# Patient Record
Sex: FEMALE | Race: WHITE
[De-identification: names, ages, dates, MRNs, and addresses within clinical notes are randomized per-mention and may not be internally consistent; named-entity substitution may affect disease eponyms.]

---

## 2017-06-14 ENCOUNTER — HOSPITAL ENCOUNTER (EMERGENCY)
Dept: HOSPITAL 49 - FER | Age: 52
LOS: 1 days | Discharge: HOME | End: 2017-06-15
Payer: COMMERCIAL

## 2017-06-14 DIAGNOSIS — Z88.5: ICD-10-CM

## 2017-06-14 DIAGNOSIS — Z79.84: ICD-10-CM

## 2017-06-14 DIAGNOSIS — Z79.4: ICD-10-CM

## 2017-06-14 DIAGNOSIS — Z79.899: ICD-10-CM

## 2017-06-14 DIAGNOSIS — E78.5: ICD-10-CM

## 2017-06-14 DIAGNOSIS — I82.432: ICD-10-CM

## 2017-06-14 DIAGNOSIS — I82.412: Primary | ICD-10-CM

## 2017-06-14 DIAGNOSIS — E11.9: ICD-10-CM

## 2017-06-14 DIAGNOSIS — Z88.6: ICD-10-CM

## 2017-06-14 LAB
BASOPHIL: 0.4 % (ref 0–2)
BUN SERPL-MCNC: 17 MG/DL (ref 6–25)
BUN/CREAT RATIO (CALC): 24 (ref 12.1–20.1)
CHLORIDE: 97 MMOL/L (ref 98–107)
CO2 (BICARBONATE): 24 MMOL/L (ref 23–33)
CREATININE: 0.7 MG/DL (ref 0.5–1)
EOSINOPHIL: 3 % (ref 0–5)
GLUCOSE SERPL-MCNC: 417 MG/DL (ref 70–105)
HCT: 38.4 % (ref 37–47)
HGB BLD-MCNC: 13.3 G/DL (ref 12.5–16)
LYMPHOCYTE: 28.8 % (ref 15–48)
MCH RBC QN AUTO: 30.8 PG (ref 25–31)
MCHC RBC AUTO-ENTMCNC: 34.6 G/DL (ref 32–36)
MCV: 88.9 FL (ref 78–100)
MONOCYTE: 10.5 % (ref 0–12)
MPV: 10.8 FL (ref 6–9.5)
NEUTROPHIL: 57.3 % (ref 41–80)
PLT: 166 K/UL (ref 150–400)
POTASSIUM: 4.1 MMOL/L (ref 3.5–5.1)
RBC MORPHOLOGY: NORMAL
RBC: 4.32 M/UL (ref 4.2–5.4)
RDW: 12.4 % (ref 11.5–14)
WBC: 7 K/UL (ref 4–10.5)

## 2017-09-25 ENCOUNTER — CONVERSION ENCOUNTER (OUTPATIENT)
Dept: OTHER | Facility: HOSPITAL | Age: 52
End: 2017-09-25

## 2018-01-23 ENCOUNTER — OFFICE VISIT CONVERTED (OUTPATIENT)
Dept: FAMILY MEDICINE CLINIC | Age: 53
End: 2018-01-23
Attending: NURSE PRACTITIONER

## 2018-02-05 ENCOUNTER — OFFICE VISIT CONVERTED (OUTPATIENT)
Dept: FAMILY MEDICINE CLINIC | Age: 53
End: 2018-02-05
Attending: NURSE PRACTITIONER

## 2018-02-07 ENCOUNTER — OFFICE VISIT CONVERTED (OUTPATIENT)
Dept: FAMILY MEDICINE CLINIC | Age: 53
End: 2018-02-07
Attending: FAMILY MEDICINE

## 2018-04-17 ENCOUNTER — OFFICE VISIT CONVERTED (OUTPATIENT)
Dept: FAMILY MEDICINE CLINIC | Age: 53
End: 2018-04-17
Attending: NURSE PRACTITIONER

## 2018-04-23 ENCOUNTER — OFFICE VISIT CONVERTED (OUTPATIENT)
Dept: FAMILY MEDICINE CLINIC | Age: 53
End: 2018-04-23
Attending: NURSE PRACTITIONER

## 2018-04-25 ENCOUNTER — OFFICE VISIT CONVERTED (OUTPATIENT)
Dept: FAMILY MEDICINE CLINIC | Age: 53
End: 2018-04-25
Attending: FAMILY MEDICINE

## 2018-05-01 ENCOUNTER — OFFICE VISIT CONVERTED (OUTPATIENT)
Dept: FAMILY MEDICINE CLINIC | Age: 53
End: 2018-05-01
Attending: FAMILY MEDICINE

## 2018-06-15 ENCOUNTER — OFFICE VISIT CONVERTED (OUTPATIENT)
Dept: FAMILY MEDICINE CLINIC | Age: 53
End: 2018-06-15
Attending: FAMILY MEDICINE

## 2018-09-04 ENCOUNTER — OFFICE VISIT CONVERTED (OUTPATIENT)
Dept: FAMILY MEDICINE CLINIC | Age: 53
End: 2018-09-04
Attending: NURSE PRACTITIONER

## 2018-09-17 ENCOUNTER — OFFICE VISIT CONVERTED (OUTPATIENT)
Dept: FAMILY MEDICINE CLINIC | Age: 53
End: 2018-09-17
Attending: FAMILY MEDICINE

## 2018-10-18 ENCOUNTER — OFFICE VISIT CONVERTED (OUTPATIENT)
Dept: FAMILY MEDICINE CLINIC | Age: 53
End: 2018-10-18
Attending: FAMILY MEDICINE

## 2018-11-27 ENCOUNTER — OFFICE VISIT CONVERTED (OUTPATIENT)
Dept: FAMILY MEDICINE CLINIC | Age: 53
End: 2018-11-27
Attending: NURSE PRACTITIONER

## 2018-12-03 ENCOUNTER — CONVERSION ENCOUNTER (OUTPATIENT)
Dept: OTHER | Facility: HOSPITAL | Age: 53
End: 2018-12-03

## 2018-12-03 ENCOUNTER — OFFICE VISIT CONVERTED (OUTPATIENT)
Dept: FAMILY MEDICINE CLINIC | Age: 53
End: 2018-12-03
Attending: FAMILY MEDICINE

## 2018-12-19 ENCOUNTER — OFFICE VISIT CONVERTED (OUTPATIENT)
Dept: FAMILY MEDICINE CLINIC | Age: 53
End: 2018-12-19
Attending: NURSE PRACTITIONER

## 2019-01-18 ENCOUNTER — HOSPITAL ENCOUNTER (OUTPATIENT)
Dept: OTHER | Facility: HOSPITAL | Age: 54
Discharge: HOME | End: 2019-01-18
Attending: NURSE PRACTITIONER

## 2019-01-18 ENCOUNTER — OFFICE VISIT CONVERTED (OUTPATIENT)
Dept: FAMILY MEDICINE CLINIC | Age: 54
End: 2019-01-18
Attending: NURSE PRACTITIONER

## 2019-01-20 LAB — BACTERIA SPEC AEROBE CULT: NORMAL

## 2019-02-25 ENCOUNTER — OFFICE VISIT CONVERTED (OUTPATIENT)
Dept: FAMILY MEDICINE CLINIC | Age: 54
End: 2019-02-25
Attending: NURSE PRACTITIONER

## 2019-03-06 ENCOUNTER — OFFICE VISIT CONVERTED (OUTPATIENT)
Dept: FAMILY MEDICINE CLINIC | Age: 54
End: 2019-03-06
Attending: FAMILY MEDICINE

## 2019-06-24 ENCOUNTER — HOSPITAL ENCOUNTER (OUTPATIENT)
Dept: OTHER | Facility: HOSPITAL | Age: 54
Discharge: HOME | End: 2019-06-24
Attending: FAMILY MEDICINE

## 2019-06-24 ENCOUNTER — OFFICE VISIT CONVERTED (OUTPATIENT)
Dept: FAMILY MEDICINE CLINIC | Age: 54
End: 2019-06-24
Attending: FAMILY MEDICINE

## 2019-06-24 LAB
ALBUMIN SERPL-MCNC: 3.7 G/DL (ref 3.5–5)
ALBUMIN/GLOB SERPL: 1.2 {RATIO} (ref 1.4–2.6)
ALP SERPL-CCNC: 132 U/L (ref 53–141)
ALT SERPL-CCNC: 42 U/L (ref 10–40)
ANION GAP SERPL CALC-SCNC: 15 MMOL/L (ref 8–19)
AST SERPL-CCNC: 34 U/L (ref 15–50)
BILIRUB SERPL-MCNC: 0.29 MG/DL (ref 0.2–1.3)
BUN SERPL-MCNC: 23 MG/DL (ref 5–25)
BUN/CREAT SERPL: 30 {RATIO} (ref 6–20)
CALCIUM SERPL-MCNC: 9.6 MG/DL (ref 8.7–10.4)
CHLORIDE SERPL-SCNC: 101 MMOL/L (ref 99–111)
CHOLEST SERPL-MCNC: 137 MG/DL (ref 107–200)
CHOLEST/HDLC SERPL: 4.7 {RATIO} (ref 3–6)
CONV CO2: 25 MMOL/L (ref 22–32)
CONV CREATININE URINE, RANDOM: 292.4 MG/DL (ref 10–300)
CONV MICROALBUM.,U,RANDOM: 37 MG/L (ref 0–20)
CONV TOTAL PROTEIN: 6.9 G/DL (ref 6.3–8.2)
CREAT UR-MCNC: 0.76 MG/DL (ref 0.5–0.9)
EST. AVERAGE GLUCOSE BLD GHB EST-MCNC: 266 MG/DL
GFR SERPLBLD BASED ON 1.73 SQ M-ARVRAT: >60 ML/MIN/{1.73_M2}
GLOBULIN UR ELPH-MCNC: 3.2 G/DL (ref 2–3.5)
GLUCOSE SERPL-MCNC: 238 MG/DL (ref 65–99)
HBA1C MFR BLD: 10.9 % (ref 3.5–5.7)
HDLC SERPL-MCNC: 29 MG/DL (ref 40–60)
LDLC SERPL CALC-MCNC: 70 MG/DL (ref 70–100)
MICROALBUMIN/CREAT UR: 12.7 MG/G{CRE} (ref 0–35)
OSMOLALITY SERPL CALC.SUM OF ELEC: 295 MOSM/KG (ref 273–304)
POTASSIUM SERPL-SCNC: 4.3 MMOL/L (ref 3.5–5.3)
SODIUM SERPL-SCNC: 137 MMOL/L (ref 135–147)
TRIGL SERPL-MCNC: 191 MG/DL (ref 40–150)
VLDLC SERPL-MCNC: 38 MG/DL (ref 5–37)

## 2019-07-08 ENCOUNTER — OFFICE VISIT CONVERTED (OUTPATIENT)
Dept: FAMILY MEDICINE CLINIC | Age: 54
End: 2019-07-08
Attending: NURSE PRACTITIONER

## 2019-07-08 ENCOUNTER — HOSPITAL ENCOUNTER (OUTPATIENT)
Dept: OTHER | Facility: HOSPITAL | Age: 54
Discharge: HOME | End: 2019-07-08
Attending: NURSE PRACTITIONER

## 2019-07-08 LAB
ANION GAP SERPL CALC-SCNC: 21 MMOL/L (ref 8–19)
BUN SERPL-MCNC: 28 MG/DL (ref 5–25)
BUN/CREAT SERPL: 25 {RATIO} (ref 6–20)
CALCIUM SERPL-MCNC: 9.3 MG/DL (ref 8.7–10.4)
CHLORIDE SERPL-SCNC: 101 MMOL/L (ref 99–111)
CONV CO2: 23 MMOL/L (ref 22–32)
CREAT UR-MCNC: 1.12 MG/DL (ref 0.5–0.9)
GFR SERPLBLD BASED ON 1.73 SQ M-ARVRAT: 55 ML/MIN/{1.73_M2}
GLUCOSE SERPL-MCNC: 204 MG/DL (ref 65–99)
OSMOLALITY SERPL CALC.SUM OF ELEC: 301 MOSM/KG (ref 273–304)
POTASSIUM SERPL-SCNC: 4.5 MMOL/L (ref 3.5–5.3)
SODIUM SERPL-SCNC: 140 MMOL/L (ref 135–147)

## 2019-07-29 ENCOUNTER — HOSPITAL ENCOUNTER (OUTPATIENT)
Dept: OTHER | Facility: HOSPITAL | Age: 54
Discharge: HOME | End: 2019-07-29
Attending: INTERNAL MEDICINE

## 2019-07-29 LAB
ANION GAP SERPL CALC-SCNC: 20 MMOL/L (ref 8–19)
BUN SERPL-MCNC: 25 MG/DL (ref 5–25)
BUN/CREAT SERPL: 25 {RATIO} (ref 6–20)
CALCIUM SERPL-MCNC: 9.4 MG/DL (ref 8.7–10.4)
CHLORIDE SERPL-SCNC: 102 MMOL/L (ref 99–111)
CONV CO2: 21 MMOL/L (ref 22–32)
CREAT UR-MCNC: 1 MG/DL (ref 0.5–0.9)
GFR SERPLBLD BASED ON 1.73 SQ M-ARVRAT: >60 ML/MIN/{1.73_M2}
GLUCOSE SERPL-MCNC: 244 MG/DL (ref 65–99)
OSMOLALITY SERPL CALC.SUM OF ELEC: 300 MOSM/KG (ref 273–304)
POTASSIUM SERPL-SCNC: 4.2 MMOL/L (ref 3.5–5.3)
SODIUM SERPL-SCNC: 139 MMOL/L (ref 135–147)

## 2019-07-31 ENCOUNTER — OFFICE VISIT CONVERTED (OUTPATIENT)
Dept: FAMILY MEDICINE CLINIC | Age: 54
End: 2019-07-31
Attending: NURSE PRACTITIONER

## 2019-08-22 ENCOUNTER — OFFICE VISIT CONVERTED (OUTPATIENT)
Dept: FAMILY MEDICINE CLINIC | Age: 54
End: 2019-08-22
Attending: NURSE PRACTITIONER

## 2019-08-22 ENCOUNTER — HOSPITAL ENCOUNTER (OUTPATIENT)
Dept: OTHER | Facility: HOSPITAL | Age: 54
Discharge: HOME | End: 2019-08-22
Attending: NURSE PRACTITIONER

## 2019-09-30 ENCOUNTER — OFFICE VISIT CONVERTED (OUTPATIENT)
Dept: FAMILY MEDICINE CLINIC | Age: 54
End: 2019-09-30
Attending: FAMILY MEDICINE

## 2019-10-02 ENCOUNTER — HOSPITAL ENCOUNTER (OUTPATIENT)
Dept: OTHER | Facility: HOSPITAL | Age: 54
Discharge: HOME | End: 2019-10-02
Attending: FAMILY MEDICINE

## 2019-10-02 LAB
25(OH)D3 SERPL-MCNC: 56.7 NG/ML (ref 30–100)
ABO GROUP BLD: NORMAL
ALBUMIN SERPL-MCNC: 3.9 G/DL (ref 3.5–5)
ALBUMIN/GLOB SERPL: 1 {RATIO} (ref 1.4–2.6)
ALP SERPL-CCNC: 131 U/L (ref 53–141)
ALT SERPL-CCNC: 34 U/L (ref 10–40)
ANION GAP SERPL CALC-SCNC: 18 MMOL/L (ref 8–19)
AST SERPL-CCNC: 29 U/L (ref 15–50)
BILIRUB SERPL-MCNC: 0.23 MG/DL (ref 0.2–1.3)
BUN SERPL-MCNC: 21 MG/DL (ref 5–25)
BUN/CREAT SERPL: 25 {RATIO} (ref 6–20)
CALCIUM SERPL-MCNC: 9.8 MG/DL (ref 8.7–10.4)
CHLORIDE SERPL-SCNC: 107 MMOL/L (ref 99–111)
CHOLEST SERPL-MCNC: 148 MG/DL (ref 107–200)
CHOLEST/HDLC SERPL: 4.1 {RATIO} (ref 3–6)
CONV ABD CONTROL: NORMAL
CONV CO2: 23 MMOL/L (ref 22–32)
CONV CREATININE URINE, RANDOM: 60.9 MG/DL (ref 10–300)
CONV MICROALBUM.,U,RANDOM: <12 MG/L (ref 0–20)
CONV TOTAL PROTEIN: 7.7 G/DL (ref 6.3–8.2)
CREAT UR-MCNC: 0.85 MG/DL (ref 0.5–0.9)
EST. AVERAGE GLUCOSE BLD GHB EST-MCNC: 171 MG/DL
GFR SERPLBLD BASED ON 1.73 SQ M-ARVRAT: >60 ML/MIN/{1.73_M2}
GLOBULIN UR ELPH-MCNC: 3.8 G/DL (ref 2–3.5)
GLUCOSE SERPL-MCNC: 116 MG/DL (ref 65–99)
HBA1C MFR BLD: 7.6 % (ref 3.5–5.7)
HDLC SERPL-MCNC: 36 MG/DL (ref 40–60)
LDLC SERPL CALC-MCNC: 80 MG/DL (ref 70–100)
MICROALBUMIN/CREAT UR: 19.7 MG/G{CRE} (ref 0–35)
OSMOLALITY SERPL CALC.SUM OF ELEC: 302 MOSM/KG (ref 273–304)
POTASSIUM SERPL-SCNC: 4.2 MMOL/L (ref 3.5–5.3)
RH BLD: NORMAL
SODIUM SERPL-SCNC: 144 MMOL/L (ref 135–147)
TRIGL SERPL-MCNC: 159 MG/DL (ref 40–150)
VLDLC SERPL-MCNC: 32 MG/DL (ref 5–37)

## 2019-10-18 ENCOUNTER — OFFICE VISIT CONVERTED (OUTPATIENT)
Dept: FAMILY MEDICINE CLINIC | Age: 54
End: 2019-10-18
Attending: NURSE PRACTITIONER

## 2019-12-18 ENCOUNTER — HOSPITAL ENCOUNTER (OUTPATIENT)
Dept: OTHER | Facility: HOSPITAL | Age: 54
Discharge: HOME | End: 2019-12-18
Attending: NURSE PRACTITIONER

## 2019-12-18 ENCOUNTER — OFFICE VISIT CONVERTED (OUTPATIENT)
Dept: FAMILY MEDICINE CLINIC | Age: 54
End: 2019-12-18
Attending: NURSE PRACTITIONER

## 2019-12-21 LAB — BACTERIA SPEC AEROBE CULT: NORMAL

## 2019-12-27 ENCOUNTER — HOSPITAL ENCOUNTER (OUTPATIENT)
Dept: OTHER | Facility: HOSPITAL | Age: 54
Discharge: HOME | End: 2019-12-27
Attending: FAMILY MEDICINE

## 2020-01-02 ENCOUNTER — HOSPITAL ENCOUNTER (OUTPATIENT)
Dept: OTHER | Facility: HOSPITAL | Age: 55
Discharge: HOME | End: 2020-01-02
Attending: FAMILY MEDICINE

## 2020-01-06 ENCOUNTER — HOSPITAL ENCOUNTER (OUTPATIENT)
Dept: OTHER | Facility: HOSPITAL | Age: 55
Discharge: HOME | End: 2020-01-06
Attending: NURSE PRACTITIONER

## 2020-01-06 ENCOUNTER — OFFICE VISIT CONVERTED (OUTPATIENT)
Dept: FAMILY MEDICINE CLINIC | Age: 55
End: 2020-01-06
Attending: NURSE PRACTITIONER

## 2020-01-23 ENCOUNTER — OFFICE VISIT CONVERTED (OUTPATIENT)
Dept: FAMILY MEDICINE CLINIC | Age: 55
End: 2020-01-23
Attending: FAMILY MEDICINE

## 2020-01-23 ENCOUNTER — HOSPITAL ENCOUNTER (OUTPATIENT)
Dept: OTHER | Facility: HOSPITAL | Age: 55
Discharge: HOME | End: 2020-01-23
Attending: FAMILY MEDICINE

## 2020-01-24 LAB
ALP SERPL-CCNC: 115 U/L (ref 53–141)
ASO AB SERPL-ACNC: 172 [IU]/ML (ref 0–200)
CALCIUM SERPL-MCNC: 9.3 MG/DL (ref 8.7–10.4)
CONV RHEUMATOID FACTOR IGM: 94.3 [IU]/ML (ref 0–14)
CRP SERPL-MCNC: 10.1 MG/L (ref 0–5)
DSDNA AB SER-ACNC: NEGATIVE [IU]/ML
ENA AB SER IA-ACNC: NEGATIVE {RATIO}
ERYTHROCYTE [SEDIMENTATION RATE] IN BLOOD: 54 MM/H (ref 0–30)
PHOSPHATE SERPL-MCNC: 4.5 MG/DL (ref 2.4–4.5)
URATE SERPL-MCNC: 4.6 MG/DL (ref 2.5–7.5)

## 2020-03-02 ENCOUNTER — HOSPITAL ENCOUNTER (OUTPATIENT)
Dept: OTHER | Facility: HOSPITAL | Age: 55
Discharge: HOME | End: 2020-03-02

## 2020-03-02 LAB
ALBUMIN SERPL-MCNC: 3.9 G/DL (ref 3.5–5)
ALBUMIN/GLOB SERPL: 1.1 {RATIO} (ref 1.4–2.6)
ALP SERPL-CCNC: 110 U/L (ref 53–141)
ALT SERPL-CCNC: 22 U/L (ref 10–40)
ANION GAP SERPL CALC-SCNC: 18 MMOL/L (ref 8–19)
AST SERPL-CCNC: 21 U/L (ref 15–50)
BILIRUB SERPL-MCNC: 0.19 MG/DL (ref 0.2–1.3)
BUN SERPL-MCNC: 25 MG/DL (ref 5–25)
BUN/CREAT SERPL: 27 {RATIO} (ref 6–20)
CALCIUM SERPL-MCNC: 9.7 MG/DL (ref 8.7–10.4)
CHLORIDE SERPL-SCNC: 105 MMOL/L (ref 99–111)
CONV CO2: 21 MMOL/L (ref 22–32)
CONV TOTAL PROTEIN: 7.5 G/DL (ref 6.3–8.2)
CREAT UR-MCNC: 0.91 MG/DL (ref 0.5–0.9)
EST. AVERAGE GLUCOSE BLD GHB EST-MCNC: 169 MG/DL
GFR SERPLBLD BASED ON 1.73 SQ M-ARVRAT: >60 ML/MIN/{1.73_M2}
GLOBULIN UR ELPH-MCNC: 3.6 G/DL (ref 2–3.5)
GLUCOSE SERPL-MCNC: 152 MG/DL (ref 65–99)
HBA1C MFR BLD: 7.5 % (ref 3.5–5.7)
OSMOLALITY SERPL CALC.SUM OF ELEC: 297 MOSM/KG (ref 273–304)
POTASSIUM SERPL-SCNC: 4.3 MMOL/L (ref 3.5–5.3)
SODIUM SERPL-SCNC: 140 MMOL/L (ref 135–147)

## 2020-03-23 ENCOUNTER — OFFICE VISIT CONVERTED (OUTPATIENT)
Dept: FAMILY MEDICINE CLINIC | Age: 55
End: 2020-03-23
Attending: FAMILY MEDICINE

## 2020-03-24 ENCOUNTER — HOSPITAL ENCOUNTER (OUTPATIENT)
Dept: OTHER | Facility: HOSPITAL | Age: 55
Discharge: HOME | End: 2020-03-24
Attending: FAMILY MEDICINE

## 2020-03-24 LAB
ALBUMIN SERPL-MCNC: 3.8 G/DL (ref 3.5–5)
ALBUMIN/GLOB SERPL: 1.1 {RATIO} (ref 1.4–2.6)
ALP SERPL-CCNC: 125 U/L (ref 53–141)
ALT SERPL-CCNC: 20 U/L (ref 10–40)
ANION GAP SERPL CALC-SCNC: 16 MMOL/L (ref 8–19)
AST SERPL-CCNC: 17 U/L (ref 15–50)
BILIRUB SERPL-MCNC: 0.23 MG/DL (ref 0.2–1.3)
BUN SERPL-MCNC: 22 MG/DL (ref 5–25)
BUN/CREAT SERPL: 26 {RATIO} (ref 6–20)
CALCIUM SERPL-MCNC: 9.6 MG/DL (ref 8.7–10.4)
CHLORIDE SERPL-SCNC: 103 MMOL/L (ref 99–111)
CHOLEST SERPL-MCNC: 153 MG/DL (ref 107–200)
CHOLEST/HDLC SERPL: 4.6 {RATIO} (ref 3–6)
CONV CO2: 24 MMOL/L (ref 22–32)
CONV TOTAL PROTEIN: 7.4 G/DL (ref 6.3–8.2)
CREAT UR-MCNC: 0.85 MG/DL (ref 0.5–0.9)
GFR SERPLBLD BASED ON 1.73 SQ M-ARVRAT: >60 ML/MIN/{1.73_M2}
GLOBULIN UR ELPH-MCNC: 3.6 G/DL (ref 2–3.5)
GLUCOSE SERPL-MCNC: 193 MG/DL (ref 65–99)
HDLC SERPL-MCNC: 33 MG/DL (ref 40–60)
LDLC SERPL CALC-MCNC: 62 MG/DL (ref 70–100)
OSMOLALITY SERPL CALC.SUM OF ELEC: 297 MOSM/KG (ref 273–304)
POTASSIUM SERPL-SCNC: 4.3 MMOL/L (ref 3.5–5.3)
SODIUM SERPL-SCNC: 139 MMOL/L (ref 135–147)
TRIGL SERPL-MCNC: 288 MG/DL (ref 40–150)
VLDLC SERPL-MCNC: 58 MG/DL (ref 5–37)

## 2020-05-02 ENCOUNTER — OFFICE VISIT CONVERTED (OUTPATIENT)
Dept: FAMILY MEDICINE CLINIC | Age: 55
End: 2020-05-02
Attending: NURSE PRACTITIONER

## 2020-07-06 ENCOUNTER — HOSPITAL ENCOUNTER (OUTPATIENT)
Dept: OTHER | Facility: HOSPITAL | Age: 55
Discharge: HOME | End: 2020-07-06
Attending: INTERNAL MEDICINE

## 2020-07-07 LAB
ALBUMIN SERPL-MCNC: 3.8 G/DL (ref 3.5–5)
ALBUMIN/GLOB SERPL: 1.3 {RATIO} (ref 1.4–2.6)
ALP SERPL-CCNC: 93 U/L (ref 53–141)
ALT SERPL-CCNC: 23 U/L (ref 10–40)
ANION GAP SERPL CALC-SCNC: 17 MMOL/L (ref 8–19)
AST SERPL-CCNC: 20 U/L (ref 15–50)
BILIRUB SERPL-MCNC: <0.15 MG/DL (ref 0.2–1.3)
BUN SERPL-MCNC: 16 MG/DL (ref 5–25)
BUN/CREAT SERPL: 18 {RATIO} (ref 6–20)
CALCIUM SERPL-MCNC: 9.3 MG/DL (ref 8.7–10.4)
CHLORIDE SERPL-SCNC: 108 MMOL/L (ref 99–111)
CHOLEST SERPL-MCNC: 139 MG/DL (ref 107–200)
CHOLEST/HDLC SERPL: 4.5 {RATIO} (ref 3–6)
CONV CO2: 17 MMOL/L (ref 22–32)
CONV CREATININE URINE, RANDOM: 74.6 MG/DL (ref 10–300)
CONV MICROALBUM.,U,RANDOM: <12 MG/L (ref 0–20)
CONV TOTAL PROTEIN: 6.7 G/DL (ref 6.3–8.2)
CREAT UR-MCNC: 0.91 MG/DL (ref 0.5–0.9)
EST. AVERAGE GLUCOSE BLD GHB EST-MCNC: 171 MG/DL
GFR SERPLBLD BASED ON 1.73 SQ M-ARVRAT: >60 ML/MIN/{1.73_M2}
GLOBULIN UR ELPH-MCNC: 2.9 G/DL (ref 2–3.5)
GLUCOSE SERPL-MCNC: 175 MG/DL (ref 65–99)
HBA1C MFR BLD: 7.6 % (ref 3.5–5.7)
HDLC SERPL-MCNC: 31 MG/DL (ref 40–60)
LDLC SERPL CALC-MCNC: 53 MG/DL (ref 70–100)
MICROALBUMIN/CREAT UR: 16.1 MG/G{CRE} (ref 0–35)
OSMOLALITY SERPL CALC.SUM OF ELEC: 291 MOSM/KG (ref 273–304)
POTASSIUM SERPL-SCNC: 4 MMOL/L (ref 3.5–5.3)
SODIUM SERPL-SCNC: 138 MMOL/L (ref 135–147)
TRIGL SERPL-MCNC: 275 MG/DL (ref 40–150)
VLDLC SERPL-MCNC: 55 MG/DL (ref 5–37)

## 2020-07-13 ENCOUNTER — OFFICE VISIT CONVERTED (OUTPATIENT)
Dept: FAMILY MEDICINE CLINIC | Age: 55
End: 2020-07-13
Attending: FAMILY MEDICINE

## 2020-11-02 ENCOUNTER — OFFICE VISIT CONVERTED (OUTPATIENT)
Dept: FAMILY MEDICINE CLINIC | Age: 55
End: 2020-11-02
Attending: FAMILY MEDICINE

## 2020-12-07 ENCOUNTER — OFFICE VISIT CONVERTED (OUTPATIENT)
Dept: FAMILY MEDICINE CLINIC | Age: 55
End: 2020-12-07

## 2020-12-07 ENCOUNTER — HOSPITAL ENCOUNTER (OUTPATIENT)
Dept: OTHER | Facility: HOSPITAL | Age: 55
Discharge: HOME | End: 2020-12-07
Attending: NURSE PRACTITIONER

## 2020-12-10 LAB — SARS-COV-2 RNA SPEC QL NAA+PROBE: DETECTED

## 2020-12-13 ENCOUNTER — APPOINTMENT (OUTPATIENT)
Dept: GENERAL RADIOLOGY | Facility: HOSPITAL | Age: 55
End: 2020-12-13

## 2020-12-13 ENCOUNTER — HOSPITAL ENCOUNTER (OUTPATIENT)
Facility: HOSPITAL | Age: 55
Setting detail: OBSERVATION
Discharge: HOME | End: 2020-12-15
Attending: EMERGENCY MEDICINE | Admitting: INTERNAL MEDICINE

## 2020-12-13 ENCOUNTER — APPOINTMENT (OUTPATIENT)
Dept: CT IMAGING | Facility: HOSPITAL | Age: 55
End: 2020-12-13

## 2020-12-13 DIAGNOSIS — R09.02 HYPOXIA: Primary | ICD-10-CM

## 2020-12-13 DIAGNOSIS — U07.1 PNEUMONIA DUE TO COVID-19 VIRUS: ICD-10-CM

## 2020-12-13 DIAGNOSIS — J12.82 PNEUMONIA DUE TO COVID-19 VIRUS: ICD-10-CM

## 2020-12-13 LAB
ALBUMIN SERPL-MCNC: 3.8 G/DL (ref 3.5–5.2)
ALBUMIN/GLOB SERPL: 1.2 G/DL
ALP SERPL-CCNC: 107 U/L (ref 39–117)
ALT SERPL W P-5'-P-CCNC: 22 U/L (ref 1–33)
ANION GAP SERPL CALCULATED.3IONS-SCNC: 14.3 MMOL/L (ref 5–15)
AST SERPL-CCNC: 23 U/L (ref 1–32)
B PARAPERT DNA SPEC QL NAA+PROBE: NOT DETECTED
B PERT DNA SPEC QL NAA+PROBE: NOT DETECTED
BASOPHILS # BLD AUTO: 0.01 10*3/MM3 (ref 0–0.2)
BASOPHILS NFR BLD AUTO: 0.2 % (ref 0–1.5)
BILIRUB SERPL-MCNC: 0.5 MG/DL (ref 0–1.2)
BUN SERPL-MCNC: 24 MG/DL (ref 6–20)
BUN/CREAT SERPL: 22.9 (ref 7–25)
C PNEUM DNA NPH QL NAA+NON-PROBE: NOT DETECTED
CALCIUM SPEC-SCNC: 8.8 MG/DL (ref 8.6–10.5)
CHLORIDE SERPL-SCNC: 103 MMOL/L (ref 98–107)
CO2 SERPL-SCNC: 19.7 MMOL/L (ref 22–29)
CREAT SERPL-MCNC: 1.05 MG/DL (ref 0.57–1)
D-LACTATE SERPL-SCNC: 1.1 MMOL/L (ref 0.5–2)
DEPRECATED RDW RBC AUTO: 42.9 FL (ref 37–54)
EOSINOPHIL # BLD AUTO: 0.02 10*3/MM3 (ref 0–0.4)
EOSINOPHIL NFR BLD AUTO: 0.5 % (ref 0.3–6.2)
ERYTHROCYTE [DISTWIDTH] IN BLOOD BY AUTOMATED COUNT: 12.6 % (ref 12.3–15.4)
FLUAV SUBTYP SPEC NAA+PROBE: NOT DETECTED
FLUBV RNA ISLT QL NAA+PROBE: NOT DETECTED
GFR SERPL CREATININE-BSD FRML MDRD: 54 ML/MIN/1.73
GLOBULIN UR ELPH-MCNC: 3.3 GM/DL
GLUCOSE BLDC GLUCOMTR-MCNC: 315 MG/DL (ref 70–130)
GLUCOSE SERPL-MCNC: 175 MG/DL (ref 65–99)
HADV DNA SPEC NAA+PROBE: NOT DETECTED
HBA1C MFR BLD: 7.7 % (ref 4.8–5.6)
HCOV 229E RNA SPEC QL NAA+PROBE: NOT DETECTED
HCOV HKU1 RNA SPEC QL NAA+PROBE: NOT DETECTED
HCOV NL63 RNA SPEC QL NAA+PROBE: NOT DETECTED
HCOV OC43 RNA SPEC QL NAA+PROBE: NOT DETECTED
HCT VFR BLD AUTO: 42.7 % (ref 34–46.6)
HGB BLD-MCNC: 13.9 G/DL (ref 12–15.9)
HMPV RNA NPH QL NAA+NON-PROBE: NOT DETECTED
HOLD SPECIMEN: NORMAL
HOLD SPECIMEN: NORMAL
HPIV1 RNA SPEC QL NAA+PROBE: NOT DETECTED
HPIV2 RNA SPEC QL NAA+PROBE: NOT DETECTED
HPIV3 RNA NPH QL NAA+PROBE: NOT DETECTED
HPIV4 P GENE NPH QL NAA+PROBE: NOT DETECTED
IMM GRANULOCYTES # BLD AUTO: 0.04 10*3/MM3 (ref 0–0.05)
IMM GRANULOCYTES NFR BLD AUTO: 1 % (ref 0–0.5)
INR PPP: 1.04 (ref 0.9–1.1)
LYMPHOCYTES # BLD AUTO: 1.32 10*3/MM3 (ref 0.7–3.1)
LYMPHOCYTES NFR BLD AUTO: 31.7 % (ref 19.6–45.3)
M PNEUMO IGG SER IA-ACNC: NOT DETECTED
MCH RBC QN AUTO: 30.1 PG (ref 26.6–33)
MCHC RBC AUTO-ENTMCNC: 32.6 G/DL (ref 31.5–35.7)
MCV RBC AUTO: 92.4 FL (ref 79–97)
MONOCYTES # BLD AUTO: 0.52 10*3/MM3 (ref 0.1–0.9)
MONOCYTES NFR BLD AUTO: 12.5 % (ref 5–12)
NEUTROPHILS NFR BLD AUTO: 2.25 10*3/MM3 (ref 1.7–7)
NEUTROPHILS NFR BLD AUTO: 54.1 % (ref 42.7–76)
NRBC BLD AUTO-RTO: 0 /100 WBC (ref 0–0.2)
NT-PROBNP SERPL-MCNC: 60.6 PG/ML (ref 0–900)
PLATELET # BLD AUTO: 166 10*3/MM3 (ref 140–450)
PMV BLD AUTO: 11.2 FL (ref 6–12)
POTASSIUM SERPL-SCNC: 3.8 MMOL/L (ref 3.5–5.2)
PROCALCITONIN SERPL-MCNC: 0.08 NG/ML (ref 0–0.25)
PROT SERPL-MCNC: 7.1 G/DL (ref 6–8.5)
PROTHROMBIN TIME: 13.4 SECONDS (ref 11.7–14.2)
RBC # BLD AUTO: 4.62 10*6/MM3 (ref 3.77–5.28)
RHINOVIRUS RNA SPEC NAA+PROBE: NOT DETECTED
RSV RNA NPH QL NAA+NON-PROBE: NOT DETECTED
SARS-COV-2 RNA NPH QL NAA+NON-PROBE: DETECTED
SODIUM SERPL-SCNC: 137 MMOL/L (ref 136–145)
TROPONIN T SERPL-MCNC: <0.01 NG/ML (ref 0–0.03)
WBC # BLD AUTO: 4.16 10*3/MM3 (ref 3.4–10.8)
WHOLE BLOOD HOLD SPECIMEN: NORMAL
WHOLE BLOOD HOLD SPECIMEN: NORMAL

## 2020-12-13 PROCEDURE — 85610 PROTHROMBIN TIME: CPT | Performed by: EMERGENCY MEDICINE

## 2020-12-13 PROCEDURE — 96372 THER/PROPH/DIAG INJ SC/IM: CPT

## 2020-12-13 PROCEDURE — 93010 ELECTROCARDIOGRAM REPORT: CPT | Performed by: INTERNAL MEDICINE

## 2020-12-13 PROCEDURE — G0378 HOSPITAL OBSERVATION PER HR: HCPCS

## 2020-12-13 PROCEDURE — 94799 UNLISTED PULMONARY SVC/PX: CPT

## 2020-12-13 PROCEDURE — 83880 ASSAY OF NATRIURETIC PEPTIDE: CPT | Performed by: EMERGENCY MEDICINE

## 2020-12-13 PROCEDURE — 99284 EMERGENCY DEPT VISIT MOD MDM: CPT

## 2020-12-13 PROCEDURE — 96374 THER/PROPH/DIAG INJ IV PUSH: CPT

## 2020-12-13 PROCEDURE — 82306 VITAMIN D 25 HYDROXY: CPT | Performed by: INTERNAL MEDICINE

## 2020-12-13 PROCEDURE — 25010000002 ONDANSETRON PER 1 MG: Performed by: EMERGENCY MEDICINE

## 2020-12-13 PROCEDURE — 96375 TX/PRO/DX INJ NEW DRUG ADDON: CPT

## 2020-12-13 PROCEDURE — 63710000001 INSULIN GLARGINE PER 5 UNITS: Performed by: INTERNAL MEDICINE

## 2020-12-13 PROCEDURE — 82962 GLUCOSE BLOOD TEST: CPT

## 2020-12-13 PROCEDURE — 93005 ELECTROCARDIOGRAM TRACING: CPT | Performed by: EMERGENCY MEDICINE

## 2020-12-13 PROCEDURE — 84145 PROCALCITONIN (PCT): CPT | Performed by: EMERGENCY MEDICINE

## 2020-12-13 PROCEDURE — 0202U NFCT DS 22 TRGT SARS-COV-2: CPT | Performed by: EMERGENCY MEDICINE

## 2020-12-13 PROCEDURE — 80053 COMPREHEN METABOLIC PANEL: CPT | Performed by: EMERGENCY MEDICINE

## 2020-12-13 PROCEDURE — 83036 HEMOGLOBIN GLYCOSYLATED A1C: CPT | Performed by: INTERNAL MEDICINE

## 2020-12-13 PROCEDURE — 85025 COMPLETE CBC W/AUTO DIFF WBC: CPT | Performed by: EMERGENCY MEDICINE

## 2020-12-13 PROCEDURE — 84484 ASSAY OF TROPONIN QUANT: CPT | Performed by: EMERGENCY MEDICINE

## 2020-12-13 PROCEDURE — 87040 BLOOD CULTURE FOR BACTERIA: CPT | Performed by: EMERGENCY MEDICINE

## 2020-12-13 PROCEDURE — 71275 CT ANGIOGRAPHY CHEST: CPT

## 2020-12-13 PROCEDURE — 83605 ASSAY OF LACTIC ACID: CPT | Performed by: EMERGENCY MEDICINE

## 2020-12-13 PROCEDURE — 25010000002 DEXAMETHASONE SODIUM PHOSPHATE 10 MG/ML SOLUTION: Performed by: EMERGENCY MEDICINE

## 2020-12-13 PROCEDURE — 25010000002 ENOXAPARIN PER 10 MG: Performed by: INTERNAL MEDICINE

## 2020-12-13 PROCEDURE — 71045 X-RAY EXAM CHEST 1 VIEW: CPT

## 2020-12-13 PROCEDURE — 0 IOPAMIDOL PER 1 ML: Performed by: EMERGENCY MEDICINE

## 2020-12-13 RX ORDER — DEXAMETHASONE SODIUM PHOSPHATE 10 MG/ML
6 INJECTION, SOLUTION INTRAMUSCULAR; INTRAVENOUS DAILY
Status: DISCONTINUED | OUTPATIENT
Start: 2020-12-14 | End: 2020-12-14

## 2020-12-13 RX ORDER — DULAGLUTIDE 1.5 MG/.5ML
4.5 INJECTION, SOLUTION SUBCUTANEOUS
COMMUNITY

## 2020-12-13 RX ORDER — ONDANSETRON 2 MG/ML
4 INJECTION INTRAMUSCULAR; INTRAVENOUS EVERY 6 HOURS PRN
Status: DISCONTINUED | OUTPATIENT
Start: 2020-12-13 | End: 2020-12-15 | Stop reason: HOSPADM

## 2020-12-13 RX ORDER — TOPIRAMATE 100 MG/1
100 TABLET, FILM COATED ORAL 2 TIMES DAILY
Status: DISCONTINUED | OUTPATIENT
Start: 2020-12-13 | End: 2020-12-15 | Stop reason: HOSPADM

## 2020-12-13 RX ORDER — PANTOPRAZOLE SODIUM 40 MG/1
40 TABLET, DELAYED RELEASE ORAL EVERY MORNING
Status: DISCONTINUED | OUTPATIENT
Start: 2020-12-14 | End: 2020-12-15 | Stop reason: HOSPADM

## 2020-12-13 RX ORDER — BUDESONIDE AND FORMOTEROL FUMARATE DIHYDRATE 160; 4.5 UG/1; UG/1
2 AEROSOL RESPIRATORY (INHALATION)
COMMUNITY

## 2020-12-13 RX ORDER — ALENDRONATE SODIUM 70 MG/1
70 TABLET ORAL
COMMUNITY
End: 2022-02-16 | Stop reason: SDUPTHER

## 2020-12-13 RX ORDER — CETIRIZINE HYDROCHLORIDE 10 MG/1
10 TABLET ORAL DAILY
COMMUNITY

## 2020-12-13 RX ORDER — SODIUM CHLORIDE 0.9 % (FLUSH) 0.9 %
10 SYRINGE (ML) INJECTION AS NEEDED
Status: DISCONTINUED | OUTPATIENT
Start: 2020-12-13 | End: 2020-12-15 | Stop reason: HOSPADM

## 2020-12-13 RX ORDER — ACETAMINOPHEN 325 MG/1
650 TABLET ORAL EVERY 4 HOURS PRN
Status: DISCONTINUED | OUTPATIENT
Start: 2020-12-13 | End: 2020-12-15 | Stop reason: HOSPADM

## 2020-12-13 RX ORDER — UREA 10 %
3 LOTION (ML) TOPICAL NIGHTLY PRN
Status: DISCONTINUED | OUTPATIENT
Start: 2020-12-13 | End: 2020-12-15 | Stop reason: HOSPADM

## 2020-12-13 RX ORDER — TRAZODONE HYDROCHLORIDE 100 MG/1
100 TABLET ORAL NIGHTLY
Status: DISCONTINUED | OUTPATIENT
Start: 2020-12-13 | End: 2020-12-15 | Stop reason: HOSPADM

## 2020-12-13 RX ORDER — DEXTROSE MONOHYDRATE 25 G/50ML
25 INJECTION, SOLUTION INTRAVENOUS
Status: DISCONTINUED | OUTPATIENT
Start: 2020-12-13 | End: 2020-12-15 | Stop reason: HOSPADM

## 2020-12-13 RX ORDER — MELATONIN
2000 DAILY
Status: DISCONTINUED | OUTPATIENT
Start: 2020-12-14 | End: 2020-12-15 | Stop reason: HOSPADM

## 2020-12-13 RX ORDER — ESTRADIOL 0.5 MG/1
0.5 TABLET ORAL DAILY
COMMUNITY
End: 2021-07-01 | Stop reason: SDUPTHER

## 2020-12-13 RX ORDER — PROMETHAZINE HYDROCHLORIDE 25 MG/1
25 TABLET ORAL EVERY 6 HOURS PRN
COMMUNITY
End: 2023-02-01 | Stop reason: SDUPTHER

## 2020-12-13 RX ORDER — EPINEPHRINE 0.3 MG/.3ML
INJECTION SUBCUTANEOUS
COMMUNITY
End: 2021-12-28 | Stop reason: SDUPTHER

## 2020-12-13 RX ORDER — INSULIN GLARGINE 100 [IU]/ML
120 INJECTION, SOLUTION SUBCUTANEOUS 2 TIMES DAILY
Status: DISCONTINUED | OUTPATIENT
Start: 2020-12-13 | End: 2020-12-14

## 2020-12-13 RX ORDER — OMEPRAZOLE 40 MG/1
40 CAPSULE, DELAYED RELEASE ORAL DAILY
COMMUNITY
End: 2021-10-26 | Stop reason: SDUPTHER

## 2020-12-13 RX ORDER — NICOTINE POLACRILEX 4 MG
15 LOZENGE BUCCAL
Status: DISCONTINUED | OUTPATIENT
Start: 2020-12-13 | End: 2020-12-15 | Stop reason: HOSPADM

## 2020-12-13 RX ORDER — ATORVASTATIN CALCIUM 10 MG/1
10 TABLET, FILM COATED ORAL NIGHTLY
COMMUNITY
End: 2021-07-01 | Stop reason: SDUPTHER

## 2020-12-13 RX ORDER — ONDANSETRON 4 MG/1
4 TABLET, FILM COATED ORAL EVERY 6 HOURS PRN
Status: DISCONTINUED | OUTPATIENT
Start: 2020-12-13 | End: 2020-12-15 | Stop reason: HOSPADM

## 2020-12-13 RX ORDER — BUDESONIDE AND FORMOTEROL FUMARATE DIHYDRATE 160; 4.5 UG/1; UG/1
2 AEROSOL RESPIRATORY (INHALATION)
Status: DISCONTINUED | OUTPATIENT
Start: 2020-12-13 | End: 2020-12-15 | Stop reason: HOSPADM

## 2020-12-13 RX ORDER — TRAZODONE HYDROCHLORIDE 100 MG/1
100 TABLET ORAL NIGHTLY
COMMUNITY
End: 2021-07-01 | Stop reason: SDUPTHER

## 2020-12-13 RX ORDER — DEXAMETHASONE SODIUM PHOSPHATE 10 MG/ML
6 INJECTION, SOLUTION INTRAMUSCULAR; INTRAVENOUS ONCE
Status: COMPLETED | OUTPATIENT
Start: 2020-12-13 | End: 2020-12-13

## 2020-12-13 RX ORDER — ATORVASTATIN CALCIUM 20 MG/1
10 TABLET, FILM COATED ORAL NIGHTLY
Status: DISCONTINUED | OUTPATIENT
Start: 2020-12-13 | End: 2020-12-15 | Stop reason: HOSPADM

## 2020-12-13 RX ORDER — ALBUTEROL SULFATE 90 UG/1
2 AEROSOL, METERED RESPIRATORY (INHALATION)
Status: DISCONTINUED | OUTPATIENT
Start: 2020-12-13 | End: 2020-12-15 | Stop reason: HOSPADM

## 2020-12-13 RX ORDER — TOPIRAMATE 100 MG/1
100 TABLET, FILM COATED ORAL 2 TIMES DAILY
COMMUNITY
End: 2021-07-01 | Stop reason: SDUPTHER

## 2020-12-13 RX ORDER — MONTELUKAST SODIUM 10 MG/1
10 TABLET ORAL NIGHTLY
COMMUNITY
End: 2021-12-14

## 2020-12-13 RX ORDER — MONTELUKAST SODIUM 10 MG/1
10 TABLET ORAL NIGHTLY
Status: DISCONTINUED | OUTPATIENT
Start: 2020-12-13 | End: 2020-12-14

## 2020-12-13 RX ORDER — NAPROXEN SODIUM 220 MG
220 TABLET ORAL 2 TIMES DAILY PRN
COMMUNITY
End: 2022-06-23

## 2020-12-13 RX ORDER — NITROGLYCERIN 0.4 MG/1
0.4 TABLET SUBLINGUAL
Status: DISCONTINUED | OUTPATIENT
Start: 2020-12-13 | End: 2020-12-15 | Stop reason: HOSPADM

## 2020-12-13 RX ORDER — ALBUTEROL SULFATE 90 UG/1
2 AEROSOL, METERED RESPIRATORY (INHALATION) EVERY 4 HOURS PRN
COMMUNITY

## 2020-12-13 RX ORDER — ALBUTEROL SULFATE 2.5 MG/3ML
2.5 SOLUTION RESPIRATORY (INHALATION) EVERY 4 HOURS PRN
COMMUNITY

## 2020-12-13 RX ORDER — DILTIAZEM HYDROCHLORIDE 120 MG/1
120 CAPSULE, COATED, EXTENDED RELEASE ORAL DAILY
Status: DISCONTINUED | OUTPATIENT
Start: 2020-12-14 | End: 2020-12-15 | Stop reason: HOSPADM

## 2020-12-13 RX ORDER — DILTIAZEM HYDROCHLORIDE 120 MG/1
120 CAPSULE, COATED, EXTENDED RELEASE ORAL DAILY
COMMUNITY
End: 2022-02-01 | Stop reason: SDUPTHER

## 2020-12-13 RX ORDER — CETIRIZINE HYDROCHLORIDE 10 MG/1
10 TABLET ORAL DAILY
Status: DISCONTINUED | OUTPATIENT
Start: 2020-12-14 | End: 2020-12-15 | Stop reason: HOSPADM

## 2020-12-13 RX ORDER — INSULIN GLARGINE 300 U/ML
120 INJECTION, SOLUTION SUBCUTANEOUS 2 TIMES DAILY
Status: ON HOLD | COMMUNITY
End: 2020-12-15 | Stop reason: SDUPTHER

## 2020-12-13 RX ORDER — ONDANSETRON 2 MG/ML
4 INJECTION INTRAMUSCULAR; INTRAVENOUS ONCE
Status: COMPLETED | OUTPATIENT
Start: 2020-12-13 | End: 2020-12-13

## 2020-12-13 RX ADMIN — SODIUM CHLORIDE 500 ML: 9 INJECTION, SOLUTION INTRAVENOUS at 16:04

## 2020-12-13 RX ADMIN — DEXAMETHASONE SODIUM PHOSPHATE 6 MG: 10 INJECTION, SOLUTION INTRAMUSCULAR; INTRAVENOUS at 13:40

## 2020-12-13 RX ADMIN — IOPAMIDOL 100 ML: 755 INJECTION, SOLUTION INTRAVENOUS at 14:22

## 2020-12-13 RX ADMIN — ENOXAPARIN SODIUM 40 MG: 40 INJECTION SUBCUTANEOUS at 21:33

## 2020-12-13 RX ADMIN — ACETAMINOPHEN 650 MG: 325 TABLET, FILM COATED ORAL at 18:24

## 2020-12-13 RX ADMIN — ONDANSETRON 4 MG: 2 INJECTION INTRAMUSCULAR; INTRAVENOUS at 12:10

## 2020-12-13 RX ADMIN — BUDESONIDE AND FORMOTEROL FUMARATE DIHYDRATE 2 PUFF: 160; 4.5 AEROSOL RESPIRATORY (INHALATION) at 23:29

## 2020-12-13 RX ADMIN — INSULIN GLARGINE 120 UNITS: 100 INJECTION, SOLUTION SUBCUTANEOUS at 21:37

## 2020-12-13 RX ADMIN — ATORVASTATIN CALCIUM 10 MG: 20 TABLET, FILM COATED ORAL at 21:33

## 2020-12-13 NOTE — ED TRIAGE NOTES
Pt reports that she tested positive for COVID on Monday. Reports worsening SOA in the last 3 days. States that she has a headache and back pain as well. Reports checking her O2 at home and that it has been 90 or lower. Patient masked at arrival and triage staff wore all appropriate PPE during entire encounter with patient.

## 2020-12-13 NOTE — PROGRESS NOTES
"Pharmacy Consult - Lovenox    Nory Rodriguez has been consulted for pharmacy to dose enoxaparin for VTE prophylaxis per Iain Howe's request.       Relevant clinical data and objective history reviewed:  55 y.o. 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    Lab Results   Component Value Date    WBC 4.16 12/13/2020    HGB 13.9 12/13/2020    HCT 42.7 12/13/2020    MCV 92.4 12/13/2020     12/13/2020     Lab Results   Component Value Date    GLUCOSE 175 (H) 12/13/2020    CALCIUM 8.8 12/13/2020     12/13/2020    K 3.8 12/13/2020    CO2 19.7 (L) 12/13/2020     12/13/2020    BUN 24 (H) 12/13/2020    CREATININE 1.05 (H) 12/13/2020    EGFRIFNONA 54 (L) 12/13/2020    BCR 22.9 12/13/2020    ANIONGAP 14.3 12/13/2020       Estimated Creatinine Clearance: 69.1 mL/min (A) (by C-G formula based on SCr of 1.05 mg/dL (H)).    Active Inpatient Anticoagulation Orders: lovenox    Assessment/Plan    Will start patient on 40 mg subcutaneous every 24 hours, adjusted for renal function. . Pharmacy will continue to follow.     Ibeth De Los Santos RPH       "

## 2020-12-13 NOTE — ED NOTES
Pt wearing mask. Myself had mask, and eye wear/PPE when present with pt.     Minda Hendrix, PCT  12/13/20 1120     labs, imaging wnl.  tolerating po, stable for dc home w supportive care.  f/up w PMD in 2 days. --MD Giacomo

## 2020-12-13 NOTE — ED NOTES
Patient was placed in face mask in first look.  Patient was wearing a face mask throughout our encounter.  I wore gown, mask, face shield throughout the encounter.  Hand hygiene was performed before and after patient encounter.  Isolation maintained.       Olegario Gutierrez RN  12/13/20 [id number]

## 2020-12-13 NOTE — ED PROVIDER NOTES
EMERGENCY DEPARTMENT ENCOUNTER    CHIEF COMPLAINT  Chief Complaint: Shortness of air  History given by: Patient  History limited by: Nothing  Room Number: 10/10  PMD: Eileen Barajas in [place]    HPI:  Pt is a 55 y.o. female presents complaining of 3-day history of shortness of air.  Patient reports body aches, cough, fevers onset approximately 8 days prior to arrival with oxygen saturations of 80 to 85% this morning at home after Covid 19 test + 12/7/2020.  Patient denies abdominal pain, nausea/vomiting, swelling of extremities.  Patient reports she is not a smoker, has a history of asthma and uses her inhalers sporadically, is not on home oxygen and has type 2 diabetes on oral medications and insulin which she reports she takes as directed.    Patient reports approximately 10 years ago she had a viral infection with fluid buildup around her heart and lungs and a pericardial window performed at [facility] during that illness.    Duration: 3 days  Associated Symptoms: Cough, congestion, body aches  Aggravating Factors: Exertion  Alleviating Factors: Nothing  Treatment before arrival: Regular medicines    Upon review the patient's old chart it is noted that she was admitted at [facility] in November 2011 with a cardiac cath done 11/8/2011 with an LV vent AF of 55 to 60% with no significant coronary artery disease and a pericardial window on the same day with removal of 300 cc of pericardial effusion.    PAST MEDICAL HISTORY  Active Ambulatory Problems     Diagnosis Date Noted   • No Active Ambulatory Problems     Resolved Ambulatory Problems     Diagnosis Date Noted   • No Resolved Ambulatory Problems     Past Medical History:   Diagnosis Date   • Acute vaginitis    • Asthma    • Chronic back pain    • Diabetes mellitus (CMS/[facility])    • GERD (gastroesophageal reflux disease)    • Hyperlipidemia    • Hypertension    • Kidney stone    • Neuropathy    • Obesity    • Osteoporosis    • Rheumatoid lung disease with  rheumatoid arthritis (CMS/HCC)    • Sleep apnea    • Viral hepatitis        PAST SURGICAL HISTORY  Past Surgical History:   Procedure Laterality Date   • CARDIAC CATHETERIZATION  11/2011   • CATARACT EXTRACTION, BILATERAL  08/2018   • CHOLECYSTECTOMY     • HYSTERECTOMY     • PERICARDIAL WINDOW  11/2011       FAMILY HISTORY  History reviewed. No pertinent family history.    SOCIAL HISTORY  Social History     Socioeconomic History   • Marital status:      Spouse name: Not on file   • Number of children: Not on file   • Years of education: Not on file   • Highest education level: Not on file   Tobacco Use   • Smoking status: Never Smoker   • Smokeless tobacco: Never Used   Substance and Sexual Activity   • Alcohol use: Not Currently   • Drug use: Never   • Sexual activity: Defer       ALLERGIES  Asa [aspirin], Ciprofloxacin, Levemir [insulin detemir], Nitroglycerin, Sumatriptan, Cat hair extract, Codeine, and Diclofenac    REVIEW OF SYSTEMS  Review of Systems   Constitutional: Positive for fatigue and fever. Negative for chills.   HENT: Negative for rhinorrhea and sore throat.    Eyes: Negative for visual disturbance.   Respiratory: Positive for cough and shortness of breath.    Cardiovascular: Negative for chest pain, palpitations and leg swelling.   Gastrointestinal: Negative for abdominal pain, diarrhea and vomiting.   Endocrine: Negative.    Genitourinary: Negative for decreased urine volume, dysuria and frequency.   Musculoskeletal: Positive for back pain and myalgias. Negative for neck pain.   Skin: Negative for rash.   Neurological: Negative for syncope and headaches.   Psychiatric/Behavioral: Negative.    All other systems reviewed and are negative.      PHYSICAL EXAM  ED Triage Vitals   Temp Heart Rate Resp BP SpO2   12/13/20 1045 12/13/20 1042 12/13/20 1042 -- 12/13/20 1042   98.8 °F (37.1 °C) 90 18  96 %      Temp src Heart Rate Source Patient Position BP Location FiO2 (%)   12/13/20 1045 12/13/20  1042 -- -- --   Tympanic Monitor          Physical Exam   Constitutional: She is oriented to person, place, and time. She has a sickly appearance ( Listless, mildly ill-appearing). She appears distressed (mild).   HENT:   Head: Normocephalic and atraumatic.   Eyes: EOM are normal.   Neck: Normal range of motion. Neck supple.   Cardiovascular: Normal rate, regular rhythm, normal heart sounds and intact distal pulses.   No murmur heard.  Pulses:       Posterior tibial pulses are 2+ on the right side and 2+ on the left side.   Pulmonary/Chest: Effort normal and breath sounds normal. No respiratory distress.   Abdominal: Soft. Bowel sounds are normal. There is no abdominal tenderness. There is no rebound and no guarding.   Musculoskeletal: Normal range of motion.         General: No edema.   Neurological: She is alert and oriented to person, place, and time.   Skin: Skin is warm and dry.   Psychiatric: Affect normal.   Nursing note and vitals reviewed.      LAB RESULTS  Lab Results (last 24 hours)     Procedure Component Value Units Date/Time    CBC & Differential [[id number]]  (Abnormal) Collected: 12/13/20 1109    Specimen: Blood Updated: 12/13/20 1146    Narrative:      The following orders were created for panel order CBC & Differential.  Procedure                               Abnormality         Status                     ---------                               -----------         ------                     CBC Auto Differential[[id number]]        Abnormal            Final result                 Please view results for these tests on the individual orders.    Comprehensive Metabolic Panel [[id number]]  (Abnormal) Collected: 12/13/20 1109    Specimen: Blood Updated: 12/13/20 1213     Glucose 175 mg/dL      BUN 24 mg/dL      Creatinine 1.05 mg/dL      Sodium 137 mmol/L      Potassium 3.8 mmol/L      Chloride 103 mmol/L      CO2 19.7 mmol/L      Calcium 8.8 mg/dL      Total Protein 7.1 g/dL      Albumin 3.80 g/dL       ALT (SGPT) 22 U/L      AST (SGOT) 23 U/L      Alkaline Phosphatase 107 U/L      Total Bilirubin 0.5 mg/dL      eGFR Non African Amer 54 mL/min/1.73      Globulin 3.3 gm/dL      A/G Ratio 1.2 g/dL      BUN/Creatinine Ratio 22.9     Anion Gap 14.3 mmol/L     Narrative:      GFR Normal >60  Chronic Kidney Disease <60  Kidney Failure <15      BNP [[id number]]  (Normal) Collected: 12/13/20 1109    Specimen: Blood Updated: 12/13/20 1218     proBNP 60.6 pg/mL     Narrative:      Among patients with dyspnea, NT-proBNP is highly sensitive for the detection of acute congestive heart failure. In addition NT-proBNP of <300 pg/ml effectively rules out acute congestive heart failure with 99% negative predictive value.    Results may be falsely decreased if patient taking Biotin.      Troponin [[id number]]  (Normal) Collected: 12/13/20 1109    Specimen: Blood Updated: 12/13/20 1218     Troponin T <0.010 ng/mL     Narrative:      Troponin T Reference Range:  <= 0.03 ng/mL-   Negative for AMI  >0.03 ng/mL-     Abnormal for myocardial necrosis.  Clinicians would have to utilize clinical acumen, EKG, Troponin and serial changes to determine if it is an Acute Myocardial Infarction or myocardial injury due to an underlying chronic condition.       Results may be falsely decreased if patient taking Biotin.      Protime-INR [[id number]]  (Normal) Collected: 12/13/20 1109    Specimen: Blood Updated: 12/13/20 1209     Protime 13.4 Seconds      INR 1.04    Procalcitonin [[id number]]  (Normal) Collected: 12/13/20 1109    Specimen: Blood Updated: 12/13/20 1218     Procalcitonin 0.08 ng/mL     Narrative:      As a Marker for Sepsis (Non-Neonates):   1. <0.5 ng/mL represents a low risk of severe sepsis and/or septic shock.  1. >2 ng/mL represents a high risk of severe sepsis and/or septic shock.    As a Marker for Lower Respiratory Tract Infections that require antibiotic therapy:  PCT on Admission     Antibiotic Therapy             6-12 Hrs  "later  > 0.5                Strongly Recommended            >0.25 - <0.5         Recommended  0.1 - 0.25           Discouraged                   Remeasure/reassess PCT  <0.1                 Strongly Discouraged          Remeasure/reassess PCT      As 28 day mortality risk marker: \"Change in Procalcitonin Result\" (> 80 % or <=80 %) if Day 0 (or Day 1) and Day 4 values are available. Refer to http://www.[facility]-pct-calculator.com/   Change in PCT <=80 %   A decrease of PCT levels below or equal to 80 % defines a positive change in PCT test result representing a higher risk for 28-day all-cause mortality of patients diagnosed with severe sepsis or septic shock.  Change in PCT > 80 %   A decrease of PCT levels of more than 80 % defines a negative change in PCT result representing a lower risk for 28-day all-cause mortality of patients diagnosed with severe sepsis or septic shock.                Results may be falsely decreased if patient taking Biotin.     Lactic Acid, Plasma [[id number]]  (Normal) Collected: 12/13/20 1109    Specimen: Blood Updated: 12/13/20 1205     Lactate 1.1 mmol/L     CBC Auto Differential [[id number]]  (Abnormal) Collected: 12/13/20 1109    Specimen: Blood Updated: 12/13/20 1146     WBC 4.16 10*3/mm3      RBC 4.62 10*6/mm3      Hemoglobin 13.9 g/dL      Hematocrit 42.7 %      MCV 92.4 fL      MCH 30.1 pg      MCHC 32.6 g/dL      RDW 12.6 %      RDW-SD 42.9 fl      MPV 11.2 fL      Platelets 166 10*3/mm3      Neutrophil % 54.1 %      Lymphocyte % 31.7 %      Monocyte % 12.5 %      Eosinophil % 0.5 %      Basophil % 0.2 %      Immature Grans % 1.0 %      Neutrophils, Absolute 2.25 10*3/mm3      Lymphocytes, Absolute 1.32 10*3/mm3      Monocytes, Absolute 0.52 10*3/mm3      Eosinophils, Absolute 0.02 10*3/mm3      Basophils, Absolute 0.01 10*3/mm3      Immature Grans, Absolute 0.04 10*3/mm3      nRBC 0.0 /100 WBC     Blood Culture - Blood, Arm, Right [[id number]] Collected: 12/13/20 1200    Specimen: " Blood from Arm, Right Updated: 12/13/20 1532    Blood Culture - Blood, Arm, Left [[id number]] Collected: 12/13/20 1220    Specimen: Blood from Arm, Left Updated: 12/13/20 1532    Respiratory Panel PCR w/COVID-19(SARS-CoV-2) TIFFANIE/MAX/ROSA/PAD/[facility] In-House, NP Swab in UTM/VTM, 3-4 HR TAT - Swab, Nasopharynx [[id number]]  (Abnormal) Collected: 12/13/20 1340    Specimen: Swab from Nasopharynx Updated: 12/13/20 1506     ADENOVIRUS, PCR Not Detected     Coronavirus 229E Not Detected     Coronavirus HKU1 Not Detected     Coronavirus NL63 Not Detected     Coronavirus OC43 Not Detected     COVID19 Detected     Human Metapneumovirus Not Detected     Human Rhinovirus/Enterovirus Not Detected     Influenza A PCR Not Detected     Influenza B PCR Not Detected     Parainfluenza Virus 1 Not Detected     Parainfluenza Virus 2 Not Detected     Parainfluenza Virus 3 Not Detected     Parainfluenza Virus 4 Not Detected     RSV, PCR Not Detected     Bordetella pertussis pcr Not Detected     Bordetella parapertussis PCR Not Detected     Chlamydophila pneumoniae PCR Not Detected     Mycoplasma pneumo by PCR Not Detected    Narrative:      Fact sheet for providers: https://docs.[employer]/wp-content/uploads/MRL2323-0731-DJ5.1-EUA-Provider-Fact-Sheet-3.pdf    Fact sheet for patients: https://docs.[employer]/wp-content/uploads/JMC9091-6749-DK7.1-EUA-Patient-Fact-Sheet-1.pdf    Test performed by PCR.          I ordered the above labs and reviewed the results    RADIOLOGY  CT Angiogram Chest   Final Result      XR Chest 1 View   Final Result      Discussed with Dr. Perrin, radiology regarding patient's CT chest is significant for no PE but bilateral patchy opacities consistent with Covid pneumonia    I ordered the above noted radiological studies. Interpreted by radiologist. Viewed by me in PACS.       PROCEDURES  Procedures      PROGRESS AND CONSULTS  ED Course as of Dec 13 1546   Sun Dec 13, 2020   1509 Discussed with Dr. Thomas  Stingl on-call for [facility] who is aware patient presentation, imaging imaging results, hypoxia and labs and agrees to admit for further testing, treatment as needed    [TO]      ED Course User Index  [TO] Kristina Nesbitt MD     EKG          EKG time: 1120  Rhythm/Rate: Sinus rhythm, rate in the 70s  P waves and MS: Left atrial enlargement, normal JOSIE  QRS, axis: Right bundle branch block, LVH  ST and T waves: Nonspecific ST/T wave findings    Interpreted Contemporaneously by me, independently viewed  No old for comparison        MEDICAL DECISION MAKING  Results were reviewed/discussed with the patient and they were also made aware of online access. Pt also made aware that some labs, such as cultures, will not be resulted during ER visit and follow up with PMD is necessary.       MDM       DIAGNOSIS  Final diagnoses:   Hypoxia   Pneumonia due to COVID-19 virus       DISPOSITION  ADMISSION    Discussed treatment plan and reason for admission with pt/family and admitting physician.  Pt/family voiced understanding of the plan for admission for further testing/treatment as needed.         Latest Documented Vital Signs:  As of 15:46 EST  BP- 118/69 HR- 76 Temp- 98.8 °F (37.1 °C) (Tympanic) O2 sat- 93%    --  Patient was wearing facemask when I entered the room and throughout our encounter. Full protective equipment was worn throughout this patient encounter including a face mask, eye protection and gloves. Hand hygiene was performed before donning protective equipment and after removal when leaving the room.      Kristina Nesbitt MD  12/13/20 [id number]

## 2020-12-13 NOTE — ED NOTES
Pt had mask on when placed in room. RN wore goggles and surgical mask upon entering room.        Vita Macias, RN  12/13/20 [id number]

## 2020-12-13 NOTE — PLAN OF CARE
Goal Outcome Evaluation:         Patient alert and oriented with c/o pain to head and back- aching. Admitted with PNA d/t COVID. Patient on 2 L/NC and sinus on monitor. No acute distress noted, will continue to monitor.

## 2020-12-14 PROBLEM — U07.1 PNEUMONIA DUE TO COVID-19 VIRUS: Status: ACTIVE | Noted: 2020-12-14

## 2020-12-14 PROBLEM — J12.82 PNEUMONIA DUE TO COVID-19 VIRUS: Status: ACTIVE | Noted: 2020-12-14

## 2020-12-14 LAB
25(OH)D3 SERPL-MCNC: 66.1 NG/ML (ref 30–100)
ANION GAP SERPL CALCULATED.3IONS-SCNC: 11.2 MMOL/L (ref 5–15)
BUN SERPL-MCNC: 29 MG/DL (ref 6–20)
BUN/CREAT SERPL: 30.9 (ref 7–25)
CALCIUM SPEC-SCNC: 9.1 MG/DL (ref 8.6–10.5)
CHLORIDE SERPL-SCNC: 104 MMOL/L (ref 98–107)
CO2 SERPL-SCNC: 24.8 MMOL/L (ref 22–29)
CREAT SERPL-MCNC: 0.94 MG/DL (ref 0.57–1)
DEPRECATED RDW RBC AUTO: 40.9 FL (ref 37–54)
ERYTHROCYTE [DISTWIDTH] IN BLOOD BY AUTOMATED COUNT: 12.5 % (ref 12.3–15.4)
GFR SERPL CREATININE-BSD FRML MDRD: 61 ML/MIN/1.73
GLUCOSE BLDC GLUCOMTR-MCNC: 115 MG/DL (ref 70–130)
GLUCOSE BLDC GLUCOMTR-MCNC: 135 MG/DL (ref 70–130)
GLUCOSE BLDC GLUCOMTR-MCNC: 185 MG/DL (ref 70–130)
GLUCOSE BLDC GLUCOMTR-MCNC: 217 MG/DL (ref 70–130)
GLUCOSE BLDC GLUCOMTR-MCNC: 247 MG/DL (ref 70–130)
GLUCOSE SERPL-MCNC: 122 MG/DL (ref 65–99)
HCT VFR BLD AUTO: 38 % (ref 34–46.6)
HGB BLD-MCNC: 12.9 G/DL (ref 12–15.9)
MCH RBC QN AUTO: 30.7 PG (ref 26.6–33)
MCHC RBC AUTO-ENTMCNC: 33.9 G/DL (ref 31.5–35.7)
MCV RBC AUTO: 90.5 FL (ref 79–97)
PLATELET # BLD AUTO: 157 10*3/MM3 (ref 140–450)
PMV BLD AUTO: 11.3 FL (ref 6–12)
POTASSIUM SERPL-SCNC: 4 MMOL/L (ref 3.5–5.2)
QT INTERVAL: 406 MS
RBC # BLD AUTO: 4.2 10*6/MM3 (ref 3.77–5.28)
SODIUM SERPL-SCNC: 140 MMOL/L (ref 136–145)
WBC # BLD AUTO: 4.24 10*3/MM3 (ref 3.4–10.8)

## 2020-12-14 PROCEDURE — 96376 TX/PRO/DX INJ SAME DRUG ADON: CPT

## 2020-12-14 PROCEDURE — 63710000001 INSULIN LISPRO (HUMAN) PER 5 UNITS: Performed by: INTERNAL MEDICINE

## 2020-12-14 PROCEDURE — 80048 BASIC METABOLIC PNL TOTAL CA: CPT | Performed by: INTERNAL MEDICINE

## 2020-12-14 PROCEDURE — 94799 UNLISTED PULMONARY SVC/PX: CPT

## 2020-12-14 PROCEDURE — 63710000001 INSULIN GLARGINE PER 5 UNITS: Performed by: INTERNAL MEDICINE

## 2020-12-14 PROCEDURE — 82962 GLUCOSE BLOOD TEST: CPT

## 2020-12-14 PROCEDURE — G0378 HOSPITAL OBSERVATION PER HR: HCPCS

## 2020-12-14 PROCEDURE — 25010000002 DEXAMETHASONE SODIUM PHOSPHATE 10 MG/ML SOLUTION: Performed by: INTERNAL MEDICINE

## 2020-12-14 PROCEDURE — 85027 COMPLETE CBC AUTOMATED: CPT | Performed by: INTERNAL MEDICINE

## 2020-12-14 PROCEDURE — 25010000002 ENOXAPARIN PER 10 MG: Performed by: INTERNAL MEDICINE

## 2020-12-14 PROCEDURE — 86140 C-REACTIVE PROTEIN: CPT | Performed by: INTERNAL MEDICINE

## 2020-12-14 PROCEDURE — 96372 THER/PROPH/DIAG INJ SC/IM: CPT

## 2020-12-14 RX ORDER — INSULIN GLARGINE 100 [IU]/ML
100 INJECTION, SOLUTION SUBCUTANEOUS 2 TIMES DAILY
Status: DISCONTINUED | OUTPATIENT
Start: 2020-12-14 | End: 2020-12-15

## 2020-12-14 RX ADMIN — TOPIRAMATE 100 MG: 100 TABLET, FILM COATED ORAL at 00:24

## 2020-12-14 RX ADMIN — INSULIN GLARGINE 100 UNITS: 100 INJECTION, SOLUTION SUBCUTANEOUS at 21:59

## 2020-12-14 RX ADMIN — ACETAMINOPHEN 650 MG: 325 TABLET, FILM COATED ORAL at 08:52

## 2020-12-14 RX ADMIN — ALBUTEROL SULFATE 2 PUFF: 90 AEROSOL, METERED RESPIRATORY (INHALATION) at 15:29

## 2020-12-14 RX ADMIN — SODIUM CHLORIDE, PRESERVATIVE FREE 10 ML: 5 INJECTION INTRAVENOUS at 21:56

## 2020-12-14 RX ADMIN — ACETAMINOPHEN 650 MG: 325 TABLET, FILM COATED ORAL at 14:12

## 2020-12-14 RX ADMIN — ALBUTEROL SULFATE 2 PUFF: 90 AEROSOL, METERED RESPIRATORY (INHALATION) at 11:37

## 2020-12-14 RX ADMIN — INSULIN LISPRO 8 UNITS: 100 INJECTION, SOLUTION INTRAVENOUS; SUBCUTANEOUS at 21:54

## 2020-12-14 RX ADMIN — BUDESONIDE AND FORMOTEROL FUMARATE DIHYDRATE 2 PUFF: 160; 4.5 AEROSOL RESPIRATORY (INHALATION) at 08:15

## 2020-12-14 RX ADMIN — TRAZODONE HYDROCHLORIDE 100 MG: 100 TABLET ORAL at 00:25

## 2020-12-14 RX ADMIN — INSULIN LISPRO 5 UNITS: 100 INJECTION, SOLUTION INTRAVENOUS; SUBCUTANEOUS at 17:39

## 2020-12-14 RX ADMIN — Medication 3 MG: at 00:24

## 2020-12-14 RX ADMIN — ENOXAPARIN SODIUM 40 MG: 40 INJECTION SUBCUTANEOUS at 21:55

## 2020-12-14 RX ADMIN — ALBUTEROL SULFATE 2 PUFF: 90 AEROSOL, METERED RESPIRATORY (INHALATION) at 23:38

## 2020-12-14 RX ADMIN — Medication 2000 UNITS: at 08:53

## 2020-12-14 RX ADMIN — DEXAMETHASONE SODIUM PHOSPHATE 6 MG: 10 INJECTION, SOLUTION INTRAMUSCULAR; INTRAVENOUS at 08:54

## 2020-12-14 RX ADMIN — INSULIN LISPRO 4 UNITS: 100 INJECTION, SOLUTION INTRAVENOUS; SUBCUTANEOUS at 12:47

## 2020-12-14 RX ADMIN — DILTIAZEM HYDROCHLORIDE 120 MG: 120 CAPSULE, COATED, EXTENDED RELEASE ORAL at 08:53

## 2020-12-14 RX ADMIN — INSULIN LISPRO 16 UNITS: 100 INJECTION, SOLUTION INTRAVENOUS; SUBCUTANEOUS at 00:25

## 2020-12-14 RX ADMIN — TOPIRAMATE 100 MG: 100 TABLET, FILM COATED ORAL at 21:55

## 2020-12-14 RX ADMIN — SODIUM CHLORIDE, PRESERVATIVE FREE 10 ML: 5 INJECTION INTRAVENOUS at 00:24

## 2020-12-14 RX ADMIN — TOPIRAMATE 100 MG: 100 TABLET, FILM COATED ORAL at 08:53

## 2020-12-14 RX ADMIN — INSULIN GLARGINE 110 UNITS: 100 INJECTION, SOLUTION SUBCUTANEOUS at 08:55

## 2020-12-14 RX ADMIN — ALBUTEROL SULFATE 2 PUFF: 90 AEROSOL, METERED RESPIRATORY (INHALATION) at 03:50

## 2020-12-14 RX ADMIN — INSULIN LISPRO 5 UNITS: 100 INJECTION, SOLUTION INTRAVENOUS; SUBCUTANEOUS at 12:46

## 2020-12-14 RX ADMIN — BUDESONIDE AND FORMOTEROL FUMARATE DIHYDRATE 2 PUFF: 160; 4.5 AEROSOL RESPIRATORY (INHALATION) at 19:23

## 2020-12-14 RX ADMIN — ATORVASTATIN CALCIUM 10 MG: 20 TABLET, FILM COATED ORAL at 21:55

## 2020-12-14 RX ADMIN — CETIRIZINE HYDROCHLORIDE 10 MG: 10 TABLET ORAL at 08:53

## 2020-12-14 RX ADMIN — TRAZODONE HYDROCHLORIDE 100 MG: 100 TABLET ORAL at 21:55

## 2020-12-14 RX ADMIN — PANTOPRAZOLE SODIUM 40 MG: 40 TABLET, DELAYED RELEASE ORAL at 06:36

## 2020-12-14 RX ADMIN — ACETAMINOPHEN 650 MG: 325 TABLET, FILM COATED ORAL at 21:58

## 2020-12-14 RX ADMIN — INSULIN LISPRO 8 UNITS: 100 INJECTION, SOLUTION INTRAVENOUS; SUBCUTANEOUS at 17:40

## 2020-12-14 NOTE — PROGRESS NOTES
Patient has already been seen by my partner earlier today,  COVID-19 pneumonia  The note was reviewed,Patient was not examined, will follow up daily and will assess at the bedside in a.m.

## 2020-12-14 NOTE — CONSULTS
Group: [facility]         CONSULT NOTE    Patient Identification:  Nory Rodriguez  55 y.o.  female  1965  [id number]            Requesting physician: Dr. Howe    Reason for Consultation: COVID-19 pneumonia    CC: Shortness of breath    History of Present Illness:  55-year-old obese female who presents for shortness of breath.  This has been going on for the past 8 to 9 days.  It became quite severe yesterday.  Alleviated by supplemental oxygen at rest.  Associated with a cough, no sputum, some chills but no documented fever, some myalgias and overall fatigue.  She tested positive for COVID-19 infection on December 7, 2020.  She does have diabetes mellitus.  She has a history of asthma.  Not usually on supplemental oxygen.  Denies ever smoking.  She says 10 years ago she was treated at [facility] for viral illness with pericarditis requiring pericardial window.  I reviewed medical records.  I reviewed H&P dictated with Dr. Nesbitt from the emergency room, there are no other medical records available for review in the system.       Review of Systems   Constitutional: Positive for diaphoresis and fatigue. Negative for fever.   HENT: Negative for ear discharge and sore throat.    Eyes: Negative for pain and visual disturbance.   Respiratory: Positive for cough and shortness of breath.    Cardiovascular: Negative for chest pain and leg swelling.   Gastrointestinal: Negative for abdominal pain and diarrhea.   Endocrine: Negative for cold intolerance and polyuria.   Genitourinary: Negative for dysuria and hematuria.   Musculoskeletal: Negative for joint swelling and myalgias.   Skin: Negative for rash and wound.   Neurological: Positive for weakness. Negative for speech difficulty and numbness.   Hematological: Negative for adenopathy. Does not bruise/bleed easily.   Psychiatric/Behavioral: Negative for agitation and confusion.       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   12/11/2020   • albuterol sulfate  (90 Base) MCG/ACT inhaler Inhale 2 puffs Every 4 (Four) Hours As Needed for Wheezing.   12/4/2020   • alendronate (FOSAMAX) 70 MG tablet Take 70 mg by mouth Every 7 (Seven) Days.   12/13/2020 at Unknown time   • atorvastatin (Lipitor) 10 MG tablet Take 10 mg by mouth Every Night.   12/12/2020 at Unknown time   • budesonide-formoterol (SYMBICORT) 160-4.5 MCG/ACT inhaler Inhale 2 puffs 2 (Two) Times a Day.   12/11/2020   • cetirizine (zyrTEC) 10 MG tablet Take 10 mg by mouth Daily.   12/12/2020 at Unknown time   • Cholecalciferol (vitamin D3) 125 MCG (5000 UT) capsule capsule Take 5,000 Units by mouth Daily.   12/12/2020 at Unknown time   • dilTIAZem CD (CARDIZEM CD) 120 MG 24 hr capsule Take 120 mg by mouth Daily.   12/12/2020 at Unknown time   • Dulaglutide (Trulicity) 1.5 MG/0.5ML solution pen-injector Inject  under the skin into the appropriate area as directed. Every sunday 12/12/2020 at Unknown time   • Empagliflozin (JARDIANCE PO) Take 10 mg by mouth Daily.   12/12/2020 at Unknown time   • EPINEPHrine (EpiPen 2-Lizandro) 0.3 MG/0.3ML solution auto-injector injection       •  estradiol (ESTRACE) 0.5 MG tablet Take 0.5 mg by mouth Daily.   12/12/2020 at Unknown time   • Insulin Glargine, 1 Unit Dial, (Toujeo SoloStar) 300 UNIT/ML solution pen-injector injection Inject 120 Units under the skin into the appropriate area as directed 2 (Two) Times a Day.   12/12/2020 at Unknown time   • insulin lispro (humaLOG) 100 UNIT/ML injection Inject  under the skin into the appropriate area as directed 3 (Three) Times a Day Before Meals. Sliding scale   12/3/2020   • metFORMIN (GLUCOPHAGE) 500 MG tablet Take 500 mg by mouth 2 (Two) Times a Day With Meals.   12/13/2020 at Unknown time   • montelukast (SINGULAIR) 10 MG tablet Take 10 mg by mouth Every Night.   12/12/2020 at Unknown time   • naproxen sodium (ALEVE) 220 MG tablet Take 220 mg by mouth 2 (Two) Times a Day As Needed.   12/12/2020 at Unknown time   • omeprazole (priLOSEC) 40 MG capsule Take 40 mg by mouth Daily.   12/12/2020 at Unknown time   • promethazine (PHENERGAN) 25 MG tablet Take 25 mg by mouth Every 6 (Six) Hours As Needed for Nausea or Vomiting.   Past Month at Unknown time   • topiramate (TOPAMAX) 100 MG tablet Take 100 mg by mouth 2 (Two) Times a Day.   12/12/2020 at Unknown time   • traZODone (DESYREL) 100 MG tablet Take 100 mg by mouth Every Night.   12/12/2020 at Unknown time       Allergies:  Allergies   Allergen Reactions   • Asa [Aspirin] Anaphylaxis   • Ciprofloxacin Anaphylaxis   • Levemir [Insulin Detemir] GI Intolerance   • Nitroglycerin Hives   • Sumatriptan Dizziness   • Cat Hair Extract Rash   • Codeine Palpitations   • Diclofenac Swelling       Social History:   Social History     Socioeconomic History   • Marital status:      Spouse name: Not on file   • Number of children: Not on file   • Years of education: Not on file   • Highest education level: Not on file   Tobacco Use   • Smoking status: Never Smoker   • Smokeless tobacco: Never Used   Substance and Sexual Activity   • Alcohol use: Not Currently   • Drug  "use: Never   • Sexual activity: Defer       Family History:  History reviewed. No pertinent family history.    Physical Exam:  /71 (BP Location: Right arm, Patient Position: Lying)   Pulse 80   Temp 96.4 °F (35.8 °C) (Oral)   Resp 20   Ht 162.6 cm (64\")   Wt 103 kg (227 lb 11.2 oz)   SpO2 91%   BMI 39.08 kg/m²  Body mass index is 39.08 kg/m². 91% 103 kg (227 lb 11.2 oz)  Physical Exam  Constitutional:       Appearance: She is obese.   HENT:      Right Ear: External ear normal.      Left Ear: External ear normal.      Nose: Nose normal.   Eyes:      Conjunctiva/sclera: Conjunctivae normal.      Pupils: Pupils are equal, round, and reactive to light.   Neck:      Musculoskeletal: Neck supple. No neck rigidity.      Thyroid: No thyromegaly.      Vascular: No JVD.      Trachea: No tracheal deviation.   Cardiovascular:      Rate and Rhythm: Normal rate and regular rhythm.      Heart sounds: Normal heart sounds. No murmur.   Pulmonary:      Effort: Pulmonary effort is normal.      Breath sounds: Normal breath sounds.   Abdominal:      Palpations: Abdomen is soft.      Tenderness: There is no abdominal tenderness. There is no rebound.      Comments: Obesity hampers exam   Musculoskeletal: Normal range of motion.         General: No deformity.   Skin:     General: Skin is warm.      Findings: No rash.      Comments: No palpable nodules   Neurological:      General: No focal deficit present.      Mental Status: She is alert and oriented to person, place, and time.      Cranial Nerves: No cranial nerve deficit.      Sensory: No sensory deficit.   Psychiatric:         Mood and Affect: Mood normal.         Thought Content: Thought content normal.         LABS:  COVID19   Date Value Ref Range Status   12/13/2020 Detected (C) Not Detected - Ref. Range Final       Lab Results   Component Value Date    CALCIUM 8.8 12/13/2020     Results from last 7 days   Lab Units 12/13/20  1109   SODIUM mmol/L 137   POTASSIUM mmol/L " 3.8   CHLORIDE mmol/L 103   CO2 mmol/L 19.7*   BUN mg/dL 24*   CREATININE mg/dL 1.05*   GLUCOSE mg/dL 175*   CALCIUM mg/dL 8.8   WBC 10*3/mm3 4.16   HEMOGLOBIN g/dL 13.9   PLATELETS 10*3/mm3 166   ALT (SGPT) U/L 22   AST (SGOT) U/L 23   PROBNP pg/mL 60.6   PROCALCITONIN ng/mL 0.08     Lab Results   Component Value Date    TROPONINT <0.010 12/13/2020     Results from last 7 days   Lab Units 12/13/20  1109   TROPONIN T ng/mL <0.010         Results from last 7 days   Lab Units 12/13/20  1109   PROCALCITONIN ng/mL 0.08   LACTATE mmol/L 1.1           Results from last 7 days   Lab Units 12/13/20  1109   INR  1.04         No results found for: TSH  Estimated Creatinine Clearance: 69.1 mL/min (A) (by C-G formula based on SCr of 1.05 mg/dL (H)).         Imaging: I personally visualized the images of CT scan of the chest showing no pulmonary embolism.  She does have hazy groundglass opacities bilaterally consistent with pneumonia.      Assessment:  COVID-19 pneumonia  Acute hypoxic respiratory failure  Obesity  Hyperglycemia, diabetes mellitus  History of asthma, stable    Recommendations:  Treat with supplemental oxygen and dexamethasone.  Has risk factors for progression and worsening of respiratory failure, namely diabetes mellitus and obesity.  She is outside of the window for remdesivir treatment.  Her asthma is currently stable.  We will follow along    Patient was placed in face mask upon entering room and kept mask on throughout our encounter. I wore full protective equipment throughout this patient encounter including a face mask, gown and gloves. Hand hygiene was performed before donning protective equipment and after removal when leaving the room.    Aman Crespo MD  12/14/2020  00:08 EST      Much of this encounter note is an electronic transcription/translation of spoken language to printed text using Dragon Software.

## 2020-12-14 NOTE — SIGNIFICANT NOTE
12/14/20 0912   OTHER   Discipline physical therapist   Rehab Time/Intention   Session Not Performed   (per nsg flowsheet, pt indep w/ mobility.  No current indication for skilled PT.  Will follow peripherally for any changes.)   Recommendation   PT - Next Appointment 12/16/20

## 2020-12-14 NOTE — PROGRESS NOTES
"Adult Nutrition  Assessment/PES    Patient Name:  Nory Rodriguez  YOB: 1965  MRN: [id number]  Admit Date:  12/13/2020    Assessment Date:  12/14/2020    Comments:  Nutrition assessment triggered by MST score of 2.  Admitted with COVID 19 PNA.  Eating 50-75% at meals.  Morbid obesity.  2L O2.    RD to continue to follow.    RD working remotely due to covid-19 pandemic. Assessment completed based on review of electronic medical record. RD may be reached via secure chat or email.     Reason for Assessment     Row Name 12/14/20 1544          Reason for Assessment    Reason For Assessment  identified at risk by screening criteria     Diagnosis  pulmonary disease;infection/sepsis;other (see comments);diabetes diagnosis/complications;gastrointestinal disease;cardiac disease;renal disease;liver disease asthma, chronic back pain, DM, GERD, HLD, HTN, kidney stone, OP, sleep apnea, viral hepatitis; adm with COVID 19 PNA     Identified At Risk by Screening Criteria  MST SCORE 2+         Nutrition/Diet History     Row Name 12/14/20 1547          Nutrition/Diet History    Typical Food/Fluid Intake  50-75% at meals         Anthropometrics     Row Name 12/14/20 1547          Anthropometrics    Height  162.6 cm (64.02\")        Admit Weight    Admit Weight  -- 226# 12/13        Ideal Body Weight (IBW)    Ideal Body Weight (IBW) (kg)  55.04        Body Mass Index (BMI)    BMI Assessment  BMI 35-39.9: obesity grade II 38.89         Labs/Tests/Procedures/Meds     Row Name 12/14/20 1547          Labs/Procedures/Meds    Lab Results Reviewed  reviewed, pertinent     Lab Results Comments  Gluc, BUN        Diagnostic Tests/Procedures    Diagnostic Test/Procedure Reviewed  reviewed, pertinent        Medications    Pertinent Medications Reviewed  reviewed, pertinent     Pertinent Medications Comments  vit D3, dexamethasone, lovenox, lantus, humalog, protonix         Physical Findings     Row Name 12/14/20 1548          Physical " "Findings    Overall Physical Appearance  obese;on oxygen therapy     Skin  -- B=21, intact         Estimated/Assessed Needs     Row Name 12/14/20 1549 12/14/20 1547       Calculation Measurements    Weight Used For Calculations  103 kg (227 lb 1.2 oz)  --    Height  --  162.6 cm (64.02\")       Estimated/Assessed Needs       KCAL/KG    KCAL/KG  15 Kcal/Kg (kcal);20 Kcal/Kg (kcal)  --    15 Kcal/Kg (kcal)  1545  --    20 Kcal/Kg (kcal)  2060  --       Protein Requirements    Weight Used For Protein Calculations  103 kg (227 lb 1.2 oz)  --    Est Protein Requirement Amount (gms/kg)  1.0 gm protein  --    Estimated Protein Requirements (gms/day)  103  --       Fluid Requirements    Fluid Requirements (mL/day)  2060  --        Nutrition Prescription Ordered     Row Name 12/14/20 1550          Nutrition Prescription PO    Current PO Diet  Regular     Common Modifiers  Consistent Carbohydrate         Evaluation of Received Nutrient/Fluid Intake     Row Name 12/14/20 1550          PO Evaluation    Number of Meals  2     % PO Intake  50-75         Problem/Interventions:  Problem 1     Row Name 12/14/20 1550          Nutrition Diagnoses Problem 1    Problem 1  Nutrition Appropriate for Condition at this Time         Intervention Goal     Row Name 12/14/20 1551          Intervention Goal    General  Maintain nutrition;Reduce/improve symptoms;Disease management/therapy     PO  Maintain intake     Weight  Appropriate weight loss         Nutrition Intervention     Row Name 12/14/20 1551          Nutrition Intervention    RD/Tech Action  Follow Tx progress;Care plan reviewd         Education/Evaluation     Row Name 12/14/20 9751          Education    Education  Will Instruct as appropriate        Monitor/Evaluation    Monitor  Per protocol;PO intake;Pertinent labs;Weight;Symptoms           Electronically signed by:  Maggie Luis RD  12/14/20 15:51 EST  "

## 2020-12-14 NOTE — PLAN OF CARE
Goal Outcome Evaluation:  Plan of Care Reviewed With: patient  Progress: no change   Patient alert and oriented with generalized c/o mild discomfort and aching back- medicated per MAR with tylenol. Patient on 2 L/NC. Patient up in chair. Will educate patient on how to use IS. No acute distress noted, will continue to monitor.

## 2020-12-14 NOTE — PROGRESS NOTES
[facility] Medicine Services  PROGRESS NOTE    Patient Name: Nory Rodriguez  : 1965  MRN: [id number]    Date of Admission: 2020  Primary Care Physician: Eileen Barajas MD    Subjective   Subjective     CC:  Follow-up Covid pneumonia    HPI:  Patient with shortness of breath.  She got up and took off her oxygen to go to the bathroom and had desaturation to 74%.  Next time she states she will ask for help and keep her oxygen on  when using the bathroom.  Denies much cough but just occasionally.  Denies any fevers today.  No other new complaints.    Review of Systems  No current fevers or chills  Positive for shortness of breath, occasional cough  No current nausea, vomiting, or diarrhea  No current chest pain or palpitations      Objective   Objective     Vital Signs:   Temp:  [96.4 °F (35.8 °C)-98.8 °F (37.1 °C)] 97 °F (36.1 °C)  Heart Rate:  [66-90] 66  Resp:  [18-20] 20  BP: (107-130)/(63-83) 110/64        Physical Exam:  Constitutional:Awake, alert  HENT: NCAT, mucous membranes moist, neck supple  Respiratory: Rales and rhonchi bilaterally, tachypnea noted, currently on supplemental oxygen  Cardiovascular: RRR, normal radial pulses  Gastrointestinal: Positive bowel sounds, soft, nontender, nondistended  Musculoskeletal: Near morbid obesity, BMI is 39, no lower extremity edema   Psychiatric: Appropriate affect, cooperative, conversational  Neurologic: No slurred speech or facial droop, follows commands, oriented   skin: No rashes or jaundice, warm      Results Reviewed:  Results from last 7 days   Lab Units 20  0720  1109   WBC 10*3/mm3 4.24 4.16   HEMOGLOBIN g/dL 12.9 13.9   HEMATOCRIT % 38.0 42.7   PLATELETS 10*3/mm3 157 166   INR   --  1.04   PROCALCITONIN ng/mL  --  0.08     Results from last 7 days   Lab Units 20  0727 20  1109   SODIUM mmol/L 140 137   POTASSIUM mmol/L 4.0 3.8   CHLORIDE mmol/L 104 103   CO2 mmol/L 24.8 19.7*   BUN mg/dL 29* 24*    CREATININE mg/dL 0.94 1.05*   GLUCOSE mg/dL 122* 175*   CALCIUM mg/dL 9.1 8.8   ALT (SGPT) U/L  --  22   AST (SGOT) U/L  --  23   TROPONIN T ng/mL  --  <0.010   PROBNP pg/mL  --  60.6     Estimated Creatinine Clearance: 77.1 mL/min (by C-G formula based on SCr of 0.94 mg/dL).    Microbiology Results Abnormal     Procedure Component Value - Date/Time    Respiratory Panel PCR w/COVID-19(SARS-CoV-2) TIFFANIE/AMX/ROSA/PAD/[facility]/M[facility] In-House, NP Swab in UTM/VTM, 3-4 HR TAT - Swab, Nasopharynx [[id number]]  (Abnormal) Collected: 12/13/20 1340    Lab Status: Final result Specimen: Swab from Nasopharynx Updated: 12/13/20 1506     ADENOVIRUS, PCR Not Detected     Coronavirus 229E Not Detected     Coronavirus HKU1 Not Detected     Coronavirus NL63 Not Detected     Coronavirus OC43 Not Detected     COVID19 Detected     Human Metapneumovirus Not Detected     Human Rhinovirus/Enterovirus Not Detected     Influenza A PCR Not Detected     Influenza B PCR Not Detected     Parainfluenza Virus 1 Not Detected     Parainfluenza Virus 2 Not Detected     Parainfluenza Virus 3 Not Detected     Parainfluenza Virus 4 Not Detected     RSV, PCR Not Detected     Bordetella pertussis pcr Not Detected     Bordetella parapertussis PCR Not Detected     Chlamydophila pneumoniae PCR Not Detected     Mycoplasma pneumo by PCR Not Detected    Narrative:      Fact sheet for providers: https://docs.[employer]/wp-content/uploads/DUD3855-3108-ER1.1-EUA-Provider-Fact-Sheet-3.pdf    Fact sheet for patients: https://docs.[employer]/wp-content/uploads/UGR8171-3655-SL9.1-EUA-Patient-Fact-Sheet-1.pdf    Test performed by PCR.          Imaging Results (Last 24 Hours)     Procedure Component Value Units Date/Time    CT Angiogram Chest [[id number]] Collected: 12/13/20 1442     Updated: 12/13/20 1456    Narrative:      CT ANGIOGRAPHY OF THE CHEST WITH INTRAVENOUS CONTRAST AND 3-D  RECONSTRUCTIONS     HISTORY: Covid positive infection. Cough and fevers and  hypoxia.     The CT scan was performed as an emergency procedure through the chest  with CT angiography protocol using intravenous contrast and 3-D  reconstructions and demonstrates the followin. The pulmonary arteries are well-opacified and there is no evidence of  pulmonary embolus. The thoracic aorta shows no evidence of aneurysm or  dissection.  2. There are scattered areas of groundglass opacity and consolidation  scattered in both lungs and highly suspicious for Covid 19 pneumonia.  These are not well seen on the recent portable chest x-ray.  3. There is no mediastinal adenopathy. There is an enlarged right hilar  lymph node measuring 2.2 cm in long axis dimension and this is likely  reactive. There is no axillary adenopathy.  4. There is no pericardial effusion. The CT images through the upper  liver, spleen, both adrenal glands, and upper poles of both kidneys are  unremarkable.                 Radiation dose reduction techniques were utilized, including automated  exposure control and exposure modulation based on body size.     This report was finalized on 2020 2:53 PM by Dr. Roderick Perrin M.D.       XR Chest 1 View [[id number]] Collected: 20 1216     Updated: 20 1309    Narrative:      ONE VIEW PORTABLE CHEST     HISTORY: Positive Covid 19 infection. Shortness of breath.     FINDINGS: The examination is somewhat limited by the patient's large  size. The lungs are moderately expanded and grossly clear and no focal  pneumonia is identified on the current exam. The heart size is normal.     This report was finalized on 2020 1:06 PM by Dr. Roderick Perrin M.D.                  I have reviewed the medications:  Scheduled Meds:albuterol sulfate HFA, 2 puff, Inhalation, Q4H - RT  atorvastatin, 10 mg, Oral, Nightly  budesonide-formoterol, 2 puff, Inhalation, BID - RT  cetirizine, 10 mg, Oral, Daily  cholecalciferol, 2,000 Units, Oral, Daily  dexamethasone, 6 mg, Intravenous,  Daily  dilTIAZem CD, 120 mg, Oral, Daily  enoxaparin, 40 mg, Subcutaneous, Q24H  insulin glargine, 100 Units, Subcutaneous, BID  insulin lispro, 0-24 Units, Subcutaneous, 4x Daily With Meals & Nightly  insulin lispro, 5 Units, Subcutaneous, TID With Meals  pantoprazole, 40 mg, Oral, QAM  topiramate, 100 mg, Oral, BID  traZODone, 100 mg, Oral, Nightly      Continuous Infusions:Pharmacy to Dose enoxaparin (LOVENOX),       PRN Meds:.•  acetaminophen  •  dextrose  •  dextrose  •  glucagon (human recombinant)  •  melatonin  •  nitroglycerin  •  ondansetron **OR** ondansetron  •  Pharmacy to Dose enoxaparin (LOVENOX)  •  sodium chloride    Assessment/Plan   Assessment & Plan     Active Hospital Problems    Diagnosis  POA   • **Pneumonia due to COVID-19 virus [U07.1, J12.89]  Yes   • Asthma [J45.909]  Yes   • Obesity [E66.9]  Yes   • Type 2 diabetes mellitus with hyperglycemia, with long-term current use of insulin (CMS/[facility]) [E11.65, Z79.4]  Not Applicable   • Hypertension [I10]  Yes   • Diabetic neuropathy (CMS/[facility]) [E11.40]  Yes   • GERD (gastroesophageal reflux disease) [K21.9]  Yes   • 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CMS/[facility]) [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        Brief Hospital Course to date:  Nory Rodriguez is a 55 y.o. female presents with Covid pneumonia and hypoxia.    Discussion/plan for today:  Received IV dexamethasone in emergency department.  Transition to oral today.  Monitor oxygen levels and adjust oxygen as needed.  74% on room air during my examination today.  I placed her back on her oxygen after she took it off to walk to the bathroom and encouraged her to leave it on and ask for nursing assistance.  Blood sugar will be complicated as patient is on very high insulin and steroids will likely worsen hyperglycemia however she is eating less.  Patient received 120 units last night  and this morning.  I have asked nurse to recheck midmorning and again at lunchtime to make sure she does not have any hypoglycemia.  She will likely need some prandial insulin which has been added today to her correction scale.  CT scan images personally reviewed and infiltrates noted. Repeat laboratory studies tomorrow.  Increase prophylactic Lovenox due to severe obesity.  Otherwise per below    COVID-19 pneumonia:  Steroid therapy with Decadron.  Wean oxygen as tolerated.  Supportive care and symptomatic treatment.    Asthma:  Continue inhalers and scheduled albuterol.  Steroid therapy.    Type 2 diabetes mellitus with diabetic neuropathy:  Basal, prandial, and correction insulin.  Adjust as needed.  Monitor glucose carefully.      GERD: PPI.  Stable.    Hyperlipidemia: Atorvastatin.  Stable.    Obesity: Supportive care.  Weight loss recommended.  Symptomatic treatment.    DVT Prophylaxis: Lovenox      Disposition: I expect the patient to be discharged likely home when improved    CODE STATUS:   Code Status and Medical Interventions:   Ordered at: 12/13/20 1748     Code Status:    CPR     Medical Interventions (Level of Support Prior to Arrest):    Full       Jorden Corley MD  12/14/20

## 2020-12-14 NOTE — H&P
HISTORY AND PHYSICAL   [facility]        Patient Identification:  Name: Nory Rodriguez  Age: 55 y.o.  Sex: female  :  1965  MRN: [id number]                     Primary Care Physician: Eileen Barajas MD    Chief Complaint:  55 year old female who has been short of air for the last three days; she tested positive for covid early this month; she has had cough, fever and aches and noted low oxygen sats at home; she denies nausea, vomiting or diarrhea; she is morbidly obese and has rheumatoid arthritis; she also has asthma    History of Present Illness:   As above    Past Medical History:  Past Medical History:   Diagnosis Date   • Acute vaginitis    • Asthma    • Chronic back pain    • Diabetes mellitus (CMS/HCC)    • GERD (gastroesophageal reflux disease)    • Hyperlipidemia    • Hypertension    • Kidney stone    • Neuropathy    • Obesity    • Osteoporosis    • 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   2020   • albuterol sulfate  (90 Base) MCG/ACT inhaler Inhale 2 puffs Every 4 (Four) Hours As Needed for Wheezing.   2020   • alendronate (FOSAMAX) 70 MG tablet Take 70 mg by mouth Every 7 (Seven) Days.   2020 at Unknown time   • atorvastatin (Lipitor) 10 MG tablet Take 10 mg by mouth Every Night.   2020 at Unknown time   • budesonide-formoterol (SYMBICORT) 160-4.5 MCG/ACT inhaler Inhale 2 puffs 2 (Two) Times a Day.   2020   • cetirizine (zyrTEC)  10 MG tablet Take 10 mg by mouth Daily.   12/12/2020 at Unknown time   • Cholecalciferol (vitamin D3) 125 MCG (5000 UT) capsule capsule Take 5,000 Units by mouth Daily.   12/12/2020 at Unknown time   • dilTIAZem CD (CARDIZEM CD) 120 MG 24 hr capsule Take 120 mg by mouth Daily.   12/12/2020 at Unknown time   • Dulaglutide (Trulicity) 1.5 MG/0.5ML solution pen-injector Inject  under the skin into the appropriate area as directed. Every sunday 12/12/2020 at Unknown time   • Empagliflozin (JARDIANCE PO) Take 10 mg by mouth Daily.   12/12/2020 at Unknown time   • EPINEPHrine (EpiPen 2-Lizandro) 0.3 MG/0.3ML solution auto-injector injection       • estradiol (ESTRACE) 0.5 MG tablet Take 0.5 mg by mouth Daily.   12/12/2020 at Unknown time   • Insulin Glargine, 1 Unit Dial, (Toujeo SoloStar) 300 UNIT/ML solution pen-injector injection Inject 120 Units under the skin into the appropriate area as directed 2 (Two) Times a Day.   12/12/2020 at Unknown time   • insulin lispro (humaLOG) 100 UNIT/ML injection Inject  under the skin into the appropriate area as directed 3 (Three) Times a Day Before Meals. Sliding scale   12/3/2020   • metFORMIN (GLUCOPHAGE) 500 MG tablet Take 500 mg by mouth 2 (Two) Times a Day With Meals.   12/13/2020 at Unknown time   • montelukast (SINGULAIR) 10 MG tablet Take 10 mg by mouth Every Night.   12/12/2020 at Unknown time   • naproxen sodium (ALEVE) 220 MG tablet Take 220 mg by mouth 2 (Two) Times a Day As Needed.   12/12/2020 at Unknown time   • omeprazole (priLOSEC) 40 MG capsule Take 40 mg by mouth Daily.   12/12/2020 at Unknown time   • promethazine (PHENERGAN) 25 MG tablet Take 25 mg by mouth Every 6 (Six) Hours As Needed for Nausea or Vomiting.   Past Month at Unknown time   • topiramate (TOPAMAX) 100 MG tablet Take 100 mg by mouth 2 (Two) Times a Day.   12/12/2020 at Unknown time   • traZODone (DESYREL) 100 MG tablet Take 100 mg by mouth Every Night.   12/12/2020 at Unknown time        Allergies:  Allergies   Allergen Reactions   • Asa [Aspirin] Anaphylaxis   • Ciprofloxacin Anaphylaxis   • Levemir [Insulin Detemir] GI Intolerance   • Nitroglycerin Hives   • Sumatriptan Dizziness   • Cat Hair Extract Rash   • Codeine Palpitations   • Diclofenac Swelling     Immunizations:    There is no immunization history on file for this patient.  Social History:   Social History     Social History Narrative   • Not on file     Social History     Socioeconomic History   • Marital status:      Spouse name: Not on file   • Number of children: Not on file   • Years of education: Not on file   • Highest education level: Not on file   Tobacco Use   • Smoking status: Never Smoker   • Smokeless tobacco: Never Used   Substance and Sexual Activity   • Alcohol use: Not Currently   • Drug use: Never   • Sexual activity: Defer       Family History:  History reviewed. No pertinent family history.     Review of Systems  See history of present illness and past medical history.  Patient denies headache, dizziness, syncope, falls, trauma, change in vision, change in hearing, change in taste, changes in weight, changes in appetite, focal weakness, numbness, or paresthesia.  Patient denies ,sinus pressure, rhinorrhea, epistaxis, hemoptysis, nausea, vomiting,hematemesis, diarrhea, constipation or hematchezia.  Denies cold or heat intolerance, polydipsia, polyuria, polyphagia. Denies hematuria, pyuria, dysuria, hesitancy, frequency or urgency. Denies consumption of raw and under cooked meats foods or change in water source.  Denies fever, chills, sweats, night sweats.  Denies missing any routine medications. Remainder of ROS is negative.    Objective:  T Max 24 hrs: Temp (24hrs), Av.4 °F (36.9 °C), Min:98 °F (36.7 °C), Max:98.8 °F (37.1 °C)    Vitals Ranges:   Temp:  [98 °F (36.7 °C)-98.8 °F (37.1 °C)] 98 °F (36.7 °C)  Heart Rate:  [76-90] 82  Resp:  [18-20] 20  BP: (113-130)/(63-83) 114/63      Exam:  /63  "(BP Location: Right arm, Patient Position: Lying)   Pulse 82   Temp 98 °F (36.7 °C) (Oral)   Resp 20   Ht 162.6 cm (64\")   Wt 103 kg (227 lb 11.2 oz)   SpO2 96%   BMI 39.08 kg/m²     General Appearance:    Alert, cooperative, no distress, appears stated age; tachypneic, ill-appearing   Head:    Normocephalic, without obvious abnormality, atraumatic   Eyes:    PERRL, conjunctivae/corneas clear, EOM's intact, both eyes   Ears:    Normal external ear canals, both ears   Nose:   Nares normal, septum midline, mucosa normal, no drainage    or sinus tenderness   Throat:   Lips, mucosa, and tongue normal   Neck:   Supple, symmetrical, trachea midline, no adenopathy;     thyroid:  no enlargement/tenderness/nodules; no carotid    bruit or JVD   Back:     Symmetric, no curvature, ROM normal, no CVA tenderness   Lungs:     Decreased breath sounds bilaterally, respirations unlabored   Chest Wall:    No tenderness or deformity    Heart:    Regular rate and rhythm, S1 and S2 normal, no murmur, rub   or gallop   Abdomen:     Soft, nontender, bowel sounds active all four quadrants,     no masses, no hepatomegaly, no splenomegaly   Extremities:   Extremities normal, atraumatic, no cyanosis or edema   Pulses:   2+ and symmetric all extremities   Skin:   Skin color, texture, turgor normal, no rashes or lesions   Lymph nodes:   Cervical, supraclavicular, and axillary nodes normal   Neurologic:   CNII-XII intact, normal strength, sensation intact throughout      .    Data Review:  Labs in chart were reviewed.  WBC   Date Value Ref Range Status   12/13/2020 4.16 3.40 - 10.80 10*3/mm3 Final     Hemoglobin   Date Value Ref Range Status   12/13/2020 13.9 12.0 - 15.9 g/dL Final     Hematocrit   Date Value Ref Range Status   12/13/2020 42.7 34.0 - 46.6 % Final     Platelets   Date Value Ref Range Status   12/13/2020 166 140 - 450 10*3/mm3 Final     Sodium   Date Value Ref Range Status   12/13/2020 137 136 - 145 mmol/L Final "     Potassium   Date Value Ref Range Status   2020 3.8 3.5 - 5.2 mmol/L Final     Chloride   Date Value Ref Range Status   2020 103 98 - 107 mmol/L Final     CO2   Date Value Ref Range Status   2020 19.7 (L) 22.0 - 29.0 mmol/L Final     BUN   Date Value Ref Range Status   2020 24 (H) 6 - 20 mg/dL Final     Creatinine   Date Value Ref Range Status   2020 1.05 (H) 0.57 - 1.00 mg/dL Final     Glucose   Date Value Ref Range Status   2020 175 (H) 65 - 99 mg/dL Final     Calcium   Date Value Ref Range Status   2020 8.8 8.6 - 10.5 mg/dL Final     AST (SGOT)   Date Value Ref Range Status   2020 23 1 - 32 U/L Final     ALT (SGPT)   Date Value Ref Range Status   2020 22 1 - 33 U/L Final     Alkaline Phosphatase   Date Value Ref Range Status   2020 107 39 - 117 U/L Final     INR   Date Value Ref Range Status   2020 1.04 0.90 - 1.10 Final                Imaging Results (All)     Procedure Component Value Units Date/Time    CT Angiogram Chest [[id number]] Collected: 20 1442     Updated: 20 1456    Narrative:      CT ANGIOGRAPHY OF THE CHEST WITH INTRAVENOUS CONTRAST AND 3-D  RECONSTRUCTIONS     HISTORY: Covid positive infection. Cough and fevers and hypoxia.     The CT scan was performed as an emergency procedure through the chest  with CT angiography protocol using intravenous contrast and 3-D  reconstructions and demonstrates the followin. The pulmonary arteries are well-opacified and there is no evidence of  pulmonary embolus. The thoracic aorta shows no evidence of aneurysm or  dissection.  2. There are scattered areas of groundglass opacity and consolidation  scattered in both lungs and highly suspicious for Covid 19 pneumonia.  These are not well seen on the recent portable chest x-ray.  3. There is no mediastinal adenopathy. There is an enlarged right hilar  lymph node measuring 2.2 cm in long axis dimension and this is likely  reactive.  There is no axillary adenopathy.  4. There is no pericardial effusion. The CT images through the upper  liver, spleen, both adrenal glands, and upper poles of both kidneys are  unremarkable.                 Radiation dose reduction techniques were utilized, including automated  exposure control and exposure modulation based on body size.     This report was finalized on 12/13/2020 2:53 PM by Dr. Roderick Perrin M.D.       XR Chest 1 View [[id number]] Collected: 12/13/20 1216     Updated: 12/13/20 1309    Narrative:      ONE VIEW PORTABLE CHEST     HISTORY: Positive Covid 19 infection. Shortness of breath.     FINDINGS: The examination is somewhat limited by the patient's large  size. The lungs are moderately expanded and grossly clear and no focal  pneumonia is identified on the current exam. The heart size is normal.     This report was finalized on 12/13/2020 1:06 PM by Dr. Roderick Perrin M.D.           Patient Active Problem List   Diagnosis Code   • Hypoxia R09.02       Assessment:    Hypoxia  covid 19 pneumonia  Obesity  Rheumatoid arthritis  Hyperglycemia  Hypertension  asthma  Diabetes    Plan:  Has required oxygen to keep her sats acceptable  Will ask pulmonary to see her  Insulin sliding scale and accu checks  Continue decadron  High risk due to obesity, oxygen requirement    Martha Howe MD  12/13/2020  20:48 EST

## 2020-12-14 NOTE — PROGRESS NOTES
Discharge Planning Assessment  [facility]     Patient Name: Nory Rodriguez  MRN: [id number]  Today's Date: 12/14/2020    Admit Date: 12/13/2020    Discharge Needs Assessment     Row Name 12/14/20 1200       Living Environment    Lives With  alone    Current Living Arrangements  home/apartment/condo    Primary Care Provided by  self    Family Caregiver if Needed  child(sathya), adult    Family Caregiver Names  [phone] Daughter/Malini    Quality of Family Relationships  helpful;involved    Able to Return to Prior Arrangements  yes       Resource/Environmental Concerns    Resource/Environmental Concerns  none       Transition Planning    Patient/Family Anticipates Transition to  home    Patient/Family Anticipated Services at Transition  none    Transportation Anticipated  family or friend will provide       Discharge Needs Assessment    Readmission Within the Last 30 Days  no previous admission in last 30 days    Equipment Currently Used at Home  glucometer    Concerns to be Addressed  adjustment to diagnosis/illness    Equipment Needed After Discharge  other (see comments) TBD, O2    Discharge Facility/Level of Care Needs  home with home health        Discharge Plan     Row Name 12/14/20 1201       Plan    Plan  Home w/ family support    Patient/Family in Agreement with Plan  yes    Plan Comments  Spoke w/ patient by phone, facesheet verified. Pt lives alone, she is normally IADL; she uses a glucometer no other DME. She plans to return home. She denies needs, but is agreeable w/ [facility] if home o2 is ordered. Pt lives in [facility], if HH is needed she is agreeable w/ [facility]. Her adult daughter is involved and will assist if needed. Preferred pharm is [facility]. [facility] notified for possible o2 needs at [place]. CCP will follow progress.        Continued Care and Services - Admitted Since 12/13/2020    Coordination has not been started for this encounter.         Demographic Summary    No documentation.        Functional Status     Row Name 12/14/20 1201       Functional Status    Usual Activity Tolerance  good       Functional Status, IADL    Medications  independent    Meal Preparation  independent    Housekeeping  independent    Laundry  independent    Shopping  independent       Mental Status Summary    Recent Changes in Mental Status/Cognitive Functioning  no changes        Psychosocial    No documentation.       Abuse/Neglect    No documentation.       Legal    No documentation.       Substance Abuse    No documentation.       Patient Forms    No documentation.           Radha Crow RN

## 2020-12-14 NOTE — PLAN OF CARE
Problem: Adult Inpatient Plan of Care  Goal: Plan of Care Review  Outcome: Ongoing, Progressing  Flowsheets (Taken 12/14/2020 0438)  Progress: no change  Plan of Care Reviewed With: patient  Outcome Summary: Pt rested comfortably throughout the night. No complaints of pain or SOA.  Goal: Patient-Specific Goal (Individualized)  Outcome: Ongoing, Progressing  Goal: Absence of Hospital-Acquired Illness or Injury  Outcome: Ongoing, Progressing  Intervention: Identify and Manage Fall Risk  Recent Flowsheet Documentation  Taken 12/14/2020 0200 by Minda Cerrato RN  Safety Promotion/Fall Prevention: safety round/check completed  Taken 12/14/2020 0000 by Minda Cerrato RN  Safety Promotion/Fall Prevention: safety round/check completed  Taken 12/13/2020 2133 by Minda Cerrato RN  Safety Promotion/Fall Prevention: safety round/check completed  Taken 12/13/2020 2000 by Minda Cerrato RN  Safety Promotion/Fall Prevention: safety round/check completed  Intervention: Prevent Skin Injury  Recent Flowsheet Documentation  Taken 12/14/2020 0200 by Minda Cerrato RN  Body Position: position changed independently  Taken 12/14/2020 0000 by Minda Cerrato RN  Body Position: position changed independently  Taken 12/13/2020 2133 by Minda Cerrato RN  Body Position: position changed independently  Taken 12/13/2020 2000 by Minda Cerrato RN  Body Position: position changed independently  Goal: Optimal Comfort and Wellbeing  Outcome: Ongoing, Progressing  Goal: Readiness for Transition of Care  Outcome: Ongoing, Progressing     Problem: Fluid Imbalance (Pneumonia)  Goal: Fluid Balance  Outcome: Ongoing, Progressing     Problem: Infection (Pneumonia)  Goal: Resolution of Infection Signs and Symptoms  Outcome: Ongoing, Progressing     Problem: Respiratory Compromise (Pneumonia)  Goal: Effective Oxygenation and Ventilation  Outcome: Ongoing, Progressing     Problem: Pain Acute  Goal: Optimal Pain Control  Outcome: Ongoing, Progressing   Goal Outcome  Evaluation:  Plan of Care Reviewed With: patient  Progress: no change  Outcome Summary: Pt rested comfortably throughout the night. No complaints of pain or SOA.

## 2020-12-15 ENCOUNTER — READMISSION MANAGEMENT (OUTPATIENT)
Dept: CALL CENTER | Facility: HOSPITAL | Age: 55
End: 2020-12-15

## 2020-12-15 VITALS
TEMPERATURE: 96.5 F | BODY MASS INDEX: 38.77 KG/M2 | WEIGHT: 227.1 LBS | HEART RATE: 83 BPM | RESPIRATION RATE: 18 BRPM | OXYGEN SATURATION: 96 % | DIASTOLIC BLOOD PRESSURE: 66 MMHG | HEIGHT: 64 IN | SYSTOLIC BLOOD PRESSURE: 122 MMHG

## 2020-12-15 PROBLEM — R09.02 HYPOXIA: Status: RESOLVED | Noted: 2020-12-13 | Resolved: 2020-12-15

## 2020-12-15 LAB
CRP SERPL-MCNC: 2.37 MG/DL (ref 0–0.5)
GLUCOSE BLDC GLUCOMTR-MCNC: 120 MG/DL (ref 70–130)
GLUCOSE BLDC GLUCOMTR-MCNC: 140 MG/DL (ref 70–130)

## 2020-12-15 PROCEDURE — 94618 PULMONARY STRESS TESTING: CPT

## 2020-12-15 PROCEDURE — 25010000002 ENOXAPARIN PER 10 MG: Performed by: INTERNAL MEDICINE

## 2020-12-15 PROCEDURE — 63710000001 DEXAMETHASONE PER 0.25 MG: Performed by: INTERNAL MEDICINE

## 2020-12-15 PROCEDURE — G0378 HOSPITAL OBSERVATION PER HR: HCPCS

## 2020-12-15 PROCEDURE — 96372 THER/PROPH/DIAG INJ SC/IM: CPT

## 2020-12-15 PROCEDURE — 82962 GLUCOSE BLOOD TEST: CPT

## 2020-12-15 PROCEDURE — 94799 UNLISTED PULMONARY SVC/PX: CPT

## 2020-12-15 PROCEDURE — 63710000001 INSULIN LISPRO (HUMAN) PER 5 UNITS: Performed by: INTERNAL MEDICINE

## 2020-12-15 PROCEDURE — 63710000001 INSULIN GLARGINE PER 5 UNITS: Performed by: INTERNAL MEDICINE

## 2020-12-15 RX ORDER — INSULIN GLARGINE 100 [IU]/ML
80 INJECTION, SOLUTION SUBCUTANEOUS 2 TIMES DAILY
Status: DISCONTINUED | OUTPATIENT
Start: 2020-12-15 | End: 2020-12-15 | Stop reason: HOSPADM

## 2020-12-15 RX ORDER — DEXAMETHASONE 6 MG/1
6 TABLET ORAL DAILY
Qty: 2 TABLET | Refills: 0 | Status: SHIPPED | OUTPATIENT
Start: 2020-12-16 | End: 2021-08-09 | Stop reason: SDUPTHER

## 2020-12-15 RX ORDER — ACETAMINOPHEN 325 MG/1
650 TABLET ORAL EVERY 4 HOURS PRN
Start: 2020-12-15 | End: 2022-07-25

## 2020-12-15 RX ORDER — INSULIN GLARGINE 300 U/ML
80 INJECTION, SOLUTION SUBCUTANEOUS 2 TIMES DAILY
Start: 2020-12-15 | End: 2021-09-21 | Stop reason: SDUPTHER

## 2020-12-15 RX ADMIN — INSULIN GLARGINE 100 UNITS: 100 INJECTION, SOLUTION SUBCUTANEOUS at 08:45

## 2020-12-15 RX ADMIN — ENOXAPARIN SODIUM 40 MG: 40 INJECTION SUBCUTANEOUS at 08:29

## 2020-12-15 RX ADMIN — ALBUTEROL SULFATE 2 PUFF: 90 AEROSOL, METERED RESPIRATORY (INHALATION) at 11:18

## 2020-12-15 RX ADMIN — ACETAMINOPHEN 650 MG: 325 TABLET, FILM COATED ORAL at 08:29

## 2020-12-15 RX ADMIN — INSULIN LISPRO 5 UNITS: 100 INJECTION, SOLUTION INTRAVENOUS; SUBCUTANEOUS at 12:57

## 2020-12-15 RX ADMIN — DILTIAZEM HYDROCHLORIDE 120 MG: 120 CAPSULE, COATED, EXTENDED RELEASE ORAL at 08:29

## 2020-12-15 RX ADMIN — ALBUTEROL SULFATE 2 PUFF: 90 AEROSOL, METERED RESPIRATORY (INHALATION) at 15:27

## 2020-12-15 RX ADMIN — SODIUM CHLORIDE, PRESERVATIVE FREE 10 ML: 5 INJECTION INTRAVENOUS at 08:29

## 2020-12-15 RX ADMIN — CETIRIZINE HYDROCHLORIDE 10 MG: 10 TABLET ORAL at 08:29

## 2020-12-15 RX ADMIN — Medication 2000 UNITS: at 08:29

## 2020-12-15 RX ADMIN — TOPIRAMATE 100 MG: 100 TABLET, FILM COATED ORAL at 08:29

## 2020-12-15 RX ADMIN — BUDESONIDE AND FORMOTEROL FUMARATE DIHYDRATE 2 PUFF: 160; 4.5 AEROSOL RESPIRATORY (INHALATION) at 07:34

## 2020-12-15 RX ADMIN — PANTOPRAZOLE SODIUM 40 MG: 40 TABLET, DELAYED RELEASE ORAL at 06:21

## 2020-12-15 RX ADMIN — DEXAMETHASONE 6 MG: 4 TABLET ORAL at 08:29

## 2020-12-15 NOTE — PLAN OF CARE
Goal Outcome Evaluation:  Plan of Care Reviewed With: patient  Progress: no change   Patient with discharge orders. Patient sitting up in chair on room air, seems to be tolerating well. Order for walking oximetry-pending and CRP order also pending. No acute distress noted, will continue to monitor.

## 2020-12-15 NOTE — DISCHARGE SUMMARY
[facility] Hospital Medicine Services  DISCHARGE SUMMARY    Patient Name: Nory Rodriguez  : 1965  MRN: [id number]    Date of Admission: 2020 10:45 AM  Date of Discharge:  12/15/2020  Primary Care Physician: Eileen Barajas MD    Consults     Date and Time Order Name Status Description    2020 Inpatient Pulmonology Consult Completed     2020 1457 [facility] (on-call MD unless specified) Details Completed           Hospital Course     Presenting Problem:   Hypoxia [R09.02]  Pneumonia due to COVID-19 virus [U07.1, J12.89]  Pneumonia due to COVID-19 virus [U07.1, J12.89]    Active Hospital Problems    Diagnosis  POA   • **Pneumonia due to COVID-19 virus [U07.1, J12.89]  Yes   • Asthma [J45.909]  Yes   • Obesity [E66.9]  Yes   • Type 2 diabetes mellitus with hyperglycemia, with long-term current use of insulin (CMS/[facility]) [E11.65, Z79.4]  Not Applicable   • Hypertension [I10]  Yes   • Diabetic neuropathy (CMS/[facility]) [E11.40]  Yes   • GERD (gastroesophageal reflux disease) [K21.9]  Yes   • 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CMS/[facility]) [M05.10]  Yes   • Hyperlipidemia [E78.5]  Yes      Resolved Hospital Problems    Diagnosis Date Resolved POA   • Hypoxia [R09.02] 12/15/2020 Yes          Hospital Course:  Nory Rodriguez is a 55 y.o. female with past medical history of asthma, diabetes, GERD, and obesity presents with Covid pneumonia and hypoxia.  Due to risk factors and severity of her illness and hypoxia she was treated with dexamethasone and hospitalized for further medical management.  Pulmonology evaluated her and agreed with steroid plan.  She is now back on room air oxygen after successful treatment of her COVID-19 pneumonia with Decadron.  Pulmonology has cleared her for discharge today but would like her to complete a total of 5 days dexamethasone treatment.  I have prescribed her 2 additional doses with the next dose to be given tomorrow morning.  She agrees with this plan and is  eager and adamant to discharge home today.  I had offered her an additional day in the hospital to be monitored until tomorrow but she declined this.  Patient agrees to follow-up very closely with her primary care physician and telemedicine and was told by the pulmonologist she can follow-up in their clinic on an as-needed basis if she has persistent issues with shortness of breath.  Patient agreed with this plan.  Her case was more difficult as she requires extremely large doses of insulin at home that were carefully adjusted with her decreased oral intake but also with steroid hyperglycemia.  Patient reports at home she usually takes 100 to 110 units twice daily of long-acting insulin.  With her eating very little currently current plan is to decrease to 80 units twice daily and continue her scale for meals.  Patient checks her blood sugar very carefully throughout the day and knows the signs and symptoms and treatment of hypoglycemia which we discussed at length.  She also agrees when she goes home to call her doctor if she has excessive hyper or hypoglycemia.      At the time of discharge patient was told to take all medications as prescribed, keep all follow-up appointments, and call their doctor or return to the hospital with any worsening or concerning symptoms.    Please note that dragon voice recognition software was used to create this note and that transcription errors are possible.    Day of Discharge     HPI:   Requesting discharge home today.  Denies any new complaints.    ROS:  No current fevers or chills  Positive for mostly resolved shortness of breath, occasional cough  No current nausea, vomiting, or diarrhea  No current chest pain or palpitations    Vital Signs:   Temp:  [96.5 °F (35.8 °C)-98 °F (36.7 °C)] 96.5 °F (35.8 °C)  Heart Rate:  [55-70] 65  Resp:  [18-20] 18  BP: (104-122)/(61-68) 122/66     Physical Exam:  Constitutional:Awake, alert  HENT: NCAT, mucous membranes moist, neck  supple  Respiratory:  Minimal Rales and rhonchi bilaterally, resolved tachypnea noted, currently on room air oxygen  Cardiovascular: RRR, normal radial pulses  Gastrointestinal: Positive bowel sounds, soft, nontender, nondistended  Musculoskeletal: Near morbid obesity, BMI is 39, no lower extremity edema   Psychiatric: Appropriate affect, cooperative, conversational  Neurologic: No slurred speech or facial droop, follows commands, oriented   skin: No rashes or jaundice, warm    Pertinent  and/or Most Recent Results     Results from last 7 days   Lab Units 12/14/20  0727 12/13/20  1109   WBC 10*3/mm3 4.24 4.16   HEMOGLOBIN g/dL 12.9 13.9   HEMATOCRIT % 38.0 42.7   PLATELETS 10*3/mm3 157 166   SODIUM mmol/L 140 137   POTASSIUM mmol/L 4.0 3.8   CHLORIDE mmol/L 104 103   CO2 mmol/L 24.8 19.7*   BUN mg/dL 29* 24*   CREATININE mg/dL 0.94 1.05*   GLUCOSE mg/dL 122* 175*   CALCIUM mg/dL 9.1 8.8     Results from last 7 days   Lab Units 12/13/20  1109   BILIRUBIN mg/dL 0.5   ALK PHOS U/L 107   ALT (SGPT) U/L 22   AST (SGOT) U/L 23   PROTIME Seconds 13.4   INR  1.04           Invalid input(s): TG, LDLCALC, LDLREALC  Results from last 7 days   Lab Units 12/13/20  2108 12/13/20  1109   HEMOGLOBIN A1C % 7.70*  --    PROBNP pg/mL  --  60.6   TROPONIN T ng/mL  --  <0.010   PROCALCITONIN ng/mL  --  0.08   LACTATE mmol/L  --  1.1       Brief Urine Lab Results     None          Microbiology Results Abnormal     Procedure Component Value - Date/Time    Blood Culture - Blood, Arm, Left [[id number]] Collected: 12/13/20 1220    Lab Status: Preliminary result Specimen: Blood from Arm, Left Updated: 12/14/20 1545     Blood Culture No growth at 24 hours    Blood Culture - Blood, Arm, Right [[id number]] Collected: 12/13/20 1200    Lab Status: Preliminary result Specimen: Blood from Arm, Right Updated: 12/14/20 1545     Blood Culture No growth at 24 hours    Respiratory Panel PCR w/COVID-19(SARS-CoV-2) TIFFANIE/MAX/ROSA/PAD/COR/MAD/MAYTE In-House, NP  Swab in UTM/VTM, 3-4 HR TAT - Swab, Nasopharynx [[id number]]  (Abnormal) Collected: 20 1340    Lab Status: Final result Specimen: Swab from Nasopharynx Updated: 20 1506     ADENOVIRUS, PCR Not Detected     Coronavirus 229E Not Detected     Coronavirus HKU1 Not Detected     Coronavirus NL63 Not Detected     Coronavirus OC43 Not Detected     COVID19 Detected     Human Metapneumovirus Not Detected     Human Rhinovirus/Enterovirus Not Detected     Influenza A PCR Not Detected     Influenza B PCR Not Detected     Parainfluenza Virus 1 Not Detected     Parainfluenza Virus 2 Not Detected     Parainfluenza Virus 3 Not Detected     Parainfluenza Virus 4 Not Detected     RSV, PCR Not Detected     Bordetella pertussis pcr Not Detected     Bordetella parapertussis PCR Not Detected     Chlamydophila pneumoniae PCR Not Detected     Mycoplasma pneumo by PCR Not Detected    Narrative:      Fact sheet for providers: https://docs.[employer]/wp-content/uploads/RMB3805-7432-ZV5.1-EUA-Provider-Fact-Sheet-3.pdf    Fact sheet for patients: https://docs.[employer]/wp-content/uploads/CDC8417-9171-YP3.1-EUA-Patient-Fact-Sheet-1.pdf    Test performed by PCR.          Imaging Results (All)     Procedure Component Value Units Date/Time    CT Angiogram Chest [[id number]] Collected: 20 1442     Updated: 20 1456    Narrative:      CT ANGIOGRAPHY OF THE CHEST WITH INTRAVENOUS CONTRAST AND 3-D  RECONSTRUCTIONS     HISTORY: Covid positive infection. Cough and fevers and hypoxia.     The CT scan was performed as an emergency procedure through the chest  with CT angiography protocol using intravenous contrast and 3-D  reconstructions and demonstrates the followin. The pulmonary arteries are well-opacified and there is no evidence of  pulmonary embolus. The thoracic aorta shows no evidence of aneurysm or  dissection.  2. There are scattered areas of groundglass opacity and consolidation  scattered in both lungs and  highly suspicious for Covid 19 pneumonia.  These are not well seen on the recent portable chest x-ray.  3. There is no mediastinal adenopathy. There is an enlarged right hilar  lymph node measuring 2.2 cm in long axis dimension and this is likely  reactive. There is no axillary adenopathy.  4. There is no pericardial effusion. The CT images through the upper  liver, spleen, both adrenal glands, and upper poles of both kidneys are  unremarkable.                 Radiation dose reduction techniques were utilized, including automated  exposure control and exposure modulation based on body size.     This report was finalized on 12/13/2020 2:53 PM by Dr. Roderick Perrin M.D.       XR Chest 1 View [[id number]] Collected: 12/13/20 1216     Updated: 12/13/20 1309    Narrative:      ONE VIEW PORTABLE CHEST     HISTORY: Positive Covid 19 infection. Shortness of breath.     FINDINGS: The examination is somewhat limited by the patient's large  size. The lungs are moderately expanded and grossly clear and no focal  pneumonia is identified on the current exam. The heart size is normal.     This report was finalized on 12/13/2020 1:06 PM by Dr. Roderick Perrin M.D.                            Discharge Details        Discharge Medications      New Medications      Instructions Start Date   acetaminophen 325 MG tablet  Commonly known as: TYLENOL   650 mg, Oral, Every 4 Hours PRN      dexamethasone 6 MG tablet  Commonly known as: DECADRON   6 mg, Oral, Daily, Next dose 12/16   Start Date: December 16, 2020        Changes to Medications      Instructions Start Date   Toujeo SoloStar 300 UNIT/ML solution pen-injector injection  Generic drug: Insulin Glargine (1 Unit Dial)  What changed: how much to take   80 Units, Subcutaneous, 2 Times Daily         Continue These Medications      Instructions Start Date   albuterol sulfate  (90 Base) MCG/ACT inhaler  Commonly known as: PROVENTIL HFA;VENTOLIN HFA;PROAIR HFA   2 puffs, Inhalation,  Every 4 Hours PRN      albuterol (2.5 MG/3ML) 0.083% nebulizer solution  Commonly known as: PROVENTIL   2.5 mg, Nebulization, Every 4 Hours PRN      alendronate 70 MG tablet  Commonly known as: FOSAMAX   70 mg, Oral, Every 7 Days      budesonide-formoterol 160-4.5 MCG/ACT inhaler  Commonly known as: SYMBICORT   2 puffs, Inhalation, 2 Times Daily - RT      cetirizine 10 MG tablet  Commonly known as: zyrTEC   10 mg, Oral, Daily      dilTIAZem  MG 24 hr capsule  Commonly known as: CARDIZEM CD   120 mg, Oral, Daily      EpiPen 2-Lizandro 0.3 MG/0.3ML solution auto-injector injection  Generic drug: EPINEPHrine   No dose, route, or frequency recorded.      estradiol 0.5 MG tablet  Commonly known as: ESTRACE   0.5 mg, Oral, Daily      insulin lispro 100 UNIT/ML injection  Commonly known as: humaLOG   Subcutaneous, 3 Times Daily Before Meals, Sliding scale       JARDIANCE PO   10 mg, Oral, Daily      Lipitor 10 MG tablet  Generic drug: atorvastatin   10 mg, Oral, Nightly      metFORMIN 500 MG tablet  Commonly known as: GLUCOPHAGE   500 mg, Oral, 2 Times Daily With Meals      montelukast 10 MG tablet  Commonly known as: SINGULAIR   10 mg, Oral, Nightly      naproxen sodium 220 MG tablet  Commonly known as: ALEVE   220 mg, Oral, 2 Times Daily PRN      omeprazole 40 MG capsule  Commonly known as: priLOSEC   40 mg, Oral, Daily      promethazine 25 MG tablet  Commonly known as: PHENERGAN   25 mg, Oral, Every 6 Hours PRN      topiramate 100 MG tablet  Commonly known as: TOPAMAX   100 mg, Oral, 2 Times Daily      traZODone 100 MG tablet  Commonly known as: DESYREL   100 mg, Oral, Nightly      Trulicity 1.5 MG/0.5ML solution pen-injector  Generic drug: Dulaglutide   Subcutaneous, Every sunday       vitamin D3 125 MCG (5000 UT) capsule capsule   5,000 Units, Oral, Daily             Allergies   Allergen Reactions   • Asa [Aspirin] Anaphylaxis   • Ciprofloxacin Anaphylaxis   • Levemir [Insulin Detemir] GI Intolerance   •  Nitroglycerin Hives   • Sumatriptan Dizziness   • Cat Hair Extract Rash   • Codeine Palpitations   • Diclofenac Swelling         Discharge Disposition:  Home or Self Care    Diet:  Hospital:  Diet Order   Procedures   • Diet Regular; Consistent Carbohydrate       Activity:  Activity Instructions     Activity as Tolerated                 CODE STATUS:    Code Status and Medical Interventions:   Ordered at: 12/13/20 1748     Code Status:    CPR     Medical Interventions (Level of Support Prior to Arrest):    Full       Additional Instructions for the Follow-ups that You Need to Schedule     Discharge Follow-up with PCP   As directed       Currently Documented PCP:    Eileen Barajas MD    PCP Phone Number:    [phone]     Follow Up Details: 1 week                     Jorden Corley MD  12/15/20      Time Spent on Discharge:  I spent 33 minutes on this discharge activity which included: face-to-face encounter with the patient, reviewing the data in the system, coordination of the care with the nursing staff as well as consultants, documentation, and entering orders.

## 2020-12-15 NOTE — PLAN OF CARE
Goal Outcome Evaluation:  Plan of Care Reviewed With: patient  Progress: no change  Outcome Summary: Pt resting comfortably, received tylenol for c/o pain to shoulders, currently on 1L of O2, satting at 92%. up to br ad aris, nad, sr,

## 2020-12-15 NOTE — DISCHARGE PLACEMENT REQUEST
"Nory Bagley (55 y.o. Female)     Date of Birth Social Security Number Address Home Phone MRN    1965  [address] [place] [address] [phone] [id number]    Yazdanism Marital Status          None        Admission Date Admission Type Admitting Provider Attending Provider Department, Room/Bed    12/13/20 Emergency Stingl, MD Barney Sanon Stephen Matthew, MD [address], [address]    Discharge Date Discharge Disposition Discharge Destination         Home or Self Care              Attending Provider: Jorden Corley MD    Allergies: Asa [Aspirin], Ciprofloxacin, Levemir [Insulin Detemir], Nitroglycerin, Sumatriptan, Cat Hair Extract, Codeine, Diclofenac    Isolation: Enh Drop/Con   Infection: COVID (confirmed) (12/13/20)   Code Status: CPR    Ht: 162.6 cm (64.02\")   Wt: 103 kg (227 lb 1.6 oz)    Admission Cmt: None   Principal Problem: Pneumonia due to COVID-19 virus [U07.1,J12.89]                 Active Insurance as of 12/13/2020     Primary Coverage     Payor Plan Insurance Group Employer/Plan Group    [employer]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

## 2020-12-15 NOTE — PROGRESS NOTES
Continued Stay Note  [facility]     Patient Name: Nory Rodriguez  MRN: [id number]  Today's Date: 12/15/2020    Admit Date: 12/13/2020    Discharge Plan     Row Name 12/15/20 1504       Plan    Final Discharge Disposition Code  01 - home or self-care    Final Note  Awaiting walking ox for o2 needs. Shabnam/Romel aware and will arrange o2/deliver tank pending o2 need/orders. Pt to dc home w/ family/private auto. Epic referral sent to [facility], PENDING o2/HH needs. CCP will follow up. Maricarmen PEPPER notified of COVID dc.        Discharge Codes    No documentation.       Expected Discharge Date and Time     Expected Discharge Date Expected Discharge Time    Dec 16, 2020             Radha Crow RN

## 2020-12-15 NOTE — NURSING NOTE
Patient states she is not feeling well today. VS stable and recorded.  BG 85 according to patients monitor. Patient not wanting to take scheduled insulin and wanting to wait to take lantus until after she eats breakfast.  Patient says she feels weak and dizzy. Patient states BG dropped to 83 last night and felt dizzy and nauseous. Oxygen increased to 2 L/NC patient instructed to stay in bed unless nurse or aid assists her on getting up. Patient verbalized understanding. Bed alarm turned on as  Reminder.  Patient eating breakfast now. Patient with dry cough. Patient using IS.  No acute distress noted, will continue to monitor.

## 2020-12-15 NOTE — NURSING NOTE
Krissy, from respiratory, said patient tolerated room air during oximetry exercise- No need for oxygen. Patient's sister transporting her home. Cherelle called sister and she is on her way to the hospital. No acute distress noted at this time.

## 2020-12-15 NOTE — PROGRESS NOTES
"  PROGRESS NOTE  Patient Name: Nory Rodriguez  Age/Sex: 55 y.o. female  : 1965  MRN: [id number]    Date of Admission: 2020  Date of Encounter Visit: 12/15/20   LOS: 1 day   Patient Care Team:  Eileen Barajas MD as PCP - General (Family Medicine)    Chief Complaint: COVID-19 pneumonia    Hospital course: Patient is feeling better, her oxygen requirements are better, she is well controlled on 2 L nasal cannula, she feels less tight and she is breathing easier, and she is reporting less dyspnea with activity      REVIEW OF SYSTEMS:   CONSTITUTIONAL: no fever or chills  CARDIOVASCULAR: No chest pain, chest pressure or chest discomfort. No palpitations or edema.   RESPIRATORY: Less shortness of breath, no productive secretions.   GASTROINTESTINAL: No anorexia, nausea, vomiting or diarrhea. No abdominal pain or blood.   HEMATOLOGIC: No bleeding or bruising.     Ventilator/Non-Invasive Ventilation Settings (From admission, onward)    2 L/min nasal cannula oxygen            Vital Signs  Temp:  [96.5 °F (35.8 °C)-98 °F (36.7 °C)] 96.5 °F (35.8 °C)  Heart Rate:  [55-70] 56  Resp:  [18-20] 18  BP: (104-122)/(60-68) 122/66  SpO2:  [91 %-98 %] 96 %  on  Flow (L/min):  [1-2] 2 Device (Oxygen Therapy): nasal cannula    Intake/Output Summary (Last 24 hours) at 12/15/2020 1110  Last data filed at 2020 1741  Gross per 24 hour   Intake 600 ml   Output --   Net 600 ml     Flowsheet Rows      First Filed Value   Admission Height  162.6 cm (64\") Documented at 2020 1107   Admission Weight  102 kg (225 lb) Documented at 2020 1107        Body mass index is 38.96 kg/m².      20  1640 20  0500 12/15/20  0500   Weight: 103 kg (227 lb 11.2 oz) 103 kg (226 lb 12.8 oz) 103 kg (227 lb 1.6 oz)       Physical Exam:  GEN:  No acute distress, alert, cooperative, well developed   EYES:   Sclerae clear. No icterus. PERRL. Normal EOM  ENT:   External ears/nose normal, no oral lesions, no thrush, mucous " membranes moist  NECK:  Supple, midline trachea, no JVD  LUNGS: Normal chest on inspection, minimal crackles no wheezes. Respirations regular, even and unlabored.   CV:  Regular rhythm and rate. Normal S1/S2. No murmurs, gallops, or rubs noted.  ABD:  Soft, nontender and nondistended. Normal bowel sounds. No guarding  EXT:  Moves all extremities well. No cyanosis. No redness. No edema.   Skin: Dry, intact, no bleeding    Results Review:    Results From Last 14 Days   Lab Units 12/13/20  1109   LACTATE mmol/L 1.1     Results from last 7 days   Lab Units 12/14/20  0727 12/13/20  1109   SODIUM mmol/L 140 137   POTASSIUM mmol/L 4.0 3.8   CHLORIDE mmol/L 104 103   CO2 mmol/L 24.8 19.7*   BUN mg/dL 29* 24*   CREATININE mg/dL 0.94 1.05*   CALCIUM mg/dL 9.1 8.8   AST (SGOT) U/L  --  23   ALT (SGPT) U/L  --  22   ANION GAP mmol/L 11.2 14.3   ALBUMIN g/dL  --  3.80     Results from last 7 days   Lab Units 12/13/20  1109   TROPONIN T ng/mL <0.010         Results from last 7 days   Lab Units 12/13/20  1109   PROBNP pg/mL 60.6     Results from last 7 days   Lab Units 12/14/20  0727 12/13/20  1109   WBC 10*3/mm3 4.24 4.16   HEMOGLOBIN g/dL 12.9 13.9   HEMATOCRIT % 38.0 42.7   PLATELETS 10*3/mm3 157 166   MCV fL 90.5 92.4   NEUTROPHIL % %  --  54.1   LYMPHOCYTE % %  --  31.7   MONOCYTES % %  --  12.5*   EOSINOPHIL % %  --  0.5   BASOPHIL % %  --  0.2   IMM GRAN % %  --  1.0*     Results from last 7 days   Lab Units 12/13/20  1109   INR  1.04               Invalid input(s): LDLCALC      Results from last 7 days   Lab Units 12/13/20  2108   HEMOGLOBIN A1C % 7.70*     Glucose   Date/Time Value Ref Range Status   12/15/2020 0618 120 70 - 130 mg/dL Final   12/14/2020 1943 217 (H) 70 - 130 mg/dL Final   12/14/2020 1712 247 (H) 70 - 130 mg/dL Final   12/14/2020 1154 185 (H) 70 - 130 mg/dL Final   12/14/2020 0929 135 (H) 70 - 130 mg/dL Final   12/14/2020 0753 115 70 - 130 mg/dL Final   12/13/2020 2241 315 (H) 70 - 130 mg/dL Final      Results from last 7 days   Lab Units 12/13/20  1109   PROCALCITONIN ng/mL 0.08   LACTATE mmol/L 1.1     Results from last 7 days   Lab Units 12/13/20  1220 12/13/20  1200   BLOODCX  No growth at 24 hours No growth at 24 hours         Results from last 7 days   Lab Units 12/13/20  1340   COVID19  Detected*   ADENOVIRUS DETECTION BY PCR  Not Detected   CORONAVIRUS 229E  Not Detected   CORONAVIRUS HKU1  Not Detected   CORONAVIRUS NL63  Not Detected   CORONAVIRUS OC43  Not Detected   HUMAN METAPNEUMOVIRUS  Not Detected   HUMAN RHINOVIRUS/ENTEROVIRUS  Not Detected   INFLUENZA B PCR  Not Detected   PARAINFLUENZA 1  Not Detected   PARAINFLUENZA VIRUS 2  Not Detected   PARAINFLUENZA VIRUS 3  Not Detected   PARAINFLUENZA VIRUS 4  Not Detected   BORDETELLA PERTUSSIS PCR  Not Detected   BORDETELLA PARAPERTUSSIS PCR  Not Detected   CHLAMYDOPHILA PNEUMONIAE PCR  Not Detected   MYCOPLAMA PNEUMO PCR  Not Detected   RSV, PCR  Not Detected               Imaging:   Imaging Results (All)     Procedure Component Value Units Date/Time       I reviewed the patient's new clinical results.  I personally viewed and interpreted the patient's imaging results: Bilateral groundglass infiltrate, consistent with the diagnosis of COVID-19 pneumonia        Medication Review:   albuterol sulfate HFA, 2 puff, Inhalation, Q4H - RT  atorvastatin, 10 mg, Oral, Nightly  budesonide-formoterol, 2 puff, Inhalation, BID - RT  cetirizine, 10 mg, Oral, Daily  cholecalciferol, 2,000 Units, Oral, Daily  dexamethasone, 6 mg, Oral, Daily  dilTIAZem CD, 120 mg, Oral, Daily  enoxaparin, 40 mg, Subcutaneous, BID  insulin glargine, 80 Units, Subcutaneous, BID  insulin lispro, 0-24 Units, Subcutaneous, 4x Daily With Meals & Nightly  insulin lispro, 5 Units, Subcutaneous, TID With Meals  pantoprazole, 40 mg, Oral, QAM  topiramate, 100 mg, Oral, BID  traZODone, 100 mg, Oral, Nightly             ASSESSMENT:   1. COVID-19 pneumonia  2. Acute hypoxic respiratory  failure  3. Obesity  4. Hyperglycemia, diabetes mellitus  5. History of asthma, stable    PLAN:  White count is normal, hemoglobin and platelet are stable, kidney function is normal, patient is currently on 2 L nasal cannula oxygen.  I do not have any inflammatory markers, but her procalcitonin is negative, her , she is feeling better, the infiltrate were not that extensive.  Patient is eager to go home  Since she is improving, she can go home on the oral Decadron  Patient is to have a portable pulse oximeter and to come back in case of any persistent hypoxemia or increased oxygen requirement after discharge.  Continue with the Decadron after discharge for a total of 5 days at the current 6 mg p.o. daily dose  Can follow-up as an outpatient only if having persistent symptoms despite the resolution of the viral phase    Discussed with patient    Disposition: Per primary team    Jenn Winston MD  12/15/20  11:10 EST          Dictated utilizing Dragon dictation

## 2020-12-15 NOTE — PROGRESS NOTES
Exercise Oximetry    Patient Name:Nory Rodriguez   MRN: [id number]   Date: 12/15/20             ROOM AIR BASELINE   SpO2% 94   Heart Rate 82   Blood Pressure      EXERCISE ON ROOM AIR SpO2% EXERCISE ON O2 @ LPM SpO2%   1 MINUTE 94 1 MINUTE    2 MINUTES 93 2 MINUTES    3 MINUTES 93 3 MINUTES    4 MINUTES 94 4 MINUTES    5 MINUTES 93 5 MINUTES    6 MINUTES 92 6 MINUTES               Distance Walked   Distance Walked   Dyspnea (Jessica Scale)   Dyspnea (Jessica Scale)   Fatigue (Jessica Scale)   Fatigue (Jessica Scale)   SpO2% Post Exercise   SpO2% Post Exercise   HR Post Exercise  83 HR Post Exercise   Time to Recovery   Time to Recovery     Comments: patient walked in the room without ant issues on room

## 2020-12-16 ENCOUNTER — READMISSION MANAGEMENT (OUTPATIENT)
Dept: CALL CENTER | Facility: HOSPITAL | Age: 55
End: 2020-12-16

## 2020-12-16 NOTE — OUTREACH NOTE
COVID-19 Week 1 Survey      Responses   [facility] patient discharged from?  [place]   Does the patient have one of the following disease processes/diagnoses(primary or secondary)?  COVID-19   COVID-19 underlying condition?  None   Call Number  Call 1   Week 1 Call successful?  No   Discharge diagnosis  Pneumonia due to COVID-19 virus          Meli Bunn RN

## 2020-12-16 NOTE — OUTREACH NOTE
Prep Survey      Responses   [facility] facility patient discharged from?  [place]   Is LACE score < 7 ?  No   Eligibility  Readm Mgmt   Discharge diagnosis  Pneumonia due to COVID-19 virus   Does the patient have one of the following disease processes/diagnoses(primary or secondary)?  COVID-19   Does the patient have Home health ordered?  Yes   What is the Home health agency?   [facility]   Is there a DME ordered?  Yes   What DME was ordered?  [facility] aware and will arrange o2/deliver   Prep survey completed?  Yes          Bere Clarke RN

## 2020-12-17 ENCOUNTER — READMISSION MANAGEMENT (OUTPATIENT)
Dept: CALL CENTER | Facility: HOSPITAL | Age: 55
End: 2020-12-17

## 2020-12-17 NOTE — OUTREACH NOTE
COVID-19 Week 1 Survey      Responses   [facility] patient discharged from?  [place]   Does the patient have one of the following disease processes/diagnoses(primary or secondary)?  COVID-19   COVID-19 underlying condition?  None   Call Number  Call 2   Week 1 Call successful?  Yes   Call start time  [phone]   Call end time  0948   Discharge diagnosis  Pneumonia due to COVID-19 virus   Person spoke with today (if not patient) and relationship  Malini-daughter    Meds reviewed with patient/caregiver?  Yes   Is the patient having any side effects they believe may be caused by any medication additions or changes?  No   Does the patient have all medications ordered at discharge?  Yes   Is the patient taking all medications as directed (includes completed medication regime)?  Yes   Does the patient have a primary care provider?   Yes   Does the patient have an appointment with their PCP or specialist within 7 days of discharge?  No   What is preventing the patient from scheduling follow up appointments within 7 days of discharge?  Haven't had time   Nursing Interventions  Advised patient to make appointment   Has the patient kept scheduled appointments due by today?  N/A   What is the Home health agency?   No home health    What DME was ordered?  [facility] aware and will arrange o2/deliver-no O2    Psychosocial issues?  No   Did the patient receive a copy of their discharge instructions?  Yes   Did the patient receive a copy of COVID-19 specific instructions?  Yes   Nursing interventions  Reviewed instructions with patient   What is the patient's perception of their health status since discharge?  Improving   Does the patient have any of the following symptoms?  None   Nursing Interventions  Nurse provided patient education   Pulse Ox monitoring  Intermittent   Pulse Ox device source  Patient   O2 Sat comments  Daughter is unsure reading    O2 Sat: education provided  Sat levels, When to seek care   Is the  patient/caregiver able to teach back steps to recovery at home?  Eat a well-balance diet, Rest and rebuild strength, gradually increase activity   If the patient is a current smoker, are they able to teach back resources for cessation?  Not a smoker   Is the patient/caregiver able to teach back the hierarchy of who to call/visit for symptoms/problems? PCP, Specialist, Home health nurse, Urgent Care, ED, 911  Yes   COVID-19 call completed?  Yes          Meli Bunn RN

## 2020-12-18 ENCOUNTER — READMISSION MANAGEMENT (OUTPATIENT)
Dept: CALL CENTER | Facility: HOSPITAL | Age: 55
End: 2020-12-18

## 2020-12-18 LAB
BACTERIA SPEC AEROBE CULT: NORMAL
BACTERIA SPEC AEROBE CULT: NORMAL

## 2020-12-18 NOTE — OUTREACH NOTE
COVID-19 Week 1 Survey      Responses   [facility] patient discharged from?  [place]   Does the patient have one of the following disease processes/diagnoses(primary or secondary)?  COVID-19   COVID-19 underlying condition?  None   Call Number  Call 3   Week 1 Call successful?  No   Discharge diagnosis  Pneumonia due to COVID-19 virus          Meli Bunn RN

## 2020-12-21 ENCOUNTER — OFFICE VISIT CONVERTED (OUTPATIENT)
Dept: FAMILY MEDICINE CLINIC | Age: 55
End: 2020-12-21
Attending: FAMILY MEDICINE

## 2020-12-22 ENCOUNTER — READMISSION MANAGEMENT (OUTPATIENT)
Dept: CALL CENTER | Facility: HOSPITAL | Age: 55
End: 2020-12-22

## 2020-12-22 NOTE — OUTREACH NOTE
COVID-19 Week 2 Survey      Responses   [facility] patient discharged from?  [place]   Does the patient have one of the following disease processes/diagnoses(primary or secondary)?  COVID-19   COVID-19 underlying condition?  None   Call Number  Call 1   COVID-19 Week 2: Call 1 attempt successful?  No   Discharge diagnosis  Pneumonia due to COVID-19 virus          Shy Burgos RN

## 2020-12-28 ENCOUNTER — HOSPITAL ENCOUNTER (OUTPATIENT)
Dept: OTHER | Facility: HOSPITAL | Age: 55
Discharge: HOME | End: 2020-12-28
Attending: INTERNAL MEDICINE

## 2020-12-28 LAB
ALBUMIN SERPL-MCNC: 3.6 G/DL (ref 3.5–5)
ALBUMIN/GLOB SERPL: 1 {RATIO} (ref 1.4–2.6)
ALP SERPL-CCNC: 131 U/L (ref 53–141)
ALT SERPL-CCNC: 26 U/L (ref 10–40)
ANION GAP SERPL CALC-SCNC: 14 MMOL/L (ref 8–19)
AST SERPL-CCNC: 20 U/L (ref 15–50)
BILIRUB SERPL-MCNC: 0.16 MG/DL (ref 0.2–1.3)
BUN SERPL-MCNC: 18 MG/DL (ref 5–25)
BUN/CREAT SERPL: 19 {RATIO} (ref 6–20)
CALCIUM SERPL-MCNC: 9.7 MG/DL (ref 8.7–10.4)
CHLORIDE SERPL-SCNC: 101 MMOL/L (ref 99–111)
CONV CO2: 27 MMOL/L (ref 22–32)
CONV TOTAL PROTEIN: 7.3 G/DL (ref 6.3–8.2)
CREAT UR-MCNC: 0.96 MG/DL (ref 0.5–0.9)
EST. AVERAGE GLUCOSE BLD GHB EST-MCNC: 186 MG/DL
GFR SERPLBLD BASED ON 1.73 SQ M-ARVRAT: >60 ML/MIN/{1.73_M2}
GLOBULIN UR ELPH-MCNC: 3.7 G/DL (ref 2–3.5)
GLUCOSE SERPL-MCNC: 330 MG/DL (ref 65–99)
HBA1C MFR BLD: 8.1 % (ref 3.5–5.7)
OSMOLALITY SERPL CALC.SUM OF ELEC: 301 MOSM/KG (ref 273–304)
POTASSIUM SERPL-SCNC: 3.8 MMOL/L (ref 3.5–5.3)
SODIUM SERPL-SCNC: 138 MMOL/L (ref 135–147)
TSH SERPL-ACNC: 1.47 M[IU]/L (ref 0.27–4.2)

## 2021-02-01 ENCOUNTER — OFFICE VISIT CONVERTED (OUTPATIENT)
Dept: FAMILY MEDICINE CLINIC | Age: 56
End: 2021-02-01
Attending: FAMILY MEDICINE

## 2021-02-15 ENCOUNTER — HOSPITAL ENCOUNTER (OUTPATIENT)
Dept: OTHER | Facility: HOSPITAL | Age: 56
Discharge: HOME | End: 2021-02-15
Attending: FAMILY MEDICINE

## 2021-03-08 ENCOUNTER — OFFICE VISIT CONVERTED (OUTPATIENT)
Dept: FAMILY MEDICINE CLINIC | Age: 56
End: 2021-03-08
Attending: FAMILY MEDICINE

## 2021-05-03 ENCOUNTER — HOSPITAL ENCOUNTER (OUTPATIENT)
Dept: OTHER | Facility: HOSPITAL | Age: 56
Discharge: HOME | End: 2021-05-03
Attending: FAMILY MEDICINE

## 2021-05-03 ENCOUNTER — OFFICE VISIT CONVERTED (OUTPATIENT)
Dept: FAMILY MEDICINE CLINIC | Age: 56
End: 2021-05-03
Attending: FAMILY MEDICINE

## 2021-05-04 LAB
ALBUMIN SERPL-MCNC: 4.2 G/DL (ref 3.5–5)
ALBUMIN/GLOB SERPL: 1.1 {RATIO} (ref 1.4–2.6)
ALP SERPL-CCNC: 112 U/L (ref 53–141)
ALT SERPL-CCNC: 20 U/L (ref 10–40)
ANION GAP SERPL CALC-SCNC: 18 MMOL/L (ref 8–19)
AST SERPL-CCNC: 18 U/L (ref 15–50)
BILIRUB SERPL-MCNC: 0.21 MG/DL (ref 0.2–1.3)
BUN SERPL-MCNC: 19 MG/DL (ref 5–25)
BUN/CREAT SERPL: 22 {RATIO} (ref 6–20)
CALCIUM SERPL-MCNC: 9.4 MG/DL (ref 8.7–10.4)
CHLORIDE SERPL-SCNC: 103 MMOL/L (ref 99–111)
CHOLEST SERPL-MCNC: 177 MG/DL (ref 107–200)
CHOLEST/HDLC SERPL: 3.7 {RATIO} (ref 3–6)
CONV CO2: 23 MMOL/L (ref 22–32)
CONV CREATININE URINE, RANDOM: 82.2 MG/DL (ref 10–300)
CONV MICROALBUM.,U,RANDOM: <12 MG/L (ref 0–20)
CONV TOTAL PROTEIN: 7.9 G/DL (ref 6.3–8.2)
CREAT UR-MCNC: 0.87 MG/DL (ref 0.5–0.9)
EST. AVERAGE GLUCOSE BLD GHB EST-MCNC: 180 MG/DL
GFR SERPLBLD BASED ON 1.73 SQ M-ARVRAT: >60 ML/MIN/{1.73_M2}
GLOBULIN UR ELPH-MCNC: 3.7 G/DL (ref 2–3.5)
GLUCOSE SERPL-MCNC: 180 MG/DL (ref 65–99)
HBA1C MFR BLD: 7.9 % (ref 3.5–5.7)
HDLC SERPL-MCNC: 48 MG/DL (ref 40–60)
LDLC SERPL CALC-MCNC: 97 MG/DL (ref 70–100)
MICROALBUMIN/CREAT UR: 14.6 MG/G{CRE} (ref 0–35)
OSMOLALITY SERPL CALC.SUM OF ELEC: 297 MOSM/KG (ref 273–304)
POTASSIUM SERPL-SCNC: 4.1 MMOL/L (ref 3.5–5.3)
SODIUM SERPL-SCNC: 140 MMOL/L (ref 135–147)
TRIGL SERPL-MCNC: 158 MG/DL (ref 40–150)
VLDLC SERPL-MCNC: 32 MG/DL (ref 5–37)

## 2021-05-18 NOTE — PROGRESS NOTES
Nory Rodriguez Maren  1965     Office/Outpatient Visit    Visit Date: Mon, Mar 8, 2021 09:45 am    Provider: Eileen Barajas MD (Assistant: Karen Daniel MA)    Location: [facility]        Electronically signed by Eileen Barajas MD on  03/09/2021 09:21:32 AM                             Subjective:        CC: Cherelle is a 55 year old White female.  ALSO NEEDS TO DISCUSS ATORVASTATIN;         HPI:           Patient to be evaluated for acute recurrent maxillary sinusitis.  This has been a problem for the past week.  This is an acute problem without chronic or recurrent episodes.  Her primary symptoms include chest congestion, dry cough, facial pressure, frontal headache, nasal congestion, rhinorrhea and sneezing.  She denies fever.  She has already tried allergen immunotherapy and TYLENOL SINUS.  Medical history is pertinent for allergies, asthma, and frequent sinusitis.            With regard to the essential (primary) hypertension, her current cardiac medication regimen includes a diuretic ( hctz ) and an ACE inhibitor ( lisinopril 2.5 mg for her diabetes ).  Compliance with treatment has been good; she takes her medication as directed, maintains her diet and exercise regimen, and follows up as directed.  She is tolerating the medication well without side effects.            With regard to the type 2 diabetes mellitus with diabetic polyneuropathy, specifically, this is type 2, insulin requiring diabetes, complicated by peripheral neuropathy.  Compliance with treatment has been poor; she does not follow a diet and exercise regimen.      Tobacco screen: Non-smoker.  Current meds include an oral hypoglycemic ( Glucophage ), insulin/injectable ( Humalog- about 15 units with meals, SSI and carb counting; Trulicity; toujeo-125 units bid ), an ACE inhibitor, aspirin, and a lipid lowering agent.  She reports home blood glucose readings have been high, with average fasting readings in the mid-200s  mg/dL range. She checks her glucose 3 to 4 times daily.  Has not fallen recently In regard to preventative care, she performs foot self-exams several days per week and her last ophthalmology exam was in 2020-Dr Hernández- NO DIABETIC RETINOPATHY, she has cataracts.      ROS:     CONSTITUTIONAL:  Negative for chills, fatigue, fever, and weight change.      EYES:  Negative for blurred vision, eye pain, and photophobia.      CARDIOVASCULAR:  Negative for chest pain, palpitations, tachycardia, orthopnea, and edema.      RESPIRATORY:  Positive for recent cough, dyspnea and frequent wheezing.      GASTROINTESTINAL:  Positive for diarrhea.   Negative for abdominal pain, constipation, nausea or vomiting.      GENITOURINARY:  Negative for genital lesions, hematuria, menstrual problems, polyuria, abnormal vaginal bleeding, and vaginal discharge.      MUSCULOSKELETAL:  Negative for myalgias.      INTEGUMENTARY/BREAST:  Negative for rash.      NEUROLOGICAL:  Positive for weakness.   Negative for dizziness or headaches.          Past Medical History / Family History / Social History:         Last Reviewed on 3/08/2021 10:06 AM by Eileen Barajas    Past Medical History:                 PAST MEDICAL HISTORY         Hepatitis: type A;     Allergies    GERD    IDDM    hypercholesterolemia     Back pain     Breast lump     Cellulitis of the chest wall     Classic migraine     Congenital accessory skin tags     H/O Depression     GERD     chronic hoarseness     Hypertension     H/O Migraine headaches     Obstructive sleep apnea    Peripheral neuropathy     Post-menopausal    h/o Uterine polyp             GYNECOLOGICAL HISTORY:     miscarriage 1    Contraception: S/P tubal ligation;         CURRENT MEDICAL PROVIDERS:    Allergist: Dr. Delgadillo    Ophthalmologist: Ebenezer    Orthopedist: Antony Ewing, Shabnam Miguel Endocrinologist         PREVENTIVE HEALTH MAINTENANCE             BONE DENSITY: was last done 2019 with the  following abnormality noted-- Osteopenic at L1 in Lumbar Spine Only     COLORECTAL CANCER SCREENING: Up to date (colonoscopy q10y; sigmoidoscopy q5y; Cologuard q3y) was last done 18, Results are in chart     EYE EXAM: Diabetic Eye Exam during this calendar year and results are in chart was last done 2020     MAMMOGRAM: Done within last 2 years and results in are chart was last done 2/15/21 with normal results     PAP SMEAR: was last done ZEESHAN BSO no longer needs PAPs         Surgical History:         Cholecystectomy    Hysterectomy: TAHBSO;     cataracts removed b 2018     pericardial window ; Procedures: heart cath          Family History:     Father: Coronary Artery Disease; ;  Cerebrovascular Accident     Mother: Hyperlipidemia;  Endometrial Cancer     Brother(s): Type 2 Diabetes     Paternal Grandfather: Type 2 Diabetes     Paternal Grandmother:      Maternal Grandfather: Type 2 Diabetes     Maternal Grandmother: Breast Cancer;  Alzheimer's Disease         Social History:     Occupation: [employer] dept     Marital Status:      Children: 1 child and 2 step-children         Tobacco/Alcohol/Supplements:     Last Reviewed on 3/08/2021 09:50 AM by Karen Daniel    Tobacco: She has never smoked.  Non-drinker         Substance Abuse History:     Last Reviewed on 2020 11:51 AM by Mitzi West        Mental Health History:     Last Reviewed on 2019 01:57 PM by Santa Tobar        Communicable Diseases (eg STDs):     Last Reviewed on 2019 01:57 PM by Santa Tobar        Allergies:     Last Reviewed on 2021 02:30 PM by Adriel Doss    Levemir:      Cipro:      Nitroglycerin:      Bydureon: Nausea  (Adverse Reaction)    Diclofenac Sodium: swelling of feet,swelling of ankle  (Adverse Reaction)    cat dander:      Codeine:      Aspirin:      Sumatriptan: dizziness  (Adverse Reaction)        Current Medications:     Last Reviewed on  "12/21/2020 09:58 AM by Shefali Jimenez    EpiPen 0.3 mg/0.3 mL injection Auto-Injector [inject 0.3 milliliter (0.3 mg) by intramuscular route once]    Claritin 10 mg oral tablet [take 1 tablet (10 mg) by oral route once daily]    Lancet   Lancet [Onet touch Fine Point Lancets use wiht one touch glucometer, check blood sugars 4 times daily]    lisinopriL 2.5 mg oral tablet [TAKE 1 TABLET DAILY]    BD Ultra-Fine Short Pen Needle 31 gauge x 5/16\"  [USE 4 TIMES A DAY WITH     HUMALOG]    promethazine 25 mg oral tablet [TAKE ONE-HALF TO ONE TABLET BY MOUTH EVERY 4 TO 6 HOURS AS NEEDED FOR NAUSEA AND VOMITING]    topiramate 100 mg oral tablet [TAKE 1 TABLET TWICE A DAY]    metFORMIN 500 mg oral Tablet, Extended Release 24 hr [1 tab bid]    Aleve 220 mg oral tablet    One Touch Ultra Blue Test Strips  Reagent Strips [Test Blood Sugar QID]    estradioL 0.5 mg oral tablet [TAKE 1 TABLET DAILY]    atorvastatin 10 mg oral tablet [1 tab qhs]    OneTouch UltraMini kit  [check blood sugar 6 times day]    Insulin Syringes 0.5ml Syringe [Use as directed]    HumaLOG KwikPen Insulin 200 unit/mL (3 mL) subcutaneous Insulin Pen [sliding scale]    alendronate 70 mg oral tablet [TAKE 1 TABLET EVERY WEEK]    Toujeo Max U-300 SoloStar 300 unit/mL (3 mL) subcutaneous Insulin Pen [INJECT 80 UNITS  SUBCUTANEOUSLY TWO TIMES A DAY]    montelukast 10 mg oral tablet [TAKE 1 TABLET EVERY EVENING]    allergy shots once a week     ProAir HFA 90 mcg/actuation Inhalation HFA Aerosol Inhaler [PRN]    Symbicort 160mcg/4.5mcg Oral Inhaler [2 puffs BID]    Trulicity 1.5 mg/0.5 mL subcutaneous Pen Injector [every Sunday]    Jardiance 10 mg oral tablet [1 TAB DAILY]    omeprazole 40 mg oral capsule,delayed release (enteric coated) [TAKE 1 CAPSULE TWICE DAILY]    cholecalciferol (vitamin D3) 125 mcg (5,000 unit) oral capsule [TAKE ONE CAPSULE BY MOUTH DAILY]    Diltiazem HCl 120 mg oral Capsule, Extended Release 24 hr [1 tab daily]    Glucagon Emergency     " Dexcom G6 Sensor device  [Use as directed]    ALBUTEROL    NEB 0.083%     traZODone 150 mg oral tablet [take 1 tablet (150 mg) by oral route qhs]    DEXAMETHASON TAB 6MG         Objective:        Vitals:         Current: 3/8/2021 9:50:58 AM    Ht:  5 ft, 4.25 in;  Wt: 245.2 lbs;  BMI: 41.8T: 96.9 F (temporal);  BP: 121/62 mm Hg (left arm, sitting);  P: 75 bpm (left arm (BP Cuff), sitting);  sCr: 0.91 mg/dL;  GFR: 83.64O2 Sat: 95 % (room air)        Exams:     PHYSICAL EXAM:     GENERAL: vital signs recorded - well developed, well nourished;  no apparent distress;     EYES: extraocular movements intact; conjunctiva and cornea are normal; PERRLA;     E/N/T: EARS: the left TM is distorted, dull, and has fluid behind it;  NOSE: nasal mucosa is partially obscured by clear drainage;  bilateral maxillary and bilateral frontal sinus tenderness present; OROPHARYNX:  normal mucosa, dentition, gingiva, and posterior pharynx;     NECK: range of motion is normal; thyroid is non-palpable;     RESPIRATORY: normal respiratory rate and pattern with no distress; expiratory wheezes in the apices;     CARDIOVASCULAR: normal rate; rhythm is regular;  no systolic murmur; no edema;     GASTROINTESTINAL: nontender; normal bowel sounds; no organomegaly;     MUSCULOSKELETAL:  Normal range of motion, strength and tone;     NEUROLOGICAL:  cranial nerves, motor and sensory function, reflexes, gait and coordination are all intact;     PSYCHIATRIC:  appropriate affect and demeanor; normal speech pattern; grossly normal memory;         Procedures:     Acute recurrent maxillary sinusitis    1. Kenalog 40 mg given IM in the right upper arm; administered by KG;  lot number [id number]; expires 04/01/2022 Patient experienced no reaction.              Assessment:         J01.01   Acute recurrent maxillary sinusitis       I10   Essential (primary) hypertension       E11.42   Type 2 diabetes mellitus with diabetic polyneuropathy           ORDERS:          Meds Prescribed:       [Refilled] Augmentin 875-125 mg oral tablet [1 tablet po bid x 10 days. ], #20 (twenty) tablets, Refills: 0 (zero)       [Refilled] guaiFENesin 400 mg oral tablet [take 1 tablet (400 mg) by oral route every BID], #20 (twenty) tablets, Refills: 0 (zero)       [Refilled] atorvastatin 10 mg oral tablet [1 tab qhs], #90 (ninety) tablets, Refills: 1 (one)         Other Orders:       [id number]  Therapeutic injection  (In-House)                      Plan:         Acute recurrent maxillary sinusitis        RECOMMENDATIONS given include: Push Fluids, Rest, Follow up if no improvement or worsening symptoms like high fevers, vomiting, weakness, or increasing shortness of air.    .            Prescriptions:       [Refilled] Augmentin 875-125 mg oral tablet [1 tablet po bid x 10 days. ], #20 (twenty) tablets, Refills: 0 (zero)       [Refilled] guaiFENesin 400 mg oral tablet [take 1 tablet (400 mg) by oral route every BID], #20 (twenty) tablets, Refills: 0 (zero)       [Refilled] atorvastatin 10 mg oral tablet [1 tab qhs], #90 (ninety) tablets, Refills: 1 (one)           Orders:       [id number]  Therapeutic injection  (In-House)              Essential (primary) hypertensionwell controlled        Type 2 diabetes mellitus with diabetic polyneuropathyshe is seeing diabetic specialist who is managing her high BS at this time            Charge Capture:         Primary Diagnosis:     J01.01  Acute recurrent maxillary sinusitis           Orders:      [id number]  Office/outpatient visit; established patient, level 4  (In-House)            [id number]  Therapeutic injection  (In-House)              I10  Essential (primary) hypertension     E11.42  Type 2 diabetes mellitus with diabetic polyneuropathy         ADDENDUMS:      ____________________________________    Addendum: 03/22/2021 09:11 AM - Marina Prado        Add -  (Kenalog 10Mg x1)/KG

## 2021-05-18 NOTE — PROGRESS NOTES
Nory Rodriguez 1965     Office/Outpatient Visit    Visit Date:  12:58 pm    Provider: Eileen Barajas MD (Assistant: Rimma Sanford MA)    Location: [facility]        Electronically signed by Eileen Barajas MD on  2018 09:18:50 AM                             SUBJECTIVE:        CC: foot pain         HPI:     acute ankle pain B with edema in medial ankles and tenderness to tough, brown spots on ankles are more prominant.     ROS:     CONSTITUTIONAL:  Negative for chills, fatigue, fever, and weight change.      CARDIOVASCULAR:  Negative for chest pain, palpitations, tachycardia, orthopnea, and edema.      RESPIRATORY:  Negative for cough, dyspnea, and hemoptysis.      GASTROINTESTINAL:  Negative for abdominal pain, heartburn, constipation, diarrhea, and stool changes.      INTEGUMENTARY/BREAST:  Negative for rash.      NEUROLOGICAL:  Negative for dizziness and weakness.          PMH/FMH/SH:     Last Reviewed on 2018 01:45 PM by Eileen Barajas    Past Medical History:         Hepatitis: type A;     Allergies    GERD    IDDM    hypercholesterolemia     Back pain     Breast lump     Cellulitis of the chest wall     Classic migraine     Classic migraine     Common migraine     Congenital accessory skin tags     H/O Depression     GERD     chronic hoarseness     Hypertension     H/O Migraine headaches     Obstructive sleep apnea    Peripheral neuropathy     Post-menopausal    h/o Uterine polyp             GYNECOLOGICAL HISTORY:     miscarriage 1    Contraception: S/P tubal ligation;         CURRENT MEDICAL PROVIDERS:    Therapist, Shabnam Miguel         PREVENTIVE HEALTH MAINTENANCE             COLORECTAL CANCER SCREENING: will schedule     MAMMOGRAM: Done within last 2 years and results in are chart was last done 17 with normal results     PAP SMEAR: was last done ZEESHAN BSO no longer needs PAPs         Surgical History:         Cholecystectomy    Hysterectomy:  TAHBSO;      pericardial window ; Procedures: heart cath          Family History:     Father: Coronary Artery Disease; ;  Cerebrovascular Accident     Mother: Hyperlipidemia;  Endometrial Cancer     Brother(s): Type 2 Diabetes     Paternal Grandfather: Type 2 Diabetes     Paternal Grandmother:      Maternal Grandfather: Type 2 Diabetes     Maternal Grandmother: Breast Cancer;  Alzheimer's Disease         Social History:     Occupation: [employer]     Marital Status:      Children: 1 child and 2 step-children         Tobacco/Alcohol/Supplements:     Last Reviewed on 2018 01:45 PM by Eileen Barajas    Tobacco: She has never smoked.  Non-drinker         Substance Abuse History:     Last Reviewed on 2018 03:57 PM by Santa Tobar        Mental Health History:     Last Reviewed on 2018 03:57 PM by Santa Tobar        Communicable Diseases (eg STDs):     Last Reviewed on 2018 03:57 PM by Santa Tobar            Allergies:     Last Reviewed on 2018 03:57 PM by Santa Tobar    Levemir:    Cipro:    Nitroglycerin:    Januvia: vomiting (Adverse Reaction)    Bydureon: nausea (Adverse Reaction)    Metformin HCl: vomiting (Adverse Reaction)    Codeine:    Aspirin:    Sumatriptan: dizziness (Adverse Reaction)        Current Medications:     Last Reviewed on 2018 03:57 PM by Santa Tobar    Atorvastatin Calcium 10mg Tablet 1 tab qhs     Fosamax 70mg Tablet Take 1 tablet(s) by mouth q week     Singulair  10mg Tablet Take 1 tablet(s) by mouth each evening     Betamethasone/Clotrimazole 0.05%/1% Cream Apply small amount to affected area bid  x 10 days     Prilosec 40mg Capsules, Extended Release 1 tab BID     Promethazine HCl 25mg Tablet 1/2 - 1 tab q 4-6 hrs prn   prn nausea and vomiting     Estradiol 0.5mg Tablet 1 tab daily PO     Mary Gill 300units/1ml Injection 100 units bid     Lancet   Lancet Onet touch Fine Point Lancets  "use wiht one touch glucometer, check blood sugars 4 times daily     Jardiance 25mg Tablet Take 1 tablet(s) by mouth qam     Lisinopril 2.5mg Tablet Take 1 tablet(s) by mouth daily     Ventolin HFA 90mcg/1actuation Oral Inhaler Inhale 2 puff(s) by mouth 4 times a day as needed     One Touch Ultra Blue Test Strips  Reagent Strips Test Blood Sugar QID     Topamax 100mg Tablet 1 tab BID     BD Ultra-Fine Original Pen Needle 29G x 1/2\"  Pen Needle Use as directed QID w/Lantus and Novalog Pen     Benzonatate 200mg Capsules 1 capsule tid     Insulin Syringes 0.5ml Syringe Use as directed     Fioricet 50mg/300mg/40mg Capsules One to 2 PO Q 4 hours. Max of 4 in 24 hr period.     Erythromycin Ophthalmic 0.5% Ophthalmic Ointment Apply 1/2 inch ribbon of ointment to affected eye(s) bid for 10 days     Symbicort 160/4.5 Oral Inhaler 2 puffs BID     Trulicity PEN 0.75mg/0.5ml Injection Inject once weekly     allergy shots once a week     One Touch UltraMini Monitor Kit check blood sugar 6 times day     Aleve 220mg Tablet     [employer] Pen Needles 31G 8mm #100         OBJECTIVE:        Vitals:         Current: 2/7/2018 1:00:50 PM    Ht:  5 ft, 4.25 in;  Wt: 257 lbs;  BMI: 43.8    T: 97.3 F (oral);  BP: 125/78 mm Hg (right arm, sitting);  P: 84 bpm (right arm (BP Cuff), sitting);  sCr: 0.65 mg/dL;  GFR: 123.65        Exams:     PHYSICAL EXAM:     GENERAL:  well developed and nourished; appropriately groomed; in no apparent distress;     EYES: PERRL, EOMI     NECK: FROM, supple;     RESPIRATORY: CTA B WITHOUT WHEEZING/RALES OR RHONCHI, NORMAL RESP. EFFORT     CARDIOVASCULAR: regular rate and rhythm; normal S1, S2; no murmur, rub, or gallop; normal PMI;     MUSCULOSKELETAL: gait: affected by a right leg limp;  edema/warmth/redness and pain to touch medial ankles B; decreased ROM R ankle due to pain neg homman sign         ASSESSMENT           729.5   M25.571   M25.572  Foot pain              DDx:     V76.51   Z12.11  Screening for " cancer of colon              DDx:         ORDERS:         Meds Prescribed:       Indomethacin 50mg Capsules 1 po qid day 1, 1 po tid day 2, 1 po bid day 3 and 4, 1 po qday day 5 and 6  #14 (Fourteen) capsule(s) Refills: 0         Radiology/Test Orders:       [id number]  Radiologic examination, right ankle complete minimum 3 views  (Send-Out)           Lab Orders:       [id number]  [facility] Arthritis Profile  (Send-Out)         [id number]  [facility] Basic Metabolic Panel  (Send-Out)                   PLAN:          Foot pain pain B, L is worse than R     LABORATORY:  Labs ordered to be performed today include Arthritis Profile and basic metabolic panel.      RADIOLOGY:  I have ordered a right ankle xray to be done today.  School/Work Excuse for Today           Prescriptions:       Indomethacin 50mg Capsules 1 po qid day 1, 1 po tid day 2, 1 po bid day 3 and 4, 1 po qday day 5 and 6  #14 (Fourteen) capsule(s) Refills: 0           Orders:       [id number]  [facility] Arthritis Profile  (Send-Out)         [id number]  [facility] Basic Metabolic Panel  (Send-Out)         [id number]  Radiologic examination, right ankle complete minimum 3 views  (Send-Out)            Screening for cancer of colon she couldnt tolerate the oral prep for Dr Rollins/Derick for her colonoscopy (even on nausea medicine she vomited), her insurance wont pay for the cologuard, and she called a GI doc in [facility] who does the pill prep and he refused to do pills in a diabetic-need to call Dr Rollins office and see what they recommend so we can get her colonoscopy done             Patient Recommendations:        For  Foot pain:     I also recommend ^.  right ankle x-ray             CHARGE CAPTURE           **Please note: ICD descriptions below are intended for billing purposes only and may not represent clinical diagnoses**        Primary Diagnosis:         729.5 Foot pain            M25.571    Pain in right ankle and joints of right foot           M25.572    Pain in left  ankle and joints of left foot              Orders:          [id number]   Office/outpatient visit; established patient, level 3  (In-House)           V76.51 Screening for cancer of colon            Z12.11    Encounter for screening for malignant neoplasm of colon        ADDENDUMS:      ____________________________________    Date: 02/12/2018 09:29 AM    Author: Diamond Pulido         Visit Note Faxed to:        Jus Zhang  (General Surgery); Number [phone]

## 2021-05-18 NOTE — PROGRESS NOTES
Nory RodriguezShawna 1965     Office/Outpatient Visit    Visit Date: Wed, Apr 25, 2018 02:28 pm    Provider: Eileen Barajas MD (Assistant: Micaela Baca MA)    Location: [facility]        Electronically signed by Eileen Barajas MD on  04/25/2018 05:38:25 PM                             SUBJECTIVE:        CC: MRSA of a carbuncle         HPI:     Cherelle presents with unspecified skin lesion.  There is a solitary skin lesion of concern.  This was first noticed 9-10 days.  It is located in the vaginal or vulvar region.  This is described as painful.  Other symptoms noted include purulent drainage from boil still.  Past medical history is significant for culture was positive for MRSA/ sensitive to doxy.  she thinks the area is slightly better, has taken ATB, done warm compresses, but is wanting to know about going ahead with her shoulder surgery on Wednesday     ongoing R shoulder pain, she was sched for surgery today and I made her reschedule due to MRSA infection     her diabetes is stable, she needs a toujeo pen sample today     ROS:     CONSTITUTIONAL:  Negative for fever.      CARDIOVASCULAR:  Negative for chest pain, palpitations, tachycardia, orthopnea, and edema.      RESPIRATORY:  Negative for dyspnea and frequent wheezing.      GASTROINTESTINAL:  Positive for nausea.   Negative for abdominal pain, constipation, diarrhea or vomiting.      GENITOURINARY:  Positive for vaginal itching.      MUSCULOSKELETAL:  Positive for right shoulder pain.      INTEGUMENTARY/BREAST:  Positive for rash.          PMH/FMH/SH:     Last Reviewed on 4/25/2018 02:47 PM by Eileen Barajas    Past Medical History:         Hepatitis: type A;     Allergies    GERD    IDDM    hypercholesterolemia     Back pain     Breast lump     Cellulitis of the chest wall     Classic migraine     Congenital accessory skin tags     H/O Depression     GERD     chronic hoarseness     Hypertension     H/O Migraine headaches     Obstructive  sleep apnea    Peripheral neuropathy     Post-menopausal    h/o Uterine polyp             GYNECOLOGICAL HISTORY:     miscarriage 1    Contraception: S/P tubal ligation;         CURRENT MEDICAL PROVIDERS:    Therapist, Shabnam Miguel         PREVENTIVE HEALTH MAINTENANCE             BONE DENSITY: was last done 17 with the following abnormality noted-- Osteopenic at L1 in Lumbar Spine Only     COLORECTAL CANCER SCREENING: will schedule     MAMMOGRAM: Done within last 2 years and results in are chart was last done 17 with normal results     PAP SMEAR: was last done ZEESHAN BSO no longer needs PAPs         Surgical History:         Cholecystectomy    Hysterectomy: TAHBSO;      pericardial window ; Procedures: heart cath          Family History:     Father: Coronary Artery Disease; ;  Cerebrovascular Accident     Mother: Hyperlipidemia;  Endometrial Cancer     Brother(s): Type 2 Diabetes     Paternal Grandfather: Type 2 Diabetes     Paternal Grandmother:      Maternal Grandfather: Type 2 Diabetes     Maternal Grandmother: Breast Cancer;  Alzheimer's Disease         Social History:     Occupation: [employer] de[employer]     Marital Status:      Children: 1 child and 2 step-children         Tobacco/Alcohol/Supplements:     Last Reviewed on 2018 02:47 PM by Eileen Barajas    Tobacco: She has never smoked.  Non-drinker         Substance Abuse History:     Last Reviewed on 2018 03:57 PM by Santa Tobar        Mental Health History:     Last Reviewed on 2018 03:57 PM by Santa Tobar        Communicable Diseases (eg STDs):     Last Reviewed on 2018 03:57 PM by Santa Tobar            Allergies:     Last Reviewed on 2018 10:58 AM by Rimma Sanford    Levemir:    Cipro:    Nitroglycerin:    Januvia: vomiting (Adverse Reaction)    Bydureon: nausea (Adverse Reaction)    Metformin HCl: vomiting (Adverse Reaction)    Codeine:    Aspirin:     "Sumatriptan: dizziness (Adverse Reaction)        Current Medications:     Last Reviewed on 4/23/2018 12:57 PM by Monika Baca    Betamethasone/Clotrimazole 0.05%/1% Cream Apply small amount to affected area bid  x 10 days     Topamax 100mg Tablet 1 tab BID     Omeprazole 40mg Capsules, Extended Release 1 tab BID     Singulair  10mg Tablet Take 1 tablet(s) by mouth each evening     Estradiol 0.5mg Tablet 1 tab daily PO     Lisinopril 2.5mg Tablet Take 1 tablet(s) by mouth daily     Atorvastatin Calcium 10mg Tablet 1 tab qhs     Fosamax 70mg Tablet Take 1 tablet(s) by mouth q week     Promethazine HCl 25mg Tablet 1/2 - 1 tab q 4-6 hrs prn   prn nausea and vomiting     Toujeo SoloStar 300units/1ml Injection 100 units bid     Lancet   Lancet Onet touch Fine Point Lancets use wiht one touch glucometer, check blood sugars 4 times daily     Jardiance 25mg Tablet Take 1 tablet(s) by mouth qam     One Touch Ultra Blue Test Strips  Reagent Strips Test Blood Sugar QID     BD Ultra-Fine Original Pen Needle 29G x 1/2\"  Pen Needle Use as directed QID w/Lantus and Novalog Pen     Benzonatate 200mg Capsules 1 capsule tid     Insulin Syringes 0.5ml Syringe Use as directed     Fioricet 50mg/300mg/40mg Capsules One to 2 PO Q 4 hours. Max of 4 in 24 hr period.     Erythromycin Ophthalmic 0.5% Ophthalmic Ointment Apply 1/2 inch ribbon of ointment to affected eye(s) bid for 10 days     Symbicort 160/4.5 Oral Inhaler 2 puffs BID     Bactrim DS 160mg/800mg Tablet Take 1 tablet(s) by mouth q12h for 10 days     Doxycycline Monohydrate 100mg Tablet Take 1 tablet(s) by mouth bid     Miconazole Nitrate 2% Vaginal Cream Apply externally bid x 7 days.     Meloxicam 15mg Tablet 1/2 to 1 prn pain to be take with food     ProAir HFA 90mcg/1actuation Oral Inhaler PRN     allergy shots once a week     One Touch UltraMini Monitor Kit check blood sugar 6 times day     Aleve 220mg Tablet     [employer] Pen Needles 31G 8mm #100         OBJECTIVE:        " Vitals:         Current: 4/25/2018 2:30:01 PM    Ht:  5 ft, 4.25 in;  Wt: 255.6 lbs;  BMI: 43.5    T: 99.3 F (oral);  BP: 125/79 mm Hg (right arm, sitting);  P: 91 bpm (right arm (BP Cuff), sitting);  sCr: 0.76 mg/dL;  GFR: 105.51        Exams:     PHYSICAL EXAM:     GENERAL: vital signs recorded - morbidly obese;  well groomed;  no apparent distress;     NECK: supple, no LAD/TM;     RESPIRATORY: normal respiratory rate and pattern with no distress; normal breath sounds with no rales, rhonchi, wheezes or rubs;     CARDIOVASCULAR: normal rate; rhythm is regular;     GENITOURINARY: external genitalia: 1cm open carbuncle, 2 cm closed red inflammed tender carbuncle;     MUSCULOSKELETAL: normal gait;     NEUROLOGICAL:  cranial nerves, motor and sensory function, reflexes, gait and coordination are all intact;     PSYCHIATRIC:  appropriate affect and demeanor; normal speech pattern; grossly normal memory;         Procedures:     Carbuncle of vulva             INCISION & DRAINAGE     Informed consent obtained in writing.  The patient expressed understanding of the potential risks and complications discussed including pain, bleeding, infection, swelling, nerve damage, damage to blood vessels, scarring, and need for additional surgery.  Sterile technique was observed.  Wound care instructions were given to the patient.          DIAGNOSIS: abscess vulva     LOCATION:  vulva     LESION SIZE:  2 cm     PREP: betadine     ANESTHESIA: 1% lidocaine with epinephrine ( 1 cc ) infiltrated locally     INCISION:  Incision was performed using a #11 blade.      CONTENTS:  The incision yielded purulent material.      DRAINAGE: A moderate amount of material drained with the aid of a skeele curette.      IRRIGATION: Sterile saline was injected into the cyst lining and lumen.      HEMOSTASIS: application of pressure     EST BLOOD LOSS: 2 cc     COMPLICATIONS: no complications             ASSESSMENT           041.19   B95.62  MRSA wound               DDx:     616.4   N76.89  Carbuncle of vulva              DDx:     719.41   M25.511  Shoulder pain              DDx:     250.00   E11.42  Type II diabetes requiring insulin              DDx:     787.02   R11.0  Nausea              DDx:     477.9   J30.9  Allergic rhinitis              DDx:         ORDERS:         Meds Prescribed:       Refill of: Promethazine HCl 25mg Tablet 1/2 - 1 tab q 4-6 hrs prn   prn nausea and vomiting  #20 (Twenty) tablet(s) Refills: 2       Refill of: Toujeo SoloStar (Insulin Glargine (rDNA)) 300units/1ml Injection 100 units bid  #18 (Eighteen) prefilled pen Refills: 0       Mupirocin 2% Topical Ointment apply to nares and rectal area daily x 10 days  #15 (Fifteen) gm Refills: 0         Radiology/Test Orders:       [id number]  Pro services for allergen immunotherapy not including provision of allergenic extracts; 2+ injection  (In-House)                   PLAN:          MRSA wound  cont doxy and bactrim to completion     School/Work Excuse for Today          Carbuncle of vulva s/p I and D, cont doxy and bactrim          Shoulder pain shoulder sx rescheduled for next week with Dr Ortiz told Cherelle she is not to have her surgery if this wound is not completely healed, will treat potential mrsa in nares with mucopirin ointment          Type II diabetes requiring insulin Toujeo pen given          Nausea chronic, phenergan refilled           Prescriptions:       Refill of: Promethazine HCl 25mg Tablet 1/2 - 1 tab q 4-6 hrs prn   prn nausea and vomiting  #20 (Twenty) tablet(s) Refills: 2       Refill of: Toujeo SoloStar (Insulin Glargine (rDNA)) 300units/1ml Injection 100 units bid  #18 (Eighteen) prefilled pen Refills: 0       Mupirocin 2% Topical Ointment apply to nares and rectal area daily x 10 days  #15 (Fifteen) gm Refills: 0          Allergic rhinitis           Orders:       [id number]  Pro services for allergen immunotherapy not including provision of allergenic extracts; 2+ injection   (In-House)               CHARGE CAPTURE           **Please note: ICD descriptions below are intended for billing purposes only and may not represent clinical diagnoses**        Primary Diagnosis:         041.19 MRSA wound            B95.62    Methicillin resistant Staphylococcus aureus infection as the cause of diseases classified elsewhere              Orders:          27458   Office/outpatient visit; established patient, level 4  (In-House)           616.4 Carbuncle of vulva            N76.89    Other specified inflammation of vagina and vulva    719.41 Shoulder pain            M25.511    Pain in right shoulder    250.00 Type II diabetes requiring insulin            E11.42    Type 2 diabetes mellitus with diabetic polyneuropathy    787.02 Nausea            R11.0    Nausea    477.9 Allergic rhinitis            J30.9    Allergic rhinitis, unspecified              Orders:          40121   Pro services for allergen immunotherapy not including provision of allergenic extracts; 2+ injection  (In-House)

## 2021-05-18 NOTE — PROGRESS NOTES
"Nory Rodriguez 1965     Office/Outpatient Visit    Visit Date: Tue, Nov 27, 2018 11:08 am    Provider: Nory Sauceda N.P. (Assistant: Nuris Vázquez MA)    Location: [facility]        Electronically signed by Nory Sauceda N.P. on  11/30/2018 10:04:22 AM                             SUBJECTIVE:        CC:     Cherelle is a 53 year old White female.  Patient presents today for blood sugar, states she passed out at work this morning, sugar level went to 652.;         HPI:         Patient to be evaluated for type II diabetes requiring insulin.  Pt states being at work today with her vision changing and leg went \"jelly\". She increased her insulin  at work and rechecked it. Her level was down to 350 from 650 earlier. She called her daughter who came and picked her up. States people at work and her daughter told her to got to the hospital. Howver, pt states this has happened several times before. She has gone to the hospital and they tell her that there's not anything they can do and send her home. Pt states she has been dealing with this for years and she knows her body. She refuses to go to the hospital. States she has a headache. She will go home and rest, let her headache improve. If there is anything that changes, she will know and will deal with that then.      ROS:     CONSTITUTIONAL:  Positive for fatigue.      EYES:  Negative for blurred vision.      CARDIOVASCULAR:  Negative for chest pain, orthopnea, paroxysmal nocturnal dyspnea and pedal edema.      RESPIRATORY:  Negative for dyspnea.      GASTROINTESTINAL:  Negative for abdominal pain, constipation, diarrhea, nausea and vomiting.      NEUROLOGICAL:  Positive for dizziness and headaches.      PSYCHIATRIC:  Negative for anxiety, depression, and sleep disturbances.          PMH/FMH/SH:     Last Reviewed on 11/27/2018 12:06 PM by Nory Sauceda    Past Medical History:         Hepatitis: type A;     Allergies    GERD    IDDM    " hypercholesterolemia     Back pain     Breast lump     Cellulitis of the chest wall     Classic migraine     Congenital accessory skin tags     H/O Depression     GERD     chronic hoarseness     Hypertension     H/O Migraine headaches     Obstructive sleep apnea    Peripheral neuropathy     Post-menopausal    h/o Uterine polyp             GYNECOLOGICAL HISTORY:     miscarriage 1    Contraception: S/P tubal ligation;         CURRENT MEDICAL PROVIDERS:    Therapist, Shabnam Miguel         PREVENTIVE HEALTH MAINTENANCE             BONE DENSITY: was last done 17 with the following abnormality noted-- Osteopenic at L1 in Lumbar Spine Only     COLORECTAL CANCER SCREENING: Up to date (colonoscopy q10y; sigmoidoscopy q5y; Cologuard q3y) was last done 18, Results are in chart     MAMMOGRAM: Done within last 2 years and results in are chart was last done 17 with normal results     PAP SMEAR: was last done ZEESHAN BSO no longer needs PAPs         Surgical History:         Cholecystectomy    Hysterectomy: TAHBSO;      pericardial window ; Procedures: heart cath          Family History:     Father: Coronary Artery Disease; ;  Cerebrovascular Accident     Mother: Hyperlipidemia;  Endometrial Cancer     Brother(s): Type 2 Diabetes     Paternal Grandfather: Type 2 Diabetes     Paternal Grandmother:      Maternal Grandfather: Type 2 Diabetes     Maternal Grandmother: Breast Cancer;  Alzheimer's Disease         Social History:     Occupation: [employer] dept     Marital Status:      Children: 1 child and 2 step-children         Tobacco/Alcohol/Supplements:     Last Reviewed on 2018 12:06 PM by Nory Sauceda    Tobacco: She has never smoked.  Non-drinker         Substance Abuse History:     Last Reviewed on 2018 12:06 PM by Nory Sauceda        Mental Health History:     Last Reviewed on 2018 12:06 PM by Nory Sauceda        Communicable Diseases (eg  STDs):     Last Reviewed on 11/27/2018 12:06 PM by Nory Sauceda            Current Problems:     Last Reviewed on 11/27/2018 12:06 PM by Nory Sauceda    Low back pain     Osteopenia     Malaise and fatigue, NEC     Other viral hepatitis carrier     Screening for cancer of colon     History of noncompliance with medical treatment     Overweight     GERD     Common migraine     Peripheral neuropathy     Pressure ulcer stage I     Morbid obesity     Chronic insomnia     Degenerative disc disease     Nephrolithiasis     Allergic rhinitis     Congenital accessory skin tags     Post-menopausal HRT     Type II diabetes requiring insulin     Obstructive sleep apnea     Uterine polyp     Hypercholesterolemia     Hypertension     Depression     GE reflux disease     Shoulder pain     Carbuncle of vulva     MRSA wound         Immunizations:     zzFluzone pf-quadrivalent 3 and up 9/29/2015     zzFluzone pf-quadrivalent 3 and up 10/26/2016     zzFluzone pf-quadrivalent 3 and up 9/25/2017     Fluzone (3 + years dose) 9/14/2009     Fluzone (3 + years dose) 10/27/2012     Fluzone pf (3+ years dose) 10/14/2013     Fluzone pf (3+ years dose) 10/7/2014     Fluzone Quadrivalent (3+ years) 9/17/2018     PNEUMOVAX 23 (Pneumococcal PPV23) 11/29/2016     Tdap (Tetanus, reduced diph, acellular pertussis) 9/29/2015         Allergies:     Last Reviewed on 11/27/2018 12:06 PM by Nory Sauceda    Levemir:    Cipro:    Nitroglycerin:    Januvia: vomiting (Adverse Reaction)    Bydureon: nausea (Adverse Reaction)    Metformin HCl: vomiting (Adverse Reaction)    Codeine:    Aspirin:    Sumatriptan: dizziness (Adverse Reaction)        Current Medications:     Last Reviewed on 11/27/2018 12:06 PM by Nory Sauceda    Singulair  10mg Tablet Take 1 tablet(s) by mouth each evening     Topamax 100mg Tablet 1 tab BID     Atorvastatin Calcium 10mg Tablet 1 tab qhs     Fosamax 70mg Tablet Take 1 tablet(s) by mouth q week     Lisinopril  "2.5mg Tablet Take 1 tablet(s) by mouth daily     Betamethasone/Clotrimazole 0.05%/1% Cream Apply small amount to affected area bid  x 10 days     Meloxicam 15mg Tablet 1/2 to 1 prn pain to be take with food     Trulicity PEN 0.75mg/0.5ml Injection Inject once weekly     Omeprazole 40mg Capsules, Extended Release 1 tab BID     Toujeo SoloStar 300units/1ml Injection 100 units bid     Promethazine HCl 25mg Tablet 1/2 - 1 tab q 4-6 hrs prn   prn nausea and vomiting     Estradiol 0.5mg Tablet 1 tab daily PO     Lancet   Lancet Onet touch Fine Point Lancets use wiht one touch glucometer, check blood sugars 4 times daily     One Touch Ultra Blue Test Strips  Reagent Strips Test Blood Sugar QID     BD Ultra-Fine Original Pen Needle 29G x 1/2\"  Pen Needle Use as directed QID w/Lantus and Novalog Pen     Benzonatate 200mg Capsules 1 capsule tid     Insulin Syringes 0.5ml Syringe Use as directed     Fioricet 50mg/300mg/40mg Capsules One to 2 PO Q 4 hours. Max of 4 in 24 hr period.     Symbicort 160/4.5 Oral Inhaler 2 puffs BID     Baclofen 10mg Tablet 1 tab po TID prn for pain/muscle pain     Zyrtec 10mg Tablet 1 bid     ProAir HFA 90mcg/1actuation Oral Inhaler PRN     allergy shots once a week     One Touch UltraMini Monitor Kit check blood sugar 6 times day     Aleve 220mg Tablet     [employer] Pen Needles 31G 8mm #100         OBJECTIVE:        Vitals:         Current: 11/27/2018 11:15:21 AM    Ht:  5 ft, 4.25 in;  Wt: 263.4 lbs;  BMI: 44.9    T: 98 F (oral);  BP: 129/61 mm Hg (right arm, sitting);  P: 77 bpm (right arm (BP Cuff), sitting);  sCr: 0.74 mg/dL;  GFR: 108.51        Exams:     PHYSICAL EXAM:     GENERAL: vital signs recorded - well developed, well nourished;  no apparent distress;     EYES: extraocular movements intact; conjunctiva and cornea are normal; PERRLA;     NECK: range of motion is normal; thyroid is non-palpable;     RESPIRATORY: normal respiratory rate and pattern with no distress; normal breath sounds " with no rales, rhonchi, wheezes or rubs;     CARDIOVASCULAR: normal rate; rhythm is regular;  no systolic murmur; no edema;     GASTROINTESTINAL: nontender; normal bowel sounds; no organomegaly;     BREAST/INTEGUMENT: Facial flushing;     NEUROLOGICAL:  cranial nerves, motor and sensory function, reflexes, gait and coordination are all intact;     PSYCHIATRIC:  appropriate affect and demeanor; normal speech pattern; grossly normal memory;         Lab/Test Results:             Glucose capillary:  241 (11/27/2018),     Performed by::  giselle (11/27/2018),             ASSESSMENT:           250.00   E11.42  Type II diabetes requiring insulin              DDx:     278.00   E66.9   Z71.3  Obesity, NOS              DDx:         ORDERS:         Lab Orders:       [id number]*  Glucose quantitative inhouse  (In-House)         86207  BDCB - [facility] CBC with 3 part diff  (Send-Out)         98387  COMP - [facility] Comp. Metabolic Panel  (Send-Out)         69861  BDUAM - [facility] Urinalysis, automated, with micro  (Send-Out)           Procedures Ordered:       [id number]  Collection of capillary blood specimen (eg, finger, heel, ear stick)  (In-House)           Other Orders:         Calculated BMI above the upper parameter and a follow-up plan was documented in the medical record  (In-House)                   PLAN:          Type II diabetes requiring insulin Instructed daughter to stay or have someone stay with patient and monitor her blood sugar hourly and if needed with symptom changes. Daughter states the pt's mom will be coming to stay with her.     LABORATORY:  Labs ordered to be performed today include CBC, Comprehensive metabolic panel, Glucose by glucometer, and urinalysis with micro.            Orders:       94583*  Glucose quantitative inhouse  (In-House)         75939  BDCBC - [facility] CBC with 3 part diff  (Send-Out)         59289  COMP - [facility] Comp. Metabolic Panel  (Send-Out)         57442  BDUAM - [facility] Urinalysis, automated, with micro   (Send-Out)         87407  Collection of capillary blood specimen (eg, finger, heel, ear stick)  (In-House)            Obesity, NOS     MIPS     BMI Elevated - Follow-Up Plan: She was provided education on weight loss strategies           Orders:         Calculated BMI above the upper parameter and a follow-up plan was documented in the medical record  (In-House)               CHARGE CAPTURE:           Primary Diagnosis:     250.00 Type II diabetes requiring insulin            E11.42    Type 2 diabetes mellitus with diabetic polyneuropathy              Orders:          60086   Office/outpatient visit; established patient, level 4 (55 minutes)  (In-House)             20897*   Glucose quantitative inhouse  (In-House)             09012   Collection of capillary blood specimen (eg, finger, heel, ear stick)  (In-House)           278.00 Obesity, NOS            E66.9    Obesity, unspecified           Z71.3    Dietary counseling and surveillance              Orders:             Calculated BMI above the upper parameter and a follow-up plan was documented in the medical record  (In-House)

## 2021-05-18 NOTE — PROGRESS NOTES
Nory Rodriguez Maren  1965     Office/Outpatient Visit    Visit Date: Thu, Jan 23, 2020 10:49 am    Provider: Eileen Barajas MD (Assistant: Kerri Underwood RN)    Location: [facility]        Electronically signed by Eileen Barajas MD on  02/03/2020 09:32:25 AM                             Subjective:        CC: hand pain    HPI:       Cherelle has acute numbness and pain to her plantar hand in the 3-5 metacarpal region, tender to touch and constantly numb for past week and a half, she had these symptoms 3 months ago for 3 days and then it went away, ice causes pain, heat does nothing, she cant take aleve, but she can tolerate advil and this does not help her pain (400 mg every 4-6 hours).  No wrist pain or pain moving wrist.  She has swelling in her fingers and pain in all her fingers (a little in thumb), cherry with flexion. No known injury or trauma          Essential (primary) hypertension details; this was first diagnosed one year ago.  Her current cardiac medication regimen includes a diuretic ( hctz ) and an ACE inhibitor ( lisinopril 2.5 mg for her diabetes ).  Compliance with treatment has been good; she takes her medication as directed, maintains her diet and exercise regimen, and follows up as directed.  She is tolerating the medication well without side effects.      ROS:     CONSTITUTIONAL:  Negative for chills, fatigue, fever, and weight change.      CARDIOVASCULAR:  Negative for chest pain, palpitations, tachycardia, orthopnea, and edema.      RESPIRATORY:  Negative for cough, dyspnea, and hemoptysis.      GASTROINTESTINAL:  Negative for abdominal pain, heartburn, constipation, diarrhea, and stool changes.      GENITOURINARY:  Positive for vaginal itching.   Negative for dysuria, vaginal discharge or frequent urination.      INTEGUMENTARY/BREAST:  Positive for knot x 3 on left lower leg.          Past Medical History / Family History / Social History:         Last Reviewed on  2019 01:51 PM by Eileen Barajas    Past Medical History:                 PAST MEDICAL HISTORY         Hepatitis: type A;     Allergies    GERD    IDDM    hypercholesterolemia     Back pain     Breast lump     Cellulitis of the chest wall     Classic migraine     Congenital accessory skin tags     H/O Depression     GERD     chronic hoarseness     Hypertension     H/O Migraine headaches     Obstructive sleep apnea    Peripheral neuropathy     Post-menopausal    h/o Uterine polyp             GYNECOLOGICAL HISTORY:     miscarriage 1    Contraception: S/P tubal ligation;         CURRENT MEDICAL PROVIDERS:    Allergist: Dr. Delgadillo    Ophthalmologist: Ebenezer    Orthopedist: Antony    Therapist, Shabnam Miguel Endocrinologist         PREVENTIVE HEALTH MAINTENANCE             BONE DENSITY: was last done 2019 with the following abnormality noted-- Osteopenic at L1 in Lumbar Spine Only     COLORECTAL CANCER SCREENING: Up to date (colonoscopy q10y; sigmoidoscopy q5y; Cologuard q3y) was last done 18, Results are in chart     EYE EXAM: Diabetic Eye Exam during this calendar year and results are in chart was last done 19     MAMMOGRAM: Done within last 2 years and results in are chart was last done 2019 with normal results     PAP SMEAR: was last done ZEESHAN BSO no longer needs PAPs         Surgical History:         Cholecystectomy    Hysterectomy: TAHBSO;     cataracts removed b 2018     pericardial window ; Procedures: heart cath          Family History:     Father: Coronary Artery Disease; ;  Cerebrovascular Accident     Mother: Hyperlipidemia;  Endometrial Cancer     Brother(s): Type 2 Diabetes     Paternal Grandfather: Type 2 Diabetes     Paternal Grandmother:      Maternal Grandfather: Type 2 Diabetes     Maternal Grandmother: Breast Cancer;  Alzheimer's Disease         Social History:     Occupation: [employer] dept     Marital Status:      Children: 1 child  "and 2 step-children         Tobacco/Alcohol/Supplements:     Last Reviewed on 1/06/2020 01:07 PM by Spurling, Sarah C    Tobacco: She has never smoked.  Non-drinker         Substance Abuse History:     Last Reviewed on 7/31/2019 01:57 PM by Santa Tobar        Mental Health History:     Last Reviewed on 7/31/2019 01:57 PM by Santa Tobar        Communicable Diseases (eg STDs):     Last Reviewed on 7/31/2019 01:57 PM by Santa Tobar        Allergies:     Last Reviewed on 1/23/2020 10:51 AM by Kerri Underwood    Levemir:      Cipro:      Nitroglycerin:      Bydureon: Nausea  (Adverse Reaction)    Diclofenac Sodium: swelling of feet,swelling of ankle  (Adverse Reaction)    cat dander:      Codeine:      Aspirin:      Sumatriptan: dizziness  (Adverse Reaction)        Current Medications:     Last Reviewed on 1/06/2020 01:21 PM by Spurling, Sarah C    EpiPen 0.3 mg/0.3 mL injection Auto-Injector [inject 0.3 milliliter (0.3 mg) by intramuscular route once]    Lancet   Lancet [Onet touch Fine Point Lancets use wiht one touch glucometer, check blood sugars 4 times daily]    lisinopril 2.5 mg oral tablet [TAKE ONE TABLET BY MOUTH DAILY]    [employer] Pen [place] 31G 8mm #100     Promethazine HCl 25mg Tablet [ 1/2 - 1 tab q 4-6 hrs prn   prn nausea and vomiting]    Topamax 100 mg oral tablet [1 tab BID]    metFORMIN 500 mg oral Tablet, Extended Release 24 hr [1 tab bid]    Aleve 220 mg oral tablet    BD Ultra-Fine Original Pen Needle 29G x 1/2\"  Pen Needle [use bid with toujeo]    One Touch Ultra Blue Test Strips  Reagent Strips [Test Blood Sugar QID]    Estradiol 0.5 mg oral tablet [1 tab daily PO]    Atorvastatin Calcium 10mg Tablet [1 tab qhs]    OneTouch UltraMini kit  [check blood sugar 6 times day]    Insulin Syringes 0.5ml Syringe [Use as directed]    HumaLOG KwikPen Insulin 200 unit/mL (3 mL) subcutaneous Insulin Pen [sliding scale]    Fosamax 70 mg oral tablet [Take 1 tablet(s) by mouth q week]    " Singulair  10mg Tablet [Take 1 tablet(s) by mouth each evening]    allergy shots once a week     ProAir HFA 90 mcg/actuation Inhalation HFA Aerosol Inhaler [PRN]    Symbicort 160mcg/4.5mcg Oral Inhaler [2 puffs BID]    Trulicity 1.5 mg/0.5 mL subcutaneous Pen Injector [every Sunday]    Jardiance 10 mg oral tablet [1 TAB DAILY]    Omeprazole 40 mg oral capsule,delayed release (enteric coated) [1 tab BID]    Zyrtec 10 mg oral tablet [1 bid]    Diltiazem HCl 120 mg oral Capsule, Extended Release 24 hr [1 tab daily]    hydroCHLOROthiazide 12.5 mg oral tablet [1 po daily]    Glucagon Emergency     Dexcom G6 Sensor device  [Use as directed]    [place] (general)  Needle [31 guage X 5/16 QID with humalog]    ALBUTEROL    NEB 0.083%         Objective:        Vitals:         Current: 1/23/2020 10:55:42 AM    Ht:  5 ft, 4.25 in;  Wt: 243.6 lbs;  BMI: 41.5T: 98.2 F (oral);  BP: 104/61 mm Hg (left arm, sitting);  P: 84 bpm (left arm (BP Cuff), sitting);  sCr: 0.85 mg/dL;  GFR: 90.34        Exams:     PHYSICAL EXAM:     GENERAL: vital signs recorded - well developed, well nourished;  no apparent distress;     RESPIRATORY: CTA B WITHOUT WHEEZING/RALES OR RHONCHI, NORMAL RESP. EFFORT     CARDIOVASCULAR: normal rate; rhythm is regular;  no systolic murmur; no edema;     MUSCULOSKELETAL: normal gait; R hand-edema to soft tissue, entire plantar surface, very tender to light pressure, poor hand  strength-diffuse, with a 1 cm new tender knot mid hand;     NEUROLOGICAL:  cranial nerves, motor and sensory function, reflexes, gait and coordination are all intact;     PSYCHIATRIC:  appropriate affect and demeanor; normal speech pattern; grossly normal memory;         Procedures:     Allergic rhinitis, unspecified    Allergy Injection (2 or more) exp 10/29/20/ pdr             Assessment:         R20.2   Paresthesia of skin       M25.541   Pain in joints of right hand       I10   Essential (primary) hypertension       E11.42   Type 2  diabetes mellitus with diabetic polyneuropathy       J30.9   Allergic rhinitis, unspecified           ORDERS:         Radiology/Test Orders:       [id number]  Right radiologic examination; hand; minimum of three views  (Send-Out)            99564  Professional services for allergen immunotherapy not including provision of allergenic extracts; two or more injections  (In-House)              Lab Orders:       [id number]  [facility] Arthritis Profile  (Send-Out)                      Plan:         Paresthesia of skinsee below        Pain in joints of right handif she gets worse she is to go [facility], cont hand brace, will check a hand xray and arthritis profilewill start her on a topical cream of gabapentin/lidocaine and diclofenac    LABORATORY:  Labs ordered to be performed today include Arthritis Profile.      RADIOLOGY:  I have ordered a Right Hand hand x-ray to be done today.            Orders:       94648  [facility] Arthritis Profile  (Send-Out)            [id number]  Right radiologic examination; hand; minimum of three views  (Send-Out)              Essential (primary) hypertensionwell controlled        Type 2 diabetes mellitus with diabetic polyneuropathyBS are in low 200s and sign improved for her since starting the dexcom        Allergic rhinitis, unspecified          Orders:       67762  Professional services for allergen immunotherapy not including provision of allergenic extracts; two or more injections  (In-House)                  Patient Recommendations:        For  Pain in joints of right hand:    I also recommend ^.              Charge Capture:         Primary Diagnosis:     R20.2  Paresthesia of skin           Orders:      22467  Office/outpatient visit; established patient, level 4  (In-House)              M25.541  Pain in joints of right hand     I10  Essential (primary) hypertension     E11.42  Type 2 diabetes mellitus with diabetic polyneuropathy     J30.9  Allergic rhinitis, unspecified            Orders:      84573  Professional services for allergen immunotherapy not including provision of allergenic extracts; two or more injections  (In-House)

## 2021-05-18 NOTE — PROGRESS NOTES
MichaelNory NALLELY 1965     Office/Outpatient Visit    Visit Date: Fri, Oct 18, 2019 11:23 am    Provider: Margie Steele N.P. (Assistant: Nuris Vázquez MA)    Location: [facility]        Electronically signed by Margie Steele N.P. on  10/19/2019 05:43:17 PM                             SUBJECTIVE:        CC:     Cherelle is a 54 year old White female.  Patient presents today with complaints of sinus pressure, bilateral ear pain, cough, congestion X 2 days;         HPI:         Patient to be evaluated for upper respiratory illness.  These have been present for the past one to two days.  The symptoms include dry cough, ear complaints, headache, sinus pain/pressure and sore throat.  She denies fever.  She has not tried any medications for symptomatic relief.  Medical history is significant for on immunotherapy allergies.  On Zyrtec and Singulair. She reports not able to tolerate nasal steroid sprays and was told by allergist not to use.     ROS:     CONSTITUTIONAL:  Negative for chills and fever.      EYES:  Negative for eye drainage and eye pain.      E/N/T:  Positive for ear pain and sinus pressure.      CARDIOVASCULAR:  Negative for chest pain and palpitations.      RESPIRATORY:  Negative for dyspnea and frequent wheezing.      NEUROLOGICAL:  Positive for headaches.   Negative for fainting.          PMH/FMH/SH:     Last Reviewed on 2019 01:51 PM by Eileen Barajas    Past Medical History:                 PAST MEDICAL HISTORY         Hepatitis: type A;     Allergies    GERD    IDDM    hypercholesterolemia     Back pain     Breast lump     Cellulitis of the chest wall     Classic migraine     Congenital accessory skin tags     H/O Depression     GERD     chronic hoarseness     Hypertension     H/O Migraine headaches     Obstructive sleep apnea    Peripheral neuropathy     Post-menopausal    h/o Uterine polyp             GYNECOLOGICAL HISTORY:     miscarriage 1    Contraception: S/P tubal  ligation;         CURRENT MEDICAL PROVIDERS:    Allergist: Dr. Delgadillo    Ophthalmologist: Ebenezer    Orthopedist: Antony    Therapist, Shabnam Miguel Endocrinologist         PREVENTIVE HEALTH MAINTENANCE             BONE DENSITY: was last done 17 with the following abnormality noted-- Osteopenic at L1 in Lumbar Spine Only     COLORECTAL CANCER SCREENING: Up to date (colonoscopy q10y; sigmoidoscopy q5y; Cologuard q3y) was last done 18, Results are in chart     EYE EXAM: Diabetic Eye Exam during this calendar year and results are in chart was last done 19     MAMMOGRAM: Done within last 2 years and results in are chart was last done 12-3-18 with normal results     PAP SMEAR: was last done ZEEHSAN BSO no longer needs PAPs         Surgical History:         Cholecystectomy    Hysterectomy: TAHBSO;     cataracts removed b 2018      pericardial window ; Procedures: heart cath          Family History:     Father: Coronary Artery Disease; ;  Cerebrovascular Accident     Mother: Hyperlipidemia;  Endometrial Cancer     Brother(s): Type 2 Diabetes     Paternal Grandfather: Type 2 Diabetes     Paternal Grandmother:      Maternal Grandfather: Type 2 Diabetes     Maternal Grandmother: Breast Cancer;  Alzheimer's Disease         Social History:     Occupation: [employer] dept     Marital Status:      Children: 1 child and 2 step-children         Tobacco/Alcohol/Supplements:     Last Reviewed on 2019 01:51 PM by Eileen Barajas    Tobacco: She has never smoked.  Non-drinker         Substance Abuse History:     Last Reviewed on 2019 01:57 PM by Santa oTbar        Mental Health History:     Last Reviewed on 2019 01:57 PM by Santa Tobar        Communicable Diseases (eg STDs):     Last Reviewed on 2019 01:57 PM by Santa Tobar            Current Problems:     Last Reviewed on 2019 01:57 PM by Santa Tobar    Encounter for blood typing      Hip pain     Sleep apnea with sleep disturbance     Vitamin D deficiency, unspecified     Moderate asthma (exacerbation)     Near-syncope     Obesity, NOS     Low back pain     Osteopenia     Other viral hepatitis carrier     Screening for cancer of colon     History of noncompliance with medical treatment     Overweight     GERD     Common migraine     Peripheral neuropathy     Pressure ulcer stage I     Morbid obesity     Chronic insomnia     Degenerative disc disease     Nephrolithiasis     Allergic rhinitis     Congenital accessory skin tags     Post-menopausal HRT     Type II diabetes requiring insulin     Obstructive sleep apnea     Uterine polyp     Hypercholesterolemia     Hypertension     Depression     GE reflux disease     Knee pain     Wrist pain     Screening for depression     Shoulder pain     MRSA wound         Immunizations:     zzFluzone pf-quadrivalent 3 and up 9/29/2015     zzFluzone pf-quadrivalent 3 and up 10/26/2016     zzFluzone pf-quadrivalent 3 and up 9/25/2017     Fluzone (3 + years dose) 9/14/2009     Fluzone (3 + years dose) 10/27/2012     Fluzone pf (3+ years dose) 10/14/2013     Fluzone pf (3+ years dose) 10/7/2014     Fluzone Quadrivalent (3+ years) 9/17/2018     Fluzone Quadrivalent (3+ years) 9/30/2019     PNEUMOVAX 23 (Pneumococcal PPV23) 11/29/2016     Tdap (Tetanus, reduced diph, acellular pertussis) 9/29/2015         Allergies:     Last Reviewed on 9/30/2019 01:10 PM by Monika Puga    Levemir:    Cipro:    Nitroglycerin:    Bydureon: nausea (Adverse Reaction)    Diclofenac Sodium: swelling of ankle, swelling of feet (Adverse Reaction)    Codeine:    Aspirin:    Sumatriptan: dizziness (Adverse Reaction)        Current Medications:     Last Reviewed on 9/30/2019 01:10 PM by Monika Puga    Estradiol 0.5mg Tablet 1 tab daily PO     Lisinopril 2.5mg Tablet Take 1 tablet(s) by mouth daily     Symbicort 160mcg/4.5mcg Oral Inhaler 2 puffs BID     Singulair  10mg Tablet Take 1  "tablet(s) by mouth each evening     Topamax 100mg Tablet 1 tab BID     Atorvastatin Calcium 10mg Tablet 1 tab qhs     Promethazine HCl 25mg Tablet 1/2 - 1 tab q 4-6 hrs prn   prn nausea and vomiting     Humalog KwikPen 200units/1ml Pen System, Disposable sliding scale     Trulicity PEN 1.5mg/0.5ml Injection every Sunday     Omeprazole 40mg Capsules, Extended Release 1 tab BID     Fosamax 70mg Tablet Take 1 tablet(s) by mouth q week     Lancet   Lancet Onet touch Fine Point Lancets use wiht one touch glucometer, check blood sugars 4 times daily     One Touch Ultra Blue Test Strips  Reagent Strips Test Blood Sugar QID     Insulin Syringes 0.5ml Syringe Use as directed     Jardiance 10mg Tablet 1 TAB DAILY     Glucagon Emergency     Hydrochlorothiazide (HCTZ) 12.5mg Tablet 1 po daily     Metformin HCl 500mg Tablets, Extended Release 1 tab bid     Diltiazem HCl 120mg Capsules, Extended Release 1 tab daily     Zyrtec 10mg Tablet 1 bid     ProAir HFA 90mcg/1actuation Oral Inhaler PRN     allergy shots once a week     One Touch UltraMini Monitor Kit check blood sugar 6 times day     Aleve 220mg Tablet     Kroger Pen Needles 31G 8mm #100     BD Ultra-Fine Original Pen Needle 29G x 1/2\"  Pen Needle use bid with toujeo     Needles (general)  Needle 31 guage X 5/16 QID with humalog     Toujeo SoloStar 300units/1ml Injection 120 units bid     Vitamin D3 5,000IU Capsules 1 capsule 4 days a week and 2 capsules 3 days a week     Dexcom G6 Sensor Kit Use as directed         OBJECTIVE:        Vitals:         Current: 10/18/2019 11:27:35 AM    Ht:  5 ft, 4.25 in;  Wt: 259.6 lbs;  BMI: 44.2    T: 97.8 F (oral);  BP: 116/67 mm Hg (right arm, sitting);  P: 77 bpm (right arm (BP Cuff), sitting);  sCr: 0.85 mg/dL;  GFR: 92.82        Exams:     PHYSICAL EXAM:     GENERAL: well developed, well nourished;  no apparent distress;     EYES: PERRL, EOMI     E/N/T: EARS: external auditory canal normal;  both TMs are dull;  NOSE: normal " turbinates; bilateral maxillary sinus tenderness present; OROPHARYNX: oral mucosa is normal; posterior pharynx shows no exudate;     NECK: range of motion is normal; trachea is midline;     RESPIRATORY: normal respiratory rate and pattern with no distress; normal breath sounds with no rales, rhonchi, wheezes or rubs;     CARDIOVASCULAR: normal rate; rhythm is regular;     NEUROLOGIC: mental status: alert and oriented x 3; GROSSLY INTACT     PSYCHIATRIC: appropriate affect and demeanor;         Procedures:     Acute sinusitis, other     1. Toradol 30 mg given IM in the left hip; administered by AS;  lot number [id number]; expires 08/2020             ASSESSMENT           461.8   J01.90  Acute sinusitis, other              DDx:         ORDERS:         Other Orders:       [id number]  Therapeutic injection  (In-House)           Toradol, per 15 mg (x2)                 PLAN:          Acute sinusitis, other Symptomatic treatment discussed. To call next Friday if no improvement and will order antibiotics if persistent symptoms.         RECOMMENDATIONS given include: Push Fluids, Rest, Follow up if no improvement or worsening symptoms like high fevers, vomiting, weakness, or increasing shortness of air.     and Sinus rinses. Vicks vapo rub..  Narcotic or pain medication Toradol 30 mg MIPS Vaccines Flu and Pneumonia updated in Shot record School/Work Excuse for Today           Orders:       [id number]  Therapeutic injection  (In-House)                     Toradol, per 15 mg (x2)             CHARGE CAPTURE           **Please note: ICD descriptions below are intended for billing purposes only and may not represent clinical diagnoses**        Primary Diagnosis:         461.8 Acute sinusitis, other            J01.90    Acute sinusitis, unspecified              Orders:          [id number]   Office/outpatient visit; established patient, level 3  (In-House)             [id number]   Therapeutic injection  (In-House)                                            Toradol, per 15 mg (x2)

## 2021-05-18 NOTE — PROGRESS NOTES
Michael Nory Maren  1965     Office/Outpatient Visit    Visit Date: Sat, May 2, 2020 08:58 am    Provider: Margie Steele N.P. (Assistant: Ivana Pulido MA)    Location: [facility]        Electronically signed by Margie Steele N.P. on  05/03/2020 04:55:52 PM                             Subjective:        CC: Cherelle is a 54 year old White female.  right hand pain for 4/29/2020;         HPI: Cherelle consented to this telemedication consultation. Consent obtained by Ivana Pulido MA and Margie RODRIGUEZ. Visit is being conducted over [employer] jose audio and video.     Cherelle c/o numbness and pain to her right hand in the 3-5 metacarpal region to the plantar surface. Notes swelling, tender to touch and numb for 3-4 days. Worse with flexion. She had these she had these symptoms 3 months ago. Right hand xray was normal. She saw ortho Dr Hardy and was referred to hand specialist. Has appointment with [facility] in June. She did PT back at that time and that helped her symptoms some. She can not tolerate Advil or Diclofenac. No known injury or trauma. Additionally, she had elevated rheumatology labs at that time and was referred to rheumatologist.    ROS:     CONSTITUTIONAL:  Negative for chills and fever.      CARDIOVASCULAR:  Negative for chest pain and palpitations.      RESPIRATORY:  Negative for dyspnea and frequent wheezing.      MUSCULOSKELETAL:  Positive for right hand pain.      NEUROLOGICAL:  Positive for paresthesias.   Negative for fainting.          Past Medical History / Family History / Social History:         Last Reviewed on 9/30/2019 01:51 PM by Eileen Barajas    Past Medical History:                 PAST MEDICAL HISTORY         Hepatitis: type A;     Allergies    GERD    IDDM    hypercholesterolemia     Back pain     Breast lump     Cellulitis of the chest wall     Classic migraine     Congenital accessory skin tags     H/O Depression     GERD     chronic hoarseness      Hypertension     H/O Migraine headaches     Obstructive sleep apnea    Peripheral neuropathy     Post-menopausal    h/o Uterine polyp             GYNECOLOGICAL HISTORY:     miscarriage 1    Contraception: S/P tubal ligation;         CURRENT MEDICAL PROVIDERS:    Allergist: Dr. Delgadillo    Ophthalmologist: Ebenezer    Orthopedist: Antony    Therapist, Shabnam Miguel Endocrinologist         PREVENTIVE HEALTH MAINTENANCE             BONE DENSITY: was last done 2019 with the following abnormality noted-- Osteopenic at L1 in Lumbar Spine Only     COLORECTAL CANCER SCREENING: Up to date (colonoscopy q10y; sigmoidoscopy q5y; Cologuard q3y) was last done 18, Results are in chart     EYE EXAM: Diabetic Eye Exam during this calendar year and results are in chart was last done 19     MAMMOGRAM: Done within last 2 years and results in are chart was last done 2019 with normal results     PAP SMEAR: was last done ZEESHAN BSO no longer needs PAPs         Surgical History:         Cholecystectomy    Hysterectomy: TAHBSO;     cataracts removed b 2018     pericardial window ; Procedures: heart cath          Family History:     Father: Coronary Artery Disease; ;  Cerebrovascular Accident     Mother: Hyperlipidemia;  Endometrial Cancer     Brother(s): Type 2 Diabetes     Paternal Grandfather: Type 2 Diabetes     Paternal Grandmother:      Maternal Grandfather: Type 2 Diabetes     Maternal Grandmother: Breast Cancer;  Alzheimer's Disease         Social History:     Occupation: [employer] dept     Marital Status:      Children: 1 child and 2 step-children         Tobacco/Alcohol/Supplements:     Last Reviewed on 3/23/2020 09:29 AM by Diana Mora    Tobacco: She has never smoked.  Non-drinker         Substance Abuse History:     Last Reviewed on 2019 01:57 PM by Santa Tobar        Mental Health History:     Last Reviewed on 2019 01:57 PM by Santa Tobar         Communicable Diseases (eg STDs):     Last Reviewed on 7/31/2019 01:57 PM by Santa Tobar        Current Problems:     Last Reviewed on 1/06/2020 01:08 PM by Spurling, Sarah C    Essential (primary) hypertension    Pure hypercholesterolemia, unspecified    Type 2 diabetes mellitus with diabetic polyneuropathy    Hormone replacement therapy (postmenopausal)    Hereditary and idiopathic neuropathy, unspecified    Allergic rhinitis, unspecified    Low back pain    Calculus of kidney    Primary insomnia    Morbid (severe) obesity due to excess calories    Migraine without aura, not intractable, without status migrainosus    Pressure ulcer of other site, stage 1    Gastro-esophageal reflux disease without esophagitis    Overweight    Encounter for screening for malignant neoplasm of colon    Methicillin resistant Staphylococcus aureus infection as the cause of diseases classified elsewhere    Pain in right shoulder    Carrier of other viral hepatitis    Encounter for screening for osteoporosis    Dietary counseling and surveillance    Obesity, unspecified    Dizziness and giddiness    Moderate persistent asthma with (acute) exacerbation    Vitamin D deficiency, unspecified    Sleep apnea, unspecified    Encounter for screening for other disorder    Pain in left hip    Encounter for blood typing    Acute upper respiratory infection, unspecified    Encounter for other screening for malignant neoplasm of breast    Localized swelling, mass and lump, right lower limb    Encounter for gynecological examination (general) (routine) with abnormal findings    Acute vaginitis    Pain in left leg    Personal history of other venous thrombosis and embolism    Allergic rhinitis    Paresthesia of skin    Pain in joints of right hand    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    Effusion, right knee    Effusion, left knee    Insomnia, unspecified    Mild intermittent asthma, uncomplicated         Immunizations:     zzFluzone pf-quadrivalent 3 and up 9/29/2015    zzFluzone pf-quadrivalent 3 and up 10/26/2016    zzFluzone pf-quadrivalent 3 and up 9/25/2017    Fluzone (3 + years dose) 9/14/2009    Fluzone (3 + years dose) 10/27/2012    Fluzone pf (3+ years dose) 10/14/2013    Fluzone pf (3+ years dose) 10/7/2014    Fluzone Quadrivalent (3+ years) 9/17/2018    Fluzone Quadrivalent (3+ years) 9/30/2019    Fluzone Quadrivalent (3+ years) 10/18/2019    PNEUMOVAX 23 (Pneumococcal PPV23) 11/29/2016    Tdap (Tetanus, reduced diph, acellular pertussis) 9/29/2015        Allergies:     Last Reviewed on 5/02/2020 08:59 AM by Ivana Pulido    Levemir:      Cipro:      Nitroglycerin:      Bydureon: Nausea  (Adverse Reaction)    Diclofenac Sodium: swelling of feet,swelling of ankle  (Adverse Reaction)    cat dander:      Codeine:      Aspirin:      Sumatriptan: dizziness  (Adverse Reaction)        Current Medications:     Last Reviewed on 3/23/2020 09:29 AM by Diana Mora    EpiPen 0.3 mg/0.3 mL injection Auto-Injector [inject 0.3 milliliter (0.3 mg) by intramuscular route once]    Lancet   Lancet [Onet touch Fine Point Lancets use wiht one touch glucometer, check blood sugars 4 times daily]    lisinopriL 2.5 mg oral tablet [TAKE ONE TABLET BY MOUTH DAILY]    Kroger Pen [place] 31G 8mm #100     promethazine 25 mg oral tablet [ 1/2 - 1 tab q 4-6 hrs prn   prn nausea and vomiting]    topiramate 100 mg oral tablet [TAKE 1 TABLET TWICE A DAY]    metFORMIN 500 mg oral Tablet, Extended Release 24 hr [1 tab bid]    Aleve 220 mg oral tablet    One Touch Ultra Blue Test Strips  Reagent Strips [Test Blood Sugar QID]    Estradiol 0.5 mg oral tablet [1 tab daily PO]    Atorvastatin Calcium 10mg Tablet [1 tab qhs]    OneTouch UltraMini kit  [check blood sugar 6 times day]    Insulin Syringes 0.5ml Syringe [Use as directed]    HumaLOG KwikPen Insulin 200 unit/mL (3 mL) subcutaneous Insulin Pen [sliding scale]    Fosamax 70 mg oral  tablet [Take 1 tablet(s) by mouth q week]    Toujeo Max U-300 SoloStar 300 unit/mL (3 mL) subcutaneous Insulin Pen [INJECT 120 UNITS TWO TIMES A DAY]    Singulair  10mg Tablet [Take 1 tablet(s) by mouth each evening]    allergy shots once a week     ProAir HFA 90 mcg/actuation Inhalation HFA Aerosol Inhaler [PRN]    Symbicort 160mcg/4.5mcg Oral Inhaler [2 puffs BID]    Trulicity 1.5 mg/0.5 mL subcutaneous Pen Injector [every Sunday]    Jardiance 10 mg oral tablet [1 TAB DAILY]    Omeprazole 40 mg oral capsule,delayed release (enteric coated) [1 tab BID]    cholecalciferol (vitamin D3) 125 mcg (5,000 unit) oral capsule [1 capsule every day]    Zyrtec 10 mg oral tablet [1 bid]    Diltiazem HCl 120 mg oral Capsule, Extended Release 24 hr [1 tab daily]    hydroCHLOROthiazide 12.5 mg oral tablet [1 po daily]    Glucagon Emergency     Dexcom G6 Sensor device  [Use as directed]    ALBUTEROL    NEB 0.083%     traZODone 50 mg oral tablet [take 1 tablet (50 mg) by oral route once daily at bedtime]        Objective:        Exams:     PHYSICAL EXAM:     GENERAL: well developed, well nourished;  no apparent distress;     EYES: extraocular movements intact;     NECK: range of motion is normal; trachea is midline;     RESPIRATORY: normal respiratory rate and pattern with no distress;     MUSCULOSKELETAL: Mild swelling noted to right hand. Reports discomfort with flexion. No discoloration noted.;     NEUROLOGIC: mental status: alert and oriented x 3;     PSYCHIATRIC: appropriate affect and demeanor; normal speech pattern;         Assessment:         M25.541   Pain in joints of right hand       E11.42   Type 2 diabetes mellitus with diabetic polyneuropathy           ORDERS:         Meds Prescribed:       [New Rx] Medrol 4 mg oral tablet [take 3 tablets by oral route once daily for 5 days], #15 (fifteen) tablets, Refills: 0 (zero)         Procedures Ordered:       RFPT  Physical/Occupational Therapy Referral  (Send-Out)                       Plan:         Pain in joints of right hand        REFERRALS:  Referral initiated to physical therapy ( [facility] ) for evaluation and treatment.      FOLLOW-UP: Chronic visit follow up           Prescriptions:       [New Rx] Medrol 4 mg oral tablet [take 3 tablets by oral route once daily for 5 days], #15 (fifteen) tablets, Refills: 0 (zero)           Orders:       RFPT  Physical/Occupational Therapy Referral  (Send-Out)              Type 2 diabetes mellitus with diabetic polyneuropathyDiscussed monitoring blood sugar closely while taking oral steroids. Call for any concerns.            Charge Capture:         Primary Diagnosis:     M25.541  Pain in joints of right hand           Orders:      [id number]  Office/outpatient visit; established patient, level 3  (In-House)              E11.42  Type 2 diabetes mellitus with diabetic polyneuropathy

## 2021-05-18 NOTE — PROGRESS NOTES
Michael Nory Maren  1965     Office/Outpatient Visit    Visit Date: Wed, Dec 18, 2019 03:04 pm    Provider: Margie Steele N.P. (Assistant: Nuris Vázquez MA)    Location: [facility]        Electronically signed by Margie Steele N.P. on  2019 04:03:17 PM                             Subjective:        CC: Cherelle is a 54 year old White female.  presents today due to complaints of sinus pressure, drainage, sore throat X 1 day         HPI:           Patient to be evaluated for acute upper respiratory infection, unspecified.  These have been present since yesterday.  The symptoms include sinus pain/pressure, post nasal drip and sore throat.  She denies fever.  She has not tried any medications for symptomatic relief.  She reports that she is unable to take many OTC medications such as Flonase. She can also not tolerate oral steroids. She has taken Mucinex DM in the past and was able to tolerate.     ROS:     CONSTITUTIONAL:  Negative for chills and fever.      EYES:  Negative for eye drainage and eye pain.      E/N/T:  Positive for sinus pressure, sore throat and post nasal drip.   Negative for ear pain.      CARDIOVASCULAR:  Negative for chest pain and palpitations.      RESPIRATORY:  Negative for dyspnea and frequent wheezing.          Past Medical History / Family History / Social History:         Last Reviewed on 2019 01:51 PM by Eileen Barajas    Past Medical History:                 PAST MEDICAL HISTORY         Hepatitis: type A;     Allergies    GERD    IDDM    hypercholesterolemia     Back pain     Breast lump     Cellulitis of the chest wall     Classic migraine     Congenital accessory skin tags     H/O Depression     GERD     chronic hoarseness     Hypertension     H/O Migraine headaches     Obstructive sleep apnea    Peripheral neuropathy     Post-menopausal    h/o Uterine polyp             GYNECOLOGICAL HISTORY:     miscarriage 1    Contraception: S/P tubal ligation;          CURRENT MEDICAL PROVIDERS:    Allergist: Dr. Delgadillo    Ophthalmologist: Ebenezer    Orthopedist: Antony    Therapist, Shabnam Miguel Endocrinologist         PREVENTIVE HEALTH MAINTENANCE             BONE DENSITY: was last done 17 with the following abnormality noted-- Osteopenic at L1 in Lumbar Spine Only     COLORECTAL CANCER SCREENING: Up to date (colonoscopy q10y; sigmoidoscopy q5y; Cologuard q3y) was last done 18, Results are in chart     EYE EXAM: Diabetic Eye Exam during this calendar year and results are in chart was last done 19     MAMMOGRAM: Done within last 2 years and results in are chart was last done 12-3-18 with normal results     PAP SMEAR: was last done ZEESHAN BSO no longer needs PAPs         Surgical History:         Cholecystectomy    Hysterectomy: TAHBSO;     cataracts removed b 2018     pericardial window ; Procedures: heart cath          Family History:     Father: Coronary Artery Disease; ;  Cerebrovascular Accident     Mother: Hyperlipidemia;  Endometrial Cancer     Brother(s): Type 2 Diabetes     Paternal Grandfather: Type 2 Diabetes     Paternal Grandmother:      Maternal Grandfather: Type 2 Diabetes     Maternal Grandmother: Breast Cancer;  Alzheimer's Disease         Social History:     Occupation: [employer]     Marital Status:      Children: 1 child and 2 step-children         Tobacco/Alcohol/Supplements:     Last Reviewed on 10/18/2019 11:26 AM by Nuris Vázquez    Tobacco: She has never smoked.  Non-drinker         Substance Abuse History:     Last Reviewed on 2019 01:57 PM by Santa Tobar        Mental Health History:     Last Reviewed on 2019 01:57 PM by Santa Tobar        Communicable Diseases (eg STDs):     Last Reviewed on 2019 01:57 PM by Santa Tobar        Current Problems:     Last Reviewed on 2019 01:57 PM by Santa Tobar    GE reflux disease    Depression     Hypertension    Essential (primary) hypertension    Pure hypercholesterolemia, unspecified    Hypercholesterolemia    Uterine polyp    Obstructive sleep apnea    Type II diabetes requiring insulin    Type 2 diabetes mellitus with diabetic polyneuropathy    Post-menopausal HRT    Hormone replacement therapy (postmenopausal)    Peripheral neuropathy    Hereditary and idiopathic neuropathy, unspecified    Congenital accessory skin tags    Allergic rhinitis, unspecified    Allergic rhinitis    Low back pain    Calculus of kidney    Nephrolithiasis    Degenerative disc disease    Primary insomnia    Chronic insomnia    Morbid obesity    Morbid (severe) obesity due to excess calories    Migraine without aura, not intractable, without status migrainosus    Common migraine    Pressure ulcer of other site, stage 1    Pressure ulcer stage I    Overweight    Overweight    Gastro-esophageal reflux disease without esophagitis    GERD    History of noncompliance with medical treatment    Screening for cancer of colon    Encounter for screening for malignant neoplasm of colon    Pain in right shoulder    Methicillin resistant Staphylococcus aureus infection as the cause of diseases classified elsewhere    MRSA wound    Shoulder pain    Carrier of other viral hepatitis    Other viral hepatitis carrier    Encounter for screening for osteoporosis    Osteopenia    Low back pain    Obesity, unspecified    Obesity, NOS    Dietary counseling and surveillance    Near-syncope    Dizziness and giddiness    Moderate persistent asthma with (acute) exacerbation    Moderate asthma (exacerbation)    Vitamin D deficiency, unspecified    Vitamin D deficiency, unspecified    Sleep apnea, unspecified    Sleep apnea with sleep disturbance    Encounter for screening for other disorder    Screening for depression    Pain in left hip    Hip pain    Encounter for blood typing    Encounter for blood typing        Immunizations:     zzFluzone  "pf-quadrivalent 3 and up 9/29/2015    zzFluzone pf-quadrivalent 3 and up 10/26/2016    zzFluzone pf-quadrivalent 3 and up 9/25/2017    Fluzone (3 + years dose) 9/14/2009    Fluzone (3 + years dose) 10/27/2012    Fluzone pf (3+ years dose) 10/14/2013    Fluzone pf (3+ years dose) 10/7/2014    Fluzone Quadrivalent (3+ years) 9/17/2018    Fluzone Quadrivalent (3+ years) 9/30/2019    Fluzone Quadrivalent (3+ years) 10/18/2019    PNEUMOVAX 23 (Pneumococcal PPV23) 11/29/2016    Tdap (Tetanus, reduced diph, acellular pertussis) 9/29/2015        Allergies:     Last Reviewed on 10/18/2019 11:26 AM by Nuris Vázquez    Levemir:      Cipro:      Nitroglycerin:      Bydureon: Nausea  (Adverse Reaction)    Diclofenac Sodium: swelling of feet,swelling of ankle  (Adverse Reaction)    Codeine:      Aspirin:      Sumatriptan: dizziness  (Adverse Reaction)        Current Medications:     Last Reviewed on 10/18/2019 11:26 AM by Nuris Vázquez    Lancet   Lancet [Onet touch Fine Point Lancets use wiht one touch glucometer, check blood sugars 4 times daily]    lisinopril 2.5 mg oral tablet [TAKE ONE TABLET BY MOUTH DAILY]    [employer] Pen [place] 31G 8mm #100     Promethazine HCl 25mg Tablet [ 1/2 - 1 tab q 4-6 hrs prn   prn nausea and vomiting]    Topamax 100 mg oral tablet [1 tab BID]    metFORMIN 500 mg oral Tablet, Extended Release 24 hr [1 tab bid]    Aleve 220 mg oral tablet    BD Ultra-Fine Original Pen Needle 29G x 1/2\"  Pen Needle [use bid with toujeo]    One Touch Ultra Blue Test Strips  Reagent Strips [Test Blood Sugar QID]    Estradiol 0.5 mg oral tablet [1 tab daily PO]    Atorvastatin Calcium 10mg Tablet [1 tab qhs]    OneTouch UltraMini kit  [check blood sugar 6 times day]    Insulin Syringes 0.5ml Syringe [Use as directed]    HumaLOG KwikPen Insulin 200 unit/mL (3 mL) subcutaneous Insulin Pen [sliding scale]    Fosamax 70 mg oral tablet [Take 1 tablet(s) by mouth q week]    Toujeo SoloStar 300units/1ml Injection [120 " units bid]    Singulair  10mg Tablet [Take 1 tablet(s) by mouth each evening]    allergy shots once a week     ProAir HFA 90 mcg/actuation Inhalation HFA Aerosol Inhaler [PRN]    Symbicort 160mcg/4.5mcg Oral Inhaler [2 puffs BID]    Trulicity 1.5 mg/0.5 mL subcutaneous Pen Injector [every Sunday]    Jardiance 10 mg oral tablet [1 TAB DAILY]    Omeprazole 40 mg oral capsule,delayed release (enteric coated) [1 tab BID]    cholecalciferol (vitamin D3) 5,000 unit oral capsule [1 capsule 4 days a week and 2 capsules 3 days a week]    Zyrtec 10 mg oral tablet [1 bid]    Diltiazem HCl 120 mg oral Capsule, Extended Release 24 hr [1 tab daily]    hydroCHLOROthiazide 12.5 mg oral tablet [1 po daily]    Glucagon Emergency     Dexcom G6 Sensor device  [Use as directed]    [place] (general)  Needle [31 guage X 5/16 QID with humalog]        Objective:        Vitals:         Current: 12/18/2019 3:08:02 PM    Ht:  5 ft, 4.25 in;  Wt: 250.6 lbs;  BMI: 42.7T: 98 F (oral);  BP: 115/58 mm Hg (right arm, sitting);  P: 86 bpm (right arm (BP Cuff), sitting);  sCr: 0.85 mg/dL;  GFR: 91.43        Exams:     PHYSICAL EXAM:     GENERAL: well developed, well nourished;  no apparent distress;     EYES: PERRL, EOMI     E/N/T: EARS: external auditory canal normal;  both TMs are dull;  NOSE: normal turbinates; no sinus tenderness; OROPHARYNX: oral mucosa is normal; posterior pharynx shows no exudate and post nasal drip;     NECK: range of motion is normal; trachea is midline;     RESPIRATORY: normal respiratory rate and pattern with no distress; normal breath sounds with no rales, rhonchi, wheezes or rubs;     CARDIOVASCULAR: normal rate; rhythm is regular;     MUSCULOSKELETAL: normal gait;     NEUROLOGIC: mental status: alert and oriented x 3; GROSSLY INTACT     PSYCHIATRIC: appropriate affect and demeanor;         Lab/Test Results:         Rapid Strep Screen: Negative (12/18/2019),     Performed by:: tls (12/18/2019),     Influenza A and B:  Negative (12/18/2019),             Assessment:         J06.9   Acute upper respiratory infection, unspecified           ORDERS:         Meds Prescribed:       [New Rx] Mucinex DM  mg oral Tablet, Extended Release 12 hr [take 1 - 2 tablets by oral route every 12 hours as needed], #60 (sixty) tablets, Refills: 0 (zero)         Lab Orders:       [id number]  Infectious agent antigen detection by immunoassay; Influenza  (In-House)            87376-09  Infectious agent antigen detection by immunoassay; Influenza  (In-House)            06595  Group A Streptococcus detection by immunoassay with direct optical observation  (In-House)            60476  [facility] Throat culture, strep  (Send-Out)                      Plan:         Acute upper respiratory infection, unspecifiedRSS and influenza testing normal.     LABORATORY:  Labs ordered to be performed today include Flu A&B Flu A Flu B.      RECOMMENDATIONS given include: Push Fluids, Rest, Follow up if no improvement or worsening symptoms like high fevers, vomiting, weakness, or increasing shortness of air.    .  MIPS Vaccines Flu and Pneumonia updated in Shot record     FOLLOW-UP: Chronic visit follow up           Prescriptions:       [New Rx] Mucinex DM  mg oral Tablet, Extended Release 12 hr [take 1 - 2 tablets by oral route every 12 hours as needed], #60 (sixty) tablets, Refills: 0 (zero)           Orders:       [id number]  Infectious agent antigen detection by immunoassay; Influenza  (In-House)            56168-53  Infectious agent antigen detection by immunoassay; Influenza  (In-House)            93685  Group A Streptococcus detection by immunoassay with direct optical observation  (In-House)            88478  [facility] Throat culture, strep  (Send-Out)                  Charge Capture:         Primary Diagnosis:     J06.9  Acute upper respiratory infection, unspecified           Orders:      [id number]  Office/outpatient visit; established patient, level 3   (In-House)            03260  Infectious agent antigen detection by immunoassay; Influenza  (In-House)            42293-79  Infectious agent antigen detection by immunoassay; Influenza  (In-House)            14828  Group A Streptococcus detection by immunoassay with direct optical observation  (In-House)

## 2021-05-18 NOTE — PROGRESS NOTES
Nory RodriguezShawna 1965     Office/Outpatient Visit    Visit Date: Tue, Sep 4, 2018 05:50 pm    Provider: Melita Garcia N.P. (Assistant: Priti Whyte MA)    Location: [facility]        Electronically signed by Melita Garcia N.P. on  09/06/2018 10:46:16 PM                             SUBJECTIVE:        CC:     Cherelle is a 53 year old White female.  dry cough,sore throat echo in ears, headache ? pt questioning sinuis infection;         HPI: seen with [employer] student         Cherelle presents with uRI.  These have been present for the past 3 days.  The symptoms include productive cough, ear congestion and stuffiness,  frontal headache, nasal congestion and watery nasal discharge.  She denies fever or wheezing.  She denies exposure to ill contacts.  She has already tried to relieve the symptoms with zyrtec, singulair, symbicort,.      ROS:     CONSTITUTIONAL:  Negative for chills and fever.      EYES:  Negative for eye drainage.      E/N/T:  Positive for nasal congestion, hoarseness, sinus pressure and sore throat.   Negative for diminished hearing.      CARDIOVASCULAR:  Negative for chest pain and palpitations.      RESPIRATORY:  Positive for chronic cough.   Negative for dyspnea.      GASTROINTESTINAL:  Negative for abdominal pain, nausea and vomiting.      NEUROLOGICAL:  Negative for paresthesias and weakness.      PSYCHIATRIC:  Negative for anxiety and depression.          PMH/FMH/SH:     Last Reviewed on 6/15/2018 10:03 AM by Eileen Barajas    Past Medical History:         Hepatitis: type A;     Allergies    GERD    IDDM    hypercholesterolemia     Back pain     Breast lump     Cellulitis of the chest wall     Classic migraine     Congenital accessory skin tags     H/O Depression     GERD     chronic hoarseness     Hypertension     H/O Migraine headaches     Obstructive sleep apnea    Peripheral neuropathy     Post-menopausal    h/o Uterine polyp             GYNECOLOGICAL HISTORY:      miscarriage 1    Contraception: S/P tubal ligation;         CURRENT MEDICAL PROVIDERS:    Therapist, Shabnam Miguel         PREVENTIVE HEALTH MAINTENANCE             BONE DENSITY: was last done 17 with the following abnormality noted-- Osteopenic at L1 in Lumbar Spine Only     COLORECTAL CANCER SCREENING: Up to date (colonoscopy q10y; sigmoidoscopy q5y; Cologuard q3y) was last done 18, Results are in chart     MAMMOGRAM: Done within last 2 years and results in are chart was last done 17 with normal results     PAP SMEAR: was last done ZEESHAN BSO no longer needs PAPs         Surgical History:         Cholecystectomy    Hysterectomy: TAHBSO;      pericardial window ; Procedures: heart cath          Family History:     Father: Coronary Artery Disease; ;  Cerebrovascular Accident     Mother: Hyperlipidemia;  Endometrial Cancer     Brother(s): Type 2 Diabetes     Paternal Grandfather: Type 2 Diabetes     Paternal Grandmother:      Maternal Grandfather: Type 2 Diabetes     Maternal Grandmother: Breast Cancer;  Alzheimer's Disease         Social History:     Occupation: [employer]     Marital Status:      Children: 1 child and 2 step-children         Tobacco/Alcohol/Supplements:     Last Reviewed on 6/15/2018 10:03 AM by Eileen Barajas    Tobacco: She has never smoked.  Non-drinker         Substance Abuse History:     Last Reviewed on 2018 03:57 PM by Santa Tobar        Mental Health History:     Last Reviewed on 2018 03:57 PM by Santa Tobar        Communicable Diseases (eg STDs):     Last Reviewed on 2018 03:57 PM by Santa Tobar            Current Problems:     Last Reviewed on 2018 03:57 PM by Santa Tobar    Malaise and fatigue, NEC     Other viral hepatitis carrier     Screening for cancer of colon     History of noncompliance with medical treatment     Overweight     GERD     Common migraine     Peripheral  neuropathy     Pressure ulcer stage I     Morbid obesity     Chronic insomnia     Degenerative disc disease     Nephrolithiasis     Allergic rhinitis     Congenital accessory skin tags     Post-menopausal HRT     Type II diabetes requiring insulin     Obstructive sleep apnea     Uterine polyp     Hypercholesterolemia     Hypertension     Depression     GE reflux disease     Shoulder pain     Carbuncle of vulva     MRSA wound         Immunizations:     zzFluzone pf-quadrivalent 3 and up 9/29/2015     zzFluzone pf-quadrivalent 3 and up 10/26/2016     zzFluzone pf-quadrivalent 3 and up 9/25/2017     Fluzone (3 + years dose) 9/14/2009     Fluzone (3 + years dose) 10/27/2012     Fluzone pf (3+ years dose) 10/14/2013     Fluzone pf (3+ years dose) 10/7/2014     PNEUMOVAX 23 (Pneumococcal PPV23) 11/29/2016     Tdap (Tetanus, reduced diph, acellular pertussis) 9/29/2015         Allergies:     Last Reviewed on 9/04/2018 05:56 PM by Priti Whyte    Levemir:    Cipro:    Nitroglycerin:    Januvia: vomiting (Adverse Reaction)    Bydureon: nausea (Adverse Reaction)    Metformin HCl: vomiting (Adverse Reaction)    Codeine:    Aspirin:    Sumatriptan: dizziness (Adverse Reaction)        Current Medications:     Last Reviewed on 9/04/2018 05:57 PM by Priti Whyte    Lisinopril 2.5mg Tablet Take 1 tablet(s) by mouth daily     Betamethasone/Clotrimazole 0.05%/1% Cream Apply small amount to affected area bid  x 10 days     Meloxicam 15mg Tablet 1/2 to 1 prn pain to be take with food     Singulair  10mg Tablet Take 1 tablet(s) by mouth each evening     Topamax 100mg Tablet 1 tab BID     Trulicity PEN 0.75mg/0.5ml Injection Inject once weekly     Atorvastatin Calcium 10mg Tablet 1 tab qhs     Fosamax 70mg Tablet Take 1 tablet(s) by mouth q week     Omeprazole 40mg Capsules, Extended Release 1 tab BID     Toujeo SoloStar 300units/1ml Injection 100 units bid     Promethazine HCl 25mg Tablet 1/2 - 1 tab q 4-6 hrs prn   prn nausea and  "vomiting     Estradiol 0.5mg Tablet 1 tab daily PO     Lancet   Lancet Onet touch Fine Point Lancets use wiht one touch glucometer, check blood sugars 4 times daily     One Touch Ultra Blue Test Strips  Reagent Strips Test Blood Sugar QID     BD Ultra-Fine Original Pen Needle 29G x 1/2\"  Pen Needle Use as directed QID w/Lantus and Novalog Pen     Benzonatate 200mg Capsules 1 capsule tid     Insulin Syringes 0.5ml Syringe Use as directed     Fioricet 50mg/300mg/40mg Capsules One to 2 PO Q 4 hours. Max of 4 in 24 hr period.     Symbicort 160/4.5 Oral Inhaler 2 puffs BID     Vitamin D3 5,000IU Capsules 1 capsule 4 days a week and 2 capsules 3 days a week     Humalog KwikPen 100units/1ml Pen System, Disposable 12 units with each meal and SSI with carb counts     ProAir HFA 90mcg/1actuation Oral Inhaler PRN     allergy shots once a week     One Touch UltraMini Monitor Kit check blood sugar 6 times day     Aleve 220mg Tablet     [employer]ger Pen Needles 31G 8mm #100     Zyrtec 10mg Tablet 1 bid         OBJECTIVE:        Vitals:         Historical:     06/15/2018  BP:   135/77 mm Hg ( (left arm, , sitting, );)     06/15/2018  Wt:   258.9lbs        Current: 9/4/2018 5:56:12 PM    Ht:  5 ft, 4.25 in;  Wt: 261.8 lbs;  BMI: 44.6    T: 97.9 F (oral);  BP: 136/55 mm Hg (right arm, sitting);  P: 74 bpm (right arm (BP Cuff), sitting);  sCr: 0.74 mg/dL;  GFR: 108.23        Exams:     PHYSICAL EXAM:     GENERAL: vital signs recorded - well developed, well nourished;  no apparent distress;     E/N/T: EARS: the right TM is has fluid behind it;  NOSE: bilateral maxillary and bilateral frontal sinus tenderness present; OROPHARYNX: posterior pharynx shows erythema;     RESPIRATORY: normal respiratory rate and pattern with no distress; normal breath sounds with no rales, rhonchi, wheezes or rubs;     CARDIOVASCULAR: normal rate; rhythm is regular;     LYMPHATIC: no enlargement of cervical or facial nodes; no supraclavicular nodes;     " PSYCHIATRIC: appropriate affect and demeanor; normal psychomotor function;         ASSESSMENT           477.9   J30.9  Allergic rhinitis              DDx:     465.8   J06.9  URI              DDx:         ORDERS:         Meds Prescribed:       Augmentin (Amoxicillin/Clavulanate) 875mg/125mg Tablet 1 po bid with food  #14 (Fourteen) tablet(s) Refills: 0         Radiology/Test Orders:       [id number]  [facility] services for allergen immunotherapy not including provision of allergenic extracts; 2+ injection  (In-House)  (serum expires 6-1-19)                 PLAN:          Allergic rhinitis           Orders:       [id number]  [facility] for allergen immunotherapy not including provision of allergenic extracts; 2+ injection  (In-House)  (serum expires 6-1-19)          URI         she states this is her typical sinus infection and she will get much worse if she does not get an antibiotic.  has had adverse effects of all nasal steroid sprays.  follow up if not improving.            Prescriptions:       Augmentin (Amoxicillin/Clavulanate) 875mg/125mg Tablet 1 po bid with food  #14 (Fourteen) tablet(s) Refills: 0             CHARGE CAPTURE           **Please note: ICD descriptions below are intended for billing purposes only and may not represent clinical diagnoses**        Primary Diagnosis:         477.9 Allergic rhinitis            J30.9    Allergic rhinitis, unspecified              Orders:          85975   Office/outpatient visit; established patient, level 3  (In-House)             44374   [facility] for allergen immunotherapy not including provision of allergenic extracts; 2+ injection  (In-House)  (serum expires 6-1-19)         465.8 URI            J06.9    Acute upper respiratory infection, unspecified

## 2021-05-18 NOTE — PROGRESS NOTES
Nory Rodriguez Maren  1965     Office/Outpatient Visit    Visit Date: Mon, Mar 23, 2020 09:18 am    Provider: Eileen Barajas MD (Assistant: Diana Mora LPN)    Location: [facility]        Electronically signed by Eileen Barajas MD on  03/30/2020 11:53:35 AM                             Subjective:        CC: Allergy shots are now twice a week. TeleHealth visit, consent verified by Diana Mora- f/u on diabetes and pt requests handicap parking permit- [facility]     HPI:           Cherelle presents with type 2 diabetes mellitus with diabetic polyneuropathy.  Specifically, this is type 2, insulin requiring diabetes, complicated by peripheral neuropathy.  Compliance with treatment has been poor; she does not follow a diet and exercise regimen.      Tobacco screen: Non-smoker.  Current meds include insulin/injectable ( Humalog- about 12 units with meals, SSI and carb counting; toujeo-100 units bid ), an ACE inhibitor, aspirin, and a lipid lowering agent.  She reports home blood glucose readings have averaged fasting readings in the low 200s mg/dL range. She checks her glucose 3 to 4 times daily.  Most recent lab results include Hemoglobin A1c:  7.6 (%) (10/02/2019), LDL:  80 (mg/dL) (10/02/2019), HDL:  36 (mg/dL) (10/02/2019), Triglycerides:  159 (mg/dL) (10/02/2019), Microalbuminuria:  19.7 (mg/g creat) (10/02/2019), Microalbumin, Urine, rand:  <12.0 (mg/L) (10/02/2019), glycohemoglobin 7.5%, and normal LFTs, Cr 1.9.  Has not fallen recently In regard to preventative care, she performs foot self-exams several days per week and her last ophthalmology exam was in 8/2019-Dr Hernández- NO DIABETIC RETINOPATHY, she has cataracts.            With regard to the essential (primary) hypertension, her current cardiac medication regimen includes a diuretic ( hctz ) and an ACE inhibitor ( lisinopril 2.5 mg for her diabetes ).  Compliance with treatment has been good; she takes her medication as directed, maintains her diet  and exercise regimen, and follows up as directed.  She is tolerating the medication well without side effects.            In regard to the pure hypercholesterolemia, unspecified, date of diagnosis 2010.  Current treatment includes Lipitor and a low cholesterol/low fat diet.  Compliance with treatment has been good; she maintains her low cholesterol diet.  She denies experiencing any hypercholesterolemia related symptoms.        Cherelle is in with worsening knee and hip pain B, due to her worsening OA, and she sees Dr Pretty.  She has strong FH of RA and she fell last week week        She cant take NSAID other than aleve due to LE swelling.          LFTs were elevated.    ROS:     CONSTITUTIONAL:  Negative for chills, fatigue and fever.      EYES:  Negative for blurred vision and eye pain.      E/N/T:  Positive for nasal congestion.   Negative for ear pain or sore throat.      CARDIOVASCULAR:  Negative for chest pain, palpitations, tachycardia, orthopnea, and edema.      RESPIRATORY:  Negative for recent cough, dyspnea and frequent wheezing.      GASTROINTESTINAL:  Negative for abdominal pain, constipation, diarrhea, nausea and vomiting.      INTEGUMENTARY/BREAST:  Negative for rash.      NEUROLOGICAL:  Positive for headaches.   Negative for dizziness or weakness.      PSYCHIATRIC:  Positive for sleep disturbance.   Negative for depression or sadness.          Past Medical History / Family History / Social History:         Last Reviewed on 9/30/2019 01:51 PM by Eileen Barajas    Past Medical History:                 PAST MEDICAL HISTORY         Hepatitis: type A;     Allergies    GERD    IDDM    hypercholesterolemia     Back pain     Breast lump     Cellulitis of the chest wall     Classic migraine     Congenital accessory skin tags     H/O Depression     GERD     chronic hoarseness     Hypertension     H/O Migraine headaches     Obstructive sleep apnea    Peripheral neuropathy     Post-menopausal    h/o Uterine  polyp             GYNECOLOGICAL HISTORY:     miscarriage 1    Contraception: S/P tubal ligation;         CURRENT MEDICAL PROVIDERS:    Allergist: Dr. Delgadillo    Ophthalmologist: Ebenezer    Orthopedist: Antony Ewing, Shabnam Miguel Endocrinologist         PREVENTIVE HEALTH MAINTENANCE             BONE DENSITY: was last done 2019 with the following abnormality noted-- Osteopenic at L1 in Lumbar Spine Only     COLORECTAL CANCER SCREENING: Up to date (colonoscopy q10y; sigmoidoscopy q5y; Cologuard q3y) was last done 18, Results are in chart     EYE EXAM: Diabetic Eye Exam during this calendar year and results are in chart was last done 19     MAMMOGRAM: Done within last 2 years and results in are chart was last done 2019 with normal results     PAP SMEAR: was last done ZEESHAN BSO no longer needs PAPs         Surgical History:         Cholecystectomy    Hysterectomy: TAHBSO;     cataracts removed b 2018     pericardial window ; Procedures: heart cath          Family History:     Father: Coronary Artery Disease; ;  Cerebrovascular Accident     Mother: Hyperlipidemia;  Endometrial Cancer     Brother(s): Type 2 Diabetes     Paternal Grandfather: Type 2 Diabetes     Paternal Grandmother:      Maternal Grandfather: Type 2 Diabetes     Maternal Grandmother: Breast Cancer;  Alzheimer's Disease         Social History:     Occupation: [employer]     Marital Status:      Children: 1 child and 2 step-children         Tobacco/Alcohol/Supplements:     Last Reviewed on 2020 10:51 AM by Kerri Underwood    Tobacco: She has never smoked.  Non-drinker         Substance Abuse History:     Last Reviewed on 2019 01:57 PM by Santa Tobar        Mental Health History:     Last Reviewed on 2019 01:57 PM by Santa Tobar        Communicable Diseases (eg STDs):     Last Reviewed on 2019 01:57 PM by Santa Tobar        Allergies:     Last Reviewed  "on 1/23/2020 10:51 AM by Kerri Underwood    Levemir:      Cipro:      Nitroglycerin:      Bydureon: Nausea  (Adverse Reaction)    Diclofenac Sodium: swelling of feet,swelling of ankle  (Adverse Reaction)    cat dander:      Codeine:      Aspirin:      Sumatriptan: dizziness  (Adverse Reaction)        Current Medications:     Last Reviewed on 1/06/2020 01:21 PM by Spurling, Sarah C    EpiPen 0.3 mg/0.3 mL injection Auto-Injector [inject 0.3 milliliter (0.3 mg) by intramuscular route once]    Lancet   Lancet [Onet touch Fine Point Lancets use wiht one touch glucometer, check blood sugars 4 times daily]    lisinopriL 2.5 mg oral tablet [TAKE ONE TABLET BY MOUTH DAILY]    Kroger Pen [place] 31G 8mm #100     promethazine 25 mg oral tablet [ 1/2 - 1 tab q 4-6 hrs prn   prn nausea and vomiting]    topiramate 100 mg oral tablet [TAKE 1 TABLET TWICE A DAY]    metFORMIN 500 mg oral Tablet, Extended Release 24 hr [1 tab bid]    Aleve 220 mg oral tablet    BD Ultra-Fine Original Pen Needle 29G x 1/2\"  Pen Needle [use bid with toujeo]    One Touch Ultra Blue Test Strips  Reagent Strips [Test Blood Sugar QID]    Estradiol 0.5 mg oral tablet [1 tab daily PO]    Atorvastatin Calcium 10mg Tablet [1 tab qhs]    OneTouch UltraMini kit  [check blood sugar 6 times day]    Insulin Syringes 0.5ml Syringe [Use as directed]    HumaLOG KwikPen Insulin 200 unit/mL (3 mL) subcutaneous Insulin Pen [sliding scale]    Fosamax 70 mg oral tablet [Take 1 tablet(s) by mouth q week]    Toujeo Max U-300 SoloStar 300 unit/mL (3 mL) subcutaneous Insulin Pen [INJECT 120 UNITS TWO TIMES A DAY]    Singulair  10mg Tablet [Take 1 tablet(s) by mouth each evening]    allergy shots once a week     ProAir HFA 90 mcg/actuation Inhalation HFA Aerosol Inhaler [PRN]    Symbicort 160mcg/4.5mcg Oral Inhaler [2 puffs BID]    Trulicity 1.5 mg/0.5 mL subcutaneous Pen Injector [every Sunday]    Jardiance 10 mg oral tablet [1 TAB DAILY]    Omeprazole 40 mg oral " capsule,delayed release (enteric coated) [1 tab BID]    cholecalciferol (vitamin D3) 125 mcg (5,000 unit) oral capsule [1 capsule every day]    Zyrtec 10 mg oral tablet [1 bid]    Diltiazem HCl 120 mg oral Capsule, Extended Release 24 hr [1 tab daily]    hydroCHLOROthiazide 12.5 mg oral tablet [1 po daily]    Glucagon Emergency     Dexcom G6 Sensor device  [Use as directed]    ALBUTEROL    NEB 0.083%         Objective:        Vitals: vitals listed were checked by patient at home- [facility]         Current: 3/23/2020 9:28:07 AM    Ht:  5 ft, 4.25 in;  Wt: 248 lbs;  BMI: 42.2T: 97.8 F (temporal);  BP: 119/73 mm Hg (left arm, sitting);  P: 93 bpm (finger clip, sitting);  sCr: 0.85 mg/dL;  GFR: 91.03        Assessment:         E11.42   Type 2 diabetes mellitus with diabetic polyneuropathy       I10   Essential (primary) hypertension       E78.00   Pure hypercholesterolemia, unspecified       M25.462   Effusion, left knee       M25.461   Effusion, right knee       M05.10   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  unable to see her face to face due to covid outbreak    Telehealth: Verbal consent obtained for visit to occur via phone call; Staff, other than provider, present during telephone visit include JASON FULTON, NURSE; Total time spent was 21 minutes; [id number]--Ntyzqbdkh E/M 21-30 minutes         Essential (primary) hypertensionwell controlled        Pure hypercholesterolemia, unspecified    LABORATORY:  Labs ordered to be performed today include lipid panel.            Orders:       [id number]  [facility] Lipid Panel  (Send-Out)              Effusion, left kneedue to RA-ok for aleve and referral  in place to rheumatology, she also sees Dr Pretty and recent ER visit and got a steroid injection that has helped her pain and swelling        Effusion, right kneedue to RA-ok for aleve and referral in place to rheumatology, she also sees Dr Pretty and recent ER visit and got a steroid injection that has helped her pain and swelling        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she is on aleve prn , rheumatology appt scheduled in APril        Insomnia, unspecifiedshe will consider taking trazodone, cont melatonin, she is on CPAP machine        Mild intermittent asthma, uncomplicatedWELL CONTROLLED.             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

## 2021-05-18 NOTE — PROGRESS NOTES
Nory Rodriguez Maren  1965     Office/Outpatient Visit    Visit Date: Mon, Jan 6, 2020 01:07 pm    Provider: Krissy Lazcano N.P. (Assistant: Spurling, Sarah C, MA)    Location: [facility]        Electronically signed by Krissy Lazcano N.P. on  01/08/2020 12:58:41 PM                             Subjective:        CC: Cherelle is a 54 year old White female.  well woman appt.;         HPI:           Her last gynecological exam was one year ago.  The patient's LMP on 2010.  Her last mammogram was in Dec 2019 and was normal.  DEXA scan less than 6 months ago.  She is not currently using any form of contraception.  She performs breast self-exams every Once a week, one laying down, and in the shower. weeks.          Ms. Rodriguez hs noticed that her left leg has developed a few knots that are swollen and painful over the past few weeks - seem to be getting worse.  She has had a history of DVT in that leg    ROS:     CONSTITUTIONAL:  Negative for chills, fatigue, fever, and weight change.      CARDIOVASCULAR:  Negative for chest pain, palpitations, tachycardia, orthopnea, and edema.      RESPIRATORY:  Negative for cough, dyspnea, and hemoptysis.      GASTROINTESTINAL:  Negative for abdominal pain, heartburn, constipation, diarrhea, and stool changes.      GENITOURINARY:  Positive for vaginal itching.   Negative for dysuria, vaginal discharge or frequent urination.      INTEGUMENTARY/BREAST:  Positive for knot x 3 on left lower leg.          Past Medical History / Family History / Social History:         Last Reviewed on 9/30/2019 01:51 PM by Eileen Barajas    Past Medical History:                 PAST MEDICAL HISTORY         Hepatitis: type A;     Allergies    GERD    IDDM    hypercholesterolemia     Back pain     Breast lump     Cellulitis of the chest wall     Classic migraine     Congenital accessory skin tags     H/O Depression     GERD     chronic hoarseness     Hypertension     H/O Migraine  headaches     Obstructive sleep apnea    Peripheral neuropathy     Post-menopausal    h/o Uterine polyp             GYNECOLOGICAL HISTORY:     miscarriage 1    Contraception: S/P tubal ligation;         CURRENT MEDICAL PROVIDERS:    Allergist: Dr. Delgadillo    Ophthalmologist: Ebenezer    Orthopedist: Antony Ewing, Shabnam Miguel Endocrinologist         PREVENTIVE HEALTH MAINTENANCE             BONE DENSITY: was last done 2019 with the following abnormality noted-- Osteopenic at L1 in Lumbar Spine Only     COLORECTAL CANCER SCREENING: Up to date (colonoscopy q10y; sigmoidoscopy q5y; Cologuard q3y) was last done 18, Results are in chart     EYE EXAM: Diabetic Eye Exam during this calendar year and results are in chart was last done 19     MAMMOGRAM: Done within last 2 years and results in are chart was last done 2019 with normal results     PAP SMEAR: was last done ZEESHAN BSO no longer needs PAPs         Surgical History:         Cholecystectomy    Hysterectomy: TAHBSO;     cataracts removed b 2018     pericardial window ; Procedures: heart cath          Family History:     Father: Coronary Artery Disease; ;  Cerebrovascular Accident     Mother: Hyperlipidemia;  Endometrial Cancer     Brother(s): Type 2 Diabetes     Paternal Grandfather: Type 2 Diabetes     Paternal Grandmother:      Maternal Grandfather: Type 2 Diabetes     Maternal Grandmother: Breast Cancer;  Alzheimer's Disease         Social History:     Occupation: [employer] de[employer]     Marital Status:      Children: 1 child and 2 step-children         Tobacco/Alcohol/Supplements:     Last Reviewed on 2020 01:07 PM by Spurling, Sarah C    Tobacco: She has never smoked.  Non-drinker         Substance Abuse History:     Last Reviewed on 2019 01:57 PM by Santa Tobar        Mental Health History:     Last Reviewed on 2019 01:57 PM by Santa Tobar        Communicable Diseases (eg STDs):      Last Reviewed on 7/31/2019 01:57 PM by Santa Tobar        Current Problems:     Last Reviewed on 1/06/2020 01:08 PM by Spurling, Sarah C    Essential (primary) hypertension    Pure hypercholesterolemia, unspecified    Type 2 diabetes mellitus with diabetic polyneuropathy    Hormone replacement therapy (postmenopausal)    Hereditary and idiopathic neuropathy, unspecified    Allergic rhinitis, unspecified    Low back pain    Calculus of kidney    Primary insomnia    Morbid (severe) obesity due to excess calories    Migraine without aura, not intractable, without status migrainosus    Pressure ulcer of other site, stage 1    Overweight    Gastro-esophageal reflux disease without esophagitis    Encounter for screening for malignant neoplasm of colon    Pain in right shoulder    Methicillin resistant Staphylococcus aureus infection as the cause of diseases classified elsewhere    Carrier of other viral hepatitis    Encounter for screening for osteoporosis    Obesity, unspecified    Dietary counseling and surveillance    Dizziness and giddiness    Moderate persistent asthma with (acute) exacerbation    Vitamin D deficiency, unspecified    Sleep apnea, unspecified    Encounter for screening for other disorder    Pain in left hip    Encounter for blood typing    Acute upper respiratory infection, unspecified    Encounter for other screening for malignant neoplasm of breast    Localized swelling, mass and lump, right lower limb    Encounter for gynecological examination (general) (routine) with abnormal findings    Acute vaginitis    Pain in left leg    Personal history of other venous thrombosis and embolism        Immunizations:     zzFluzone pf-quadrivalent 3 and up 9/29/2015    zzFluzone pf-quadrivalent 3 and up 10/26/2016    zzFluzone pf-quadrivalent 3 and up 9/25/2017    Fluzone (3 + years dose) 9/14/2009    Fluzone (3 + years dose) 10/27/2012    Fluzone pf (3+ years dose) 10/14/2013    Fluzone pf (3+ years  "dose) 10/7/2014    Fluzone Quadrivalent (3+ years) 9/17/2018    Fluzone Quadrivalent (3+ years) 9/30/2019    Fluzone Quadrivalent (3+ years) 10/18/2019    PNEUMOVAX 23 (Pneumococcal PPV23) 11/29/2016    Tdap (Tetanus, reduced diph, acellular pertussis) 9/29/2015        Allergies:     Last Reviewed on 1/06/2020 01:20 PM by Spurling, Sarah C    Levemir:      Cipro:      Nitroglycerin:      Bydureon: Nausea  (Adverse Reaction)    Diclofenac Sodium: swelling of feet,swelling of ankle  (Adverse Reaction)    Codeine:      Aspirin:      Sumatriptan: dizziness  (Adverse Reaction)    cat dander:          Current Medications:     Last Reviewed on 1/06/2020 01:21 PM by Spurling, Sarah C    Lancet   Lancet [Onet touch Fine Point Lancets use wiht one touch glucometer, check blood sugars 4 times daily]    lisinopril 2.5 mg oral tablet [TAKE ONE TABLET BY MOUTH DAILY]    Kroger Pen [place] 31G 8mm #100     Promethazine HCl 25mg Tablet [ 1/2 - 1 tab q 4-6 hrs prn   prn nausea and vomiting]    Topamax 100 mg oral tablet [1 tab BID]    metFORMIN 500 mg oral Tablet, Extended Release 24 hr [1 tab bid]    Aleve 220 mg oral tablet    BD Ultra-Fine Original Pen Needle 29G x 1/2\"  Pen Needle [use bid with toujeo]    One Touch Ultra Blue Test Strips  Reagent Strips [Test Blood Sugar QID]    Estradiol 0.5 mg oral tablet [1 tab daily PO]    Atorvastatin Calcium 10mg Tablet [1 tab qhs]    OneTouch UltraMini kit  [check blood sugar 6 times day]    Insulin Syringes 0.5ml Syringe [Use as directed]    HumaLOG KwikPen Insulin 200 unit/mL (3 mL) subcutaneous Insulin Pen [sliding scale]    Fosamax 70 mg oral tablet [Take 1 tablet(s) by mouth q week]    Singulair  10mg Tablet [Take 1 tablet(s) by mouth each evening]    allergy shots once a week     ProAir HFA 90 mcg/actuation Inhalation HFA Aerosol Inhaler [PRN]    Symbicort 160mcg/4.5mcg Oral Inhaler [2 puffs BID]    Trulicity 1.5 mg/0.5 mL subcutaneous Pen Injector [every Sunday]    Jardiance 10 mg " "oral tablet [1 TAB DAILY]    Omeprazole 40 mg oral capsule,delayed release (enteric coated) [1 tab BID]    Zyrtec 10 mg oral tablet [1 bid]    Diltiazem HCl 120 mg oral Capsule, Extended Release 24 hr [1 tab daily]    hydroCHLOROthiazide 12.5 mg oral tablet [1 po daily]    Glucagon Emergency     Dexcom G6 Sensor device  [Use as directed]    [place] (general)  Needle [31 guage X 5/16 QID with humalog]    EpiPen 0.3 mg/0.3 mL injection Auto-Injector [inject 0.3 milliliter (0.3 mg) by intramuscular route once]    ALBUTEROL    NEB 0.083%         Objective:        Vitals:         Current: 1/6/2020 1:24:15 PM    Ht:  5 ft, 4.25 in;  Wt: 244.6 lbs;  BMI: 41.7T: 98.2 F (oral);  BP: 111/65 mm Hg (right arm, sitting);  P: 79 bpm (right arm (BP Cuff), sitting);  sCr: 0.85 mg/dL;  GFR: 90.50        Exams:     PHYSICAL EXAM:     GENERAL: vital signs recorded - well developed, well nourished;  no apparent distress;     NECK: range of motion is normal; thyroid is non-palpable;     RESPIRATORY: normal respiratory rate and pattern with no distress; normal breath sounds with no rales, rhonchi, wheezes or rubs;     CARDIOVASCULAR: normal rate; rhythm is regular;  no systolic murmur; no edema;     GASTROINTESTINAL: nontender; normal bowel sounds; no organomegaly;     GENITOURINARY: vagina: \"cottage cheese\", non-odorous vaginal discharge; ; cervix: absent (s/p hyst) noted;     LYMPHATIC: no axillary adenopathy;     BREAST/INTEGUMENT: BREASTS: breast exam is normal without masses, skin changes, or nipple discharge; SKIN: no significant rashes or lesions; no suspicious moles; breast exam: no overlying skin changes; no breast masses; 3 firm nodules to left anterior lower leg - tender to palpation, no erythema, no edema to the leg;  nodules are appox 1cm diameter each;     MUSCULOSKELETAL:  Normal range of motion, strength and tone;     NEUROLOGICAL:  cranial nerves, motor and sensory function, reflexes, gait and coordination are all " intact;     PSYCHIATRIC:  appropriate affect and demeanor; normal speech pattern; grossly normal memory;         Assessment:         Z01.411   Encounter for gynecological examination (general) (routine) with abnormal findings       Z86.718   Personal history of other venous thrombosis and embolism       M79.605   Pain in left leg       N76.0   Acute vaginitis           ORDERS:         Meds Prescribed:       [New Rx] Diflucan 150 mg oral tablet [take 1 tablet (150 mg) by oral route x 1  may repeat in 1 week], #2 (two) tablets, Refills: 0 (zero)         Radiology/Test Orders:       [id number]  Venous doppler left extremity  (Send-Out; Stat)            3014F  Screening mammography results documented and reviewed (PV)1  (In-House)              Lab Orders:       [id number]  [facility]AG[facility] - [facility] VAG SCREEN CANDIDA,IAN, & TRICH [id number]  (Send-Out)              Other Orders:         Depression screen negative  (In-House)                      Plan:         Encounter for gynecological examination (general) (routine) with abnormal findings    MIPS PHQ-9 Depression Screening: Completed form scanned and in chart; Total Score 2; Negative Depression Screen           Orders:         Depression screen negative  (In-House)            3014F  Screening mammography results documented and reviewed (PV)1  (In-House)              Personal history of other venous thrombosis and embolismhistory of DVT in this leg - will check doppler         Pain in left leg        RADIOLOGY:  I have ordered Venous doppler left extremity to be done today.            Orders:       [id number]  Venous doppler left extremity  (Send-Out; Stat)              Acute vaginitis    LABORATORY:  Labs ordered to be performed today include Vaginal Screen.            Prescriptions:       [New Rx] Diflucan 150 mg oral tablet [take 1 tablet (150 mg) by oral route x 1  may repeat in 1 week], #2 (two) tablets, Refills: 0 (zero)           Orders:       [id number]  [facility]AG[facility] - [facility] VAG SCREEN  GORGE,IAN, & TRICH [id number]  (Send-Out)                  Charge Capture:         Primary Diagnosis:     Z01.411  Encounter for gynecological examination (general) (routine) with abnormal findings           Orders:      [id number]  Preventive medicine, established patient, age 40-64 years  (In-House)            78242  Office/outpatient visit; established patient, level 4  (In-House)              Depression screen negative  (In-House)            3014F  Screening mammography results documented and reviewed (PV)1  (In-House)              Z86.718  Personal history of other venous thrombosis and embolism           Orders:      [id number]  Office/outpatient visit; established patient, level 3  (In-House)              M79.605  Pain in left leg     N76.0  Acute vaginitis

## 2021-05-18 NOTE — PROGRESS NOTES
Nory Rodriguez Maren  1965     Office/Outpatient Visit    Visit Date: Mon, Nov 2, 2020 11:41 am    Provider: Eileen Barajas MD (Assistant: Karen Daniel MA)    Location: [facility]        Electronically signed by Eileen Barajas MD on  11/09/2020 04:31:55 PM                             Subjective:        CC: pt says toujeo is 100units bid    HPI:           Patient presents with essential (primary) hypertension.  Her current cardiac medication regimen includes a diuretic ( hctz ) and an ACE inhibitor ( lisinopril 2.5 mg for her diabetes ).  Compliance with treatment has been good; she takes her medication as directed, maintains her diet and exercise regimen, and follows up as directed.  She is tolerating the medication well without side effects.            Pure hypercholesterolemia, unspecified details; date of diagnosis 2010.  Current treatment includes Lipitor and a low cholesterol/low fat diet.  Compliance with treatment has been good; she maintains her low cholesterol diet.  She denies experiencing any hypercholesterolemia related symptoms.            With regard to the type 2 diabetes mellitus with diabetic polyneuropathy, Cherelle has type 1 diabetes without complications.  Compliance with treatment has been fair; she does not follow a diet and exercise regimen.  Patient's diabetes was first diagnosed >5 years ago.  Current meds include an ACE inhibitor, a lipid lowering agent, and metformin, jardiance, dexcom, trulicity, and insulin tuojeo 100 units bid and SSI tid prn.  She reports home blood glucose readings have been high, with average fasting readings in the mid-200s mg/dL range. She checks her glucose 1 to 2 times daily.  In regard to preventative care, her last ophthalmology exam was in 7/6/20.  she has lost 40#s with diet and exercise    ROS:     CONSTITUTIONAL:  Negative for chills, fatigue, fever, and weight change.      CARDIOVASCULAR:  Negative for chest pain,  palpitations, tachycardia, orthopnea, and edema.      RESPIRATORY:  Negative for cough, dyspnea, and hemoptysis.      GASTROINTESTINAL:  Negative for abdominal pain, heartburn, constipation, diarrhea, and stool changes.      GENITOURINARY:  Negative for dysuria, vaginal discharge and frequent urination.      INTEGUMENTARY/BREAST:  Positive for knot x 3 on left lower leg.      NEUROLOGICAL:  Negative for dizziness and headaches.      PSYCHIATRIC:  Negative for sleep disturbance and suicidal thoughts.          Past Medical History / Family History / Social History:         Last Reviewed on 2020 12:35 PM by Eileen Barajas    Past Medical History:                 PAST MEDICAL HISTORY         Hepatitis: type A;     Allergies    GERD    IDDM    hypercholesterolemia     Back pain     Breast lump     Cellulitis of the chest wall     Classic migraine     Congenital accessory skin tags     H/O Depression     GERD     chronic hoarseness     Hypertension     H/O Migraine headaches     Obstructive sleep apnea    Peripheral neuropathy     Post-menopausal    h/o Uterine polyp             GYNECOLOGICAL HISTORY:     miscarriage 1    Contraception: S/P tubal ligation;         CURRENT MEDICAL PROVIDERS:    Allergist: Dr. Delgadillo    Ophthalmologist: Ebenezer    Orthopedist: Antony Ewing, Shabnam Miguel Endocrinologist         PREVENTIVE HEALTH MAINTENANCE             BONE DENSITY: was last done 2019 with the following abnormality noted-- Osteopenic at L1 in Lumbar Spine Only     COLORECTAL CANCER SCREENING: Up to date (colonoscopy q10y; sigmoidoscopy q5y; Cologuard q3y) was last done 18, Results are in chart     EYE EXAM: Diabetic Eye Exam during this calendar year and results are in chart was last done 2020     MAMMOGRAM: Done within last 2 years and results in are chart was last done 2019 with normal results     PAP SMEAR: was last done ZEESHAN BSO no longer needs PAPs         Surgical History:          "Cholecystectomy    Hysterectomy: TAHBSO;     cataracts removed b 2018     pericardial window ; Procedures: heart cath          Family History:     Father: Coronary Artery Disease; ;  Cerebrovascular Accident     Mother: Hyperlipidemia;  Endometrial Cancer     Brother(s): Type 2 Diabetes     Paternal Grandfather: Type 2 Diabetes     Paternal Grandmother:      Maternal Grandfather: Type 2 Diabetes     Maternal Grandmother: Breast Cancer;  Alzheimer's Disease         Social History:     Occupation: [employer]     Marital Status:      Children: 1 child and 2 step-children         Tobacco/Alcohol/Supplements:     Last Reviewed on 2020 11:44 AM by Karen Daniel    Tobacco: She has never smoked.  Non-drinker         Substance Abuse History:     Last Reviewed on 2019 01:57 PM by Santa Tobar        Mental Health History:     Last Reviewed on 2019 01:57 PM by Santa Tobar        Communicable Diseases (eg STDs):     Last Reviewed on 2019 01:57 PM by Santa Tobar        Allergies:     Last Reviewed on 2020 11:28 AM by Barbara Patelir:      Cipro:      Nitroglycerin:      Bydureon: Nausea  (Adverse Reaction)    Diclofenac Sodium: swelling of feet,swelling of ankle  (Adverse Reaction)    cat dander:      Codeine:      Aspirin:      Sumatriptan: dizziness  (Adverse Reaction)        Current Medications:     Last Reviewed on 2020 11:48 AM by Karen Daniel    EpiPen 0.3 mg/0.3 mL injection Auto-Injector [inject 0.3 milliliter (0.3 mg) by intramuscular route once]    Lancet   Lancet [Onet touch Fine Point Lancets use wiht one touch glucometer, check blood sugars 4 times daily]    lisinopriL 2.5 mg oral tablet [TAKE ONE TABLET BY MOUTH DAILY]    BD Ultra-Fine Short Pen Needle 31 gauge x 5/16\"  [USE 4 TIMES A DAY WITH     HUMALOG]    promethazine 25 mg oral tablet [TAKE ONE-HALF TO ONE TABLET BY MOUTH EVERY 4 TO 6 HOURS AS " NEEDED FOR NAUSEA AND VOMITING]    metFORMIN 500 mg oral Tablet, Extended Release 24 hr [1 tab bid]    Aleve 220 mg oral tablet    One Touch Ultra Blue Test Strips  Reagent Strips [Test Blood Sugar QID]    Estradiol 0.5 mg oral tablet [1 tab daily PO]    atorvastatin 10 mg oral tablet [1 tab qhs]    OneTouch UltraMini kit  [check blood sugar 6 times day]    Insulin Syringes 0.5ml Syringe [Use as directed]    HumaLOG KwikPen Insulin 200 unit/mL (3 mL) subcutaneous Insulin Pen [sliding scale]    Fosamax 70 mg oral tablet [Take 1 tablet(s) by mouth q week]    montelukast 10 mg oral tablet [TAKE 1 TABLET EVERY EVENING]    allergy shots once a week     ProAir HFA 90 mcg/actuation Inhalation HFA Aerosol Inhaler [PRN]    Symbicort 160mcg/4.5mcg Oral Inhaler [2 puffs BID]    Trulicity 1.5 mg/0.5 mL subcutaneous Pen Injector [every Sunday]    Jardiance 10 mg oral tablet [1 TAB DAILY]    Omeprazole 40 mg oral capsule,delayed release (enteric coated) [1 tab BID]    cholecalciferol (vitamin D3) 125 mcg (5,000 unit) oral capsule [TAKE ONE CAPSULE BY MOUTH DAILY]    Diltiazem HCl 120 mg oral Capsule, Extended Release 24 hr [1 tab daily]    hydroCHLOROthiazide 12.5 mg oral tablet [1 po daily]    Glucagon Emergency     Dexcom G6 Sensor device  [Use as directed]    ALBUTEROL    NEB 0.083%     traZODone 100 mg oral tablet [TAKE ONE TABLET BY MOUTH EVERY NIGHT AT BEDTIME]    Claritin 10 mg oral tablet [take 1 tablet (10 mg) by oral route once daily]    topiramate 100 mg oral tablet [TAKE 1 TABLET TWICE A DAY]    Toujeo Max U-300 SoloStar 300 unit/mL (3 mL) subcutaneous Insulin Pen [INJECT 100 UNITS  SUBCUTANEOUSLY TWO TIMES A DAY]        Objective:        Vitals:         Current: 11/2/2020 11:49:33 AM    Ht:  5 ft, 4.25 in;  Wt: 237.8 lbs;  BMI: 40.5T: 97.2 F (temporal);  BP: 124/62 mm Hg (right arm, sitting);  P: 78 bpm (right arm (BP Cuff), sitting);  sCr: 0.91 mg/dL;  GFR: 82.56        Exams:     PHYSICAL EXAM:     GENERAL: vital  signs recorded - well developed, well nourished;  no apparent distress;     EYES: PERRL, EOMI     E/N/T: EARS:  normal external auditory canals and tympanic membranes;  grossly normal hearing; OROPHARYNX:  normal mucosa, dentition, gingiva, and posterior pharynx;     NECK: supple with fROM;     RESPIRATORY: CTA B WITHOUT WHEEZING/RALES OR RHONCHI, NORMAL RESP. EFFORT     CARDIOVASCULAR: normal rate; rhythm is regular;  no systolic murmur; no edema;     GASTROINTESTINAL: nontender, nondistended; no hepatosplenomegaly or masses; no bruits;     MUSCULOSKELETAL: normal gait;     NEUROLOGICAL:  cranial nerves, motor and sensory function, reflexes, gait and coordination are all intact;     PSYCHIATRIC:  appropriate affect and demeanor; normal speech pattern; grossly normal memory;     Left foot exam    Protective sensation using Monofilament test: NORMAL sensation. Patient detects .07 grams of force which is considered normal.    Vascular status: normal peripheral vascular exam with palpable dorsal pedal and posterior tibal pulses and brisk digital capillary refill    Skin is intact without sores or ulcers    Right foot exam    Protective sensation using Monofilament test: Slightly decreased, with estimated force of 0.2 grams applied.    Vascular status: normal peripheral vascular exam with palpable dorsal pedal and posterior tibal pulses and brisk digital capillary refill    Skin is intact without sores or ulcers         Assessment:         I10   Essential (primary) hypertension       E78.00   Pure hypercholesterolemia, unspecified       E11.42   Type 2 diabetes mellitus with diabetic polyneuropathy       Z13.31   Encounter for screening for depression           ORDERS:         Lab Orders:       [id number]  DIAB2 - [facility] CMP A1C LIPID AND MICRO ALBUM CR RATIO: [id number]  (Send-Out)              Other Orders:       2028F  Foot examination performed (includes examination through visual inspection, sensory exam  with monofilament, and pulse exam - report when any of the three components are completed) (DM)4  (In-House)              Depression screen negative  (In-House)            1101F  Pt screen for fall risk; document no falls in past year or only 1 fall w/o injury in past year ([facility])  (In-House)                      Plan:         Essential (primary) hypertensionwell controlled with 40 # wt loss, will stop hctz        Pure hypercholesterolemia, unspecifiedstable on atorvastatin        Type 2 diabetes mellitus with diabetic polyneuropathywell controlled on metformin, jardiance, dexcom, trulicity, and insulin tuojeo 100 units bid and SSI tid prnshe has lost 40#s with diet and exerciseflu vaccine is UTD, she wears diabetic inserts for her diabetes-see podiatry    LABORATORY:  Labs ordered to be performed today include Diabetes Panel 2;CMP, A1C, Lipid, Microalbumin:Creatinine Ratio.      RECOMMENDATIONS given include: need for yearly flu shots, keep blood pressure log as directed, healthy carb, high healthy protein and high fiber diet, daily foot exams, yearly eye exams, exercise 30 minutes 4-5 days a week, work on BMI goal 20-25, avoid tobacco use, and recommend diabetic shoes for PN pain and poor circulation or foot deformities.            Orders:       27046  DIAB2 - [facility] CMP A1C LIPID AND MICRO ALBUM CR RATIO: [id number]  (Send-Out)              Encounter for screening for depression    MIPS PHQ-9 Depression Screening: Completed form scanned and in chart; Total Score 2; Negative Depression Screen           Orders:         Depression screen negative  (In-House)            1101F  Pt screen for fall risk; document no falls in past year or only 1 fall w/o injury in past year ([facility])  (In-House)                  Other Orders      2028F  Foot examination performed (includes examination through visual inspection, sensory exam with monofilament, and pulse exam - report when any of the three components  are completed) (DM)4  (In-House)              Patient Recommendations:        For  Type 2 diabetes mellitus with diabetic polyneuropathy:    Obtain a flu shot each year. Keep a daily blood pressure log and report elevated blood pressure to provider as directed. Drink plenty of fluids.  Fever increases the loss of fluids and can lead to dehydration.              Charge Capture:         Primary Diagnosis:     I10  Essential (primary) hypertension           Orders:      41121  Office/outpatient visit; established patient, level 4  (In-House)              E78.00  Pure hypercholesterolemia, unspecified     E11.42  Type 2 diabetes mellitus with diabetic polyneuropathy     Z13.31  Encounter for screening for depression           Orders:        Depression screen negative  (In-House)            1101F  Pt screen for fall risk; document no falls in past year or only 1 fall w/o injury in past year ([facility])  (In-House)                  Other Orders:       2028F  Foot examination performed (includes examination through visual inspection, sensory exam with monofilament, and pulse exam - report when any of the three components are completed) (DM)4  (In-House)              ADDENDUMS:      ____________________________________    Addendum: 11/10/2020 04:56 PM - Eileen Barajas        HPI: Remove ( With regard to the type 2 diabetes mellitus with diabetic polyneuropathy.) and change Cherelle has type 1 diabetes (remove without complications) and add with diabetic polyneuropathy.  [facility]

## 2021-05-18 NOTE — PROGRESS NOTES
Nory RodriguezShawna 1965     Office/Outpatient Visit    Visit Date: Thu, Oct 18, 2018 03:15 pm    Provider: True Pavon,   (Assistant: Karen Daniel MA)    Location: [facility]        Electronically signed by True Pavon,   on  10/22/2018 03:20:13 PM                             SUBJECTIVE:        CC:     Cherelle is a 53 year old White female.  presents today due to right hip pain         HPI:     Since last Sunday (5 days ago) pt has right sided low back pain that radiates to her abdomen. She has had a kidney stone in the past but this pain is different. She has also had kidney infections and this pain is higher up. No recent injuries or stressors. She sleeps in a chair due to recent shoulder surgery (May) and on Sunday was getting up out of chair and back pain started. Pain is worse with walking, better when she is still. Pain is sharp, stabbing.     ROS:     CONSTITUTIONAL:  Negative for fatigue and fever.      EYES:  Negative for blurred vision.      E/N/T:  Negative for diminished hearing and nasal congestion.      CARDIOVASCULAR:  Negative for chest pain and palpitations.      RESPIRATORY:  Negative for recent cough and dyspnea.      GASTROINTESTINAL:  Negative for abdominal pain, constipation, diarrhea, nausea and vomiting.      GENITOURINARY:  Negative for dysuria and urinary incontinence.      MUSCULOSKELETAL:  Positive for back pain ( acute ).   Negative for arthralgias or myalgias.      INTEGUMENTARY/BREAST:  Negative for atypical mole(s) and rash.      NEUROLOGICAL:  Negative for paresthesias and weakness.      PSYCHIATRIC:  Negative for anxiety, depression and sleep disturbance.          PMH/FM/SH:     Last Reviewed on 9/17/2018 10:57 AM by Eileen Barajas    Past Medical History:         Hepatitis: type A;     Allergies    GERD    IDDM    hypercholesterolemia     Back pain     Breast lump     Cellulitis of the chest wall     Classic migraine     Congenital accessory skin tags      H/O Depression     GERD     chronic hoarseness     Hypertension     H/O Migraine headaches     Obstructive sleep apnea    Peripheral neuropathy     Post-menopausal    h/o Uterine polyp             GYNECOLOGICAL HISTORY:     miscarriage 1    Contraception: S/P tubal ligation;         CURRENT MEDICAL PROVIDERS:    Therapist, Shabnam Miguel         PREVENTIVE HEALTH MAINTENANCE             BONE DENSITY: was last done 17 with the following abnormality noted-- Osteopenic at L1 in Lumbar Spine Only     COLORECTAL CANCER SCREENING: Up to date (colonoscopy q10y; sigmoidoscopy q5y; Cologuard q3y) was last done 18, Results are in chart     MAMMOGRAM: Done within last 2 years and results in are chart was last done 17 with normal results     PAP SMEAR: was last done ZEESHAN BSO no longer needs PAPs         Surgical History:         Cholecystectomy    Hysterectomy: TAHBSO;      pericardial window ; Procedures: heart cath          Family History:     Father: Coronary Artery Disease; ;  Cerebrovascular Accident     Mother: Hyperlipidemia;  Endometrial Cancer     Brother(s): Type 2 Diabetes     Paternal Grandfather: Type 2 Diabetes     Paternal Grandmother:      Maternal Grandfather: Type 2 Diabetes     Maternal Grandmother: Breast Cancer;  Alzheimer's Disease         Social History:     Occupation: [employer] de[employer]     Marital Status:      Children: 1 child and 2 step-children         Tobacco/Alcohol/Supplements:     Last Reviewed on 2018 10:57 AM by Eileen Barajas    Tobacco: She has never smoked.  Non-drinker         Substance Abuse History:     Last Reviewed on 2018 03:57 PM by Santa Tobar        Mental Health History:     Last Reviewed on 2018 03:57 PM by Santa Tobar        Communicable Diseases (eg STDs):     Last Reviewed on 2018 03:57 PM by Santa Tobar            Current Problems:     Last Reviewed on 2018 03:57 PM by  Santa Tobar    Osteopenia     Malaise and fatigue, NEC     Other viral hepatitis carrier     Screening for cancer of colon     History of noncompliance with medical treatment     Overweight     GERD     Common migraine     Peripheral neuropathy     Pressure ulcer stage I     Morbid obesity     Chronic insomnia     Degenerative disc disease     Nephrolithiasis     Allergic rhinitis     Congenital accessory skin tags     Post-menopausal HRT     Type II diabetes requiring insulin     Obstructive sleep apnea     Uterine polyp     Hypercholesterolemia     Hypertension     Depression     GE reflux disease     Screening mammogram - other     Shoulder pain     URI     Carbuncle of vulva     MRSA wound         Immunizations:     zzFluzone pf-quadrivalent 3 and up 9/29/2015     zzFluzone pf-quadrivalent 3 and up 10/26/2016     zzFluzone pf-quadrivalent 3 and up 9/25/2017     Fluzone (3 + years dose) 9/14/2009     Fluzone (3 + years dose) 10/27/2012     Fluzone pf (3+ years dose) 10/14/2013     Fluzone pf (3+ years dose) 10/7/2014     Fluzone Quadrivalent (3+ years) 9/17/2018     PNEUMOVAX 23 (Pneumococcal PPV23) 11/29/2016     Tdap (Tetanus, reduced diph, acellular pertussis) 9/29/2015         Allergies:     Last Reviewed on 9/17/2018 10:13 AM by Spurling, Sarah C    Levemir:    Cipro:    Nitroglycerin:    Januvia: vomiting (Adverse Reaction)    Bydureon: nausea (Adverse Reaction)    Metformin HCl: vomiting (Adverse Reaction)    Codeine:    Aspirin:    Sumatriptan: dizziness (Adverse Reaction)        Current Medications:     Last Reviewed on 9/17/2018 10:15 AM by Spurling, Sarah C    Fosamax 70mg Tablet Take 1 tablet(s) by mouth q week     Lisinopril 2.5mg Tablet Take 1 tablet(s) by mouth daily     Betamethasone/Clotrimazole 0.05%/1% Cream Apply small amount to affected area bid  x 10 days     Meloxicam 15mg Tablet 1/2 to 1 prn pain to be take with food     Singulair  10mg Tablet Take 1 tablet(s) by mouth each evening  "    Topamax 100mg Tablet 1 tab BID     Trulicity PEN 0.75mg/0.5ml Injection Inject once weekly     Atorvastatin Calcium 10mg Tablet 1 tab qhs     Omeprazole 40mg Capsules, Extended Release 1 tab BID     Toujeo SoloStar 300units/1ml Injection 100 units bid     Promethazine HCl 25mg Tablet 1/2 - 1 tab q 4-6 hrs prn   prn nausea and vomiting     Estradiol 0.5mg Tablet 1 tab daily PO     Lancet   Lancet Onet touch Fine Point Lancets use wiht one touch glucometer, check blood sugars 4 times daily     One Touch Ultra Blue Test Strips  Reagent Strips Test Blood Sugar QID     BD Ultra-Fine Original Pen Needle 29G x 1/2\"  Pen Needle Use as directed QID w/Lantus and Novalog Pen     Benzonatate 200mg Capsules 1 capsule tid     Insulin Syringes 0.5ml Syringe Use as directed     Fioricet 50mg/300mg/40mg Capsules One to 2 PO Q 4 hours. Max of 4 in 24 hr period.     Symbicort 160/4.5 Oral Inhaler 2 puffs BID     Vitamin D3 5,000IU Capsules 1 capsule 4 days a week and 2 capsules 3 days a week     Zyrtec 10mg Tablet 1 bid     ProAir HFA 90mcg/1actuation Oral Inhaler PRN     allergy shots once a week     One Touch UltraMini Monitor Kit check blood sugar 6 times day     Aleve 220mg Tablet     [employer] Pen Needles 31G 8mm #100         OBJECTIVE:        Vitals:         Current: 10/18/2018 3:20:13 PM    Ht:  5 ft, 4.25 in;  Wt: 262.8 lbs;  BMI: 44.8    T: 97.8 F (oral);  BP: 130/58 mm Hg (right arm, sitting);  P: 81 bpm (right arm (BP Cuff), sitting);  sCr: 0.74 mg/dL;  GFR: 108.41        Exams:     PHYSICAL EXAM:     GENERAL: vital signs recorded - well developed, well nourished;  well groomed;  no apparent distress;     EYES: extraocular movements intact; conjunctiva and cornea are normal; PERRLA;     E/N/T: OROPHARYNX:  normal mucosa, dentition, gingiva, and posterior pharynx;     NECK: range of motion is normal; thyroid exam reveals not enlarged;     RESPIRATORY: normal respiratory rate and pattern with no distress; normal breath " sounds with no rales, rhonchi, wheezes or rubs;     CARDIOVASCULAR: normal rate; rhythm is regular;  no systolic murmur; no edema;     GASTROINTESTINAL: nontender; normal bowel sounds;     MUSCULOSKELETAL: normal gait; normal overall tone right-sided lower back TTP and lower lumbar muscle spasm, worse when patient is seated vs when standing.;     NEUROLOGIC: mental status: alert and oriented x 3;     PSYCHIATRIC:  appropriate affect and demeanor; normal speech pattern; grossly normal memory;         ASSESSMENT           724.2   M54.5  Low back pain              DDx:     477.9   J30.9  Allergic rhinitis              DDx:         ORDERS:         Meds Prescribed:       Baclofen 10mg Tablet 1 tab po TID prn for pain/muscle pain  #60 (Sixty) tablet(s) Refills: 1         Radiology/Test Orders:       [id number]  Pro services for allergen immunotherapy not including provision of allergenic extracts; 2+ injection  (In-House)  (serum expires 08/14/2019)                 PLAN:          Low back pain heating pad recommended as pain is likely MSK. We also discussed warm bath but pt reports her shoulder pain makes it difficult to get out of bath.           Prescriptions:       Baclofen 10mg Tablet 1 tab po TID prn for pain/muscle pain  #60 (Sixty) tablet(s) Refills: 1          Allergic rhinitis           Orders:       [id number]  Pro services for allergen immunotherapy not including provision of allergenic extracts; 2+ injection  (In-House)  (serum expires 08/14/2019)             CHARGE CAPTURE           **Please note: ICD descriptions below are intended for billing purposes only and may not represent clinical diagnoses**        Primary Diagnosis:         724.2 Low back pain            M54.5    Low back pain              Orders:          50436   Office/outpatient visit; established patient, level 3  (In-House)           477.9 Allergic rhinitis            J30.9    Allergic rhinitis, unspecified              Orders:          42044   Pro  services for allergen immunotherapy not including provision of allergenic extracts; 2+ injection  (In-House)  (serum expires 08/14/2019)

## 2021-05-18 NOTE — PROGRESS NOTES
Nory Rodriguez 1965     Office/Outpatient Visit    Visit Date:  05:19 pm    Provider: Krissy Lazcano N.P. (Assistant: Kerri Underwood RN)    Location: [facility]        Electronically signed by Krissy Lazcano N.P. on  2018 06:14:30 PM                             SUBJECTIVE:        CC:     Cherelle is a 52 year old White female.  Sinus drainage and pressure;         HPI:         Cherelle presents with upper respiratory illness.  These have been present for the past 3 days.  The symptoms include cough, ear complaints, headache, nasal congestion and sinus pain/pressure.  She denies fever.  She denies exposure to ill contacts.  She has already tried to relieve the symptoms with OTC sinus mediation.  Medical history is significant for allergies and asthma.      ROS:     CONSTITUTIONAL:  Negative for chills, fatigue, fever, and weight change.      E/N/T:  Positive for ear pain and sinus pressure.      CARDIOVASCULAR:  Negative for chest pain, orthopnea, paroxysmal nocturnal dyspnea and pedal edema.      RESPIRATORY:  Negative for dyspnea.      GASTROINTESTINAL:  Positive for diarrhea and nausea.   Negative for constipation or vomiting.      NEUROLOGICAL:  Negative for dizziness, headaches, paresthesias, and weakness.      ALLERGIC/IMMUNOLOGIC:  Positive for seasonal allergies.          PMH/FMH/SH:     Last Reviewed on 10/31/2017 01:39 PM by Nory Sauceda    Past Medical History:         Hepatitis: type A;     Allergies    GERD    IDDM    hypercholesterolemia     Back pain     Breast lump     Cellulitis of the chest wall     Classic migraine     Classic migraine     Common migraine     Congenital accessory skin tags     H/O Depression     GERD     chronic hoarseness     Hypertension     H/O Migraine headaches     Obstructive sleep apnea    Peripheral neuropathy     Post-menopausal    h/o Uterine polyp             GYNECOLOGICAL HISTORY:     miscarriage 1    Contraception:  S/P tubal ligation;         CURRENT MEDICAL PROVIDERS:    Therapist, Shabnam Miguel         PREVENTIVE HEALTH MAINTENANCE             COLORECTAL CANCER SCREENING: will schedule     MAMMOGRAM: Done within last 2 years and results in are chart was last done 17 with normal results     PAP SMEAR: was last done ZEESHAN BSO no longer needs PAPs         Surgical History:         Cholecystectomy    Hysterectomy: TAHBSO;      pericardial window ; Procedures: heart cath          Family History:     Father: Coronary Artery Disease; ;  Cerebrovascular Accident     Mother: Hyperlipidemia;  Endometrial Cancer     Brother(s): Type 2 Diabetes     Paternal Grandfather: Type 2 Diabetes     Paternal Grandmother:      Maternal Grandfather: Type 2 Diabetes     Maternal Grandmother: Breast Cancer;  Alzheimer's Disease         Social History:     Occupation: [employer]     Marital Status:      Children: 1 child and 2 step-children         Tobacco/Alcohol/Supplements:     Last Reviewed on 2017 09:15 AM by Tatyana Frye    Tobacco: She has never smoked.  Non-drinker         Substance Abuse History:     Last Reviewed on 10/31/2017 01:39 PM by Nory Sauceda        Mental Health History:     Last Reviewed on 10/31/2017 01:39 PM by Nory Sauceda        Communicable Diseases (eg STDs):     Last Reviewed on 10/31/2017 01:39 PM by Nory Sauceda            Current Problems:     Last Reviewed on 10/31/2017 01:39 PM by Nory Sauceda    History of noncompliance with medical treatment     Overweight     GERD     Common migraine     Peripheral neuropathy     Pressure ulcer stage I     Morbid obesity     Chronic insomnia     Degenerative disc disease     Nephrolithiasis     Allergic rhinitis     Congenital accessory skin tags     Post-menopausal HRT     Type II diabetes requiring insulin     Obstructive sleep apnea     Uterine polyp     Hypercholesterolemia     Hypertension     Depression     GE  "reflux disease     UTI     Rotator cuff tear         Immunizations:     Fluzone pf-quadrivalent 3 and up 9/29/2015     Fluzone pf-quadrivalent 3 and up 10/26/2016     Fluzone pf-quadrivalent 3 and up 9/25/2017     Fluzone (3 + years dose) 9/14/2009     Fluzone (3 + years dose) 10/27/2012     Fluzone pf (3+ years dose) 10/14/2013     Fluzone pf (3+ years dose) 10/7/2014     PNEUMOVAX 23 (Pneumococcal PPV23) 11/29/2016     Tdap (Tetanus, reduced diph, acellular pertussis) 9/29/2015         Allergies:     Last Reviewed on 1/23/2018 05:23 PM by Kerri Underwood    Levemir:    Cipro:    Nitroglycerin:    Januvia: vomiting (Adverse Reaction)    Bydureon: nausea (Adverse Reaction)    Metformin HCl: vomiting (Adverse Reaction)    Codeine:    Aspirin:    Sumatriptan: dizziness (Adverse Reaction)        Current Medications:     Last Reviewed on 1/23/2018 05:25 PM by Kerri Underwood    Singulair  10mg Tablet Take 1 tablet(s) by mouth each evening     Betamethasone/Clotrimazole 0.05%/1% Cream Apply small amount to affected area bid  x 10 days     Prilosec 40mg Capsules, Extended Release 1 tab BID     Promethazine HCl 25mg Tablet 1/2 - 1 tab q 4-6 hrs prn   prn nausea and vomiting     Estradiol 0.5mg Tablet 1 tab daily PO     Toujeo SoloStar 300units/1ml Injection 100 units bid     Atorvastatin Calcium 10mg Tablet 1 tab qhs     Lancet   Lancet Onet touch Fine Point Lancets use wiht one touch glucometer, check blood sugars 4 times daily     Jardiance 25mg Tablet Take 1 tablet(s) by mouth qam     Lisinopril 2.5mg Tablet Take 1 tablet(s) by mouth daily     Ventolin HFA 90mcg/1actuation Oral Inhaler Inhale 2 puff(s) by mouth 4 times a day as needed     One Touch Ultra Blue Test Strips  Reagent Strips Test Blood Sugar QID     Topamax 100mg Tablet 1 tab BID     Fosamax 70mg Tablet Take 1 tablet(s) by mouth q week     BD Ultra-Fine Original Pen Needle 29G x 1/2\"  Pen Needle Use as directed QID w/Lantus and Novalog Pen     Benzonatate " 200mg Capsules 1 capsule tid     Insulin Syringes 0.5ml Syringe Use as directed     Fioricet 50mg/300mg/40mg Capsules One to 2 PO Q 4 hours. Max of 4 in 24 hr period.     Erythromycin Ophthalmic 0.5% Ophthalmic Ointment Apply 1/2 inch ribbon of ointment to affected eye(s) bid for 10 days     Symbicort 160/4.5 Oral Inhaler 2 puffs BID     Trulicity PEN 0.75mg/0.5ml Injection Inject once weekly     allergy shots once a week     One Touch UltraMini Monitor Kit check blood sugar 6 times day     Aleve 220mg Tablet     [employer] Pen Needles 31G 8mm #100         OBJECTIVE:        Vitals:         Current: 1/23/2018 5:22:17 PM    Ht:  5 ft, 4.25 in;  Wt: 259 lbs;  BMI: 44.1    T: 97.6 F (oral);  BP: 127/73 mm Hg (right arm, sitting);  P: 85 bpm (right arm (BP Cuff), sitting);  sCr: 0.65 mg/dL;  GFR: 124.06        Exams:     PHYSICAL EXAM:     GENERAL: vital signs recorded - well developed, well nourished;  no apparent distress, appears moderately ill;     EYES: extraocular movements intact; hyperemic conjunctiva;  PERRLA;     E/N/T: EARS: bilateral TMs are normal;  NOSE: bilateral frontal sinus tenderness present;     NECK: range of motion is normal; thyroid is non-palpable;     RESPIRATORY: normal respiratory rate and pattern with no distress; normal breath sounds with no rales, rhonchi, wheezes or rubs;     CARDIOVASCULAR: normal rate; rhythm is regular;  no systolic murmur; no edema;     GASTROINTESTINAL: nontender; normal bowel sounds; no organomegaly;     LYMPHATIC: bilateral submandibular nodes ( tender );  no supraclavicular nodes;     MUSCULOSKELETAL: normal gait; normal range of motion of all major muscle groups; no limb or joint pain with range of motion;     NEUROLOGICAL:  cranial nerves, motor and sensory function, reflexes, gait and coordination are all intact;     PSYCHIATRIC:  appropriate affect and demeanor; normal speech pattern; grossly normal memory;         ASSESSMENT:           465.8   J01.11  Upper  respiratory illness              DDx:         ORDERS:         Meds Prescribed:       Augmentin (Amoxicillin/Clavulanate) 875mg/125mg Tablet 1 tab bid X 10 days  #20 (Twenty) tablet(s) Refills: 0                 PLAN:          Upper respiratory illness         RECOMMENDATIONS given include: Ms. Rodriguez is in today with sinus infection which is a reoccurring problem- she did recently miss some allergy injections due to insurance problems  - I do feel this is a true sinus infection given her appearance today.            Prescriptions:       Augmentin (Amoxicillin/Clavulanate) 875mg/125mg Tablet 1 tab bid X 10 days  #20 (Twenty) tablet(s) Refills: 0           Patient Education Handouts:       Sinus Infection (Sinusitis)              Patient Recommendations:        For  Upper respiratory illness:     I also recommend Ms. Rodriguez is in today with sinus infection which is a reoccurring problem- she did recently miss some allergy injections due to insurance problems  - I do feel this is a true sinus infection given her appearance today.              CHARGE CAPTURE:           Primary Diagnosis:     465.8 Upper respiratory illness            J01.11    Acute recurrent frontal sinusitis              Orders:          [id number]   Office/outpatient visit; established patient, level 3  (In-House)

## 2021-05-18 NOTE — PROGRESS NOTES
Nory Rodriguez 1965     Office/Outpatient Visit    Visit Date:  04:22 pm    Provider: Monika Baca N.P. (Assistant: Neelam Reilly MA)    Location: [facility]        Electronically signed by Monika Baca N.P. on  2018 09:27:18 PM                             SUBJECTIVE:        CC:     Cherelle is a 52 year old White female.  sore in vaginal area; (right shoulder surgery next week)         HPI:         Patient to be evaluated for unspecified skin lesion.  There is a solitary skin lesion of concern.  This was first noticed 2-3 days.  The rash/lesion is a new problem that the patient has not encountered before.  It is located in the vaginal or vulvar region.  This is described as painful.  Possible contributing factors include shaved the day before this started and then drove to [place] and back with a friend.      ROS:     CONSTITUTIONAL:  Negative for fever.      GENITOURINARY:  Positive for vaginal itching (redness on the outside of her labia).   Negative for exudate.          PMH/FMH/SH:     Last Reviewed on 2018 04:34 PM by Monika Baca    Past Medical History:         Hepatitis: type A;     Allergies    GERD    IDDM    hypercholesterolemia     Back pain     Breast lump     Cellulitis of the chest wall     Classic migraine     Congenital accessory skin tags     H/O Depression     GERD     chronic hoarseness     Hypertension     H/O Migraine headaches     Obstructive sleep apnea    Peripheral neuropathy     Post-menopausal    h/o Uterine polyp             GYNECOLOGICAL HISTORY:     miscarriage 1    Contraception: S/P tubal ligation;         CURRENT MEDICAL PROVIDERS:    Therapist, Shabnam Miguel         PREVENTIVE HEALTH MAINTENANCE             COLORECTAL CANCER SCREENING: will schedule     MAMMOGRAM: Done within last 2 years and results in are chart was last done 17 with normal results     PAP SMEAR: was last done ZEESHAN BSO no longer needs  PAPs         Surgical History:         Cholecystectomy    Hysterectomy: TAHBSO;      pericardial window ; Procedures: heart cath          Family History:     Father: Coronary Artery Disease; ;  Cerebrovascular Accident     Mother: Hyperlipidemia;  Endometrial Cancer     Brother(s): Type 2 Diabetes     Paternal Grandfather: Type 2 Diabetes     Paternal Grandmother:      Maternal Grandfather: Type 2 Diabetes     Maternal Grandmother: Breast Cancer;  Alzheimer's Disease         Social History:     Occupation: [employer] de[employer]     Marital Status:      Children: 1 child and 2 step-children         Tobacco/Alcohol/Supplements:     Last Reviewed on 2018 04:25 PM by Neelam Reilly    Tobacco: She has never smoked.  Non-drinker             Immunizations:     zzFluzone pf-quadrivalent 3 and up 2015     zzFluzone pf-quadrivalent 3 and up 10/26/2016     zzFluzone pf-quadrivalent 3 and up 2017     Fluzone (3 + years dose) 2009     Fluzone (3 + years dose) 10/27/2012     Fluzone pf (3+ years dose) 10/14/2013     Fluzone pf (3+ years dose) 10/7/2014     PNEUMOVAX 23 (Pneumococcal PPV23) 2016     Tdap (Tetanus, reduced diph, acellular pertussis) 2015         Allergies:     Last Reviewed on 2018 04:25 PM by Neelam Reilly    Levemir:    Cipro:    Nitroglycerin:    Januvia: vomiting (Adverse Reaction)    Bydureon: nausea (Adverse Reaction)    Metformin HCl: vomiting (Adverse Reaction)    Codeine:    Aspirin:    Sumatriptan: dizziness (Adverse Reaction)        Current Medications:     Last Reviewed on 2018 04:26 PM by Neelam Reilly    Betamethasone/Clotrimazole 0.05%/1% Cream Apply small amount to affected area bid  x 10 days     Topamax 100mg Tablet 1 tab BID     Omeprazole 40mg Capsules, Extended Release 1 tab BID     Singulair  10mg Tablet Take 1 tablet(s) by mouth each evening     Estradiol 0.5mg Tablet 1 tab daily PO     Lisinopril 2.5mg Tablet Take 1  "tablet(s) by mouth daily     Atorvastatin Calcium 10mg Tablet 1 tab qhs     Fosamax 70mg Tablet Take 1 tablet(s) by mouth q week     Promethazine HCl 25mg Tablet 1/2 - 1 tab q 4-6 hrs prn   prn nausea and vomiting     Toujeo SoloStar 300units/1ml Injection 100 units bid     Lancet   Lancet Onet touch Fine Point Lancets use wiht one touch glucometer, check blood sugars 4 times daily     Jardiance 25mg Tablet Take 1 tablet(s) by mouth qam     Ventolin HFA 90mcg/1actuation Oral Inhaler Inhale 2 puff(s) by mouth 4 times a day as needed     One Touch Ultra Blue Test Strips  Reagent Strips Test Blood Sugar QID     BD Ultra-Fine Original Pen Needle 29G x 1/2\"  Pen Needle Use as directed QID w/Lantus and Novalog Pen     Benzonatate 200mg Capsules 1 capsule tid     Insulin Syringes 0.5ml Syringe Use as directed     Fioricet 50mg/300mg/40mg Capsules One to 2 PO Q 4 hours. Max of 4 in 24 hr period.     Erythromycin Ophthalmic 0.5% Ophthalmic Ointment Apply 1/2 inch ribbon of ointment to affected eye(s) bid for 10 days     Symbicort 160/4.5 Oral Inhaler 2 puffs BID     Meloxicam 15mg Tablet 1/2 to 1 prn pain to be take with food     allergy shots once a week     One Touch UltraMini Monitor Kit check blood sugar 6 times day     Aleve 220mg Tablet     [employer] Pen Needles 31G 8mm #100         OBJECTIVE:        Vitals:         Current: 4/17/2018 4:24:47 PM    Ht:  5 ft, 4.25 in;  Wt: 258 lbs;  BMI: 43.9    T: 98.1 F (oral);  BP: 127/82 mm Hg (right arm, sitting);  P: 90 bpm (right arm (BP Cuff), sitting);  sCr: 0.76 mg/dL;  GFR: 105.93        Exams:     PHYSICAL EXAM:     GENERAL:  well developed and nourished; appropriately groomed; in no apparent distress;     RESPIRATORY: normal respiratory rate and pattern with no distress; normal breath sounds with no rales, rhonchi, wheezes or rubs;     CARDIOVASCULAR: normal rate; rhythm is regular;  no systolic murmur;     GENITOURINARY: external genitalia: vulvar erythema; ; boil on right " side below her labia, with bloody exudate, tender;     PSYCHIATRIC:  appropriate affect and demeanor; normal speech pattern; grossly normal memory;         ASSESSMENT           239.2   L98.9  Unspecified skin lesion              DDx:     477.9   J30.9  Allergic rhinitis              DDx:         ORDERS:         Meds Prescribed:       Doxycycline Monohydrate 100mg Tablet Take 1 tablet(s) by mouth bid  #20 (Twenty) tablet(s) Refills: 0       Miconazole Nitrate (Miconazole Nitrate) 2% Vaginal Cream Apply externally bid x 7 days.  #45 (Forty Five) gm Refills: 1         Radiology/Test Orders:       [id number]  [facility] services for allergen immunotherapy not including provision of allergenic extracts; 2+ injection  (In-House)  (serum expires 3-8-19./pr)         Lab Orders:       [id number]  [facility] Wound Culture  (Send-Out)                   PLAN:          Unspecified skin lesionsugars running 150-200     LABORATORY:  Labs ordered to be performed today include wound culture.      RECOMMENDATIONS given include: apply warm compresses to boil.      FOLLOW-UP: Advised to call if there is no improvement 3 days.  .            Prescriptions:       Doxycycline Monohydrate 100mg Tablet Take 1 tablet(s) by mouth bid  #20 (Twenty) tablet(s) Refills: 0       Miconazole Nitrate (Miconazole Nitrate) 2% Vaginal Cream Apply externally bid x 7 days.  #45 (Forty Five) gm Refills: 1           Orders:       [id number]  [facility] Wound Culture  (Send-Out)            Allergic rhinitis           Orders:       [id number]  [facility] for allergen immunotherapy not including provision of allergenic extracts; 2+ injection  (In-House)  (serum expires 3-8-19./pr)             Patient Recommendations:        Unspecified skin lesion    Follow-up by phone if no improvement in 3 days.                APPOINTMENT INFORMATION:        Monday Tuesday Wednesday Thursday Friday Saturday Sunday            Time:___________________AM  PM   Date:_____________________              CHARGE CAPTURE           **Please note: ICD descriptions below are intended for billing purposes only and may not represent clinical diagnoses**        Primary Diagnosis:         239.2 Unspecified skin lesion            L98.9    Disorder of the skin and subcutaneous tissue, unspecified              Orders:          [id number]   Office/outpatient visit; established patient, level 3  (In-House)           477.9 Allergic rhinitis            J30.9    Allergic rhinitis, unspecified              Orders:          [id number]   [facility] for allergen immunotherapy not including provision of allergenic extracts; 2+ injection  (In-House)  (serum expires 3-8-19./pr)

## 2021-05-18 NOTE — PROGRESS NOTES
Nory Rodriguez Maren  1965     Office/Outpatient Visit    Visit Date: Mon, Dec 21, 2020 09:54 am    Provider: Eileen Barajas MD (Assistant: Shefali Jimenez MA)    Location: [facility]        Electronically signed by Eileen Barajas MD on  12/22/2020 04:55:49 PM                             Subjective:        CC: [phone] doximsarah Villarreal is a 55 year old White female.  She is here today following a transition of care from an inpatient hospital: [facility] due to covid/pnuemonia. The patient was admitted on 12- and discharged on 12-. Our office called the patient within 48 hours of discharge and scheduled the follow-up appointment.. During the patient's hospital stay the patient was treated by .          HPI:       [facility], she was admitted for 3 days on 12/13/2020, and she was started on dexamethasone and oxygen and overall did well, she was d/c on 12/15 and she is home, feels weak and tired, she has no appetite.  She feels SOA when she ambulates.  She was sent home on dexamethasone.  She is sleeping poorly.  She still has taste and smell.  Her BS are running in the low 200s and she is on insulin.  No BM in past 3 days.  Her CT scan showed covid pneumonia, no PE.  Her WBC was low in 4 range, her LFT and renal function were stable.    ROS:     CONSTITUTIONAL:  Positive for fatigue.   Negative for chills or fever.      CARDIOVASCULAR:  Negative for chest pain, palpitations, tachycardia, orthopnea, and edema.      RESPIRATORY:  Positive for recent cough, dyspnea and frequent wheezing.      GASTROINTESTINAL:  Negative for abdominal pain, heartburn, constipation, diarrhea, and stool changes.      GENITOURINARY:  Negative for dysuria, vaginal discharge and frequent urination.      MUSCULOSKELETAL:  Negative for arthralgias and myalgias.      INTEGUMENTARY/BREAST:  Negative for rash.      NEUROLOGICAL:  Negative for  dizziness and headaches.      PSYCHIATRIC:  Negative for sleep disturbance and suicidal thoughts.          Past Medical History / Family History / Social History:         Last Reviewed on 2020 10:44 AM by Eileen Barajas    Past Medical History:                 PAST MEDICAL HISTORY         Hepatitis: type A;     Allergies    GERD    IDDM    hypercholesterolemia     Back pain     Breast lump     Cellulitis of the chest wall     Classic migraine     Congenital accessory skin tags     H/O Depression     GERD     chronic hoarseness     Hypertension     H/O Migraine headaches     Obstructive sleep apnea    Peripheral neuropathy     Post-menopausal    h/o Uterine polyp             GYNECOLOGICAL HISTORY:     miscarriage 1    Contraception: S/P tubal ligation;         CURRENT MEDICAL PROVIDERS:    Allergist: Dr. Delgadillo    Ophthalmologist: Ebenezer    Orthopedist: Antony Ewing, Shabnam Miguel Endocrinologist         PREVENTIVE HEALTH MAINTENANCE             BONE DENSITY: was last done 2019 with the following abnormality noted-- Osteopenic at L1 in Lumbar Spine Only     COLORECTAL CANCER SCREENING: Up to date (colonoscopy q10y; sigmoidoscopy q5y; Cologuard q3y) was last done 18, Results are in chart     EYE EXAM: Diabetic Eye Exam during this calendar year and results are in chart was last done 2020     MAMMOGRAM: Done within last 2 years and results in are chart was last done 2019 with normal results     PAP SMEAR: was last done ZEESHAN BSO no longer needs PAPs         Surgical History:         Cholecystectomy    Hysterectomy: TAHBSO;     cataracts removed b 2018     pericardial window ; Procedures: heart cath          Family History:     Father: Coronary Artery Disease; ;  Cerebrovascular Accident     Mother: Hyperlipidemia;  Endometrial Cancer     Brother(s): Type 2 Diabetes     Paternal Grandfather: Type 2 Diabetes     Paternal Grandmother:      Maternal Grandfather: Type  "2 Diabetes     Maternal Grandmother: Breast Cancer;  Alzheimer's Disease         Social History:     Occupation: [employer]     Marital Status:      Children: 1 child and 2 step-children         Tobacco/Alcohol/Supplements:     Last Reviewed on 12/21/2020 09:57 AM by Shefali Jimenez    Tobacco: She has never smoked.  Non-drinker         Substance Abuse History:     Last Reviewed on 12/07/2020 11:51 AM by Mitzi West        Mental Health History:     Last Reviewed on 7/31/2019 01:57 PM by Santa Tobar        Communicable Diseases (eg STDs):     Last Reviewed on 7/31/2019 01:57 PM by Santa Tobar        Allergies:     Last Reviewed on 12/21/2020 09:58 AM by Shefali Jimenez    Levemir:      Cipro:      Nitroglycerin:      Bydureon: Nausea  (Adverse Reaction)    Diclofenac Sodium: swelling of feet,swelling of ankle  (Adverse Reaction)    cat dander:      Codeine:      Aspirin:      Sumatriptan: dizziness  (Adverse Reaction)        Current Medications:     Last Reviewed on 12/21/2020 09:58 AM by Shefali Jimenez    EpiPen 0.3 mg/0.3 mL injection Auto-Injector [inject 0.3 milliliter (0.3 mg) by intramuscular route once]    Claritin 10 mg oral tablet [take 1 tablet (10 mg) by oral route once daily]    Lancet   Lancet [Onet touch Fine Point Lancets use wiht one touch glucometer, check blood sugars 4 times daily]    lisinopriL 2.5 mg oral tablet [TAKE ONE TABLET BY MOUTH DAILY]    BD Ultra-Fine Short Pen Needle 31 gauge x 5/16\"  [USE 4 TIMES A DAY WITH     HUMALOG]    promethazine 25 mg oral tablet [TAKE ONE-HALF TO ONE TABLET BY MOUTH EVERY 4 TO 6 HOURS AS NEEDED FOR NAUSEA AND VOMITING]    topiramate 100 mg oral tablet [TAKE 1 TABLET TWICE A DAY]    metFORMIN 500 mg oral Tablet, Extended Release 24 hr [1 tab bid]    Aleve 220 mg oral tablet    One Touch Ultra Blue Test Strips  Reagent Strips [Test Blood Sugar QID]    estradioL 0.5 mg oral tablet [TAKE 1 TABLET DAILY]    atorvastatin 10 mg oral " tablet [1 tab qhs]    OneTouch UltraMini kit  [check blood sugar 6 times day]    Insulin Syringes 0.5ml Syringe [Use as directed]    HumaLOG KwikPen Insulin 200 unit/mL (3 mL) subcutaneous Insulin Pen [sliding scale]    Fosamax 70 mg oral tablet [Take 1 tablet(s) by mouth q week]    Toujeo Max U-300 SoloStar 300 unit/mL (3 mL) subcutaneous Insulin Pen [INJECT 80 UNITS  SUBCUTANEOUSLY TWO TIMES A DAY]    montelukast 10 mg oral tablet [TAKE 1 TABLET EVERY EVENING]    allergy shots once a week     ProAir HFA 90 mcg/actuation Inhalation HFA Aerosol Inhaler [PRN]    Symbicort 160mcg/4.5mcg Oral Inhaler [2 puffs BID]    Trulicity 1.5 mg/0.5 mL subcutaneous Pen Injector [every Sunday]    Jardiance 10 mg oral tablet [1 TAB DAILY]    Omeprazole 40 mg oral capsule,delayed release (enteric coated) [1 tab BID]    cholecalciferol (vitamin D3) 125 mcg (5,000 unit) oral capsule [TAKE ONE CAPSULE BY MOUTH DAILY]    Diltiazem HCl 120 mg oral Capsule, Extended Release 24 hr [1 tab daily]    Glucagon Emergency     Dexcom G6 Sensor device  [Use as directed]    ALBUTEROL    NEB 0.083%     traZODone 100 mg oral tablet [TAKE ONE TABLET BY MOUTH EVERY NIGHT AT BEDTIME]    DEXAMETHASON TAB 6MG        Objective:        Exams:     PHYSICAL EXAM:     GENERAL:  well developed and nourished; appropriately groomed; in no apparent distress;     EYES: extraocular movements intact; conjunctiva and cornea are normal;     RESPIRATORY: normal respiratory rate and pattern with no distress;     MUSCULOSKELETAL: normal overall tone     NEUROLOGIC: mental status: alert and oriented x 3;     PSYCHIATRIC: appropriate affect and demeanor; normal psychomotor function; normal speech pattern;         Assessment:         U07.1   COVID-19       I10   Essential (primary) hypertension       E11.42   Type 2 diabetes mellitus with diabetic polyneuropathy           Plan:         COVID-19pt is stable, still very tired and weak and SOA, she is at home and her daughter is  caring for her, she is not eating well and her BS are more elevated with her diabetes and her taking her steroid for the covid, she is to rest, not return to work until 12/29, she is to stay in quarentine, and push fluids and take boost/glucerna supplements for her nutrition as long as she is not eating well. I advised her with her asthma, and diabetes, this could take weeks and she may not be ready to return to work next week, she will keep me posted as to how she is doing, and she will cont her SSI for her temporarily elevated BSs    Telehealth: Verbal consent obtained for visit to occur via televideo conferencing; Staff, other than provider, present during telephone visit include only Dr Chin was present during the telehealth OV with patient         Essential (primary) hypertensionas above        Type 2 diabetes mellitus with diabetic polyneuropathyas above            Charge Capture:         Primary Diagnosis:     U07.1  COVID-19           Orders:      [id number]  Transitional care manage service 14 day discharge  (In-House)              I10  Essential (primary) hypertension     E11.42  Type 2 diabetes mellitus with diabetic polyneuropathy

## 2021-05-18 NOTE — PROGRESS NOTES
Nory RodriguezShawna 1965     Office/Outpatient Visit    Visit Date: Mon, Sep 17, 2018 10:11 am    Provider: Eileen Barajas MD (Assistant: Sarah Spurling, MA)    Location: [facility]        Electronically signed by Eileen Barajas MD on  09/26/2018 09:57:41 AM                             SUBJECTIVE:        CC:     Cherelle is a 53 year old White female.  This is a follow-up visit.  NIDDM         HPI:         Patient to be evaluated for type II diabetes requiring insulin.  Cherelle has type 1 diabetes without complications.  Compliance with treatment has been fair; she does not follow a diet and exercise regimen.  Patient's diabetes was first diagnosed >5 years ago.  She follows no particular diet.  Primary symptoms reported include blurred vision, fatigue, headache, polyphagia and weakness.  She specifically denies leg cramps, nocturia, polydipsia or polyuria.  Current meds include insulin ( Lantus; Humalog ).  She reports home blood glucose readings have been high, with average fasting readings in the mid-200s mg/dL range. She checks her glucose 1 to 2 times daily.  Most recent lab results include.  Hemoglobin A1c:  11.2 (%) (06/15/2018), LDL:  92 (mg/dL) (06/15/2018), HDL:  35 (mg/dL) (06/15/2018), Triglycerides:  219 (mg/dL) (06/15/2018), Microalbuminuria:  13.4 (mg/g creat) (06/15/2018), Microalbumin, Urine, rand:  <12.0 (mg/L) (06/15/2018) In regard to preventative care, Dr Sol and Ole and Minerva-s/p eye surgery in August.          Concerning hypercholesterolemia, date of diagnosis 2010.  Current treatment includes Lipitor and a low cholesterol/low fat diet.  Compliance with treatment has been good; she maintains her low cholesterol diet.  She denies experiencing any hypercholesterolemia related symptoms.          With regard to the hypertension, this was first diagnosed one year ago.  Her current cardiac medication regimen includes an ACE inhibitor ( lisinopril 2.5 mg for her diabetes ).  She is  tolerating the medication well without side effects.  Compliance with treatment has been good; she takes her medication as directed, maintains her diet and exercise regimen, and follows up as directed.      chronic esophagitis on July EGD, seeing Dr Zhang and on new GERD pill but she doesnt know name of it     Cherelle is s/p R shoulder surgery May 2, with Dr Pretty and now she is having difficulty with ROM, Dr Pretty thinks she may have frozen shoulder     ROS:     CONSTITUTIONAL:  Negative for chills, fatigue, fever, and weight change.      EYES:  Negative for eye drainage.      E/N/T:  Positive for frequent rhinorrhea and hoarseness.   Negative for ear pain, nasal congestion, sinus pressure or sore throat.      CARDIOVASCULAR:  Negative for chest pain, palpitations, tachycardia, orthopnea, and edema.      RESPIRATORY:  Negative for cough, dyspnea, and hemoptysis.      GASTROINTESTINAL:  Negative for abdominal pain, heartburn, constipation, diarrhea, and stool changes.      MUSCULOSKELETAL:  Positive for joint stiffness (shoulder).      NEUROLOGICAL:  Positive for headaches.   Negative for dizziness or weakness.      PSYCHIATRIC:  Positive for sleep disturbance.   Negative for anxiety or suicidal thoughts.          PMH/FMH/SH:     Last Reviewed on 2018 10:57 AM by Eileen Barajas    Past Medical History:         Hepatitis: type A;     Allergies    GERD    IDDM    hypercholesterolemia     Back pain     Breast lump     Cellulitis of the chest wall     Classic migraine     Congenital accessory skin tags     H/O Depression     GERD     chronic hoarseness     Hypertension     H/O Migraine headaches     Obstructive sleep apnea    Peripheral neuropathy     Post-menopausal    h/o Uterine polyp             GYNECOLOGICAL HISTORY:     miscarriage 1    Contraception: S/P tubal ligation;         CURRENT MEDICAL PROVIDERS:    Therapist, Shabnam Miguel         PREVENTIVE HEALTH MAINTENANCE             BONE DENSITY:  was last done 17 with the following abnormality noted-- Osteopenic at L1 in Lumbar Spine Only     COLORECTAL CANCER SCREENING: Up to date (colonoscopy q10y; sigmoidoscopy q5y; Cologuard q3y) was last done 18, Results are in chart     MAMMOGRAM: Done within last 2 years and results in are chart was last done 17 with normal results     PAP SMEAR: was last done ZEESHAN BSO no longer needs PAPs         Surgical History:         Cholecystectomy    Hysterectomy: TAHBSO;      pericardial window ; Procedures: heart cath          Family History:     Father: Coronary Artery Disease; ;  Cerebrovascular Accident     Mother: Hyperlipidemia;  Endometrial Cancer     Brother(s): Type 2 Diabetes     Paternal Grandfather: Type 2 Diabetes     Paternal Grandmother:      Maternal Grandfather: Type 2 Diabetes     Maternal Grandmother: Breast Cancer;  Alzheimer's Disease         Social History:     Occupation: [employer]     Marital Status:      Children: 1 child and 2 step-children         Tobacco/Alcohol/Supplements:     Last Reviewed on 2018 10:57 AM by Eileen Barajas    Tobacco: She has never smoked.  Non-drinker         Substance Abuse History:     Last Reviewed on 2018 03:57 PM by Santa Tobar        Mental Health History:     Last Reviewed on 2018 03:57 PM by Santa Tobar        Communicable Diseases (eg STDs):     Last Reviewed on 2018 03:57 PM by Santa Tobar            Allergies:     Last Reviewed on 2018 05:56 PM by Priti Whyte    Levemir:    Cipro:    Nitroglycerin:    Januvia: vomiting (Adverse Reaction)    Bydureon: nausea (Adverse Reaction)    Metformin HCl: vomiting (Adverse Reaction)    Codeine:    Aspirin:    Sumatriptan: dizziness (Adverse Reaction)        Current Medications:     Last Reviewed on 2018 05:57 PM by Priti Whyte    Lisinopril 2.5mg Tablet Take 1 tablet(s) by mouth daily      "Betamethasone/Clotrimazole 0.05%/1% Cream Apply small amount to affected area bid  x 10 days     Meloxicam 15mg Tablet 1/2 to 1 prn pain to be take with food     Singulair  10mg Tablet Take 1 tablet(s) by mouth each evening     Topamax 100mg Tablet 1 tab BID     Trulicity PEN 0.75mg/0.5ml Injection Inject once weekly     Atorvastatin Calcium 10mg Tablet 1 tab qhs     Fosamax 70mg Tablet Take 1 tablet(s) by mouth q week     Omeprazole 40mg Capsules, Extended Release 1 tab BID     Toujeo SoloStar 300units/1ml Injection 100 units bid     Promethazine HCl 25mg Tablet 1/2 - 1 tab q 4-6 hrs prn   prn nausea and vomiting     Estradiol 0.5mg Tablet 1 tab daily PO     Lancet   Lancet Onet touch Fine Point Lancets use wiht one touch glucometer, check blood sugars 4 times daily     One Touch Ultra Blue Test Strips  Reagent Strips Test Blood Sugar QID     BD Ultra-Fine Original Pen Needle 29G x 1/2\"  Pen Needle Use as directed QID w/Lantus and Novalog Pen     Benzonatate 200mg Capsules 1 capsule tid     Insulin Syringes 0.5ml Syringe Use as directed     Fioricet 50mg/300mg/40mg Capsules One to 2 PO Q 4 hours. Max of 4 in 24 hr period.     Symbicort 160/4.5 Oral Inhaler 2 puffs BID     Augmentin 875mg/125mg Tablet 1 po bid with food     Vitamin D3 5,000IU Capsules 1 capsule 4 days a week and 2 capsules 3 days a week     Zyrtec 10mg Tablet 1 bid     ProAir HFA 90mcg/1actuation Oral Inhaler PRN     allergy shots once a week     One Touch UltraMini Monitor Kit check blood sugar 6 times day     Aleve 220mg Tablet     [employer] Pen Needles 31G 8mm #100         OBJECTIVE:        Vitals:         Current: 9/17/2018 10:16:46 AM    Ht:  5 ft, 4.25 in;  Wt: 262 lbs;  BMI: 44.6    T: 98.2 F (oral);  BP: 140/75 mm Hg (right arm, sitting);  P: 75 bpm (right arm (BP Cuff), sitting);  sCr: 0.74 mg/dL;  GFR: 108.27        Exams:     PHYSICAL EXAM:     GENERAL: vital signs recorded - morbidly obese;  well groomed;  no apparent distress;     EYES: " PERRL, EOMI     E/N/T: EARS:  normal external auditory canals and tympanic membranes;  grossly normal hearing; OROPHARYNX:  normal mucosa, dentition, gingiva, and posterior pharynx;     NECK: supple, no LAD/TM;     RESPIRATORY: normal respiratory rate and pattern with no distress; normal breath sounds with no rales, rhonchi, wheezes or rubs;     CARDIOVASCULAR: normal rate; rhythm is regular;     GASTROINTESTINAL: nontender, nondistended; no hepatosplenomegaly or masses; no bruits;     MUSCULOSKELETAL: normal gait;     NEUROLOGICAL:  cranial nerves, motor and sensory function, reflexes, gait and coordination are all intact;     PSYCHIATRIC:  appropriate affect and demeanor; normal speech pattern; grossly normal memory;     Left foot exam    Protective sensation using Monofilament test: NORMAL sensation. Patient detects .07 grams of force which is considered normal.    Vascular status: normal peripheral vascular exam with palpable dorsal pedal and posterior tibal pulses and brisk digital capillary refill    Skin is intact without sores or ulcers    Right foot exam    Protective sensation using Monofilament test: Slightly decreased, with estimated force of 0.2 grams applied.    Vascular status: normal peripheral vascular exam with palpable dorsal pedal and posterior tibal pulses and brisk digital capillary refill    Skin is intact without sores or ulcers         Lab/Test Results:             Hemoglobin A1c:  11.4 (09/17/2018),     Performed by::  atc (09/17/2018),             Procedures:     Vaccination against other viral diseases, Influenza     1. Influenza, seasonal PF (children 3 years to adult): 0.5 ml unit dose given IM in the right upper arm; administered by pdr 9/17/18;  lot number [id number]; expires 6/30/19         Allergic rhinitis     Allergy Injection (2 or more) exp 8/14/19/ pdr             ASSESSMENT           250.00   E11.42  Type II diabetes requiring insulin              DDx:     272.0   E78.00   Hypercholesterolemia              DDx:     401.1   I10  Hypertension              DDx:     307.42   F51.01  Chronic insomnia              DDx:     356.9   G60.9  Peripheral neuropathy              DDx:     530.81   K21.9  GERD              DDx:     719.41   M25.511  Shoulder pain              DDx:     V04.81   Z23  Vaccination against other viral diseases, Influenza              DDx:     V76.12   Z12.31  Screening mammogram - other              DDx:     733.90   Z13.820  Osteopenia              DDx:     477.9   J30.9  Allergic rhinitis              DDx:         ORDERS:         Radiology/Test Orders:       65364  Screening mammography bi 2-view inc CAD  (Send-Out)         12134  DXA, bone density study, 1 or more sites; axial skeleton (eg hips, pelvis, spine)  (Send-Out)         74790  Professional services for allergen immunotherapy not including provision of allergenic extracts; two  (In-House)           Lab Orders:       18497*  Hgb A1c fast lab  (In-House)           Procedures Ordered:       REFER  Referral to Specialist or Other Facility  (Send-Out)         13925  Collection of capillary blood specimen (eg, finger, heel, ear stick)  (In-House)         08170  Immunization administration; one vaccine  (In-House)           Other Orders:       05803  Influenza virus vaccine, quadrivalent, split virus, preservative free 3 years of age & older  (In-House)         2028F  Foot examination performed (includes examination through visual inspection, sensory exam with monofi  (In-House)                   PLAN:          Type II diabetes requiring insulin given flu shot, referal to endocrinology for poorly treated diabetes since her insurance co wont pay for the invokana.  She is on SSI 12 units with meals, tuojeo 100 mg bid and trulicity weekly, she is on ace and statin s/p cataract surgery for cataracts, eye exam UTD, foot exam noted above meds refilled prn     LABORATORY:  Labs ordered to be performed today include Hgb A1c  inhouse fast lab.      REFERRALS:  Referral initiated to an endocrinologist ( Dr. Ronda Darnell ).            Orders:       79260*  Hgb A1c fast lab  (In-House)         REFER  Referral to Specialist or Other Facility  (Send-Out)         64810  Collection of capillary blood specimen (eg, finger, heel, ear stick)  (In-House)            Hypercholesterolemia stable on meds          Hypertension borderline          Chronic insomnia worse due to shoulder pain          Peripheral neuropathy stable          GERD stable          Shoulder pain cont PT, she is to f/u Dr Pretty          Vaccination against other viral diseases, Influenza     Regarding contraindications to an Influenza vaccine:        No contraindications were noted.  The Vaccine Information Sheet was given.            Orders:       63199  Influenza virus vaccine, quadrivalent, split virus, preservative free 3 years of age & older  (In-House)         33485  Immunization administration; one vaccine  (In-House)            Screening mammogram - other           Orders:       51671  Screening mammography bi 2-view inc CAD  (Send-Out)            Osteopenia           Orders:       64707  DXA, bone density study, 1 or more sites; axial skeleton (eg hips, pelvis, spine)  (Send-Out)            Allergic rhinitis           Orders:       61048  Professional services for allergen immunotherapy not including provision of allergenic extracts; two  (In-House)               Other Orders:       2028F  Foot examination performed (includes examination through visual inspection, sensory exam with monofi  (In-House)           CHARGE CAPTURE           **Please note: ICD descriptions below are intended for billing purposes only and may not represent clinical diagnoses**        Primary Diagnosis:         250.00 Type II diabetes requiring insulin            E11.42    Type 2 diabetes mellitus with diabetic polyneuropathy              Orders:          89017   Office/outpatient visit;  established patient, level 4  (In-House)             27985*   Hgb A1c fast lab  (In-House)             37556   Collection of capillary blood specimen (eg, finger, heel, ear stick)  (In-House)           272.0 Hypercholesterolemia            E78.00    Pure hypercholesterolemia, unspecified    401.1 Hypertension            I10    Essential (primary) hypertension    307.42 Chronic insomnia            F51.01    Primary insomnia    356.9 Peripheral neuropathy            G60.9    Hereditary and idiopathic neuropathy, unspecified    530.81 GERD            K21.9    Gastro-esophageal reflux disease without esophagitis    719.41 Shoulder pain            M25.511    Pain in right shoulder    V04.81 Vaccination against other viral diseases, Influenza            Z23    Encounter for immunization              Orders:          63441   Influenza virus vaccine, quadrivalent, split virus, preservative free 3 years of age & older  (In-House)             73722   Immunization administration; one vaccine  (In-House)           V76.12 Screening mammogram - other            Z12.31    Encounter for screening mammogram for malignant neoplasm of breast    733.90 Osteopenia            Z13.820    Encounter for screening for osteoporosis    477.9 Allergic rhinitis            J30.9    Allergic rhinitis, unspecified              Orders:          37953   Professional services for allergen immunotherapy not including provision of allergenic extracts; two  (In-House)               Other Orders:           2028F   Foot examination performed (includes examination through visual inspection, sensory exam with monofi  (In-House)

## 2021-05-18 NOTE — PROGRESS NOTES
MichaelNoryShawna 1965     Office/Outpatient Visit    Visit Date: Mon, Feb 5, 2018 12:52 pm    Provider: Santa Tobar N.P. (Assistant: Mine Roman LPN)    Location: [facility]        Electronically signed by Santa Tobar N.P. on  02/05/2018 03:58:27 PM                             SUBJECTIVE:        CC:     Cherelle is a 52 year old White female.  She presents with knot on right lower leg, reddness on ankle x 2 days. Recently went off blood thinner.1/1/18.          HPI:         Patient to be evaluated for leg pain.  Cherelle sustained an injury to.  No precipitating event or injury is identified.  Event occurred 2 days ago..  Pain started 2 days ago.  Associated symptoms include swelling, tenderness and warmth.  Pain radiates to: lower legs.  She characterizes the pain as of moderate severity and sharp.  Pain improves with elevation.  Pain worsens with walking foot dorsiflexion.  Past medical history is significant for DVT x 4 in the past, stopped Xarelto about 1 mo ago.      ROS:     CONSTITUTIONAL:  Negative for chills, fatigue, fever, and weight change.      CARDIOVASCULAR:  Positive for pedal edema ( mild; RLE /foot ) and Hx DVT x 4.   Negative for chest pain, orthopnea or paroxysmal nocturnal dyspnea.      RESPIRATORY:  Negative for dyspnea.      GASTROINTESTINAL:  Negative for abdominal pain, constipation, diarrhea, nausea and vomiting.          PMH/FMH/SH:     Last Reviewed on 2/05/2018 03:57 PM by Santa Tobar    Past Medical History:         Hepatitis: type A;     Allergies    GERD    IDDM    hypercholesterolemia     Back pain     Breast lump     Cellulitis of the chest wall     Classic migraine     Classic migraine     Common migraine     Congenital accessory skin tags     H/O Depression     GERD     chronic hoarseness     Hypertension     H/O Migraine headaches     Obstructive sleep apnea    Peripheral neuropathy     Post-menopausal    h/o Uterine polyp              GYNECOLOGICAL HISTORY:     miscarriage 1    Contraception: S/P tubal ligation;         CURRENT MEDICAL PROVIDERS:    Therapist, Shabnam Miguel         PREVENTIVE HEALTH MAINTENANCE             COLORECTAL CANCER SCREENING: will schedule     MAMMOGRAM: Done within last 2 years and results in are chart was last done 17 with normal results     PAP SMEAR: was last done ZEESHAN BSO no longer needs PAPs         Surgical History:         Cholecystectomy    Hysterectomy: TAHBSO;      pericardial window ; Procedures: heart cath          Family History:     Father: Coronary Artery Disease; ;  Cerebrovascular Accident     Mother: Hyperlipidemia;  Endometrial Cancer     Brother(s): Type 2 Diabetes     Paternal Grandfather: Type 2 Diabetes     Paternal Grandmother:      Maternal Grandfather: Type 2 Diabetes     Maternal Grandmother: Breast Cancer;  Alzheimer's Disease         Social History:     Occupation: [employer]     Marital Status:      Children: 1 child and 2 step-children         Tobacco/Alcohol/Supplements:     Last Reviewed on 2018 03:57 PM by Santa Tobar    Tobacco: She has never smoked.  Non-drinker         Substance Abuse History:     Last Reviewed on 2018 03:57 PM by Santa Tobar        Mental Health History:     Last Reviewed on 2018 03:57 PM by Santa Tobar        Communicable Diseases (eg STDs):     Last Reviewed on 2018 03:57 PM by Santa Tobar            Current Problems:     Last Reviewed on 2018 03:57 PM by Santa Tobar    History of noncompliance with medical treatment     Overweight     GERD     Common migraine     Peripheral neuropathy     Pressure ulcer stage I     Morbid obesity     Chronic insomnia     Degenerative disc disease     Nephrolithiasis     Allergic rhinitis     Congenital accessory skin tags     Post-menopausal HRT     Type II diabetes requiring insulin     Obstructive sleep apnea     Uterine  polyp     Hypercholesterolemia     Hypertension     Depression     GE reflux disease     DVT     Upper respiratory illness     UTI     Rotator cuff tear         Immunizations:     Fluzone pf-quadrivalent 3 and up 9/29/2015     Fluzone pf-quadrivalent 3 and up 10/26/2016     Fluzone pf-quadrivalent 3 and up 9/25/2017     Fluzone (3 + years dose) 9/14/2009     Fluzone (3 + years dose) 10/27/2012     Fluzone pf (3+ years dose) 10/14/2013     Fluzone pf (3+ years dose) 10/7/2014     PNEUMOVAX 23 (Pneumococcal PPV23) 11/29/2016     Tdap (Tetanus, reduced diph, acellular pertussis) 9/29/2015         Allergies:     Last Reviewed on 2/05/2018 03:57 PM by Santa Tobar    Levemir:    Cipro:    Nitroglycerin:    Januvia: vomiting (Adverse Reaction)    Bydureon: nausea (Adverse Reaction)    Metformin HCl: vomiting (Adverse Reaction)    Codeine:    Aspirin:    Sumatriptan: dizziness (Adverse Reaction)        Current Medications:     Last Reviewed on 2/05/2018 03:57 PM by Santa Tobar    Fosamax 70mg Tablet Take 1 tablet(s) by mouth q week     Singulair  10mg Tablet Take 1 tablet(s) by mouth each evening     Betamethasone/Clotrimazole 0.05%/1% Cream Apply small amount to affected area bid  x 10 days     Prilosec 40mg Capsules, Extended Release 1 tab BID     Promethazine HCl 25mg Tablet 1/2 - 1 tab q 4-6 hrs prn   prn nausea and vomiting     Estradiol 0.5mg Tablet 1 tab daily PO     Toujeo SoloStar 300units/1ml Injection 100 units bid     Atorvastatin Calcium 10mg Tablet 1 tab qhs     Lancet   Lancet Onet touch Fine Point Lancets use wiht one touch glucometer, check blood sugars 4 times daily     Jardiance 25mg Tablet Take 1 tablet(s) by mouth qam     Lisinopril 2.5mg Tablet Take 1 tablet(s) by mouth daily     Ventolin HFA 90mcg/1actuation Oral Inhaler Inhale 2 puff(s) by mouth 4 times a day as needed     One Touch Ultra Blue Test Strips  Reagent Strips Test Blood Sugar QID     Topamax 100mg Tablet 1 tab BID     BD  "Ultra-Fine Original Pen Needle 29G x 1/2\"  Pen Needle Use as directed QID w/Lantus and Novalog Pen     Benzonatate 200mg Capsules 1 capsule tid     Insulin Syringes 0.5ml Syringe Use as directed     Fioricet 50mg/300mg/40mg Capsules One to 2 PO Q 4 hours. Max of 4 in 24 hr period.     Erythromycin Ophthalmic 0.5% Ophthalmic Ointment Apply 1/2 inch ribbon of ointment to affected eye(s) bid for 10 days     Symbicort 160/4.5 Oral Inhaler 2 puffs BID     Trulicity PEN 0.75mg/0.5ml Injection Inject once weekly     allergy shots once a week     One Touch UltraMini Monitor Kit check blood sugar 6 times day     Aleve 220mg Tablet     [employer] Pen Needles 31G 8mm #100         OBJECTIVE:        Vitals:         Current: 2/5/2018 12:54:15 PM    Ht:  5 ft, 4.25 in;  Wt: 257.8 lbs;  BMI: 43.9    T: 97 F (oral);  BP: 143/78 mm Hg (right arm, sitting);  P: 83 bpm (right arm (BP Cuff), sitting);  sCr: 0.65 mg/dL;  GFR: 123.82        Repeat:     1:03:13 PM     BP:   120/68mm Hg (right arm, sitting)         Exams:     PHYSICAL EXAM:     GENERAL: vital signs recorded - well developed, well nourished;  no apparent distress;     RESPIRATORY: normal respiratory rate and pattern with no distress; normal breath sounds with no rales, rhonchi, wheezes or rubs;     CARDIOVASCULAR: normal rate; rhythm is regular;  no systolic murmur; trace pedal and RLE leg/ankle trace edema ,  with moderate tenderness, Positive distal pedal pulse , no warmth noted to RLE edema;     GASTROINTESTINAL: nontender, nondistended; no hepatosplenomegaly or masses; no bruits;         ASSESSMENT:           477.9   J30.9  Allergic rhinitis              DDx:     451.19   I80.291  DVT              DDx:         ORDERS:         Radiology/Test Orders:       28854  Pro services for allergen immunotherapy not including provision of allergenic extracts; 2+ injection  (In-House)  (Serum expires 12-4-18./pr)                 PLAN:          Allergic rhinitis           Orders:       " 04129  Pro services for allergen immunotherapy not including provision of allergenic extracts; 2+ injection  (In-House)  (Serum expires 12-4-18./pr)          DVT Sent to [facility] for STAT Venous scan RLE , order given and [facility] Called to let them know she was on her way dmt             CHARGE CAPTURE:           Primary Diagnosis:     477.9 Allergic rhinitis            J30.9    Allergic rhinitis, unspecified              Orders:          43132   Office/outpatient visit; established patient, level 4  (In-House)             49300   Pro services for allergen immunotherapy not including provision of allergenic extracts; 2+ injection  (In-House)  (Serum expires 12-4-18./pr)         451.19 DVT            I80.291    Phlebitis and thrombophlebitis of other deep vessels of right lower extremity

## 2021-05-18 NOTE — PROGRESS NOTES
Nory RodriguezShawna 1965     Office/Outpatient Visit    Visit Date: Fri, Farzad 15, 2018 09:33 am    Provider: Eileen Barajas MD (Assistant: Micaela Baca MA)    Location: [facility]        Electronically signed by Eileen Barajas MD on  06/19/2018 01:11:12 PM                             SUBJECTIVE:        CC: shoulder pain         HPI:     S/P shoulder surgery by Dr Hardy, renaldo and she is in a splint and is slowly healing, she was told she will need to stay in splint for 8-10 weeks, she is 6 weeks post op, therapy 6 months    Colonoscopy/EGD scheduled with Dr Miguel July 2nd         Additionally, she presents with history of type II diabetes requiring insulin.  specifically, this is type 2, insulin requiring diabetes, complicated by peripheral neuropathy.  Compliance with treatment has been poor; she does not follow a diet and exercise regimen.      Tobacco screen: Non-smoker.  Current meds include insulin/injectable ( Humalog- 10 units with meals; toujeo-100 units bid ), an ACE inhibitor, aspirin, and a lipid lowering agent.  She reports home blood glucose readings have averaged fasting readings in the low 200s mg/dL range. She checks her glucose 3 to 4 times daily.  Most recent lab results include Hemoglobin A1c:  11.9 (%) (12/11/2017), LDL:  55 (mg/dL) (12/11/2017), HDL:  25 (mg/dL) (12/11/2017), Triglycerides:  183 (mg/dL) (12/11/2017), Microalbuminuria:  17.5 (mg/g creat) (12/11/2017).  Has not fallen recently In regard to preventative care, she performs foot self-exams several days per week and her last ophthalmology exam was in 5/2018-Dr Hernández- NO DIABETIC RETINOPATHY, she has cataracts.  JARDIANCE UPSET HER STOMACH, BYDUREON WITHOUT IMPROVEMENT.     ROS:     CONSTITUTIONAL:  Positive for fatigue.   Negative for chills or fever.      E/N/T:  Positive for nasal congestion, frequent rhinorrhea and hoarseness.   Negative for ear pain or sore throat.      CARDIOVASCULAR:  Negative for chest  pain, palpitations, tachycardia, orthopnea, and edema.      RESPIRATORY:  Negative for recent cough, dyspnea and frequent wheezing.      GASTROINTESTINAL:  Negative for abdominal pain, constipation, diarrhea, nausea and vomiting.      MUSCULOSKELETAL:  Positive for arthralgias and myalgias.      INTEGUMENTARY/BREAST:  Negative for atypical moles, dry skin, pruritis, rashes, breast masses, and nipple discharge.      NEUROLOGICAL:  Positive for headaches.   Negative for dizziness or weakness.      PSYCHIATRIC:  Positive for sadness, sleep disturbance and grieving.          PMH/FMH/SH:     Last Reviewed on 6/15/2018 10:03 AM by Eileen Barajas    Past Medical History:         Hepatitis: type A;     Allergies    GERD    IDDM    hypercholesterolemia     Back pain     Breast lump     Cellulitis of the chest wall     Classic migraine     Congenital accessory skin tags     H/O Depression     GERD     chronic hoarseness     Hypertension     H/O Migraine headaches     Obstructive sleep apnea    Peripheral neuropathy     Post-menopausal    h/o Uterine polyp             GYNECOLOGICAL HISTORY:     miscarriage 1    Contraception: S/P tubal ligation;         CURRENT MEDICAL PROVIDERS:    Therapist, Shabnam Miguel         PREVENTIVE HEALTH MAINTENANCE             BONE DENSITY: was last done 17 with the following abnormality noted-- Osteopenic at L1 in Lumbar Spine Only     COLORECTAL CANCER SCREENING: will schedule     MAMMOGRAM: Done within last 2 years and results in are chart was last done 17 with normal results     PAP SMEAR: was last done ZEESHAN BSO no longer needs PAPs         Surgical History:         Cholecystectomy    Hysterectomy: TAHBSO;      pericardial window ; Procedures: heart cath          Family History:     Father: Coronary Artery Disease; ;  Cerebrovascular Accident     Mother: Hyperlipidemia;  Endometrial Cancer     Brother(s): Type 2 Diabetes     Paternal Grandfather: Type 2  Diabetes     Paternal Grandmother:      Maternal Grandfather: Type 2 Diabetes     Maternal Grandmother: Breast Cancer;  Alzheimer's Disease         Social History:     Occupation: [employer]     Marital Status:      Children: 1 child and 2 step-children         Tobacco/Alcohol/Supplements:     Last Reviewed on 6/15/2018 10:03 AM by Eileen Barajas    Tobacco: She has never smoked.  Non-drinker         Substance Abuse History:     Last Reviewed on 2018 03:57 PM by Santa Tobar        Mental Health History:     Last Reviewed on 2018 03:57 PM by Santa Tobar        Communicable Diseases (eg STDs):     Last Reviewed on 2018 03:57 PM by Santa Tobar            Allergies:     Last Reviewed on 6/15/2018 09:33 AM by Micaela Baca    Levemir:    Cipro:    Nitroglycerin:    Januvia: vomiting (Adverse Reaction)    Bydureon: nausea (Adverse Reaction)    Metformin HCl: vomiting (Adverse Reaction)    Codeine:    Aspirin:    Sumatriptan: dizziness (Adverse Reaction)        Current Medications:     Last Reviewed on 6/15/2018 09:33 AM by Micaela Baca    Lisinopril 2.5mg Tablet Take 1 tablet(s) by mouth daily     Atorvastatin Calcium 10mg Tablet 1 tab qhs     Trulicity PEN 0.75mg/0.5ml Injection Inject once weekly     Promethazine HCl 25mg Tablet 1/2 - 1 tab q 4-6 hrs prn   prn nausea and vomiting     Toujeo SoloStar 300units/1ml Injection 100 units bid     Betamethasone/Clotrimazole 0.05%/1% Cream Apply small amount to affected area bid  x 10 days     Topamax 100mg Tablet 1 tab BID     Omeprazole 40mg Capsules, Extended Release 1 tab BID     Singulair  10mg Tablet Take 1 tablet(s) by mouth each evening     Estradiol 0.5mg Tablet 1 tab daily PO     Fosamax 70mg Tablet Take 1 tablet(s) by mouth q week     Lancet   Lancet Onet touch Fine Point Lancets use wiht one touch glucometer, check blood sugars 4 times daily     Jardiance 25mg Tablet Take 1 tablet(s) by mouth  "qam     One Touch Ultra Blue Test Strips  Reagent Strips Test Blood Sugar QID     BD Ultra-Fine Original Pen Needle 29G x 1/2\"  Pen Needle Use as directed QID w/Lantus and Novalog Pen     Benzonatate 200mg Capsules 1 capsule tid     Insulin Syringes 0.5ml Syringe Use as directed     Fioricet 50mg/300mg/40mg Capsules One to 2 PO Q 4 hours. Max of 4 in 24 hr period.     Erythromycin Ophthalmic 0.5% Ophthalmic Ointment Apply 1/2 inch ribbon of ointment to affected eye(s) bid for 10 days     Symbicort 160/4.5 Oral Inhaler 2 puffs BID     Vitamin D3 5,000IU Capsules 1 capsule every day     Miconazole Nitrate 2% Vaginal Cream Apply externally bid x 7 days.     ProAir HFA 90mcg/1actuation Oral Inhaler PRN     allergy shots once a week     One Touch UltraMini Monitor Kit check blood sugar 6 times day     Aleve 220mg Tablet     [employer] Pen Needles 31G 8mm #100         OBJECTIVE:        Vitals:         Current: 6/15/2018 9:37:02 AM    Ht:  5 ft, 4.25 in;  Wt: 258.9 lbs;  BMI: 44.1    T: 98.7 F (oral);  BP: 135/77 mm Hg (left arm, sitting);  P: 79 bpm (left arm (BP Cuff), sitting);  sCr: 0.76 mg/dL;  GFR: 106.09        Exams:     PHYSICAL EXAM:     GENERAL: vital signs recorded - morbidly obese;  well groomed;  no apparent distress;     NECK: supple, no LAD/TM;     RESPIRATORY: normal respiratory rate and pattern with no distress; normal breath sounds with no rales, rhonchi, wheezes or rubs;     CARDIOVASCULAR: normal rate; rhythm is regular;     GASTROINTESTINAL: rectal exam: erythema around the buttocks;     MUSCULOSKELETAL: R SHOULDER-SLING IN PLACE;     NEUROLOGIC: mental status: oriented to person, place, and time;  GROSSLY INTACT     PSYCHIATRIC:  appropriate affect and demeanor; normal speech pattern; grossly normal memory;     Left foot exam    Protective sensation using Monofilament test: NORMAL sensation. Patient detects .07 grams of force which is considered normal.    Vascular status: normal peripheral vascular exam " with palpable dorsal pedal and posterior tibal pulses and brisk digital capillary refill    Skin is intact without sores or ulcers    Right foot exam    Protective sensation using Monofilament test: NORMAL sensation. Patient detects .07 grams of force which is considered normal.    Vascular status: normal peripheral vascular exam with palpable dorsal pedal and posterior tibal pulses and brisk digital capillary refill    Skin is intact without sores or ulcers         ASSESSMENT           719.41   M25.511  Shoulder pain              DDx:     250.00   E11.42  Type II diabetes requiring insulin              DDx:     V02.69   Z22.59  Other viral hepatitis carrier              DDx:         ORDERS:         Meds Prescribed:       Refill of: Toujeo SoloStar (Insulin Glargine (rDNA)) 300units/1ml Injection 100 units bid  #18 (Eighteen) prefilled pen Refills: 5       NovoLog FlexPen (Insulin Aspart (rDNA)) 100units/1ml Injection 10 units + 1 unit per carb count with meals  #5 (Five) syringe(s) Refills: 5         Lab Orders:       [id number]  AHEP4 - [facility] Hepatitis Panel (HAAb, HbcAB, HbsAG, Hep C antibody)  (Send-Out)         59195  DIAB2 - [facility] CMP A1C LIPID AND MICRO ALBUM CR RATIO: [id number]  (Send-Out)           Other Orders:       2028F  Foot examination performed (includes examination through visual inspection, sensory exam with monofi  (In-House)                   PLAN:          Shoulder pain cont shoulder sling will try to get her sugars under tighter control for healing          Type II diabetes requiring insulin INCREASE SSI TO INCLUDE CARB COUNTS     LABORATORY:  Labs ordered to be performed today include Diabetes Panel 2;CMP, A1C, Lipid, Microalbumin:Creatinine Ratio.            Prescriptions:       Refill of: Toujeo SoloStar (Insulin Glargine (rDNA)) 300units/1ml Injection 100 units bid  #18 (Eighteen) prefilled pen Refills: 5       NovoLog FlexPen (Insulin Aspart (rDNA)) 100units/1ml Injection 10  units + 1 unit per carb count with meals  #5 (Five) syringe(s) Refills: 5           Orders:       [id number]  DIAB2 - [facility] CMP A1C LIPID AND MICRO ALBUM CR RATIO: [id number]  (Send-Out)            Other viral hepatitis carrier     LABORATORY:  Labs ordered to be performed today include hepatitis panel.            Orders:       [id number]  AHEP4 - [facility] Hepatitis Panel (HAAb, HbcAB, HbsAG, Hep C antibody)  (Send-Out)               Other Orders:       2028F  Foot examination performed (includes examination through visual inspection, sensory exam with monofi  (In-House)           CHARGE CAPTURE           **Please note: ICD descriptions below are intended for billing purposes only and may not represent clinical diagnoses**        Primary Diagnosis:         719.41 Shoulder pain            M25.511    Pain in right shoulder              Orders:          11036   Office/outpatient visit; established patient, level 4  (In-House)           250.00 Type II diabetes requiring insulin            E11.42    Type 2 diabetes mellitus with diabetic polyneuropathy    V02.69 Other viral hepatitis carrier            Z22.59    Carrier of other viral hepatitis        Other Orders:           2028F   Foot examination performed (includes examination through visual inspection, sensory exam with monofi  (In-House)           ADDENDUMS:      ____________________________________    Addendum: 06/20/2018 01:16 PM - Eileen Barajas        V02.69 Other viral hepatitis carrier      Remvove:  Z22.59   Carrier of other viral hepatitis     Add: Z22.8 Carrier of other infectious disease.  KANDIS

## 2021-05-18 NOTE — PROGRESS NOTES
Noyr Rodriguez 1965     Office/Outpatient Visit    Visit Date:  10:55 am    Provider: Monika Baca N.P. (Assistant: Rimma Sanford MA)    Location: [facility]        Electronically signed by Monika Baca N.P. on  2018 12:59:14 PM                             SUBJECTIVE:        CC:     Cherelle is a 52 year old White female.  follow up on infection; due for shoulder surgery / duber 18         HPI:         Cherelle presents with unspecified skin lesion.  There is a solitary skin lesion of concern.  This was first noticed 9-10 days.  It is located in the vaginal or vulvar region.  This is described as painful.  Other symptoms noted include purulent drainage from boil still.  Past medical history is significant for culture was positive for MRSA/ sensitive to doxy.  she thinks the area is slightly better, has taken ATB, done warm compresses, but is wanting to know about going ahead with her shoulder surgery on Wednesday     ROS:     CONSTITUTIONAL:  Negative for fever.      CARDIOVASCULAR:  Negative for chest pain, palpitations, tachycardia, orthopnea, and edema.      MUSCULOSKELETAL:  Positive for right shoulder pain.          PMH/FMH/SH:     Last Reviewed on 2018 12:56 PM by Monika Baca    Past Medical History:         Hepatitis: type A;     Allergies    GERD    IDDM    hypercholesterolemia     Back pain     Breast lump     Cellulitis of the chest wall     Classic migraine     Congenital accessory skin tags     H/O Depression     GERD     chronic hoarseness     Hypertension     H/O Migraine headaches     Obstructive sleep apnea    Peripheral neuropathy     Post-menopausal    h/o Uterine polyp             GYNECOLOGICAL HISTORY:     miscarriage 1    Contraception: S/P tubal ligation;         CURRENT MEDICAL PROVIDERS:    Therapist, Shabnam Miguel         PREVENTIVE HEALTH MAINTENANCE             BONE DENSITY: was last done 17 with the  following abnormality noted-- Osteopenic at L1 in Lumbar Spine Only     COLORECTAL CANCER SCREENING: will schedule     MAMMOGRAM: Done within last 2 years and results in are chart was last done 17 with normal results     PAP SMEAR: was last done ZEESHAN BSO no longer needs PAPs         Surgical History:         Cholecystectomy    Hysterectomy: TAHBSO;      pericardial window ; Procedures: heart cath          Family History:     Father: Coronary Artery Disease; ;  Cerebrovascular Accident     Mother: Hyperlipidemia;  Endometrial Cancer     Brother(s): Type 2 Diabetes     Paternal Grandfather: Type 2 Diabetes     Paternal Grandmother:      Maternal Grandfather: Type 2 Diabetes     Maternal Grandmother: Breast Cancer;  Alzheimer's Disease         Social History:     Occupation: [employer]     Marital Status:      Children: 1 child and 2 step-children         Tobacco/Alcohol/Supplements:     Last Reviewed on 2018 10:58 AM by Rimma Sanford    Tobacco: She has never smoked.  Non-drinker             Immunizations:     zzFluzone pf-quadrivalent 3 and up 2015     zzFluzone pf-quadrivalent 3 and up 10/26/2016     zzFluzone pf-quadrivalent 3 and up 2017     Fluzone (3 + years dose) 2009     Fluzone (3 + years dose) 10/27/2012     Fluzone pf (3+ years dose) 10/14/2013     Fluzone pf (3+ years dose) 10/7/2014     PNEUMOVAX 23 (Pneumococcal PPV23) 2016     Tdap (Tetanus, reduced diph, acellular pertussis) 2015         Allergies:     Last Reviewed on 2018 10:58 AM by Rimma Sanford    Levemir:    Cipro:    Nitroglycerin:    Januvia: vomiting (Adverse Reaction)    Bydureon: nausea (Adverse Reaction)    Metformin HCl: vomiting (Adverse Reaction)    Codeine:    Aspirin:    Sumatriptan: dizziness (Adverse Reaction)        Current Medications:     Last Reviewed on 2018 12:57 PM by Monika Baca    Betamethasone/Clotrimazole 0.05%/1% Cream Apply  "small amount to affected area bid  x 10 days     Topamax 100mg Tablet 1 tab BID     Omeprazole 40mg Capsules, Extended Release 1 tab BID     Singulair  10mg Tablet Take 1 tablet(s) by mouth each evening     Estradiol 0.5mg Tablet 1 tab daily PO     Lisinopril 2.5mg Tablet Take 1 tablet(s) by mouth daily     Atorvastatin Calcium 10mg Tablet 1 tab qhs     Fosamax 70mg Tablet Take 1 tablet(s) by mouth q week     Promethazine HCl 25mg Tablet 1/2 - 1 tab q 4-6 hrs prn   prn nausea and vomiting     Toujeo SoloStar 300units/1ml Injection 100 units bid     Lancet   Lancet Onet touch Fine Point Lancets use wiht one touch glucometer, check blood sugars 4 times daily     Jardiance 25mg Tablet Take 1 tablet(s) by mouth qam     One Touch Ultra Blue Test Strips  Reagent Strips Test Blood Sugar QID     BD Ultra-Fine Original Pen Needle 29G x 1/2\"  Pen Needle Use as directed QID w/Lantus and Novalog Pen     Benzonatate 200mg Capsules 1 capsule tid     Insulin Syringes 0.5ml Syringe Use as directed     Fioricet 50mg/300mg/40mg Capsules One to 2 PO Q 4 hours. Max of 4 in 24 hr period.     Erythromycin Ophthalmic 0.5% Ophthalmic Ointment Apply 1/2 inch ribbon of ointment to affected eye(s) bid for 10 days     Symbicort 160/4.5 Oral Inhaler 2 puffs BID     Doxycycline Monohydrate 100mg Tablet Take 1 tablet(s) by mouth bid     Miconazole Nitrate 2% Vaginal Cream Apply externally bid x 7 days.     Meloxicam 15mg Tablet 1/2 to 1 prn pain to be take with food     allergy shots once a week     One Touch UltraMini Monitor Kit check blood sugar 6 times day     Aleve 220mg Tablet     [employer] Pen Needles 31G 8mm #100     ProAir HFA 90mcg/1actuation Oral Inhaler PRN         OBJECTIVE:        Vitals:         Current: 4/23/2018 10:57:19 AM    Ht:  5 ft, 4.25 in;  Wt: 258 lbs;  BMI: 43.9    T: 97.4 F (oral);  BP: 146/87 mm Hg (right arm, sitting);  P: 81 bpm (right arm (BP Cuff), sitting);  sCr: 0.76 mg/dL;  GFR: 105.93        Exams:     PHYSICAL " EXAM:     GENERAL:  well developed and nourished; appropriately groomed; in no apparent distress;     GENITOURINARY:; boil on right side below her labia, slightly indurated, scant yellow  exudate, non tender;     PSYCHIATRIC:  appropriate affect and demeanor; normal speech pattern; grossly normal memory;         ASSESSMENT           616.4   N76.89  Carbuncle of vulva              DDx:         ORDERS:         Meds Prescribed:       Bactrim DS (Trimethoprim/Sulfamethoxazole ) Tablet Take 1 tablet(s) by mouth q12h for 10 days  #20 (Twenty) tablet(s) Refills: 0                 PLAN:          Carbuncle of vulva reviewed wound culture; continue doxy, keep follow up with Antony later today, hold off on surgery until follow up with Dr. Barajas (consulted with Dr Barajas)         FOLLOW-UP: Schedule follow-up appointment in 2 days..   for with Dr. Barajas           Prescriptions:       Bactrim DS (Trimethoprim/Sulfamethoxazole ) Tablet Take 1 tablet(s) by mouth q12h for 10 days  #20 (Twenty) tablet(s) Refills: 0             CHARGE CAPTURE           **Please note: ICD descriptions below are intended for billing purposes only and may not represent clinical diagnoses**        Primary Diagnosis:         616.4 Carbuncle of vulva            N76.89    Other specified inflammation of vagina and vulva              Orders:          56922   Office/outpatient visit; established patient, level 2  (In-House)

## 2021-05-18 NOTE — PROGRESS NOTES
Nory RodriguezShawna 1965     Office/Outpatient Visit    Visit Date: Wed, Mar 6, 2019 08:33 am    Provider: Eileen Barajas MD (Assistant: Jenny White MA)    Location: [facility]        Electronically signed by Eileen Barajas MD on  03/07/2019 09:31:49 AM                             SUBJECTIVE:        CC:     Cherelle is a 53 year old White female.  This is a follow-up visit.  NIDDM (SLIDING SCALE NOVOLOG GIVEN 24/20/24 UNITS AND TOUJEO IS 1000 UNITS BID, AND TRULICITY IS 1.75 WEEKLY;         HPI:         Patient presents with type II diabetes requiring insulin.  Cherelle has type 1 diabetes without complications.  Compliance with treatment has been fair; she does not follow a diet and exercise regimen.  Patient's diabetes was first diagnosed >5 years ago.  Current meds include insulin ( (SLIDING SCALE NOVOLOG GIVEN 24/20/24 UNITS AND TOUJEO IS 1000 UNITS BID, AND TRULICITY IS 1.75 WEEKLY ).  She reports home blood glucose readings have been high, with average fasting readings in the mid-200s mg/dL range. She checks her glucose 1 to 2 times daily.  Most recent lab results include.  Hemoglobin A1c:  11.6 (%) (12/03/2018), HDL:  41 (mg/dL) (12/03/2018), LDL:  101 (mg/dL) (12/03/2018), Triglycerides:  213 (mg/dL) (12/03/2018), Microalbuminuria:  67.9 (mg/g creat) (12/03/2018) In regard to preventative care, Dr Sol Feb 2019.  HER INSURANCE WONT PAY FOR HER TO GET THE 24 HOUR METER         With regard to the hypercholesterolemia, date of diagnosis 2010.  Current treatment includes Lipitor and a low cholesterol/low fat diet.  Compliance with treatment has been good; she maintains her low cholesterol diet.  She denies experiencing any hypercholesterolemia related symptoms.          In regard to the hypertension, this was first diagnosed one year ago.  Her current cardiac medication regimen includes an ACE inhibitor ( lisinopril 2.5 mg for her diabetes ).  She is tolerating the medication well without side  effects.  Compliance with treatment has been good; she takes her medication as directed, maintains her diet and exercise regimen, and follows up as directed.      chronic esophagitis with polyps, she is omeprazole/carafate, last  EGD was July 2018 with Dr Zhang     Cherelle is s/p R shoulder surgery X 2, she is going back in for a frozen shoulder surgery next week with Dr Pretty.  She is in a lot of pain at this time and has sign decreased ROM     Cherelle is in with 8 weeks of sinus congestion and pain with purulent drainage and she has been treated twice for bronchitis with sinusitis, she has been on antibiotics and a small dose of steroids 10 mg  X 5 days and 5 mg X 5 days, and a zpac, she is SOA with dyspnea and having to use her inhaler a lot more often.  No fevers .     ROS:     CONSTITUTIONAL:  Negative for chills, fatigue, fever, and weight change.      EYES:  Negative for eye drainage.      E/N/T:  Positive for frequent rhinorrhea and hoarseness.   Negative for ear pain, nasal congestion, sinus pressure or sore throat.      CARDIOVASCULAR:  Negative for chest pain, palpitations, tachycardia, orthopnea, and edema.      RESPIRATORY:  Negative for cough, dyspnea, and hemoptysis.      GASTROINTESTINAL:  Negative for abdominal pain, heartburn, constipation, diarrhea, and stool changes.      MUSCULOSKELETAL:  Positive for joint stiffness (shoulder).      NEUROLOGICAL:  Positive for headaches.   Negative for dizziness or weakness.      PSYCHIATRIC:  Positive for sleep disturbance.   Negative for anxiety or suicidal thoughts.          PMH/FMH/SH:     Last Reviewed on 2/28/2019 09:43 PM by Santa Tobar    Past Medical History:                 PAST MEDICAL HISTORY         Hepatitis: type A;     Allergies    GERD    IDDM    hypercholesterolemia     Back pain     Breast lump     Cellulitis of the chest wall     Classic migraine     Congenital accessory skin tags     H/O Depression     GERD     chronic hoarseness      Hypertension     H/O Migraine headaches     Obstructive sleep apnea    Peripheral neuropathy     Post-menopausal    h/o Uterine polyp             GYNECOLOGICAL HISTORY:     miscarriage 1    Contraception: S/P tubal ligation;         CURRENT MEDICAL PROVIDERS:    Allergist: Dr. Delgadillo    Ophthalmologist: Ebenezer    Orthopedist: Antony    Therapist, Shabnam Miguel Endocrinologist         PREVENTIVE HEALTH MAINTENANCE             BONE DENSITY: was last done 17 with the following abnormality noted-- Osteopenic at L1 in Lumbar Spine Only     COLORECTAL CANCER SCREENING: Up to date (colonoscopy q10y; sigmoidoscopy q5y; Cologuard q3y) was last done 18, Results are in chart     MAMMOGRAM: Done within last 2 years and results in are chart was last done 12-3-18 with normal results     PAP SMEAR: was last done ZEESHAN BSO no longer needs PAPs         Surgical History:         Cholecystectomy    Hysterectomy: TAHBSO;     cataracts removed b 2018      pericardial window ; Procedures: heart cath          Family History:     Father: Coronary Artery Disease; ;  Cerebrovascular Accident     Mother: Hyperlipidemia;  Endometrial Cancer     Brother(s): Type 2 Diabetes     Paternal Grandfather: Type 2 Diabetes     Paternal Grandmother:      Maternal Grandfather: Type 2 Diabetes     Maternal Grandmother: Breast Cancer;  Alzheimer's Disease         Social History:     Occupation: [employer] dept     Marital Status:      Children: 1 child and 2 step-children         Tobacco/Alcohol/Supplements:     Last Reviewed on 2019 09:43 PM by Santa Tobar    Tobacco: She has never smoked.  Non-drinker         Substance Abuse History:     Last Reviewed on 2019 09:43 PM by Santa Tobar        Mental Health History:     Last Reviewed on 2019 09:43 PM by Santa Tobar        Communicable Diseases (eg STDs):     Last Reviewed on 2019 09:43 PM by Santa Tobar             "Allergies:     Last Reviewed on 2/28/2019 09:43 PM by Santa Tobar    Levemir:    Cipro:    Nitroglycerin:    Januvia: vomiting (Adverse Reaction)    Bydureon: nausea (Adverse Reaction)    Metformin HCl: vomiting (Adverse Reaction)    Codeine:    Aspirin:    Sumatriptan: dizziness (Adverse Reaction)        Current Medications:     Last Reviewed on 2/28/2019 09:43 PM by Santa Tobar    Lisinopril 2.5mg Tablet Take 1 tablet(s) by mouth daily     Betamethasone/Clotrimazole 0.05%/1% Cream Apply small amount to affected area bid  x 10 days     Meloxicam 15mg Tablet 1/2 to 1 prn pain to be take with food     Topamax 100mg Tablet 1 tab BID     Singulair  10mg Tablet Take 1 tablet(s) by mouth each evening     Atorvastatin Calcium 10mg Tablet 1 tab qhs     Promethazine HCl 25mg Tablet 1/2 - 1 tab q 4-6 hrs prn   prn nausea and vomiting     Humalog KwikPen 100units/1ml Pen System, Disposable Sliding scale as directed     Toujeo SoloStar 300units/1ml Injection 100 units bid     Trulicity PEN 0.75mg/0.5ml Injection Inject once weekly     Estradiol 0.5mg Tablet 1 tab daily PO     Omeprazole 40mg Capsules, Extended Release 1 tab BID     Fosamax 70mg Tablet Take 1 tablet(s) by mouth q week     Lancet   Lancet Onet touch Fine Point Lancets use wiht one touch glucometer, check blood sugars 4 times daily     One Touch Ultra Blue Test Strips  Reagent Strips Test Blood Sugar QID     BD Ultra-Fine Original Pen Needle 29G x 1/2\"  Pen Needle Use as directed QID w/Lantus and Novalog Pen     Benzonatate 200mg Capsules 1 capsule tid     Insulin Syringes 0.5ml Syringe Use as directed     Fioricet 50mg/300mg/40mg Capsules One to 2 PO Q 4 hours. Max of 4 in 24 hr period.     Zyrtec 10mg Tablet 1 bid     Albuterol 0.083% Nebulizer Solution 1 vial q 4 hours prn     Azithromycin 250mg Tablet 2 po today, 1 po x 4 days     Guaifenesin 200mg Tablet take 2 tablets twice daily     Prednisone 10mg Tablet one tablet daily x 5 days then 1/2 " tablet daily x 2 days     Tessalon Perles 100mg Capsules Take 1 capsule PO TID PRN for cough     Acyclovir 400mg Tablet 1 p.o. tid x 7 days     Vitamin D3 5,000IU Capsules 1 capsule 4 days a week and 2 capsules 3 days a week     Diclofenac Sodium 75mg Tablets, Enteric Coated 1 tab bid with food     Diltiazem HCl 120mg Capsules, Extended Release 1 tab daily     ProAir HFA 90mcg/1actuation Oral Inhaler PRN     allergy shots once a week     One Touch UltraMini Monitor Kit check blood sugar 6 times day     Aleve 220mg Tablet     [employer] Pen Needles 31G 8mm #100         OBJECTIVE:        Vitals:         Current: 3/6/2019 8:43:49 AM    Ht:  5 ft, 4.25 in;  Wt: 263.6 lbs;  BMI: 44.9    T: 97.5 F (oral);  BP: 143/81 mm Hg (right arm, sitting);  P: 78 bpm (right arm (BP Cuff), sitting);  sCr: 0.83 mg/dL;  GFR: 96.78    O2 Sat: 97 % (room air)        Exams:     PHYSICAL EXAM:     GENERAL: vital signs recorded - morbidly obese;  well groomed;  no apparent distress;     EYES: PERRL, EOMI     E/N/T: EARS:  normal external auditory canals and tympanic membranes;  grossly normal hearing; NOSE: nasal mucosa is edematous, erythematous, and partially obscured by purulent drainage;  turbinates are mildly swollen bilaterally;  bilateral maxillary and bilateral frontal sinus tenderness present; OROPHARYNX:  normal mucosa, dentition, gingiva, and posterior pharynx;     NECK: supple, no LAD/TM;     RESPIRATORY: normal respiratory rate and pattern with no distress; normal breath sounds with no rales, rhonchi, wheezes or rubs;     CARDIOVASCULAR: normal rate; rhythm is regular;     GASTROINTESTINAL: nontender, nondistended; no hepatosplenomegaly or masses; no bruits;     MUSCULOSKELETAL: normal gait;     NEUROLOGICAL:  cranial nerves, motor and sensory function, reflexes, gait and coordination are all intact;     PSYCHIATRIC:  appropriate affect and demeanor; normal speech pattern; grossly normal memory;         Procedures:     Allergic  rhinitis     exp 8/14/19/ pdr             ASSESSMENT           Shoulder pain    719.41   M25.511  Shoulder pain              DDx:     250.00   E11.42  Type II diabetes requiring insulin              DDx:     272.0   E78.00  Hypercholesterolemia              DDx:     401.1   I10  Hypertension              DDx:     356.9   G60.9  Peripheral neuropathy              DDx:     530.81   K21.9  GERD              DDx:     477.9   J30.9  Allergic rhinitis              DDx:     461.1   J01.10  Acute frontal sinusitis              DDx:     268.9   E55.9  Vitamin D deficiency, unspecified              DDx:         ORDERS:         Meds Prescribed:       Fluticasone Propionate 50mcg/1actuation Nasal Spray 2 spray(s) in each nostril daily  #16 (Sixteen) gm Refills: 0       Augmentin (Amoxicillin/Clavulanate) 875mg/125mg Tablet 1 TAB BID  #28 (Twenty Eight) tablet(s) Refills: 0                 PLAN:          Shoulder pain cont PT, she is to f/u Dr Pretty          Type II diabetes requiring insulin she is seeing Mago Silver with endocrinology for diabetes, her meds have been changed (SLIDING SCALE NOVOLOG GIVEN 24/20/24 UNITS AND TOUJEO IS 1000 UNITS BID, AND TRULICITY IS 1.75 WEEKLY,  she is on ace and statin, SHE GETS HER FOOT EXAM WITH ENDOCRINOLOGY AND IS WEARING DIABETIC SHOES s/p cataract surgery for cataracts, eye exam UTD, foot exam noted above meds refilled prn          Hypercholesterolemia stable on meds          Hypertension borderline due to pain          Peripheral neuropathy cont diabetic shoes          GERD stable on carafate and omeprazole          Allergic rhinitis ADD FLONASE NS          Acute frontal sinusitis           Prescriptions:       Fluticasone Propionate 50mcg/1actuation Nasal Spray 2 spray(s) in each nostril daily  #16 (Sixteen) gm Refills: 0       Augmentin (Amoxicillin/Clavulanate) 875mg/125mg Tablet 1 TAB BID  #28 (Twenty Eight) tablet(s) Refills: 0          Vitamin D deficiency, unspecified STABLE  ON 5000 UNITS A DAY             CHARGE CAPTURE           **Please note: ICD descriptions below are intended for billing purposes only and may not represent clinical diagnoses**        Primary Diagnosis:         Shoulder pain    719.41 Shoulder pain            M25.511    Pain in right shoulder              Orders:          52554   Office/outpatient visit; established patient, level 4  (In-House)           250.00 Type II diabetes requiring insulin            E11.42    Type 2 diabetes mellitus with diabetic polyneuropathy    272.0 Hypercholesterolemia            E78.00    Pure hypercholesterolemia, unspecified    401.1 Hypertension            I10    Essential (primary) hypertension    356.9 Peripheral neuropathy            G60.9    Hereditary and idiopathic neuropathy, unspecified    530.81 GERD            K21.9    Gastro-esophageal reflux disease without esophagitis    477.9 Allergic rhinitis            J30.9    Allergic rhinitis, unspecified    461.1 Acute frontal sinusitis            J01.10    Acute frontal sinusitis, unspecified    268.9 Vitamin D deficiency, unspecified            E55.9    Vitamin D deficiency, unspecified

## 2021-05-18 NOTE — PROGRESS NOTES
Nory Rodriguez 1965     Office/Outpatient Visit    Visit Date: Wed, Dec 19, 2018 05:57 pm    Provider: Margie Steele N.P. (Assistant: Kerri Underwood RN)    Location: [facility]        Electronically signed by Margie Steele N.P. on  2018 06:16:12 PM                             SUBJECTIVE:        CC:     Cherelle is a 53 year old White female.  sinus congestion and pressure;         HPI:         Patient complains of upper respiratory illness.  These have been present for the past one week.  The symptoms include productive cough, nasal congestion, sinus pain/pressure and hoarseness.  She denies fever.  She has already tried to relieve the symptoms with mixed cold/sinus preparations.      ROS:     CONSTITUTIONAL:  Negative for chills and fever.      EYES:  Negative for eye drainage and eye pain.      E/N/T:  Positive for nasal congestion, hoarseness, sinus pressure and sore throat.   Negative for ear pain.      CARDIOVASCULAR:  Negative for chest pain and palpitations.      RESPIRATORY:  Negative for dyspnea and frequent wheezing.          PM/[facility]/:     Last Reviewed on 2018 09:34 AM by Eileen Barajas    Past Medical History:                 PAST MEDICAL HISTORY         Hepatitis: type A;     Allergies    GERD    IDDM    hypercholesterolemia     Back pain     Breast lump     Cellulitis of the chest wall     Classic migraine     Congenital accessory skin tags     H/O Depression     GERD     chronic hoarseness     Hypertension     H/O Migraine headaches     Obstructive sleep apnea    Peripheral neuropathy     Post-menopausal    h/o Uterine polyp             GYNECOLOGICAL HISTORY:     miscarriage 1    Contraception: S/P tubal ligation;         CURRENT MEDICAL PROVIDERS:    Allergist: Dr. Delgadillo    Ophthalmologist: Ebenezer    Orthopedist: Antony Ewing, Shabnam Miguel Endocrinologist         PREVENTIVE HEALTH MAINTENANCE             BONE DENSITY: was last done 17 with the  following abnormality noted-- Osteopenic at L1 in Lumbar Spine Only     COLORECTAL CANCER SCREENING: Up to date (colonoscopy q10y; sigmoidoscopy q5y; Cologuard q3y) was last done 18, Results are in chart     MAMMOGRAM: Done within last 2 years and results in are chart was last done 12-3-18 with normal results     PAP SMEAR: was last done ZEESHAN BSO no longer needs PAPs         Surgical History:         Cholecystectomy    Hysterectomy: TAHBSO;     cataracts removed b 2018      pericardial window ; Procedures: heart cath          Family History:     Father: Coronary Artery Disease; ;  Cerebrovascular Accident     Mother: Hyperlipidemia;  Endometrial Cancer     Brother(s): Type 2 Diabetes     Paternal Grandfather: Type 2 Diabetes     Paternal Grandmother:      Maternal Grandfather: Type 2 Diabetes     Maternal Grandmother: Breast Cancer;  Alzheimer's Disease         Social History:     Occupation: [employer] de[employer]     Marital Status:      Children: 1 child and 2 step-children         Tobacco/Alcohol/Supplements:     Last Reviewed on 2018 09:34 AM by Eileen Barajas    Tobacco: She has never smoked.  Non-drinker         Substance Abuse History:     Last Reviewed on 2018 12:06 PM by Nory Sauceda        Mental Health History:     Last Reviewed on 2018 12:06 PM by Nory Sauceda        Communicable Diseases (eg STDs):     Last Reviewed on 2018 12:06 PM by Nory Sauceda            Current Problems:     Last Reviewed on 2018 12:06 PM by Nory Sauceda    Near-syncope     Obesity, NOS     Low back pain     Osteopenia     Malaise and fatigue, NEC     Other viral hepatitis carrier     Screening for cancer of colon     History of noncompliance with medical treatment     Overweight     GERD     Common migraine     Peripheral neuropathy     Pressure ulcer stage I     Morbid obesity     Chronic insomnia     Degenerative disc disease      Nephrolithiasis     Allergic rhinitis     Congenital accessory skin tags     Post-menopausal HRT     Type II diabetes requiring insulin     Obstructive sleep apnea     Uterine polyp     Hypercholesterolemia     Hypertension     Depression     GE reflux disease     Candidal vulvovaginitis     Screening for depression     Shoulder pain     Carbuncle of vulva     MRSA wound         Immunizations:     zzFluzone pf-quadrivalent 3 and up 9/29/2015     zzFluzone pf-quadrivalent 3 and up 10/26/2016     zzFluzone pf-quadrivalent 3 and up 9/25/2017     Fluzone (3 + years dose) 9/14/2009     Fluzone (3 + years dose) 10/27/2012     Fluzone pf (3+ years dose) 10/14/2013     Fluzone pf (3+ years dose) 10/7/2014     Fluzone Quadrivalent (3+ years) 9/17/2018     PNEUMOVAX 23 (Pneumococcal PPV23) 11/29/2016     Tdap (Tetanus, reduced diph, acellular pertussis) 9/29/2015         Allergies:     Last Reviewed on 12/19/2018 05:59 PM by Kerri Underwood    Levemir:    Cipro:    Nitroglycerin:    Januvia: vomiting (Adverse Reaction)    Bydureon: nausea (Adverse Reaction)    Metformin HCl: vomiting (Adverse Reaction)    Codeine:    Aspirin:    Sumatriptan: dizziness (Adverse Reaction)        Current Medications:     Last Reviewed on 12/19/2018 06:00 PM by Kerri Underwood    Humalog KwikPen 100units/1ml Pen System, Disposable Sliding scale as directed     Toujeo SoloStar 300units/1ml Injection 100 units bid     Trulicity PEN 0.75mg/0.5ml Injection Inject once weekly     Estradiol 0.5mg Tablet 1 tab daily PO     Omeprazole 40mg Capsules, Extended Release 1 tab BID     Lisinopril 2.5mg Tablet Take 1 tablet(s) by mouth daily     Singulair  10mg Tablet Take 1 tablet(s) by mouth each evening     Topamax 100mg Tablet 1 tab BID     Atorvastatin Calcium 10mg Tablet 1 tab qhs     Fosamax 70mg Tablet Take 1 tablet(s) by mouth q week     Betamethasone/Clotrimazole 0.05%/1% Cream Apply small amount to affected area bid  x 10 days     Meloxicam 15mg  "Tablet 1/2 to 1 prn pain to be take with food     Promethazine HCl 25mg Tablet 1/2 - 1 tab q 4-6 hrs prn   prn nausea and vomiting     Lancet   Lancet Onet touch Fine Point Lancets use wiht one touch glucometer, check blood sugars 4 times daily     One Touch Ultra Blue Test Strips  Reagent Strips Test Blood Sugar QID     BD Ultra-Fine Original Pen Needle 29G x 1/2\"  Pen Needle Use as directed QID w/Lantus and Novalog Pen     Benzonatate 200mg Capsules 1 capsule tid     Insulin Syringes 0.5ml Syringe Use as directed     Fioricet 50mg/300mg/40mg Capsules One to 2 PO Q 4 hours. Max of 4 in 24 hr period.     Zyrtec 10mg Tablet 1 bid     Nystatin 100,000U/1gm Cream apply 2-3 times a day     Vitamin D3 5,000IU Capsules 1 capsule 4 days a week and 2 capsules 3 days a week     Diclofenac Sodium 75mg Tablets, Enteric Coated 1 tab bid with food     Diltiazem HCl 120mg Capsules, Extended Release 1 tab daily     ProAir HFA 90mcg/1actuation Oral Inhaler PRN     allergy shots once a week     One Touch UltraMini Monitor Kit check blood sugar 6 times day     Aleve 220mg Tablet     [employer] Pen Needles 31G 8mm #100         OBJECTIVE:        Vitals:         Current: 12/19/2018 6:05:32 PM    Ht:  5 ft, 4.25 in;  Wt: 260 lbs;  BMI: 44.3    T: 97.6 F (oral);  BP: 124/56 mm Hg (left arm, sitting);  P: 79 bpm (left arm (BP Cuff), sitting);  sCr: 0.83 mg/dL;  GFR: 96.22        Exams:     PHYSICAL EXAM:     GENERAL: well developed, well nourished;  no apparent distress;     EYES: PERRL, EOMI     E/N/T: EARS: external auditory canal normal;  both TMs are dull;  NOSE: normal turbinates; bilateral maxillary and bilateral frontal sinus tenderness present; OROPHARYNX: oral mucosa is normal; posterior pharynx shows no exudate and post nasal drip;     NECK: range of motion is normal; trachea is midline;     RESPIRATORY: normal respiratory rate and pattern with no distress; normal breath sounds with no rales, rhonchi, wheezes or rubs;     " CARDIOVASCULAR: normal rate; rhythm is regular;     MUSCULOSKELETAL: normal gait;     NEUROLOGIC: mental status: alert and oriented x 3; GROSSLY INTACT     PSYCHIATRIC: appropriate affect and demeanor;         ASSESSMENT           461.8   J01.90  Acute sinusitis, other              DDx:         ORDERS:         Meds Prescribed:       Doxycycline Monohydrate 100mg Capsules Take 1 capsule(s) by mouth bid x10 days  #20 (Twenty) capsule(s) Refills: 0                 PLAN:          Acute sinusitis, other         RECOMMENDATIONS given include: Push Fluids, Rest, Follow up if no improvement or worsening symptoms like high fevers, vomiting, weakness, or increasing shortness of air.     and Voice rest. Sinus sinses. Salt water gargles..      FOLLOW-UP: Schedule follow-up appointments on a p.r.n. basis. Chronic visit follow up           Prescriptions:       Doxycycline Monohydrate 100mg Capsules Take 1 capsule(s) by mouth bid x10 days  #20 (Twenty) capsule(s) Refills: 0             Patient Recommendations:        For  Acute sinusitis, other:     Schedule follow-up appointments as needed.              CHARGE CAPTURE           **Please note: ICD descriptions below are intended for billing purposes only and may not represent clinical diagnoses**        Primary Diagnosis:         461.8 Acute sinusitis, other            J01.90    Acute sinusitis, unspecified              Orders:          [id number]   Office/outpatient visit; established patient, level 3  (In-House)

## 2021-05-18 NOTE — PROGRESS NOTES
Nory RodriguezShawna 1965     Office/Outpatient Visit    Visit Date: Mon, Jun 24, 2019 08:31 am    Provider: Eileen Barajas MD (Assistant: Jenny White MA)    Location: [facility]        Electronically signed by Eileen Barajas MD on  06/26/2019 08:51:25 AM                             SUBJECTIVE:        CC:     Cherelle is a 54 year old White female.  This is a follow-up visit.  NIDDM diabetes follow up;         HPI:         Cherelle presents with type II diabetes requiring insulin.  Cherelle has type 1 diabetes without complications.  Compliance with treatment has been fair; she does not follow a diet and exercise regimen.  Patient's diabetes was first diagnosed >5 years ago.  Current meds include insulin ( (SLIDING SCALE NOVOLOG GIVEN 24/20/24 UNITS AND TOUJEO  UNITS BID, AND TRULICITY IS 1.5 WEEKLY ).  She reports home blood glucose readings have been high, with average fasting readings in the mid-200s mg/dL range. She checks her glucose 1 to 2 times daily.  Most recent lab results include.  Hemoglobin A1c:  11.6 (%) (12/03/2018), LDL:  101 (mg/dL) (12/03/2018), HDL:  41 (mg/dL) (12/03/2018), Triglycerides:  213 (mg/dL) (12/03/2018), Microalbuminuria:  67.9 (mg/g creat) (12/03/2018) In regard to preventative care, Dr Sol Feb 2019.          With regard to the hypercholesterolemia, date of diagnosis 2010.  Current treatment includes Lipitor and a low cholesterol/low fat diet.  Compliance with treatment has been good; she maintains her low cholesterol diet.  She denies experiencing any hypercholesterolemia related symptoms.          Hypertension details; this was first diagnosed one year ago.  Her current cardiac medication regimen includes an ACE inhibitor ( lisinopril 2.5 mg for her diabetes ).  She is tolerating the medication well without side effects.  Compliance with treatment has been good; she takes her medication as directed, maintains her diet and exercise regimen, and follows up as  directed.      Cherelle is s/p R shoulder surgery X 2, she is going back in for a frozen shoulder surgery next week with Dr Pretty.  She is in a lot of pain at this time and has sign decreased ROM     ROS:     CONSTITUTIONAL:  Negative for chills, fatigue, fever, and weight change.      CARDIOVASCULAR:  Negative for chest pain, palpitations, tachycardia, orthopnea, and edema.      RESPIRATORY:  Negative for cough, dyspnea, and hemoptysis.      GASTROINTESTINAL:  Negative for abdominal pain, heartburn, constipation, diarrhea, and stool changes.      MUSCULOSKELETAL:  Positive for joint stiffness (shoulder).      NEUROLOGICAL:  Positive for headaches.   Negative for dizziness or weakness.      PSYCHIATRIC:  Positive for sleep disturbance.   Negative for anxiety or suicidal thoughts.          PMH/FMH/SH:     Last Reviewed on 2019 09:43 PM by Santa Tobar    Past Medical History:                 PAST MEDICAL HISTORY         Hepatitis: type A;     Allergies    GERD    IDDM    hypercholesterolemia     Back pain     Breast lump     Cellulitis of the chest wall     Classic migraine     Congenital accessory skin tags     H/O Depression     GERD     chronic hoarseness     Hypertension     H/O Migraine headaches     Obstructive sleep apnea    Peripheral neuropathy     Post-menopausal    h/o Uterine polyp             GYNECOLOGICAL HISTORY:     miscarriage 1    Contraception: S/P tubal ligation;         CURRENT MEDICAL PROVIDERS:    Allergist: Dr. Delgadillo    Ophthalmologist: Ebenezer    Orthopedist: Antony    Therapist, Shabnam Miguel Endocrinologist         PREVENTIVE HEALTH MAINTENANCE             BONE DENSITY: was last done 17 with the following abnormality noted-- Osteopenic at L1 in Lumbar Spine Only     COLORECTAL CANCER SCREENING: Up to date (colonoscopy q10y; sigmoidoscopy q5y; Cologuard q3y) was last done 18, Results are in chart     EYE EXAM: was last done 2019     MAMMOGRAM: Done within last 2  years and results in are chart was last done 12-3-18 with normal results     PAP SMEAR: was last done ZEESHAN BSO no longer needs PAPs         Surgical History:         Cholecystectomy    Hysterectomy: TAHBSO;     cataracts removed b 2018      pericardial window ; Procedures: heart cath          Family History:     Father: Coronary Artery Disease; ;  Cerebrovascular Accident     Mother: Hyperlipidemia;  Endometrial Cancer     Brother(s): Type 2 Diabetes     Paternal Grandfather: Type 2 Diabetes     Paternal Grandmother:      Maternal Grandfather: Type 2 Diabetes     Maternal Grandmother: Breast Cancer;  Alzheimer's Disease         Social History:     Occupation: [employer] dept     Marital Status:      Children: 1 child and 2 step-children         Tobacco/Alcohol/Supplements:     Last Reviewed on 3/06/2019 08:37 AM by Jenny White    Tobacco: She has never smoked.  Non-drinker         Substance Abuse History:     Last Reviewed on 2019 09:43 PM by Santa Tobar        Mental Health History:     Last Reviewed on 2019 09:43 PM by Santa Tobar        Communicable Diseases (eg STDs):     Last Reviewed on 2019 09:43 PM by Santa Tobar            Allergies:     Last Reviewed on 3/06/2019 08:37 AM by Jenny White    Levemir:    Cipro:    Nitroglycerin:    Januvia: vomiting (Adverse Reaction)    Bydureon: nausea (Adverse Reaction)    Metformin HCl: vomiting (Adverse Reaction)    Codeine:    Aspirin:    Sumatriptan: dizziness (Adverse Reaction)        Current Medications:     Last Reviewed on 3/06/2019 08:41 AM by Jenny White    Meloxicam 15mg Tablet 1/2 to 1 prn pain to be take with food     Lisinopril 2.5mg Tablet Take 1 tablet(s) by mouth daily     Singulair  10mg Tablet Take 1 tablet(s) by mouth each evening     Topamax 100mg Tablet 1 tab BID     Betamethasone/Clotrimazole 0.05%/1% Cream Apply small amount to affected area bid  x 10 days     " Atorvastatin Calcium 10mg Tablet 1 tab qhs     Promethazine HCl 25mg Tablet 1/2 - 1 tab q 4-6 hrs prn   prn nausea and vomiting     Humalog KwikPen 100units/1ml Pen System, Disposable Sliding scale as directed     Toujeo SoloStar 300units/1ml Injection 100 units bid     Trulicity PEN 0.75mg/0.5ml Injection Inject once weekly     Estradiol 0.5mg Tablet 1 tab daily PO     Omeprazole 40mg Capsules, Extended Release 1 tab BID     Fosamax 70mg Tablet Take 1 tablet(s) by mouth q week     Lancet   Lancet Onet touch Fine Point Lancets use wiht one touch glucometer, check blood sugars 4 times daily     One Touch Ultra Blue Test Strips  Reagent Strips Test Blood Sugar QID     BD Ultra-Fine Original Pen Needle 29G x 1/2\"  Pen Needle Use as directed QID w/Lantus and Novalog Pen     Insulin Syringes 0.5ml Syringe Use as directed     Zyrtec 10mg Tablet 1 bid     Vitamin D3 5,000IU Capsules 1 capsule 4 days a week and 2 capsules 3 days a week     Diclofenac Sodium 75mg Tablets, Enteric Coated 1 tab bid with food     Diltiazem HCl 120mg Capsules, Extended Release 1 tab daily     ProAir HFA 90mcg/1actuation Oral Inhaler PRN     allergy shots once a week     One Touch UltraMini Monitor Kit check blood sugar 6 times day     Aleve 220mg Tablet     [employer] Pen Needles 31G 8mm #100         OBJECTIVE:        Vitals:         Current: 6/24/2019 8:44:50 AM    Ht:  5 ft, 4.25 in;  Wt: 263.6 lbs;  BMI: 44.9    T: 98.2 F (oral);  BP: 121/62 mm Hg (right arm, sitting);  P: 71 bpm (right arm (BP Cuff), sitting);  sCr: 0.83 mg/dL;  GFR: 95.67        Exams:     PHYSICAL EXAM:     GENERAL: vital signs recorded - morbidly obese;  well groomed;  no apparent distress;     EYES: PERRL, EOMI     E/N/T: EARS:  normal external auditory canals and tympanic membranes;  grossly normal hearing; OROPHARYNX:  normal mucosa, dentition, gingiva, and posterior pharynx;     NECK: supple, FROM, no LAD/TM;     RESPIRATORY: normal respiratory rate and pattern with no " distress; normal breath sounds with no rales, rhonchi, wheezes or rubs;     CARDIOVASCULAR: normal rate; rhythm is regular;     GASTROINTESTINAL: nontender, nondistended; no hepatosplenomegaly or masses; no bruits;     MUSCULOSKELETAL: normal gait;     NEUROLOGICAL:  cranial nerves, motor and sensory function, reflexes, gait and coordination are all intact;     PSYCHIATRIC:  appropriate affect and demeanor; normal speech pattern; grossly normal memory;     Left foot exam    Protective sensation using Monofilament test: NORMAL sensation. Patient detects .07 grams of force which is considered normal.    Vascular status: normal peripheral vascular exam with palpable dorsal pedal and posterior tibal pulses and brisk digital capillary refill    Skin is intact without sores or ulcers    Right foot exam    Protective sensation using Monofilament test: Slightly decreased, with estimated force of 0.2 grams applied.    Vascular status: normal peripheral vascular exam with palpable dorsal pedal and posterior tibal pulses and brisk digital capillary refill    Skin is intact without sores or ulcers     L great toenail onychomycosis         Procedures:     Allergic rhinitis     Allergy Injection (2 or more) expires 04/25/2020./tls             ASSESSMENT           250.00   E11.42  Type II diabetes requiring insulin              DDx:     272.0   E78.00  Hypercholesterolemia              DDx:     401.1   I10  Hypertension              DDx:     356.9   G60.9  Peripheral neuropathy              DDx:     530.81   K21.9  GERD              DDx:     477.9   J30.9  Allergic rhinitis              DDx:     268.9   E55.9  Vitamin D deficiency, unspecified              DDx:     Shoulder pain    719.41   M25.511  Shoulder pain              DDx:     780.57   G47.30  Sleep apnea with sleep disturbance              DDx:     V07.4   Z79.890  Post-menopausal HRT              DDx:     278.02   E66.3  Overweight              DDx:     110.1   B37.2   Toe onychomycosis              DDx:         ORDERS:         Radiology/Test Orders:       16116  Professional services for allergen immunotherapy not including provision of allergenic extracts; two  (In-House)           Lab Orders:       32287  DIAB2 - [facility] CMP A1C LIPID AND MICRO ALBUM CR RATIO: [id number]  (Send-Out)           Procedures Ordered:       REFER  Referral to Specialist or Other Facility  (Send-Out)           Other Orders:       2028F  Foot examination performed (includes examination through visual inspection, sensory exam with monofi  (In-House)           Depression screen negative  (In-House)         1101F  Pt screen for fall risk; document no falls in past year or only 1 fall w/o injury in past year ([facility])  (In-House)           Negative EtOH screen  (In-House)                   PLAN:          Type II diabetes requiring insulin she is seeing endocrinology (Mago Holman) and her diabetes is still not well controlled, with hgba1c of 11 at her office,  she is going to start dexcom, and cont trulicity, toujeo 100 bid, and novolog  SSI 1 uint for hervery 2 carbs at all meds and 1 unit for everry 10 points >120.  Her SSI is about 30-40 units each time. Her eye exam is UTD, she wears diabetic shoes. s/p cataract surgery for cataracts, eye exam UTD, foot exam noted above meds refilled prn     LABORATORY:  Labs ordered to be performed today include Diabetes Panel 2;CMP, A1C, Lipid, Microalbumin:Creatinine Ratio.  MIPS Negative Depression Screen Negative alcohol screen           Orders:       65931  DIAB2 - [facility] CMP A1C LIPID AND MICRO ALBUM CR RATIO: [id number]  (Send-Out)           Depression screen negative  (In-House)         1101F  Pt screen for fall risk; document no falls in past year or only 1 fall w/o injury in past year ([facility])  (In-House)           Negative EtOH screen  (In-House)            Hypercholesterolemia stable on meds           Hypertension borderline due to pain          Peripheral neuropathy cont diabetic shoes          GERD stable on carafate/omeprazole         RECOMMENDATIONS given include: patient is aware of increased risk of kidney failure, osteoporosis and alzheimers with daily use of this med.           Allergic rhinitis stable on meds singulair/flonase and singulair           Orders:       95198  Professional services for allergen immunotherapy not including provision of allergenic extracts; two  (In-House)            Vitamin D deficiency, unspecified on 5000 units a day          Shoulder pain s/p repair with Dr Pretty-she still has moderate pain in R shoulder          Sleep apnea with sleep disturbance         REFERRALS:  Referral initiated to [facility].            Orders:       REFER  Referral to Specialist or Other Facility  (Send-Out)            Post-menopausal HRT stable on fosamax          Overweight she is working on diet and exercise          Toe onychomycosis she is going to try tea tree oil             Other Orders:       2028F  Foot examination performed (includes examination through visual inspection, sensory exam with monofi  (In-House)           CHARGE CAPTURE           **Please note: ICD descriptions below are intended for billing purposes only and may not represent clinical diagnoses**        Primary Diagnosis:         250.00 Type II diabetes requiring insulin            E11.42    Type 2 diabetes mellitus with diabetic polyneuropathy              Orders:          34119   Office/outpatient visit; established patient, level 4  (In-House)                Depression screen negative  (In-House)             1101F   Pt screen for fall risk; document no falls in past year or only 1 fall w/o injury in past year ([facility])  (In-House)                Negative EtOH screen  (In-House)           272.0 Hypercholesterolemia            E78.00    Pure hypercholesterolemia, unspecified    401.1 Hypertension             I10    Essential (primary) hypertension    356.9 Peripheral neuropathy            G60.9    Hereditary and idiopathic neuropathy, unspecified    530.81 GERD            K21.9    Gastro-esophageal reflux disease without esophagitis    477.9 Allergic rhinitis            J30.9    Allergic rhinitis, unspecified              Orders:          77301   Professional services for allergen immunotherapy not including provision of allergenic extracts; two  (In-House)           268.9 Vitamin D deficiency, unspecified            E55.9    Vitamin D deficiency, unspecified    Shoulder pain    719.41 Shoulder pain            M25.511    Pain in right shoulder    780.57 Sleep apnea with sleep disturbance            G47.30    Sleep apnea, unspecified    V07.4 Post-menopausal HRT            Z79.890    Hormone replacement therapy (postmenopausal)    278.02 Overweight            E66.3    Overweight    110.1 Toe onychomycosis            B37.2    Candidiasis of skin and nail        Other Orders:           2028F   Foot examination performed (includes examination through visual inspection, sensory exam with monofi  (In-House)

## 2021-05-18 NOTE — PROGRESS NOTES
MichaelNory Maren  1965     Office/Outpatient Visit    Visit Date: Mon, Jul 13, 2020 11:28 am    Provider: Eileen Barajas MD (Assistant: Barbara Patel, )    Location: [facility]        Electronically signed by Eileen Barajas MD on  07/14/2020 10:13:35 AM                             Subjective:        CC: diabetes eval    HPI:           Patient presents with type 2 diabetes mellitus with diabetic polyneuropathy.  Specifically, this is type 2, insulin requiring diabetes, complicated by peripheral neuropathy.  Compliance with treatment has been poor; she does not follow a diet and exercise regimen.      Tobacco screen: Non-smoker.  Current meds include an oral hypoglycemic ( Glucophage ), insulin/injectable ( Humalog- about 15 units with meals, SSI and carb counting; Trulicity; toujeo-125 units bid ), an ACE inhibitor, aspirin, and a lipid lowering agent.  She reports home blood glucose readings have been fairly good, with average fasting glucoses running in the 120-150 mg/dL range. She checks her glucose 3 to 4 times daily.  Most recent lab results include Hemoglobin A1c:  7.6 (%) (07/07/2020), LDL:  53 (mg/dL) (07/07/2020), HDL:  31 (mg/dL) (07/07/2020), Triglycerides:  275 (mg/dL) (07/07/2020), Microalbuminuria:  16.1 (mg/g creat) (07/07/2020), Microalbumin, Urine, rand:  <12.0 (mg/L) (07/07/2020).  Has not fallen recently In regard to preventative care, she performs foot self-exams several days per week and her last ophthalmology exam was in 7/2020-Dr Hernández- NO DIABETIC RETINOPATHY, she has cataracts.            With regard to the essential (primary) hypertension, her current cardiac medication regimen includes a diuretic ( hctz ) and an ACE inhibitor ( lisinopril 2.5 mg for her diabetes ).  Compliance with treatment has been good; she takes her medication as directed, maintains her diet and exercise regimen, and follows up as directed.  She is tolerating the medication well without side  effects.            With regard to the pure hypercholesterolemia, unspecified, date of diagnosis .  Current treatment includes Lipitor and a low cholesterol/low fat diet.  Compliance with treatment has been good; she maintains her low cholesterol diet.  She denies experiencing any hypercholesterolemia related symptoms.        Cherelle is following up with her worsening knee and hip pain B, and she is better stopping her bicycling.    ROS:     CONSTITUTIONAL:  Negative for chills, fatigue and fever.      EYES:  Negative for blurred vision and eye pain.      E/N/T:  Positive for nasal congestion.   Negative for ear pain or sore throat.      CARDIOVASCULAR:  Negative for chest pain, palpitations, tachycardia, orthopnea, and edema.      RESPIRATORY:  Negative for recent cough, dyspnea and frequent wheezing.      GASTROINTESTINAL:  Negative for abdominal pain, constipation, diarrhea, nausea and vomiting.      INTEGUMENTARY/BREAST:  Negative for rash.      NEUROLOGICAL:  Positive for headaches.   Negative for dizziness or weakness.      PSYCHIATRIC:  Positive for sleep disturbance.   Negative for depression or sadness.          Past Medical History / Family History / Social History:         Last Reviewed on 2020 12:10 PM by Eileen Barajas    Past Medical History:                 PAST MEDICAL HISTORY         Hepatitis: type A;     Allergies    GERD    IDDM    hypercholesterolemia     Back pain     Breast lump     Cellulitis of the chest wall     Classic migraine     Congenital accessory skin tags     H/O Depression     GERD     chronic hoarseness     Hypertension     H/O Migraine headaches     Obstructive sleep apnea    Peripheral neuropathy     Post-menopausal    h/o Uterine polyp             GYNECOLOGICAL HISTORY:     miscarriage 1    Contraception: S/P tubal ligation;         CURRENT MEDICAL PROVIDERS:    Allergist: Dr. Delgadillo    Ophthalmologist: Ebenezer    Orthopedist: Antony Ewing, Shabnam Miguel  Endocrinologist         PREVENTIVE HEALTH MAINTENANCE             BONE DENSITY: was last done 2019 with the following abnormality noted-- Osteopenic at L1 in Lumbar Spine Only     COLORECTAL CANCER SCREENING: Up to date (colonoscopy q10y; sigmoidoscopy q5y; Cologuard q3y) was last done 18, Results are in chart     EYE EXAM: Diabetic Eye Exam during this calendar year and results are in chart was last done 2020     MAMMOGRAM: Done within last 2 years and results in are chart was last done 2019 with normal results     PAP SMEAR: was last done ZEESHAN BSO no longer needs PAPs         Surgical History:         Cholecystectomy    Hysterectomy: TAHBSO;     cataracts removed b 2018     pericardial window ; Procedures: heart cath          Family History:     Father: Coronary Artery Disease; ;  Cerebrovascular Accident     Mother: Hyperlipidemia;  Endometrial Cancer     Brother(s): Type 2 Diabetes     Paternal Grandfather: Type 2 Diabetes     Paternal Grandmother:      Maternal Grandfather: Type 2 Diabetes     Maternal Grandmother: Breast Cancer;  Alzheimer's Disease         Social History:     Occupation: [employer] de[employer]     Marital Status:      Children: 1 child and 2 step-children         Tobacco/Alcohol/Supplements:     Last Reviewed on 2020 11:33 AM by Barbara Patel    Tobacco: She has never smoked.  Non-drinker         Substance Abuse History:     Last Reviewed on 2019 01:57 PM by Santa Tobar        Mental Health History:     Last Reviewed on 2019 01:57 PM by Santa Tobar        Communicable Diseases (eg STDs):     Last Reviewed on 2019 01:57 PM by Santa Tobar        Allergies:     Last Reviewed on 2020 08:59 AM by Ivana Pulido    Levemir:      Cipro:      Nitroglycerin:      Bydureon: Nausea  (Adverse Reaction)    Diclofenac Sodium: swelling of feet,swelling of ankle  (Adverse Reaction)    cat dander:      Codeine:       Aspirin:      Sumatriptan: dizziness  (Adverse Reaction)        Current Medications:     Last Reviewed on 7/13/2020 11:33 AM by Barbara Patel    EpiPen 0.3 mg/0.3 mL injection Auto-Injector [inject 0.3 milliliter (0.3 mg) by intramuscular route once]    Lancet   Lancet [Onet touch Fine Point Lancets use wiht one touch glucometer, check blood sugars 4 times daily]    lisinopriL 2.5 mg oral tablet [TAKE ONE TABLET BY MOUTH DAILY]    [employer] Pen [place] 31G 8mm #100     promethazine 25 mg oral tablet [TAKE ONE-HALF TO ONE TABLET BY MOUTH EVERY 4 TO 6 HOURS AS NEEDED FOR NAUSEA AND VOMITING]    topiramate 100 mg oral tablet [TAKE 1 TABLET TWICE A DAY]    metFORMIN 500 mg oral Tablet, Extended Release 24 hr [1 tab bid]    Aleve 220 mg oral tablet    One Touch Ultra Blue Test Strips  Reagent Strips [Test Blood Sugar QID]    Estradiol 0.5 mg oral tablet [1 tab daily PO]    atorvastatin 10 mg oral tablet [1 tab qhs]    OneTouch UltraMini kit  [check blood sugar 6 times day]    Insulin Syringes 0.5ml Syringe [Use as directed]    HumaLOG KwikPen Insulin 200 unit/mL (3 mL) subcutaneous Insulin Pen [sliding scale]    Fosamax 70 mg oral tablet [Take 1 tablet(s) by mouth q week]    Toujeo Max U-300 SoloStar 300 unit/mL (3 mL) subcutaneous Insulin Pen [INJECT 120 UNITS TWO TIMES A DAY]    Singulair  10 mg oral tablet [Take 1 tablet(s) by mouth each evening]    allergy shots once a week     ProAir HFA 90 mcg/actuation Inhalation HFA Aerosol Inhaler [PRN]    Symbicort 160mcg/4.5mcg Oral Inhaler [2 puffs BID]    Trulicity 1.5 mg/0.5 mL subcutaneous Pen Injector [every Sunday]    Jardiance 10 mg oral tablet [1 TAB DAILY]    Omeprazole 40 mg oral capsule,delayed release (enteric coated) [1 tab BID]    cholecalciferol (vitamin D3) 125 mcg (5,000 unit) oral capsule [TAKE ONE CAPSULE BY MOUTH DAILY]    Zyrtec 10 mg oral tablet [1 bid]    Diltiazem HCl 120 mg oral Capsule, Extended Release 24 hr [1 tab daily]    hydroCHLOROthiazide 12.5 mg  oral tablet [1 po daily]    Glucagon Emergency     Dexcom G6 Sensor device  [Use as directed]    ALBUTEROL    NEB 0.083%     traZODone 100 mg oral tablet [take 1 tablet (100 mg) by oral route qhs]        Objective:        Vitals:         Current: 7/13/2020 11:35:42 AM    Ht:  5 ft, 4.25 in;  Wt: 241 lbs;  BMI: 41.0T: 97.8 F (temporal);  BP: 117/68 mm Hg (right arm, sitting);  P: 71 bpm (right arm (BP Cuff), sitting);  sCr: 0.91 mg/dL;  GFR: 83.03        Exams:     PHYSICAL EXAM:     GENERAL: vital signs recorded - well developed, well nourished;  no apparent distress;     EYES: PERRL, EOMI     E/N/T: EARS:  normal external auditory canals and tympanic membranes;  grossly normal hearing; OROPHARYNX:  normal mucosa, dentition, gingiva, and posterior pharynx;     NECK: supple with fROM;     RESPIRATORY: CTA B WITHOUT WHEEZING/RALES OR RHONCHI, NORMAL RESP. EFFORT     CARDIOVASCULAR: normal rate; rhythm is regular;  no systolic murmur; no edema;     MUSCULOSKELETAL: normal gait; R hand-edema to soft tissue, entire plantar surface, very tender to light pressure, poor hand  strength-diffuse, with a 1 cm new tender knot mid hand;     NEUROLOGICAL:  cranial nerves, motor and sensory function, reflexes, gait and coordination are all intact;     PSYCHIATRIC:  appropriate affect and demeanor; normal speech pattern; grossly normal memory;     Left foot exam    Protective sensation using Monofilament test: NORMAL sensation. Patient detects .07 grams of force which is considered normal.    Vascular status: normal peripheral vascular exam with palpable dorsal pedal and posterior tibal pulses and brisk digital capillary refill    Skin is intact without sores or ulcers    Right foot exam    Protective sensation using Monofilament test: Slightly decreased, with estimated force of 0.2 grams applied.    Vascular status: normal peripheral vascular exam with palpable dorsal pedal and posterior tibal pulses and brisk digital capillary  refill    Skin is intact without sores or ulcers         Assessment:         E11.42   Type 2 diabetes mellitus with diabetic polyneuropathy       I10   Essential (primary) hypertension       E78.00   Pure hypercholesterolemia, unspecified       M25.462   Effusion, left knee       M25.461   Effusion, right knee       G47.00   Insomnia, unspecified           ORDERS:         Other Orders:       2028F  Foot examination performed (includes examination through visual inspection, sensory exam with monofilament, and pulse exam - report when any of the three components are completed) (DM)4  (In-House)                      Plan:         Type 2 diabetes mellitus with diabetic polyneuropathywell controlled, she is doing very well.  foot exam noted, eye exam is UTD.She has lost 15# since Sept.        Essential (primary) hypertensionwell controlled        Pure hypercholesterolemia, unspecifiedwell controlled        Effusion, left kneestable off mes        Effusion, right kneestable off bike        Insomnia, unspecifiedstable on trazodone 100 mg qhs and with her CPAP machine            Other Orders      2028F  Foot examination performed (includes examination through visual inspection, sensory exam with monofilament, and pulse exam - report when any of the three components are completed) (DM)4  (In-House)              Charge Capture:         Primary Diagnosis:     E11.42  Type 2 diabetes mellitus with diabetic polyneuropathy           Orders:      87015  Office/outpatient visit; established patient, level 4  (In-House)              I10  Essential (primary) hypertension     E78.00  Pure hypercholesterolemia, unspecified     M25.462  Effusion, left knee     M25.461  Effusion, right knee     G47.00  Insomnia, unspecified         Other Orders:       2028F  Foot examination performed (includes examination through visual inspection, sensory exam with monofilament, and pulse exam - report when any of the three components are completed)  (DM)4  (In-House)              ADDENDUMS:      ____________________________________    Addendum: 09/14/2020 03:17 PM - One, Team         Visit Note Faxed to:        User Entered Recipient; Number [phone]

## 2021-05-18 NOTE — PROGRESS NOTES
Nory Rodriguez 1965     Office/Outpatient Visit    Visit Date: Mon, Feb 25, 2019 06:57 pm    Provider: Santa Tobar N.P. (Assistant: Karen Daniel MA)    Location: [facility]        Electronically signed by Santa Tobar N.P. on  02/28/2019 09:53:22 PM                             SUBJECTIVE:        CC:     Cherelle is a 53 year old White female.  This is a follow-up visit.  not better from last visit;         HPI:         Patient to be evaluated for uRI.  These have been present for the past 8 weeks.  The symptoms include chest congestion, productive cough, eye itching/watering, nasal congestion, nasal discharge, sinus pain/pressure, sputum production and wheezing.  She denies fever.  She denies exposure to ill contacts.  She has already tried to relieve the symptoms with cough medication, decongestants, and mixed cold/sinus preparations.  Medical history is significant for allergies and diabetes.      ROS:     CONSTITUTIONAL:  Negative for chills, fatigue, fever, and weight change.      EYES:  Positive for watery and itching.   Negative for blurred vision.      E/N/T:  Positive for nasal congestion and sinus pressure.   Negative for sore throat.      CARDIOVASCULAR:  Negative for chest pain, orthopnea, paroxysmal nocturnal dyspnea and pedal edema.      RESPIRATORY:  Positive for recent cough ( with copious amounts of purulent sputum ) and frequent wheezing.   Negative for dyspnea.      GASTROINTESTINAL:  Negative for abdominal pain, constipation, diarrhea, nausea and vomiting.      NEUROLOGICAL:  Negative for dizziness, headaches, paresthesias, and weakness.      PSYCHIATRIC:  Negative for anxiety, depression, and sleep disturbances.          PM/FM/:     Last Reviewed on 2/28/2019 09:43 PM by Santa Tobar    Past Medical History:                 PAST MEDICAL HISTORY         Hepatitis: type A;     Allergies    GERD    IDDM    hypercholesterolemia     Back pain     Breast  lump     Cellulitis of the chest wall     Classic migraine     Congenital accessory skin tags     H/O Depression     GERD     chronic hoarseness     Hypertension     H/O Migraine headaches     Obstructive sleep apnea    Peripheral neuropathy     Post-menopausal    h/o Uterine polyp             GYNECOLOGICAL HISTORY:     miscarriage 1    Contraception: S/P tubal ligation;         CURRENT MEDICAL PROVIDERS:    Allergist: Dr. Delgadillo    Ophthalmologist: Ebenezer    Orthopedist: Antony    Therapist, Shabnam Miguel Endocrinologist         PREVENTIVE HEALTH MAINTENANCE             BONE DENSITY: was last done 17 with the following abnormality noted-- Osteopenic at L1 in Lumbar Spine Only     COLORECTAL CANCER SCREENING: Up to date (colonoscopy q10y; sigmoidoscopy q5y; Cologuard q3y) was last done 18, Results are in chart     MAMMOGRAM: Done within last 2 years and results in are chart was last done 12-3-18 with normal results     PAP SMEAR: was last done ZEESHAN BSO no longer needs PAPs         Surgical History:         Cholecystectomy    Hysterectomy: TAHBSO;     cataracts removed b 2018      pericardial window ; Procedures: heart cath          Family History:     Father: Coronary Artery Disease; ;  Cerebrovascular Accident     Mother: Hyperlipidemia;  Endometrial Cancer     Brother(s): Type 2 Diabetes     Paternal Grandfather: Type 2 Diabetes     Paternal Grandmother:      Maternal Grandfather: Type 2 Diabetes     Maternal Grandmother: Breast Cancer;  Alzheimer's Disease         Social History:     Occupation: [employer] dept     Marital Status:      Children: 1 child and 2 step-children         Tobacco/Alcohol/Supplements:     Last Reviewed on 2019 09:43 PM by Santa Tobar    Tobacco: She has never smoked.  Non-drinker             Current Problems:     Last Reviewed on 2019 09:43 PM by Santa Tobar    Near-syncope     Obesity, NOS     Low back pain      Osteopenia     Other viral hepatitis carrier     Screening for cancer of colon     History of noncompliance with medical treatment     Overweight     GERD     Common migraine     Peripheral neuropathy     Pressure ulcer stage I     Morbid obesity     Chronic insomnia     Degenerative disc disease     Nephrolithiasis     Allergic rhinitis     Congenital accessory skin tags     Post-menopausal HRT     Type II diabetes requiring insulin     Obstructive sleep apnea     Uterine polyp     Hypercholesterolemia     Hypertension     Depression     GE reflux disease     URI     Nausea with vomiting     Shoulder pain     MRSA wound         Immunizations:     zzFluzone pf-quadrivalent 3 and up 9/29/2015     zzFluzone pf-quadrivalent 3 and up 10/26/2016     zzFluzone pf-quadrivalent 3 and up 9/25/2017     Fluzone (3 + years dose) 9/14/2009     Fluzone (3 + years dose) 10/27/2012     Fluzone pf (3+ years dose) 10/14/2013     Fluzone pf (3+ years dose) 10/7/2014     Fluzone Quadrivalent (3+ years) 9/17/2018     PNEUMOVAX 23 (Pneumococcal PPV23) 11/29/2016     Tdap (Tetanus, reduced diph, acellular pertussis) 9/29/2015         Allergies:     Last Reviewed on 2/28/2019 09:43 PM by Santa Tobar    Levemir:    Cipro:    Nitroglycerin:    Januvia: vomiting (Adverse Reaction)    Bydureon: nausea (Adverse Reaction)    Metformin HCl: vomiting (Adverse Reaction)    Codeine:    Aspirin:    Sumatriptan: dizziness (Adverse Reaction)        Current Medications:     Last Reviewed on 2/28/2019 09:43 PM by Santa Tobar    Betamethasone/Clotrimazole 0.05%/1% Cream Apply small amount to affected area bid  x 10 days     Meloxicam 15mg Tablet 1/2 to 1 prn pain to be take with food     Topamax 100mg Tablet 1 tab BID     Singulair  10mg Tablet Take 1 tablet(s) by mouth each evening     Atorvastatin Calcium 10mg Tablet 1 tab qhs     Promethazine HCl 25mg Tablet 1/2 - 1 tab q 4-6 hrs prn   prn nausea and vomiting     Humalog KwikPen  "100units/1ml Pen System, Disposable Sliding scale as directed     Toujeo SoloStar 300units/1ml Injection 100 units bid     Trulicity PEN 0.75mg/0.5ml Injection Inject once weekly     Estradiol 0.5mg Tablet 1 tab daily PO     Omeprazole 40mg Capsules, Extended Release 1 tab BID     Fosamax 70mg Tablet Take 1 tablet(s) by mouth q week     Lancet   Lancet Onet touch Fine Point Lancets use wiht one touch glucometer, check blood sugars 4 times daily     One Touch Ultra Blue Test Strips  Reagent Strips Test Blood Sugar QID     BD Ultra-Fine Original Pen Needle 29G x 1/2\"  Pen Needle Use as directed QID w/Lantus and Novalog Pen     Benzonatate 200mg Capsules 1 capsule tid     Insulin Syringes 0.5ml Syringe Use as directed     Fioricet 50mg/300mg/40mg Capsules One to 2 PO Q 4 hours. Max of 4 in 24 hr period.     Zyrtec 10mg Tablet 1 bid     Acyclovir 400mg Tablet 1 p.o. tid x 7 days     Vitamin D3 5,000IU Capsules 1 capsule 4 days a week and 2 capsules 3 days a week     Diclofenac Sodium 75mg Tablets, Enteric Coated 1 tab bid with food     Diltiazem HCl 120mg Capsules, Extended Release 1 tab daily     ProAir HFA 90mcg/1actuation Oral Inhaler PRN     allergy shots once a week     One Touch UltraMini Monitor Kit check blood sugar 6 times day     Aleve 220mg Tablet     [employer]ger Pen Needles 31G 8mm #100     Lisinopril 2.5mg Tablet Take 1 tablet(s) by mouth daily         OBJECTIVE:        Vitals:         Current: 2/25/2019 7:01:22 PM    Ht:  5 ft, 4.25 in;  Wt: 268.8 lbs;  BMI: 45.8    T: 98.4 F (oral);  BP: 143/72 mm Hg (right arm, sitting);  P: 90 bpm (right arm (BP Cuff), sitting);  sCr: 0.83 mg/dL;  GFR: 97.59        Exams:     PHYSICAL EXAM:     GENERAL: vital signs recorded - well developed, well nourished;  no apparent distress;     E/N/T: NOSE: bilateral maxillary sinus tenderness present;     RESPIRATORY: normal respiratory rate and pattern with no distress; coarse breath sounds throughout; rhonchi heard in the bases;  " diffuse inspiratory and expiratory wheezes;     CARDIOVASCULAR: normal rate; rhythm is regular;  no systolic murmur; no edema;     GASTROINTESTINAL: nontender; normal bowel sounds; no organomegaly;     NEUROLOGIC: GROSSLY INTACT     PSYCHIATRIC:  appropriate affect and demeanor; normal speech pattern; grossly normal memory;         ASSESSMENT:           465.8   J06.9  URI              DDx:     493.02   J45.41  Moderate asthma (exacerbation)              DDx:         ORDERS:         Meds Prescribed:       Albuterol 0.083% Nebulizer Solution 1 vial q 4 hours prn  #150 (One [place] and [place]) ml Refills: 1       Guaifenesin 200mg Tablet take 2 tablets twice daily  #40 (Forty) tablet(s) Refills: 0       Prednisone 10mg Tablet one tablet daily x 5 days then 1/2 tablet daily x 2 days  #6 (Six) tablet(s) Refills: 0       Azithromycin 250mg Tablet 2 po today, 1 po x 4 days  #1 (One) tablet(s) Refills: 0       Tessalon Perles (Benzonatate) 100mg Capsules Take 1 capsule PO TID PRN for cough  #60 (Sixty) capsule(s) Refills: 0                 PLAN:          URI If not improving you may need a CXR, dmt         FOLLOW-UP: Advised to call if there is no improvement Follow-up by phone if no improvement in 1 week..   Schedule follow-up appointments on a p.r.n. basis..            Prescriptions:       Guaifenesin 200mg Tablet take 2 tablets twice daily  #40 (Forty) tablet(s) Refills: 0       Azithromycin 250mg Tablet 2 po today, 1 po x 4 days  #1 (One) tablet(s) Refills: 0       Tessalon Perles (Benzonatate) 100mg Capsules Take 1 capsule PO TID PRN for cough  #60 (Sixty) capsule(s) Refills: 0          Moderate asthma (exacerbation) Watch Blood sugar while taking steroid. dmt           Prescriptions:       Albuterol 0.083% Nebulizer Solution 1 vial q 4 hours prn  #150 (One [place]) ml Refills: 1       Prednisone 10mg Tablet one tablet daily x 5 days then 1/2 tablet daily x 2 days  #6 (Six) tablet(s) Refills: 0              Patient Recommendations:        For  URI:     Follow-up by phone if no improvement in 1 week. Schedule follow-up appointments as needed.                APPOINTMENT INFORMATION:        Monday Tuesday Wednesday Thursday Friday Saturday Sunday            Time:___________________AM  PM   Date:_____________________             CHARGE CAPTURE:           Primary Diagnosis:     465.8 URI            J06.9    Acute upper respiratory infection, unspecified              Orders:          72309   Office/outpatient visit; established patient, level 4  (In-House)           493.02 Moderate asthma (exacerbation)            J45.41    Moderate persistent asthma with (acute) exacerbation

## 2021-05-18 NOTE — PROGRESS NOTES
Nory RodriguezShawna 1965     Office/Outpatient Visit    Visit Date: Fri, Jan 18, 2019 03:55 pm    Provider: Melita Garcia N.P. (Assistant: Albin Arriaga)    Location: [facility]        Electronically signed by Melita Garcia N.P. on  01/21/2019 09:25:46 PM                             SUBJECTIVE:        CC:     Cherelle is a 53 year old White female.  presents today due to lost voice tuesday, now having headache, cough, vomiting, diarrhea, had fever         HPI:         Cough noted.  It has been present for the past one to two days.  Respiratory symptoms include recent, dry cough.   She denies sinus pressure or wheezing.  Other symptoms include fever to 100 degrees, headache, nasal congestion and nasal discharge.  She reports recent exposure to illness from family members.  She has already tried to relieve the symptoms with mixed cold/sinus preparations.          Additionally, she presents with history of nausea with vomiting.      2 episodes of vomiting 2 days ago.  nausea persists.  loose stool bid x 2 days.      ROS:     CONSTITUTIONAL:  Positive for chills, fatigue and fever.      E/N/T:  Positive for nasal congestion, frequent rhinorrhea, hoarseness and sore throat.      CARDIOVASCULAR:  Negative for chest pain, palpitations, tachycardia, orthopnea, and edema.      RESPIRATORY:  Positive for recent cough ( typically dry ).   Negative for frequent wheezing.      GASTROINTESTINAL:  Positive for diarrhea, nausea and vomiting.   Negative for abdominal pain.      MUSCULOSKELETAL:  Negative for arthralgias, back pain, and myalgias.      NEUROLOGICAL:  Positive for headaches.   Negative for dizziness or paresthesias.          [facility]/:     Last Reviewed on 12/03/2018 09:34 AM by Eileen Barajas    Past Medical History:                 PAST MEDICAL HISTORY         Hepatitis: type A;     Allergies    GERD    IDDM    hypercholesterolemia     Back pain     Breast lump     Cellulitis of the chest wall      Classic migraine     Congenital accessory skin tags     H/O Depression     GERD     chronic hoarseness     Hypertension     H/O Migraine headaches     Obstructive sleep apnea    Peripheral neuropathy     Post-menopausal    h/o Uterine polyp             GYNECOLOGICAL HISTORY:     miscarriage 1    Contraception: S/P tubal ligation;         CURRENT MEDICAL PROVIDERS:    Allergist: Dr. Delgadillo    Ophthalmologist: Ebenezer    Orthopedist: Antony    Therapist, Shabnam Miguel Endocrinologist         PREVENTIVE HEALTH MAINTENANCE             BONE DENSITY: was last done 17 with the following abnormality noted-- Osteopenic at L1 in Lumbar Spine Only     COLORECTAL CANCER SCREENING: Up to date (colonoscopy q10y; sigmoidoscopy q5y; Cologuard q3y) was last done 18, Results are in chart     MAMMOGRAM: Done within last 2 years and results in are chart was last done 12-3-18 with normal results     PAP SMEAR: was last done ZEESHAN BSO no longer needs PAPs         Surgical History:         Cholecystectomy    Hysterectomy: TAHBSO;     cataracts removed b 2018      pericardial window ; Procedures: heart cath          Family History:     Father: Coronary Artery Disease; ;  Cerebrovascular Accident     Mother: Hyperlipidemia;  Endometrial Cancer     Brother(s): Type 2 Diabetes     Paternal Grandfather: Type 2 Diabetes     Paternal Grandmother:      Maternal Grandfather: Type 2 Diabetes     Maternal Grandmother: Breast Cancer;  Alzheimer's Disease         Social History:     Occupation: [employer]     Marital Status:      Children: 1 child and 2 step-children         Tobacco/Alcohol/Supplements:     Last Reviewed on 2018 05:59 PM by Kerri Underwood    Tobacco: She has never smoked.  Non-drinker         Substance Abuse History:     Last Reviewed on 2018 12:06 PM by Nory Sauceda        Mental Health History:     Last Reviewed on 2018 12:06 PM by Nory Sauceda         Communicable Diseases (eg STDs):     Last Reviewed on 11/27/2018 12:06 PM by Nory Sauceda            Current Problems:     Last Reviewed on 11/27/2018 12:06 PM by Nory Sauceda    Near-syncope     Obesity, NOS     Low back pain     Osteopenia     Malaise and fatigue, NEC     Other viral hepatitis carrier     Screening for cancer of colon     History of noncompliance with medical treatment     Overweight     GERD     Common migraine     Peripheral neuropathy     Pressure ulcer stage I     Morbid obesity     Chronic insomnia     Degenerative disc disease     Nephrolithiasis     Allergic rhinitis     Congenital accessory skin tags     Post-menopausal HRT     Type II diabetes requiring insulin     Obstructive sleep apnea     Uterine polyp     Hypercholesterolemia     Hypertension     Depression     GE reflux disease     Acute sinusitis, other     Candidal vulvovaginitis     Screening for depression     Shoulder pain     Carbuncle of vulva     MRSA wound         Immunizations:     zzFluzone pf-quadrivalent 3 and up 9/29/2015     zzFluzone pf-quadrivalent 3 and up 10/26/2016     zzFluzone pf-quadrivalent 3 and up 9/25/2017     Fluzone (3 + years dose) 9/14/2009     Fluzone (3 + years dose) 10/27/2012     Fluzone pf (3+ years dose) 10/14/2013     Fluzone pf (3+ years dose) 10/7/2014     Fluzone Quadrivalent (3+ years) 9/17/2018     PNEUMOVAX 23 (Pneumococcal PPV23) 11/29/2016     Tdap (Tetanus, reduced diph, acellular pertussis) 9/29/2015         Allergies:     Last Reviewed on 12/19/2018 05:59 PM by Kerri Underwood    Levemir:    Cipro:    Nitroglycerin:    Januvia: vomiting (Adverse Reaction)    Bydureon: nausea (Adverse Reaction)    Metformin HCl: vomiting (Adverse Reaction)    Codeine:    Aspirin:    Sumatriptan: dizziness (Adverse Reaction)        Current Medications:     Last Reviewed on 1/18/2019 03:59 PM by Albin Arriaga    Promethazine HCl 25mg Tablet 1/2 - 1 tab q 4-6 hrs prn   prn nausea and  "vomiting     Humalog KwikPen 100units/1ml Pen System, Disposable Sliding scale as directed     Mary MarxoStar 300units/1ml Injection 100 units bid     Trulicity PEN 0.75mg/0.5ml Injection Inject once weekly     Estradiol 0.5mg Tablet 1 tab daily PO     Omeprazole 40mg Capsules, Extended Release 1 tab BID     Lisinopril 2.5mg Tablet Take 1 tablet(s) by mouth daily     Singulair  10mg Tablet Take 1 tablet(s) by mouth each evening     Topamax 100mg Tablet 1 tab BID     Atorvastatin Calcium 10mg Tablet 1 tab qhs     Fosamax 70mg Tablet Take 1 tablet(s) by mouth q week     Betamethasone/Clotrimazole 0.05%/1% Cream Apply small amount to affected area bid  x 10 days     Meloxicam 15mg Tablet 1/2 to 1 prn pain to be take with food     Lancet   Lancet Onet touch Fine Point Lancets use wiht one touch glucometer, check blood sugars 4 times daily     One Touch Ultra Blue Test Strips  Reagent Strips Test Blood Sugar QID     BD Ultra-Fine Original Pen Needle 29G x 1/2\"  Pen Needle Use as directed QID w/Lantus and Novalog Pen     Benzonatate 200mg Capsules 1 capsule tid     Insulin Syringes 0.5ml Syringe Use as directed     Fioricet 50mg/300mg/40mg Capsules One to 2 PO Q 4 hours. Max of 4 in 24 hr period.     Zyrtec 10mg Tablet 1 bid     Vitamin D3 5,000IU Capsules 1 capsule 4 days a week and 2 capsules 3 days a week     Diclofenac Sodium 75mg Tablets, Enteric Coated 1 tab bid with food     Diltiazem HCl 120mg Capsules, Extended Release 1 tab daily     ProAir HFA 90mcg/1actuation Oral Inhaler PRN     allergy shots once a week     One Touch UltraMini Monitor Kit check blood sugar 6 times day     Aleve 220mg Tablet     [employer] Pen Needles 31G 8mm #100         OBJECTIVE:        Vitals:         Historical:     12/19/2018  BP:   124/56 mm Hg ( (left arm, , sitting, );)     12/19/2018  Wt:   260lbs        Current: 1/18/2019 4:01:29 PM    Ht:  5 ft, 4.25 in;  Wt: 258 lbs;  BMI: 43.9    T: 98.5 F (oral);  BP: 114/67 mm Hg (left arm, " sitting);  P: 83 bpm (left arm (BP Cuff), sitting);  sCr: 0.83 mg/dL;  GFR: 95.90        Exams:     PHYSICAL EXAM:     GENERAL: tired-appearing;     E/N/T: EARS: bilateral TMs are normal;  NOSE: nasal mucosa is erythematous;  OROPHARYNX: posterior pharynx, including tonsils, tongue, and uvula are normal;     RESPIRATORY: normal respiratory rate and pattern with no distress; normal breath sounds with no rales, rhonchi, wheezes or rubs;     CARDIOVASCULAR: normal rate; rhythm is regular;     GASTROINTESTINAL: nontender; normal bowel sounds;     LYMPHATIC: no enlargement of cervical or facial nodes;     MUSCULOSKELETAL:  Normal range of motion, strength and tone;     NEUROLOGIC: mental status: alert and oriented x 3; GROSSLY INTACT     PSYCHIATRIC:  appropriate affect and demeanor; normal speech pattern; grossly normal memory;         Lab/Test Results:             Performed by::  PB [id number] (01/18/2019),     Influenza A and B:  Negative (01/18/2019),     Rapid Strep Screen:  Negative (01/18/2019),             ASSESSMENT           465.8   J01.11  Upper respiratory illness              DDx:     786.2   R05  Cough              DDx:     787.01   R11.2  Nausea with vomiting              DDx:         ORDERS:         Meds Prescribed:       Promethazine with Dextromethrophan 6.25mg/15mg per 5ml Syrup 1 to 1 and 1/2 tsp q 6 hrs prn (may cause drowsiness)  #6 (Six) oz Refills: 0         Lab Orders:       [id number]  Infectious agent antigen detection by immunoassay; Influenza  (In-House)         59612  Group A Streptococcus detection by immunoassay with direct optical observation  (In-House)         67210-03  Infectious agent antigen detection by immunoassay; Influenza  (In-House)         98594  [facility] Throat culture, strep  (Send-Out)                   PLAN: note for wed, thurs, and today          Upper respiratory illness         RECOMMENDATIONS given include: rest, increase oral fluid intake, and flu and strep negative.  advise  symptomatic tx.  follow up if not improving..            Orders:       [id number]  Infectious agent antigen detection by immunoassay; Influenza  (In-House)         64124  Group A Streptococcus detection by immunoassay with direct optical observation  (In-House)         81428-11  Infectious agent antigen detection by immunoassay; Influenza  (In-House)         71626  [facility] Throat culture, strep  (Send-Out)            Cough         do not take with plain promethazine.            Prescriptions:       Promethazine with Dextromethrophan 6.25mg/15mg per 5ml Syrup 1 to 1 and 1/2 tsp q 6 hrs prn (may cause drowsiness)  #6 (Six) oz Refills: 0          Nausea with vomiting         RECOMMENDATIONS given include: get plenty of rest, maintain a clear liquid diet, resume intake of solid foods gradually, and Go to the ER if worse.              Patient Recommendations:        For  Nausea with vomiting:     Get plenty of rest.     Maintain a diet consisting of clear liquids (clear beverages, broth, gelatin, flavored ices, pediatric electrolyte solutions, sports drinks, etc.).  Avoid solid foods or formula. After 4-8 hours have passed without vomiting, you may gradually advance your diet to include bland foods, such as saltine crackers or bread, or dilute formula.              CHARGE CAPTURE           **Please note: ICD descriptions below are intended for billing purposes only and may not represent clinical diagnoses**        Primary Diagnosis:         465.8 Upper respiratory illness            J01.11    Acute recurrent frontal sinusitis              Orders:          77175   Office/outpatient visit; established patient, level 4  (In-House)             57150   Infectious agent antigen detection by immunoassay; Influenza  (In-House)             33829   Group A Streptococcus detection by immunoassay with direct optical observation  (In-House)             68755 -59  Infectious agent antigen detection by immunoassay; Influenza  (In-House)            786.2 Cough            R05    Cough    787.01 Nausea with vomiting            R11.2    Nausea with vomiting, unspecified

## 2021-05-18 NOTE — PROGRESS NOTES
Michael Nory NALLELY 1965     Office/Outpatient Visit    Visit Date: Tue, May 1, 2018 09:58 am    Provider: Eileen Barajas MD (Assistant: Tatyana Frye MA)    Location: [facility]        Electronically signed by Eileen Barajas MD on  2018 04:33:01 PM                             SUBJECTIVE:        CC: MRSA         HPI:     Cherelle presents for f/u s/p  an abcess positive for MRSA was I and D last week, she is  and thought maybe a stitch broke and needed this evalutated before her surgery.  She is on doxy and mupirocin cream and denies fever.  There has been no drainage from the area.     ongoing R shoulder pain, she was sched for surgery today and I made her reschedule due to MRSA infection     ROS:     CONSTITUTIONAL:  Negative for fever.      CARDIOVASCULAR:  Negative for chest pain, palpitations, tachycardia, orthopnea, and edema.      RESPIRATORY:  Negative for dyspnea and frequent wheezing.      GASTROINTESTINAL:  Positive for nausea.   Negative for abdominal pain, constipation, diarrhea or vomiting.      INTEGUMENTARY/BREAST:  Positive for rash.          [facility]/SH:     Last Reviewed on 2018 10:17 AM by Eileen Barajas    Past Medical History:         Hepatitis: type A;     Allergies    GERD    IDDM    hypercholesterolemia     Back pain     Breast lump     Cellulitis of the chest wall     Classic migraine     Congenital accessory skin tags     H/O Depression     GERD     chronic hoarseness     Hypertension     H/O Migraine headaches     Obstructive sleep apnea    Peripheral neuropathy     Post-menopausal    h/o Uterine polyp             GYNECOLOGICAL HISTORY:     miscarriage 1    Contraception: S/P tubal ligation;         CURRENT MEDICAL PROVIDERS:    Therapist, Shabnam Miguel         PREVENTIVE HEALTH MAINTENANCE             BONE DENSITY: was last done 17 with the following abnormality noted-- Osteopenic at L1 in Lumbar Spine Only     COLORECTAL CANCER  SCREENING: will schedule     MAMMOGRAM: Done within last 2 years and results in are chart was last done 17 with normal results     PAP SMEAR: was last done ZEESHAN BSO no longer needs PAPs         Surgical History:         Cholecystectomy    Hysterectomy: TAHBSO;      pericardial window ; Procedures: heart cath          Family History:     Father: Coronary Artery Disease; ;  Cerebrovascular Accident     Mother: Hyperlipidemia;  Endometrial Cancer     Brother(s): Type 2 Diabetes     Paternal Grandfather: Type 2 Diabetes     Paternal Grandmother:      Maternal Grandfather: Type 2 Diabetes     Maternal Grandmother: Breast Cancer;  Alzheimer's Disease         Social History:     Occupation: [employer] dept     Marital Status:      Children: 1 child and 2 step-children         Tobacco/Alcohol/Supplements:     Last Reviewed on 2018 10:17 AM by Eileen Barajas    Tobacco: She has never smoked.  Non-drinker         Substance Abuse History:     Last Reviewed on 2018 03:57 PM by Santa Tobar        Mental Health History:     Last Reviewed on 2018 03:57 PM by Santa Tobar        Communicable Diseases (eg STDs):     Last Reviewed on 2018 03:57 PM by Santa Tobar            Allergies:     Last Reviewed on 2018 10:58 AM by Rimma Sanford:    Cipro:    Nitroglycerin:    Januvia: vomiting (Adverse Reaction)    Bydureon: nausea (Adverse Reaction)    Metformin HCl: vomiting (Adverse Reaction)    Codeine:    Aspirin:    Sumatriptan: dizziness (Adverse Reaction)        Current Medications:     Last Reviewed on 2018 10:04 AM by Tatyana Frye PEN 0.75mg/0.5ml Injection Inject once weekly     Promethazine HCl 25mg Tablet 1/2 - 1 tab q 4-6 hrs prn   prn nausea and vomiting     Toujeo SoloStar 300units/1ml Injection 100 units bid     Betamethasone/Clotrimazole 0.05%/1% Cream Apply small amount to affected area bid  x 10 days  "    Topamax 100mg Tablet 1 tab BID     Omeprazole 40mg Capsules, Extended Release 1 tab BID     Singulair  10mg Tablet Take 1 tablet(s) by mouth each evening     Estradiol 0.5mg Tablet 1 tab daily PO     Lisinopril 2.5mg Tablet Take 1 tablet(s) by mouth daily     Atorvastatin Calcium 10mg Tablet 1 tab qhs     Fosamax 70mg Tablet Take 1 tablet(s) by mouth q week     Lancet   Lancet Onet touch Fine Point Lancets use wiht one touch glucometer, check blood sugars 4 times daily     Jardiance 25mg Tablet Take 1 tablet(s) by mouth qam     One Touch Ultra Blue Test Strips  Reagent Strips Test Blood Sugar QID     BD Ultra-Fine Original Pen Needle 29G x 1/2\"  Pen Needle Use as directed QID w/Lantus and Novalog Pen     Benzonatate 200mg Capsules 1 capsule tid     Insulin Syringes 0.5ml Syringe Use as directed     Fioricet 50mg/300mg/40mg Capsules One to 2 PO Q 4 hours. Max of 4 in 24 hr period.     Erythromycin Ophthalmic 0.5% Ophthalmic Ointment Apply 1/2 inch ribbon of ointment to affected eye(s) bid for 10 days     Symbicort 160/4.5 Oral Inhaler 2 puffs BID     Mupirocin 2% Topical Ointment apply to nares and rectal area daily x 10 days     Doxycycline Monohydrate 100mg Tablet Take 1 tablet(s) by mouth bid     Miconazole Nitrate 2% Vaginal Cream Apply externally bid x 7 days.     Meloxicam 15mg Tablet 1/2 to 1 prn pain to be take with food     ProAir HFA 90mcg/1actuation Oral Inhaler PRN     allergy shots once a week     One Touch UltraMini Monitor Kit check blood sugar 6 times day     Aleve 220mg Tablet     [employer] Pen Needles 31G 8mm #100         OBJECTIVE:        Vitals:         Current: 5/1/2018 10:00:31 AM    Ht:  5 ft, 4.25 in;  Wt: 254.3 lbs;  BMI: 43.3    T: 97.4 F (oral);  BP: 113/71 mm Hg (right arm, sitting);  P: 86 bpm (right arm (BP Cuff), sitting);  sCr: 0.76 mg/dL;  GFR: 105.28        Exams:     PHYSICAL EXAM:     GENERAL: vital signs recorded - morbidly obese;  well groomed;  no apparent distress;     NECK: " supple, no LAD/TM;     RESPIRATORY: normal respiratory rate and pattern with no distress; normal breath sounds with no rales, rhonchi, wheezes or rubs;     CARDIOVASCULAR: normal rate; rhythm is regular;     GASTROINTESTINAL: rectal exam: erythema around the buttocks;     GENITOURINARY: external genitalia: open I and D carbuncle is not draining, healing from inside out with good granulation tissue formation, no induration to indicate another abscess;         ASSESSMENT           041.19   B95.62  MRSA wound              DDx:     616.4   N76.89  Carbuncle of vulva              DDx:     719.41   M25.511  Shoulder pain              DDx:     112.3   B37.2  Candidiasis, of skin and nails              DDx:         PLAN:          MRSA wound  cont doxy and nasal mupirocin to nares and rectum          Carbuncle of vulva s/p I and D, cont doxy and bactrim-area is soft and healing nicely,  to touch, cont SITZ baths          Shoulder pain surgery is sched for tomorrow and Dr Turner is aware of the MRSA infection          Candidiasis, of skin and nails cont topical antifungal cream             CHARGE CAPTURE           **Please note: ICD descriptions below are intended for billing purposes only and may not represent clinical diagnoses**        Primary Diagnosis:         041.19 MRSA wound            B95.62    Methicillin resistant Staphylococcus aureus infection as the cause of diseases classified elsewhere              Orders:          25278   Office/outpatient visit; established patient, level 4  (In-House)           616.4 Carbuncle of vulva            N76.89    Other specified inflammation of vagina and vulva    719.41 Shoulder pain            M25.511    Pain in right shoulder    112.3 Candidiasis, of skin and nails            B37.2    Candidiasis of skin and nail

## 2021-05-18 NOTE — PROGRESS NOTES
Nory Rodriguez 1965     Office/Outpatient Visit    Visit Date: Mon, Sep 30, 2019 01:06 pm    Provider: Eileen Barajas MD (Assistant: Monika Puga LPN)    Location: [facility]        Electronically signed by Eileen Barajas MD on  10/14/2019 12:08:39 PM                             SUBJECTIVE:        CC:     Cherelle is a 54 year old White female.  This is a follow-up visit.  NIDDM         HPI:         Patient presents with type II diabetes requiring insulin.  Cherelle has type 1 diabetes without complications.  Compliance with treatment has been fair; she does not follow a diet and exercise regimen.  Patient's diabetes was first diagnosed >5 years ago.  Current meds include an oral hypoglycemic ( Glucophage ) and insulin ( (SLIDING SCALE NOVOLOG GIVEN 24/20/24 UNITS AND TOUJEO  UNITS BID, AND TRULICITY IS 1.75 WEEKLY ).  She reports home blood glucose readings have been high, with average fasting readings in the mid-200s mg/dL range. She checks her glucose 1 to 2 times daily.  Most recent lab results include.  Hemoglobin A1c:  10.9 (%) (06/24/2019), LDL:  70 (mg/dL) (06/24/2019), HDL:  29 (mg/dL) (06/24/2019), Triglycerides:  191 (mg/dL) (06/24/2019), Microalbuminuria:  12.7 (mg/g creat) (06/24/2019) In regard to preventative care, her last ophthalmology exam was in 8/26/19.          Hypercholesterolemia details; date of diagnosis 2010.  Current treatment includes Lipitor and a low cholesterol/low fat diet.  Compliance with treatment has been good; she maintains her low cholesterol diet.  She denies experiencing any hypercholesterolemia related symptoms.          Concerning hypertension, this was first diagnosed one year ago.  Her current cardiac medication regimen includes a diuretic ( hctz ) and an ACE inhibitor ( lisinopril 2.5 mg for her diabetes ).  She is tolerating the medication well without side effects.  Compliance with treatment has been good; she takes her medication as directed,  maintains her diet and exercise regimen, and follows up as directed.      chronic esophagitis with polyps, she is omeprazole/carafate, last  EGD was 2018 with Dr Zhang     ROS:     CONSTITUTIONAL:  Negative for chills, fatigue, fever, and weight change.      EYES:  Negative for eye drainage.      E/N/T:  Negative for ear pain, sinus pressure and sore throat.      CARDIOVASCULAR:  Negative for chest pain, palpitations, tachycardia, orthopnea, and edema.      RESPIRATORY:  Negative for cough, dyspnea, and hemoptysis.      GASTROINTESTINAL:  Negative for abdominal pain, heartburn, constipation, diarrhea, and stool changes.      MUSCULOSKELETAL:  Positive for joint stiffness (shoulder).      INTEGUMENTARY/BREAST:  Negative for rash.      NEUROLOGICAL:  Positive for headaches.   Negative for dizziness or weakness.      PSYCHIATRIC:  Positive for sleep disturbance.   Negative for anxiety or suicidal thoughts.          PMH/FMH/SH:     Last Reviewed on 2019 01:51 PM by Eileen Barajas    Past Medical History:                 PAST MEDICAL HISTORY         Hepatitis: type A;     Allergies    GERD    IDDM    hypercholesterolemia     Back pain     Breast lump     Cellulitis of the chest wall     Classic migraine     Congenital accessory skin tags     H/O Depression     GERD     chronic hoarseness     Hypertension     H/O Migraine headaches     Obstructive sleep apnea    Peripheral neuropathy     Post-menopausal    h/o Uterine polyp             GYNECOLOGICAL HISTORY:     miscarriage 1    Contraception: S/P tubal ligation;         CURRENT MEDICAL PROVIDERS:    Allergist: Dr. Delgadillo    Ophthalmologist: Ebenezer    Orthopedist: Antony Ewing, Shabnam Miguel Endocrinologist         PREVENTIVE HEALTH MAINTENANCE             BONE DENSITY: was last done 17 with the following abnormality noted-- Osteopenic at L1 in Lumbar Spine Only     COLORECTAL CANCER SCREENING: Up to date (colonoscopy q10y; sigmoidoscopy  q5y; Cologuard q3y) was last done 18, Results are in chart     EYE EXAM: Diabetic Eye Exam during this calendar year and results are in chart was last done 19     MAMMOGRAM: Done within last 2 years and results in are chart was last done 12-3-18 with normal results     PAP SMEAR: was last done ZEESHAN BSO no longer needs PAPs         Surgical History:         Cholecystectomy    Hysterectomy: TAHBSO;     cataracts removed b 2018      pericardial window ; Procedures: heart cath          Family History:     Father: Coronary Artery Disease; ;  Cerebrovascular Accident     Mother: Hyperlipidemia;  Endometrial Cancer     Brother(s): Type 2 Diabetes     Paternal Grandfather: Type 2 Diabetes     Paternal Grandmother:      Maternal Grandfather: Type 2 Diabetes     Maternal Grandmother: Breast Cancer;  Alzheimer's Disease         Social History:     Occupation: [employer]     Marital Status:      Children: 1 child and 2 step-children         Tobacco/Alcohol/Supplements:     Last Reviewed on 2019 01:51 PM by Eileen Barajas    Tobacco: She has never smoked.  Non-drinker         Substance Abuse History:     Last Reviewed on 2019 01:57 PM by Santa Tobar        Mental Health History:     Last Reviewed on 2019 01:57 PM by Santa Tobar        Communicable Diseases (eg STDs):     Last Reviewed on 2019 01:57 PM by Santa Tobar            Allergies:     Last Reviewed on 2019 03:29 PM by Jenny White    Levemir:    Cipro:    Nitroglycerin:    Bydureon: nausea (Adverse Reaction)    Codeine:    Aspirin:    Sumatriptan: dizziness (Adverse Reaction)        Current Medications:     Last Reviewed on 2019 05:30 PM by Pa, Team    Lisinopril 2.5mg Tablet Take 1 tablet(s) by mouth daily     Symbicort 160mcg/4.5mcg Oral Inhaler 2 puffs BID     Singulair  10mg Tablet Take 1 tablet(s) by mouth each evening     Topamax 100mg Tablet 1 tab BID  "    Atorvastatin Calcium 10mg Tablet 1 tab qhs     Promethazine HCl 25mg Tablet 1/2 - 1 tab q 4-6 hrs prn   prn nausea and vomiting     Humalog KwikPen 200units/1ml Pen System, Disposable sliding scale     Toujeo SoloStar 300units/1ml Injection 100 units bid     Trulicity PEN 1.5mg/0.5ml Injection every Sunday     Estradiol 0.5mg Tablet 1 tab daily PO     Omeprazole 40mg Capsules, Extended Release 1 tab BID     Fosamax 70mg Tablet Take 1 tablet(s) by mouth q week     Lancet   Lancet Onet touch Fine Point Lancets use wiht one touch glucometer, check blood sugars 4 times daily     One Touch Ultra Blue Test Strips  Reagent Strips Test Blood Sugar QID     BD Ultra-Fine Original Pen Needle 29G x 1/2\"  Pen Needle Use as directed QID w/Lantus and Novalog Pen     Insulin Syringes 0.5ml Syringe Use as directed     Jardiance 10mg Tablet 1 TAB DAILY     Glucagon Emergency     Hydrochlorothiazide (HCTZ) 12.5mg Tablet 1 po daily     Metformin HCl 500mg Tablets, Extended Release 1 tab bid     Diclofenac Sodium 75mg Tablets, Enteric Coated 1 tab bid with food     Diltiazem HCl 120mg Capsules, Extended Release 1 tab daily     Zyrtec 10mg Tablet 1 bid     ProAir HFA 90mcg/1actuation Oral Inhaler PRN     allergy shots once a week     One Touch UltraMini Monitor Kit check blood sugar 6 times day     Aleve 220mg Tablet     [employer] Pen Needles 31G 8mm #100     Vitamin D3 5,000IU Capsules 1 capsule 4 days a week and 2 capsules 3 days a week         OBJECTIVE:        Vitals:         Current: 9/30/2019 1:20:43 PM    Ht:  5 ft, 4.25 in;  Wt: 256.6 lbs;  BMI: 43.7    T: 97.6 F (oral);  BP: 110/50 mm Hg (right arm, sitting);  P: 80 bpm (right arm (BP Cuff), sitting);  sCr: 1.12 mg/dL;  GFR: 70.09        Exams:     PHYSICAL EXAM:     GENERAL: vital signs recorded - morbidly obese;  well groomed;  no apparent distress;     NECK: supple, no LAD/TM;     RESPIRATORY: normal respiratory rate and pattern with no distress; normal breath sounds with no " rales, rhonchi, wheezes or rubs;     CARDIOVASCULAR: normal rate; rhythm is regular;     GASTROINTESTINAL: nontender, nondistended; no hepatosplenomegaly or masses; no bruits;     MUSCULOSKELETAL: normal gait; L wrist-medial cyst 3-4 cm noted, tender to palpation;     NEUROLOGICAL:  cranial nerves, motor and sensory function, reflexes, gait and coordination are all intact;     PSYCHIATRIC:  appropriate affect and demeanor; normal speech pattern; grossly normal memory;     Left foot exam    Protective sensation using Monofilament test: NORMAL sensation. Patient detects .07 grams of force which is considered normal.    Vascular status: normal peripheral vascular exam with palpable dorsal pedal and posterior tibal pulses and brisk digital capillary refill    Skin is intact without sores or ulcers    Right foot exam    Protective sensation using Monofilament test: Slightly decreased, with estimated force of 0.2 grams applied.    Vascular status: normal peripheral vascular exam with palpable dorsal pedal and posterior tibal pulses and brisk digital capillary refill    Skin is intact without sores or ulcers         Procedures:     Allergic rhinitis     exp 8/14/19/ pdr             ASSESSMENT           250.00   E11.42  Type II diabetes requiring insulin              DDx:     272.0   E78.00  Hypercholesterolemia              DDx:     401.1   I10  Hypertension              DDx:     356.9   G60.9  Peripheral neuropathy              DDx:     530.81   K21.9  GERD              DDx:     477.9   J30.9  Allergic rhinitis              DDx:     268.9   E55.9  Vitamin D deficiency, unspecified              DDx:     V04.81   Z23  Vaccination against other viral diseases, Influenza              DDx:     719.43   M25.532  Wrist pain              DDx:     719.46   M25.562  Knee pain              DDx:     V72.86   Z01.83  Encounter for blood typing              DDx:         ORDERS:         Meds Prescribed:       Refill of: Toujeo SoloStar  "(Insulin Glargine (rDNA)) 300units/1ml Injection 120 units bid  #27 (Twenty Seven) prefilled pen Refills: 1       Refill of: Estradiol 0.5mg Tablet 1 tab daily PO  #90 (Ninety) tablet(s) Refills: 1       Refill of: BD Ultra-Fine Original Pen Needle 29G x 1/2\" (Insulin Pen Device) Pen Needle use bid with toujeo  #180 (One [place] and Eighty) each Refills: 5       Needles (general) Needle 31 guage X 5/16 QID with humalog  #360 (Three [place] and Sixty) each Refills: 3         Lab Orders:       22763  VITD - H Vitamin D, 25 Hydroxy  (Send-Out)         87375  DIAB2 - [facility] CMP A1C LIPID AND MICRO ALBUM CR RATIO: [id number]  (Send-Out)         81363  Blood typing serologic ABO  (Send-Out)           Procedures Ordered:       21645  Immunization administration; one vaccine  (In-House)           Other Orders:       19410  Influenza virus vaccine, quadrivalent, split virus, preservative free 3 years of age & older  (In-House)         2028F  Foot examination performed (includes examination through visual inspection, sensory exam with monofi  (In-House)                   PLAN:          Type II diabetes requiring insulin under management with Mago Holman with endocrinology for diabetes. She has her foot exams Dr Marin and she is now on a derm glucose monitor.  She is on metformin, Tuojeo, Trulicity, humalog SSI s/p cataract surgery for cataracts, eye exam UTD-done in Aug 2019, foot exam noted above     LABORATORY:  Labs ordered to be performed today include Diabetes Panel 2;CMP, A1C, Lipid, Microalbumin:Creatinine Ratio.            Prescriptions:       Refill of: Toujeo SoloStar (Insulin Glargine (rDNA)) 300units/1ml Injection 120 units bid  #27 (Twenty Seven) prefilled pen Refills: 1       Refill of: Estradiol 0.5mg Tablet 1 tab daily PO  #90 (Ninety) tablet(s) Refills: 1       Refill of: BD Ultra-Fine Original Pen Needle 29G x 1/2\" (Insulin Pen Device) Pen Needle use bid with toujeo  #180 (One [place] " and Eighty) each Refills: 5       Needles (general) Needle 31 guage X 5/16 QID with humalog  #360 (Three [place] and Sixty) each Refills: 3           Orders:       05592  DIAB2 - [facility] CMP A1C LIPID AND MICRO ALBUM CR RATIO: 24106,68227,99906,20314,77997  (Send-Out)            Hypercholesterolemia stable on meds          Hypertension stable          Peripheral neuropathy cont diabetic shoes          GERD stable on carafate and omeprazole          Allergic rhinitis stable overall, chronic issues          Vitamin D deficiency, unspecified on 5000 units daily     LABORATORY:  Labs ordered to be performed today include Vitamin D.            Orders:       08428  VITD - [facility] Vitamin D, 25 Hydroxy  (Send-Out)            Vaccination against other viral diseases, Influenza           Orders:       18974  Influenza virus vaccine, quadrivalent, split virus, preservative free 3 years of age & older  (In-House)         98535  Immunization administration; one vaccine  (In-House)            Wrist pain acute wrist pain in her bad wrist L side-she is seeing Dr Pretty and he thinks she has a cyst and is setting her up for MRI          Knee pain seeing Dr Pretty          Encounter for blood typing           Orders:       02295  Blood typing serologic ABO  (Send-Out)               Other Orders:       61125  Office/outpatient visit; established patient, level 4  (In-House)         2028F  Foot examination performed (includes examination through visual inspection, sensory exam with monofi  (In-House)           CHARGE CAPTURE           **Please note: ICD descriptions below are intended for billing purposes only and may not represent clinical diagnoses**        Primary Diagnosis:         250.00 Type II diabetes requiring insulin            E11.42    Type 2 diabetes mellitus with diabetic polyneuropathy    272.0 Hypercholesterolemia            E78.00    Pure hypercholesterolemia, unspecified    401.1 Hypertension            I10    Essential  (primary) hypertension    356.9 Peripheral neuropathy            G60.9    Hereditary and idiopathic neuropathy, unspecified    530.81 GERD            K21.9    Gastro-esophageal reflux disease without esophagitis    477.9 Allergic rhinitis            J30.9    Allergic rhinitis, unspecified    268.9 Vitamin D deficiency, unspecified            E55.9    Vitamin D deficiency, unspecified    V04.81 Vaccination against other viral diseases, Influenza            Z23    Encounter for immunization              Orders:          98948   Influenza virus vaccine, quadrivalent, split virus, preservative free 3 years of age & older  (In-House)             23609   Immunization administration; one vaccine  (In-House)           719.43 Wrist pain            M25.532    Pain in left wrist    719.46 Knee pain            M25.562    Pain in left knee    V72.86 Encounter for blood typing            Z01.83    Encounter for blood typing        Other Orders:           73174   Office/outpatient visit; established patient, level 4  (In-House)             2028F   Foot examination performed (includes examination through visual inspection, sensory exam with monofi  (In-House)

## 2021-05-18 NOTE — PROGRESS NOTES
Nory RodriguezShawna 1965     Office/Outpatient Visit    Visit Date: Mon, Jul 8, 2019 02:55 pm    Provider: Krissy Lazcano N.P. (Assistant: Sarah Spurling, MA)    Location: [facility]        Electronically signed by Krissy Lazcano N.P. on  07/10/2019 08:09:14 AM                             SUBJECTIVE:        CC:     Cherelle is a 54 year old White female.  Pt says since she started her water pill she has had leg pain and she is thirsty all the time. Pt fell on saturday as well and hurt her left hand.;         HPI:         Cherelle presents with leg pain.  This is bilateral.  The location of the discomfort is primarily the back lower calf pain.  The pain initially began 1 to 2 weeks ago.  She characterizes the pain as stabbing.  Palliative factors include compression, elevation, and ice.  correlating with onset on Januvia Was started on HCTZ 1 month ago. Two weeks ago was started on Januvia. Sunday increased HCTZ dose to 12.5mg daily. Today got lab work per Endocrinologist.         With regard to the wrist pain,     Cherelle complains of left wrist pain.  It is fairly generalized.  The pain radiates to the thumb.  She describes it as moderate in intensity and throbbing.  Associated symptoms include stiffness, swelling and weakness She denies associated crepitus or erythema.  It is aggravated by supination and pronation.  The initial onset of discomfort was 2 days ago.  The precipitating event was weight bearing on the wrist, lifting, and a fall onto the outstretched hand.  The actual mechanism of the injury was wrist flexion.          PHQ-9 Depression Screening: Completed form scanned and in chart; Total Score 4 Alcohol Consumption Screening: Completed form scanned and in chart; Total Score 0     ROS:     CONSTITUTIONAL:  Negative for chills, fatigue, fever, and weight change.      EYES:  Negative for blurred vision.      CARDIOVASCULAR:  Negative for chest pain, orthopnea, paroxysmal nocturnal dyspnea  and pedal edema.      RESPIRATORY:  Negative for dyspnea.      GASTROINTESTINAL:  Negative for abdominal pain, constipation, diarrhea, nausea and vomiting.      GENITOURINARY:  Negative for dysuria and frequent urination.      MUSCULOSKELETAL:  Positive for limb pain ( bilateral leg pain; bilateral lower calf pain ) and left wrist pain.      NEUROLOGICAL:  Negative for dizziness, headaches, paresthesias, and weakness.      PSYCHIATRIC:  Negative for anxiety, depression, and sleep disturbances.          PMH/FMH/SH:     Last Reviewed on 2019 09:43 PM by Santa Tobar    Past Medical History:                 PAST MEDICAL HISTORY         Hepatitis: type A;     Allergies    GERD    IDDM    hypercholesterolemia     Back pain     Breast lump     Cellulitis of the chest wall     Classic migraine     Congenital accessory skin tags     H/O Depression     GERD     chronic hoarseness     Hypertension     H/O Migraine headaches     Obstructive sleep apnea    Peripheral neuropathy     Post-menopausal    h/o Uterine polyp             GYNECOLOGICAL HISTORY:     miscarriage 1    Contraception: S/P tubal ligation;         CURRENT MEDICAL PROVIDERS:    Allergist: Dr. Delgadillo    Ophthalmologist: Ebenezer    Orthopedist: Antony Ewing, Shabnam Miguel Endocrinologist         PREVENTIVE HEALTH MAINTENANCE             BONE DENSITY: was last done 17 with the following abnormality noted-- Osteopenic at L1 in Lumbar Spine Only     COLORECTAL CANCER SCREENING: Up to date (colonoscopy q10y; sigmoidoscopy q5y; Cologuard q3y) was last done 18, Results are in chart     EYE EXAM: was last done 2019     MAMMOGRAM: Done within last 2 years and results in are chart was last done 12-3-18 with normal results     PAP SMEAR: was last done ZEESHAN BSO no longer needs PAPs         Surgical History:         Cholecystectomy    Hysterectomy: TAHBSO;     cataracts removed b 2018      pericardial window ; Procedures: heart cath           Family History:     Father: Coronary Artery Disease; ;  Cerebrovascular Accident     Mother: Hyperlipidemia;  Endometrial Cancer     Brother(s): Type 2 Diabetes     Paternal Grandfather: Type 2 Diabetes     Paternal Grandmother:      Maternal Grandfather: Type 2 Diabetes     Maternal Grandmother: Breast Cancer;  Alzheimer's Disease         Social History:     Occupation: [employer]     Marital Status:      Children: 1 child and 2 step-children         Tobacco/Alcohol/Supplements:     Last Reviewed on 2019 02:58 PM by Spurling, Sarah C    Tobacco: She has never smoked.  Non-drinker         Substance Abuse History:     Last Reviewed on 2019 09:43 PM by Santa Tobar        Mental Health History:     Last Reviewed on 2019 09:43 PM by Santa Tobar        Communicable Diseases (eg STDs):     Last Reviewed on 2019 09:43 PM by Santa Tobar            Current Problems:     Last Reviewed on 2019 09:43 PM by Santa Tobar    Sleep apnea with sleep disturbance     Vitamin D deficiency, unspecified     Moderate asthma (exacerbation)     Near-syncope     Obesity, NOS     Low back pain     Osteopenia     Other viral hepatitis carrier     Screening for cancer of colon     History of noncompliance with medical treatment     Overweight     GERD     Common migraine     Peripheral neuropathy     Pressure ulcer stage I     Morbid obesity     Chronic insomnia     Degenerative disc disease     Nephrolithiasis     Allergic rhinitis     Congenital accessory skin tags     Post-menopausal HRT     Type II diabetes requiring insulin     Obstructive sleep apnea     Uterine polyp     Hypercholesterolemia     Hypertension     Depression     GE reflux disease     Screening for depression     Wrist pain     Leg pain     Toe onychomycosis     Shoulder pain     MRSA wound         Immunizations:     zzFluzone pf-quadrivalent 3 and up 2015     zzFluzone  "pf-quadrivalent 3 and up 10/26/2016     zzFluzone pf-quadrivalent 3 and up 9/25/2017     Fluzone (3 + years dose) 9/14/2009     Fluzone (3 + years dose) 10/27/2012     Fluzone pf (3+ years dose) 10/14/2013     Fluzone pf (3+ years dose) 10/7/2014     Fluzone Quadrivalent (3+ years) 9/17/2018     PNEUMOVAX 23 (Pneumococcal PPV23) 11/29/2016     Tdap (Tetanus, reduced diph, acellular pertussis) 9/29/2015         Allergies:     Last Reviewed on 7/08/2019 02:58 PM by Spurling, Sarah C    Levemir:    Cipro:    Nitroglycerin:    Januvia: vomiting (Adverse Reaction)    Bydureon: nausea (Adverse Reaction)    Codeine:    Aspirin:    Sumatriptan: dizziness (Adverse Reaction)        Current Medications:     Last Reviewed on 7/08/2019 03:00 PM by Spurling, Sarah C    Lisinopril 2.5mg Tablet Take 1 tablet(s) by mouth daily     Singulair  10mg Tablet Take 1 tablet(s) by mouth each evening     Topamax 100mg Tablet 1 tab BID     Atorvastatin Calcium 10mg Tablet 1 tab qhs     Promethazine HCl 25mg Tablet 1/2 - 1 tab q 4-6 hrs prn   prn nausea and vomiting     Humalog KwikPen 200units/1ml Pen System, Disposable sliding scale     Toujeo SoloStar 300units/1ml Injection 100 units bid     Trulicity PEN 1.5mg/0.5ml Injection every Sunday     Estradiol 0.5mg Tablet 1 tab daily PO     Omeprazole 40mg Capsules, Extended Release 1 tab BID     Fosamax 70mg Tablet Take 1 tablet(s) by mouth q week     Lancet   Lancet Onet touch Fine Point Lancets use wiht one touch glucometer, check blood sugars 4 times daily     One Touch Ultra Blue Test Strips  Reagent Strips Test Blood Sugar QID     BD Ultra-Fine Original Pen Needle 29G x 1/2\"  Pen Needle Use as directed QID w/Lantus and Novalog Pen     Insulin Syringes 0.5ml Syringe Use as directed     Zyrtec 10mg Tablet 1 bid     Vitamin D3 5,000IU Capsules 1 capsule 4 days a week and 2 capsules 3 days a week     Glucagon Emergency     Hydrochlorothiazide (HCTZ) 12.5mg Tablet 1 po daily     Metformin HCl " 500mg Tablets, Extended Release 1 tab bid     Diclofenac Sodium 75mg Tablets, Enteric Coated 1 tab bid with food     Diltiazem HCl 120mg Capsules, Extended Release 1 tab daily     ProAir HFA 90mcg/1actuation Oral Inhaler PRN     allergy shots once a week     One Touch UltraMini Monitor Kit check blood sugar 6 times day     Aleve 220mg Tablet     [employer] Pen Needles 31G 8mm #100         OBJECTIVE:        Vitals:         Current: 7/8/2019 3:01:34 PM    Ht:  5 ft, 4.25 in;  Wt: 256.4 lbs;  BMI: 43.7    T: 98.2 F (oral);  BP: 118/61 mm Hg (left arm, sitting);  P: 84 bpm (left arm (BP Cuff), sitting);  sCr: 0.76 mg/dL;  GFR: 103.26        Exams:     PHYSICAL EXAM:     GENERAL:  well developed and nourished; appropriately groomed; in no apparent distress;     RESPIRATORY: normal respiratory rate and pattern with no distress; normal breath sounds with no rales, rhonchi, wheezes or rubs;     CARDIOVASCULAR: normal rate; rhythm is regular;  no systolic murmur; no edema;     LYMPHATIC: no enlargement of cervical or facial nodes; no supraclavicular nodes;     BREAST/INTEGUMENT: SKIN: no significant rashes or lesions; no suspicious moles;     MUSCULOSKELETAL: normal gait; decreased range of motion noted in: left wrist flexion;  pain with range of motion in: left wrist flexion; lower legs bilaterally - with dorsiflexion of foot and palpation;     NEUROLOGIC: mental status: alert and oriented x 3; GROSSLY INTACT     PSYCHIATRIC:  appropriate affect and demeanor; normal speech pattern; grossly normal memory;         Procedures:     Allergic rhinitis     Allergy Injection (2 or more) exp 4/25/20/ pdr             ASSESSMENT           729.5   M79.662   M79.661  Leg pain              DDx:     719.43   M25.532  Wrist pain              DDx:     250.00   E11.42  Type II diabetes requiring insulin              DDx:     401.1   I10  Hypertension              DDx:     356.9   G60.9  Peripheral neuropathy              DDx:     278.02   E66.3   Overweight              DDx:     477.9   J30.9  Allergic rhinitis              DDx:     V79.0   Z13.89  Screening for depression              DDx:         ORDERS:         Radiology/Test Orders:       [id number]  Left radiologic examination; wrist, complete three views  (Send-Out)         03499  Professional services for allergen immunotherapy not including provision of allergenic extracts; two  (In-House)                   PLAN:          Leg pain        RECOMMENDATIONS given include: stop Januvia. Patient to notify Endocrinology regarding stopping Januvia.  Recommended trial of Januvia cessation to see improvement..           Wrist pain Recommended to use ice and Tylenol for pain and swelling of left wrist. Recommended left wrist splint if pain persists pending results of left wrist x-ray.         RADIOLOGY:  I have ordered a left wrist x-ray to be done today.            Orders:       [id number]  Left radiologic examination; wrist, complete three views  (Send-Out)            Type II diabetes requiring insulin Trial hold of Januvia. If calf pain resolves after cessation of Januvia, then probable correlation of medication. Patient to notify Endocrinology for further recommendations.          Hypertension Labs pending regarding HCTZ SE. BP in office stable today.          Peripheral neuropathy Consider future work-up if all else negative.          Overweight Recommended weight loss.          Allergic rhinitis           Orders:       [id number]  Professional services for allergen immunotherapy not including provision of allergenic extracts; two  (In-House)               Patient Recommendations:        Leg pain    I also recommend stop Januvia. Patient to notify Endocrinology regarding stopping Januvia.  Recommended trial of Januvia cessation to see improvement..              CHARGE CAPTURE           **Please note: ICD descriptions below are intended for billing purposes only and may not represent clinical diagnoses**         Primary Diagnosis:         729.5 Leg pain            M79.662    Pain in left lower leg           M79.661    Pain in right lower leg              Orders:          [id number]   Office/outpatient visit; established patient, level 3  (In-House)           719.43 Wrist pain            M25.532    Pain in left wrist    250.00 Type II diabetes requiring insulin            E11.42    Type 2 diabetes mellitus with diabetic polyneuropathy    401.1 Hypertension            I10    Essential (primary) hypertension    356.9 Peripheral neuropathy            G60.9    Hereditary and idiopathic neuropathy, unspecified    278.02 Overweight            E66.3    Overweight    477.9 Allergic rhinitis            J30.9    Allergic rhinitis, unspecified              Orders:          10332   Professional services for allergen immunotherapy not including provision of allergenic extracts; two  (In-House)           V79.0 Screening for depression            Z13.89    Encounter for screening for other disorder            No

## 2021-05-18 NOTE — PROGRESS NOTES
Nory RodriguezShawna 1965     Office/Outpatient Visit    Visit Date: Mon, Dec 3, 2018 08:50 am    Provider: Eileen Barajas MD (Assistant: Ayde Chatman RN)    Location: [facility]        Electronically signed by Eileen Barajas MD on  12/05/2018 08:56:10 AM                             SUBJECTIVE:        CC:     Cherelle is a 53 year old White female.  annual physical, scheduled for mammo today at 11; PATIENT TAKING MELOXICAM AND DICLOFENAC         HPI:         Well Woman Exam noted.  Her last physical exam was 1 year ago.  She is status-post hysterectomy.  She performs breast self-exams every week weeks.    Her last Pap smear was 3 years ago and s/p hysterectomy.   Her last mammogram was in 9/26/17 and was normal.   Her last DEXA was in 9/25/18 and showed osteopenia.   She underwent colonoscopy in 2018 with normal results.   She's had vision screening done in 8/2018 and s/p cataract surgery B in April-Dr Love.   Preventative Health updated today.  She is current with her Td and influenza immunization.  Tobacco: She has never smoked.              PHQ-9 Depression Screening: Completed form scanned and in chart; Total Score 8 Alcohol Consumption Screening: Completed form scanned and in chart; Total Score 0         Dx with hypercholesterolemia; date of diagnosis 2010.  Current treatment includes Lipitor and a low cholesterol/low fat diet.  Compliance with treatment has been good; she maintains her low cholesterol diet.  She denies experiencing any hypercholesterolemia related symptoms.          Concerning hypertension, this was first diagnosed one year ago.  Her current cardiac medication regimen includes an ACE inhibitor ( lisinopril 2.5 mg for her diabetes ).  She is tolerating the medication well without side effects.  Compliance with treatment has been good; she takes her medication as directed, maintains her diet and exercise regimen, and follows up as directed.          Type II diabetes  requiring insulin details; specifically, this is type 2, insulin requiring diabetes, complicated by peripheral neuropathy.  Compliance with treatment has been poor; she does not follow a diet and exercise regimen.      Tobacco screen: Non-smoker.  Current meds include insulin/injectable ( Humalog- about 30 units with meals, SSI and carb counting; toujeo-100 units bid ), an ACE inhibitor, aspirin, and a lipid lowering agent.  She reports home blood glucose readings have averaged fasting readings in the low 200s mg/dL range. She checks her glucose 3 to 4 times daily.  Most recent lab results include Hemoglobin A1c:  11.4 (09/17/2018), HDL:  35 (mg/dL) (06/15/2018), LDL:  92 (mg/dL) (06/15/2018), Microalbumin, Urine, rand:  <12.0 (mg/L) (06/15/2018), Triglycerides:  219 (mg/dL) (06/15/2018), Microalbuminuria:  13.4 (mg/g creat) (06/15/2018).  Has not fallen recently In regard to preventative care, she performs foot self-exams several days per week and her last ophthalmology exam was in 8/2018-Dr Hernández- NO DIABETIC RETINOPATHY, she has cataracts.      she had a near syncopal episode at work last week, BS were in 600's and she was given her insulin, she came in and was seen, she has been doing fine since then, her BS run around 220 fasting     ROS:     CONSTITUTIONAL:  Negative for chills, fatigue, fever, and weight change.      EYES:  Negative for blurred vision, eye pain, and photophobia.      E/N/T:  Negative for nasal congestion, frequent rhinorrhea and sore throat.      CARDIOVASCULAR:  Negative for chest pain, palpitations, tachycardia, orthopnea, and edema.      RESPIRATORY:  Negative for cough, dyspnea, and hemoptysis.      GASTROINTESTINAL:  Negative for abdominal pain, heartburn, constipation, diarrhea, and stool changes.      GENITOURINARY:  Positive for vaginal discharge and vaginal itching.   Negative for dysuria or urinary incontinence.      MUSCULOSKELETAL:  Negative for arthralgias, back pain, and  myalgias.      INTEGUMENTARY/BREAST:  Negative for atypical moles, dry skin, pruritis, rashes, breast masses, and nipple discharge.      NEUROLOGICAL:  Negative for dizziness, headaches, paresthesias, and weakness.      PSYCHIATRIC:  Negative for anxiety, depression, and sleep disturbances.          PMH/FMH/SH:     Last Reviewed on 2018 09:34 AM by Eileen Barajas    Past Medical History:                 PAST MEDICAL HISTORY         Hepatitis: type A;     Allergies    GERD    IDDM    hypercholesterolemia     Back pain     Breast lump     Cellulitis of the chest wall     Classic migraine     Congenital accessory skin tags     H/O Depression     GERD     chronic hoarseness     Hypertension     H/O Migraine headaches     Obstructive sleep apnea    Peripheral neuropathy     Post-menopausal    h/o Uterine polyp             GYNECOLOGICAL HISTORY:     miscarriage 1    Contraception: S/P tubal ligation;         CURRENT MEDICAL PROVIDERS:    Allergist: Dr. Delgadillo    Ophthalmologist: Ebenezer    Orthopedist: Antony Ewing, Shabnam Miguel Endocrinologist         PREVENTIVE HEALTH MAINTENANCE             BONE DENSITY: was last done 17 with the following abnormality noted-- Osteopenic at L1 in Lumbar Spine Only     COLORECTAL CANCER SCREENING: Up to date (colonoscopy q10y; sigmoidoscopy q5y; Cologuard q3y) was last done 18, Results are in chart     MAMMOGRAM: Done within last 2 years and results in are chart was last done 17 with normal results     PAP SMEAR: was last done ZEESHAN BSO no longer needs PAPs         Surgical History:         Cholecystectomy    Hysterectomy: TAHBSO;     cataracts removed b 2018      pericardial window ; Procedures: heart cath          Family History:     Father: Coronary Artery Disease; ;  Cerebrovascular Accident     Mother: Hyperlipidemia;  Endometrial Cancer     Brother(s): Type 2 Diabetes     Paternal Grandfather: Type 2 Diabetes     Paternal  Grandmother:      Maternal Grandfather: Type 2 Diabetes     Maternal Grandmother: Breast Cancer;  Alzheimer's Disease         Social History:     Occupation: [employer]     Marital Status:      Children: 1 child and 2 step-children         Tobacco/Alcohol/Supplements:     Last Reviewed on 2018 09:34 AM by Eileen Barajas    Tobacco: She has never smoked.  Non-drinker         Substance Abuse History:     Last Reviewed on 2018 12:06 PM by Nory Sauceda        Mental Health History:     Last Reviewed on 2018 12:06 PM by Nory Sauceda        Communicable Diseases (eg STDs):     Last Reviewed on 2018 12:06 PM by Nory Sauceda            Immunizations:     zzFluzone pf-quadrivalent 3 and up 2015     zzFluzone pf-quadrivalent 3 and up 10/26/2016     zzFluzone pf-quadrivalent 3 and up 2017     Fluzone (3 + years dose) 2009     Fluzone (3 + years dose) 10/27/2012     Fluzone pf (3+ years dose) 10/14/2013     Fluzone pf (3+ years dose) 10/7/2014     Fluzone Quadrivalent (3+ years) 2018     PNEUMOVAX 23 (Pneumococcal PPV23) 2016     Tdap (Tetanus, reduced diph, acellular pertussis) 2015         Allergies:     Last Reviewed on 2018 12:06 PM by Nory Sauceda    Levemir:    Cipro:    Nitroglycerin:    Januvia: vomiting (Adverse Reaction)    Bydureon: nausea (Adverse Reaction)    Metformin HCl: vomiting (Adverse Reaction)    Codeine:    Aspirin:    Sumatriptan: dizziness (Adverse Reaction)        Current Medications:     Last Reviewed on 2018 08:55 AM by Ayde Chatman    Lisinopril 2.5mg Tablet Take 1 tablet(s) by mouth daily     Singulair  10mg Tablet Take 1 tablet(s) by mouth each evening     Topamax 100mg Tablet 1 tab BID     Atorvastatin Calcium 10mg Tablet 1 tab qhs     Fosamax 70mg Tablet Take 1 tablet(s) by mouth q week     Betamethasone/Clotrimazole 0.05%/1% Cream Apply small amount to affected area bid  x 10  "days     Meloxicam 15mg Tablet 1/2 to 1 prn pain to be take with food     Trulicity PEN 0.75mg/0.5ml Injection Inject once weekly     Omeprazole 40mg Capsules, Extended Release 1 tab BID     Toujeo SoloStar 300units/1ml Injection 100 units bid     Promethazine HCl 25mg Tablet 1/2 - 1 tab q 4-6 hrs prn   prn nausea and vomiting     Estradiol 0.5mg Tablet 1 tab daily PO     Lancet   Lancet Onet touch [place] Lancets use wiht one touch glucometer, check blood sugars 4 times daily     One Touch Ultra Blue Test Strips  Reagent Strips Test Blood Sugar QID     BD Ultra-Fine Original Pen Needle 29G x 1/2\"  Pen Needle Use as directed QID w/Lantus and Novalog Pen     Benzonatate 200mg Capsules 1 capsule tid     Insulin Syringes 0.5ml Syringe Use as directed     Fioricet 50mg/300mg/40mg Capsules One to 2 PO Q 4 hours. Max of 4 in 24 hr period.     Vitamin D3 5,000IU Capsules 1 capsule 4 days a week and 2 capsules 3 days a week     Baclofen 10mg Tablet 1 tab po TID prn for pain/muscle pain     Zyrtec 10mg Tablet 1 bid     ProAir HFA 90mcg/1actuation Oral Inhaler PRN     allergy shots once a week     One Touch UltraMini Monitor Kit check blood sugar 6 times day     Aleve 220mg Tablet     [employer] Pen Needles 31G 8mm #100     Diclofenac Sodium 75mg Tablets, Enteric Coated 1 tab bid with food     Diltiazem HCl 120mg Capsules, Extended Release 1 tab daily         OBJECTIVE:        Vitals:         Current: 12/3/2018 9:03:50 AM    Ht:  5 ft, 4.25 in;  Wt: 263.4 lbs;  BMI: 44.9    T: 97.6 F (oral);  BP: 117/57 mm Hg (left arm, sitting);  P: 75 bpm (left arm (BP Cuff), sitting);  sCr: 0.75 mg/dL;  GFR: 107.07        Exams:     PHYSICAL EXAM:     GENERAL: vital signs recorded - well developed, well nourished;  no apparent distress;     EYES: extraocular movements intact; conjunctiva and cornea are normal; PERRLA;     E/N/T:  normal EACs, TMs, nasal/oral mucosa, teeth, gingiva, and oropharynx;     NECK: range of motion is normal; " thyroid is non-palpable;     RESPIRATORY: normal respiratory rate and pattern with no distress; normal breath sounds with no rales, rhonchi, wheezes or rubs;     CARDIOVASCULAR: normal rate; rhythm is regular;  no systolic murmur; no edema;     GASTROINTESTINAL: nontender; normal bowel sounds; no organomegaly; rectal exam: normal tone; no masses; 4 mm hard hemorrhoid-benign appearing;  guaiac NEGATIVE stool; erythema with white discharge around anus;     GENITOURINARY: external genitalia: normal without lesions or urethral abnormalities;; vagina: white vaginal discharge; ; cervix: absent (s/p hyst) noted;;  adnexa: normal with no masses or tenderness     LYMPHATIC: no enlargement of cervical or facial nodes; no supraclavicular nodes;     BREAST/INTEGUMENT: BREASTS: breast exam is normal without masses, skin changes, or nipple discharge;     MUSCULOSKELETAL:  Normal range of motion, strength and tone;     NEUROLOGICAL:  cranial nerves, motor and sensory function, reflexes, gait and coordination are all intact;     PSYCHIATRIC:  appropriate affect and demeanor; normal speech pattern; grossly normal memory;     Left foot exam    Protective sensation using Monofilament test: NORMAL sensation. Patient detects .07 grams of force which is considered normal.    Vascular status: normal peripheral vascular exam with palpable dorsal pedal and posterior tibal pulses and brisk digital capillary refill    Skin is intact without sores or ulcers    Right foot exam    Protective sensation using Monofilament test: Slightly decreased, with estimated force of 0.2 grams applied.    Vascular status: normal peripheral vascular exam with palpable dorsal pedal and posterior tibal pulses and brisk digital capillary refill    Skin is intact without sores or ulcers         Lab/Test Results:             Glucose, Urine:  500 mg/dL (12/03/2018),     Bilirubin, urine:  Negative (12/03/2018),     Ketones, Urine Strip:  Negative (12/03/2018),      Specific Gravity, urine:  greater than 1.030 (12/03/2018),     Blood in Urine:  trace (12/03/2018),     pH, urine:  5.5 (12/03/2018),     Protein Urine QL:  30 mg (12/03/2018),     Urobilinogen, urine:  0.2 E.U./dL (12/03/2018),     Nitrite, Urine:  Negative (12/03/2018),     Leukoctyes, urine:  Negative (12/03/2018),     Appearance:  Clear (12/03/2018),     collection source:  Clean-catch (12/03/2018),     Color:  Yellow (12/03/2018),     Performed by::  and (12/03/2018),             Procedures:     Allergic rhinitis     Allergy Injection (2 or more) exp 11/2/19/ pdr             ASSESSMENT:           V72.31     Well Woman Exam     V79.0   Z13.89  Screening for depression              DDx:     272.0   E78.00  Hypercholesterolemia              DDx:     401.1   I10  Hypertension              DDx:     307.42   F51.01  Chronic insomnia              DDx:     356.9   G60.9  Peripheral neuropathy              DDx:     530.81   K21.9  GERD              DDx:     733.90   Z13.820  Osteopenia              DDx:     724.2   M54.5  Low back pain              DDx:     250.00   E11.42  Type II diabetes requiring insulin              DDx:     477.9   J30.9  Allergic rhinitis              DDx:     278.00   E66.9   Z71.3  Obesity, NOS              DDx:     780.2   R42  Near-syncope              DDx:     112.1   B37.3  Candidal vulvovaginitis              DDx:         ORDERS:         Meds Prescribed:       Refill of: Omeprazole 40mg Capsules, Extended Release 1 tab BID  #180 (One [place] and Eighty) capsule(s) Refills: 1       Refill of: Estradiol 0.5mg Tablet 1 tab daily PO  #90 (Ninety) tablet(s) Refills: 2       Refill of: Vitamin D3 5,000IU Capsules 1 capsule 4 days a week and 2 capsules 3 days a week QS for 90 day(s) Refills: 3       Diflucan (Fluconazole) 150mg Tablet Take 1 tablet(s) by mouth on day #1, then 1 tablet on day #3  #2 (Two) tablet(s) Refills: 0       Nystatin 100,000U/1gm Cream apply 2-3 times a day  #60 (Sixty) gm  Refills: 0         Radiology/Test Orders:       3014F  Screening mammography results documented and reviewed (PV)1  (In-House)         3017F  Colorectal CA screen results documented and reviewed (PV)  (In-House)         2022F  Dilated retinal eye exam w/interpret by ophthalmologist/optometrist documented/reviewed (DM)4  (In-House)         95886  Professional services for allergen immunotherapy not including provision of allergenic extracts; two  (In-House)           Lab Orders:       44715  DIAB2 - [facility] CMP A1C LIPID AND MICRO ALBUM CR RATIO: [id number]  (Send-Out)         61975  BDWET - [facility] Wet mount for infectious agents  (Send-Out)         95155  BDKOH - [facility] Tissue exam by KOH for fungi, ova or mites  (Send-Out)         72677  BDUAM - [facility] Urinalysis, automated, with micro  (Send-Out)         85156  URCU - [facility] Urine Culture  (Send-Out)           Procedures Ordered:       REFER  Referral to Specialist or Other Facility  (Send-Out)           Other Orders:       2028F  Foot examination performed (includes examination through visual inspection, sensory exam with monofi  (In-House)         1101F  Pt screen for fall risk; document no falls in past year or only 1 fall w/o injury in past year ([facility])  (In-House)           Negative EtOH screen  (In-House)           Calculated BMI above the upper parameter and a follow-up plan was documented in the medical record  (In-House)                   PLAN:          Well Woman Exam no pap performed, breast exam WNL, colonoscopy UTD, immunizations are UTD          Screening for depression she is grieving -it is the 2 year anniversary of death of her -she defers medication     MIPS Current diagnosis of depression and/or bipolar disorder Negative alcohol screen     MAMMOGRAM: Done within last 2 years and results in are chart     COLORECTAL CANCER SCREENING: Results are in chart     BMI Elevated - Follow-Up Plan: She was provided education on weight loss  strategies           Orders:       1101F  Pt screen for fall risk; document no falls in past year or only 1 fall w/o injury in past year ([facility])  (In-House)           Negative EtOH screen  (In-House)         3014F  Screening mammography results documented and reviewed (PV)1  (In-House)         3017F  Colorectal CA screen results documented and reviewed (PV)  (In-House)           Calculated BMI above the upper parameter and a follow-up plan was documented in the medical record  (In-House)         2022F  Dilated retinal eye exam w/interpret by ophthalmologist/optometrist documented/reviewed (DM)4  (In-House)            Hypercholesterolemia stable, due for labs          Hypertension well controlled          Chronic insomnia defers meds at this time, recommend OTC melatonin          Peripheral neuropathy she wears diabetic shoes          GERD stable on omeprazole, will watch renal function         RECOMMENDATIONS given include: patient is aware of increased risk of kidney failure, osteoporosis and alzheimers with daily use of this med.           Osteopenia stable repeat dexa next year          Low back pain stable           Orders:       30298  BDUAM - [facility] Urinalysis, automated, with micro  (Send-Out)         93499  URCU - [facility] Urine Culture  (Send-Out)            Type II diabetes requiring insulin     LABORATORY:  Labs ordered to be performed today include Diabetes Panel 2;CMP, A1C, Lipid, Microalbumin:Creatinine Ratio.            Prescriptions:       Refill of: Omeprazole 40mg Capsules, Extended Release 1 tab BID  #180 (One [place] and Eighty) capsule(s) Refills: 1       Refill of: Estradiol 0.5mg Tablet 1 tab daily PO  #90 (Ninety) tablet(s) Refills: 2       Refill of: Vitamin D3 5,000IU Capsules 1 capsule 4 days a week and 2 capsules 3 days a week QS for 90 day(s) Refills: 3           Orders:       34379  DIAB2 - [facility] CMP A1C LIPID AND MICRO ALBUM CR RATIO: [id number]  (Send-Out)             Allergic rhinitis stable           Orders:       73467  Professional services for allergen immunotherapy not including provision of allergenic extracts; two  (In-House)            Obesity, NOS she is working on diet and exercise         REFERRALS:  Referral initiated to Dietitian.            Orders:       REFER  Referral to Specialist or Other Facility  (Send-Out)            Near-syncope resolved          Candidal vulvovaginitis           Prescriptions:       Diflucan (Fluconazole) 150mg Tablet Take 1 tablet(s) by mouth on day #1, then 1 tablet on day #3  #2 (Two) tablet(s) Refills: 0       Nystatin 100,000U/1gm Cream apply 2-3 times a day  #60 (Sixty) gm Refills: 0           Orders:       54768  BDWET - [facility] Wet mount for infectious agents  (Send-Out)         26620  BDKOH - [facility] Tissue exam by KOH for fungi, ova or mites  (Send-Out)               Other Orders:       2028F  Foot examination performed (includes examination through visual inspection, sensory exam with monofi  (In-House)           CHARGE CAPTURE:           Primary Diagnosis:     V72.31    Well Woman Exam                Z01.411    Encounter for gynecological examination (general) (routine) with abnormal findings                       Orders:          65634   Preventive medicine, established patient, age 40-64 years  (In-House)           V79.0 Screening for depression            Z13.89    Encounter for screening for other disorder              Orders:          56561 -25  Office/outpatient visit; established patient, level 4  (In-House)             1101F   Pt screen for fall risk; document no falls in past year or only 1 fall w/o injury in past year ([facility])  (In-House)                Negative EtOH screen  (In-House)             3014F   Screening mammography results documented and reviewed (PV)1  (In-House)             3017F   Colorectal CA screen results documented and reviewed (PV)  (In-House)                Calculated BMI above the upper parameter  and a follow-up plan was documented in the medical record  (In-House)             2022F   Dilated retinal eye exam w/interpret by ophthalmologist/optometrist documented/reviewed (DM)4  (In-House)           272.0 Hypercholesterolemia            E78.00    Pure hypercholesterolemia, unspecified    401.1 Hypertension            I10    Essential (primary) hypertension    307.42 Chronic insomnia            F51.01    Primary insomnia    356.9 Peripheral neuropathy            G60.9    Hereditary and idiopathic neuropathy, unspecified    530.81 GERD            K21.9    Gastro-esophageal reflux disease without esophagitis    733.90 Osteopenia            Z13.820    Encounter for screening for osteoporosis    724.2 Low back pain            M54.5    Low back pain    250.00 Type II diabetes requiring insulin            E11.42    Type 2 diabetes mellitus with diabetic polyneuropathy    477.9 Allergic rhinitis            J30.9    Allergic rhinitis, unspecified              Orders:          16810   Professional services for allergen immunotherapy not including provision of allergenic extracts; two  (In-House)           278.00 Obesity, NOS            E66.9    Obesity, unspecified           Z71.3    Dietary counseling and surveillance    780.2 Near-syncope            R42    Dizziness and giddiness    112.1 Candidal vulvovaginitis            B37.3    Candidiasis of vulva and vagina        Other Orders:           2028F   Foot examination performed (includes examination through visual inspection, sensory exam with monofi  (In-House)           ADDENDUMS:      ____________________________________    Date: 01/14/2019 10:13 AM    Author: Polly Calvin         Visit Note Faxed to:        Kelly Trivedi  (Nutritionist); Number [phone]            Date: 01/14/2019 10:13 AM    Author: Polly Calvin         Health Summary Faxed to:        Kelly Trivedi  (Nutritionist); Number [phone]

## 2021-05-18 NOTE — PROGRESS NOTES
Nory RodriguezShawna 1965     Office/Outpatient Visit    Visit Date: Thu, Aug 22, 2019 03:27 pm    Provider: Melita Garcia N.P. (Assistant: Jenny White MA)    Location: [facility]        Electronically signed by Melita Garcia N.P. on  08/27/2019 09:37:34 AM                             SUBJECTIVE:        CC:     Cherelle is a 54 year old White female.  She presents with left knee & hip pain NKI. Started 3 weeks ago. H/O blood clots.          HPI:         Patient to be evaluated for hip pain.      Cherelle complains of left hip pain.  The location of the pain is superficial.  It radiates to the knee.  She describes it as moderate in intensity, intermittent, sharp, and aching.  The initial onset of pain was 3 weeks ago.  There was no apparent precipitating event or injury.  has had 4-5 blood clots in the past.  family  advised she be checked for blood clot but this is nothing like previous signs of blood clot.          Knee pain details; the patient notes localized joint pain.  This has been a problem for the past 3 weeks.  She describes the discomfort as moderate in severity.  Symptoms have been stable and nonprogressive.  Primary joints affected include left hip and left knee.  No associated symptoms are reported.  no injury     ROS:     CONSTITUTIONAL:  Negative for chills, fatigue, fever, and weight change.      CARDIOVASCULAR:  Negative for chest pain, palpitations, tachycardia, orthopnea, and edema.      RESPIRATORY:  Negative for cough, dyspnea, and hemoptysis.      GASTROINTESTINAL:  Negative for abdominal pain, heartburn, constipation, diarrhea, and stool changes.      MUSCULOSKELETAL:  Positive for arthralgias and (left hip and left knee) back pain ( chronic ).   Negative for joint stiffness or myalgias.      INTEGUMENTARY/BREAST:  Negative for rash.      NEUROLOGICAL:  Negative for dizziness, headaches, paresthesias, and weakness.          PMH/FMH/SH:     Last Reviewed on 7/31/2019 01:57  PM by Santa Tobar    Past Medical History:                 PAST MEDICAL HISTORY         Hepatitis: type A;     Allergies    GERD    IDDM    hypercholesterolemia     Back pain     Breast lump     Cellulitis of the chest wall     Classic migraine     Congenital accessory skin tags     H/O Depression     GERD     chronic hoarseness     Hypertension     H/O Migraine headaches     Obstructive sleep apnea    Peripheral neuropathy     Post-menopausal    h/o Uterine polyp             GYNECOLOGICAL HISTORY:     miscarriage 1    Contraception: S/P tubal ligation;         CURRENT MEDICAL PROVIDERS:    Allergist: Dr. Delgadillo    Ophthalmologist: Ebenezer    Orthopedist: Antony    Therapist, Shabnam Miguel Endocrinologist         PREVENTIVE HEALTH MAINTENANCE             BONE DENSITY: was last done 17 with the following abnormality noted-- Osteopenic at L1 in Lumbar Spine Only     COLORECTAL CANCER SCREENING: Up to date (colonoscopy q10y; sigmoidoscopy q5y; Cologuard q3y) was last done 18, Results are in chart     EYE EXAM: was last done 2019     MAMMOGRAM: Done within last 2 years and results in are chart was last done 12-3-18 with normal results     PAP SMEAR: was last done ZEESHAN BSO no longer needs PAPs         Surgical History:         Cholecystectomy    Hysterectomy: TAHBSO;     cataracts removed b 2018      pericardial window ; Procedures: heart cath          Family History:     Father: Coronary Artery Disease; ;  Cerebrovascular Accident     Mother: Hyperlipidemia;  Endometrial Cancer     Brother(s): Type 2 Diabetes     Paternal Grandfather: Type 2 Diabetes     Paternal Grandmother:      Maternal Grandfather: Type 2 Diabetes     Maternal Grandmother: Breast Cancer;  Alzheimer's Disease         Social History:     Occupation: [employer]     Marital Status:      Children: 1 child and 2 step-children         Tobacco/Alcohol/Supplements:     Last Reviewed on  7/31/2019 01:57 PM by Santa Tobar    Tobacco: She has never smoked.  Non-drinker         Substance Abuse History:     Last Reviewed on 7/31/2019 01:57 PM by Santa Tobar        Mental Health History:     Last Reviewed on 7/31/2019 01:57 PM by Santa Tobar        Communicable Diseases (eg STDs):     Last Reviewed on 7/31/2019 01:57 PM by Santa Tobar            Current Problems:     Last Reviewed on 7/31/2019 01:57 PM by Santa Tobar    Sleep apnea with sleep disturbance     Vitamin D deficiency, unspecified     Moderate asthma (exacerbation)     Near-syncope     Obesity, NOS     Low back pain     Osteopenia     Other viral hepatitis carrier     Screening for cancer of colon     History of noncompliance with medical treatment     Overweight     GERD     Common migraine     Peripheral neuropathy     Pressure ulcer stage I     Morbid obesity     Chronic insomnia     Degenerative disc disease     Nephrolithiasis     Allergic rhinitis     Congenital accessory skin tags     Post-menopausal HRT     Type II diabetes requiring insulin     Obstructive sleep apnea     Uterine polyp     Hypercholesterolemia     Hypertension     Depression     GE reflux disease     Acute sinusitis, other     Screening for depression     Wrist pain     Leg pain     Toe onychomycosis     Shoulder pain     MRSA wound         Immunizations:     zzFluzone pf-quadrivalent 3 and up 9/29/2015     zzFluzone pf-quadrivalent 3 and up 10/26/2016     zzFluzone pf-quadrivalent 3 and up 9/25/2017     Fluzone (3 + years dose) 9/14/2009     Fluzone (3 + years dose) 10/27/2012     Fluzone pf (3+ years dose) 10/14/2013     Fluzone pf (3+ years dose) 10/7/2014     Fluzone Quadrivalent (3+ years) 9/17/2018     PNEUMOVAX 23 (Pneumococcal PPV23) 11/29/2016     Tdap (Tetanus, reduced diph, acellular pertussis) 9/29/2015         Allergies:     Last Reviewed on 7/31/2019 01:57 PM by Santa Tobar    Levemir:    Cipro:     "Nitroglycerin:    Januvia: vomiting (Adverse Reaction)    Bydureon: nausea (Adverse Reaction)    Codeine:    Aspirin:    Sumatriptan: dizziness (Adverse Reaction)        Current Medications:     Last Reviewed on 8/22/2019 03:33 PM by Jenny White    Symbicort 160mcg/4.5mcg Oral Inhaler 2 puffs BID     Lisinopril 2.5mg Tablet Take 1 tablet(s) by mouth daily     Singulair  10mg Tablet Take 1 tablet(s) by mouth each evening     Topamax 100mg Tablet 1 tab BID     Atorvastatin Calcium 10mg Tablet 1 tab qhs     Promethazine HCl 25mg Tablet 1/2 - 1 tab q 4-6 hrs prn   prn nausea and vomiting     Humalog KwikPen 200units/1ml Pen System, Disposable sliding scale     Toujeo SoloStar 300units/1ml Injection 100 units bid     Trulicity PEN 1.5mg/0.5ml Injection every Sunday     Estradiol 0.5mg Tablet 1 tab daily PO     Omeprazole 40mg Capsules, Extended Release 1 tab BID     Fosamax 70mg Tablet Take 1 tablet(s) by mouth q week     Lancet   Lancet Onet touch Fine Point Lancets use wiht one touch glucometer, check blood sugars 4 times daily     One Touch Ultra Blue Test Strips  Reagent Strips Test Blood Sugar QID     BD Ultra-Fine Original Pen Needle 29G x 1/2\"  Pen Needle Use as directed QID w/Lantus and Novalog Pen     Insulin Syringes 0.5ml Syringe Use as directed     Zyrtec 10mg Tablet 1 bid     Promethazine HCl 25mg Suppository 1 rectally q 6hrs prn     Vitamin D3 5,000IU Capsules 1 capsule 4 days a week and 2 capsules 3 days a week     Jardiance 10mg Tablet 1 TAB DAILY     Glucagon Emergency     Hydrochlorothiazide (HCTZ) 12.5mg Tablet 1 po daily     Metformin HCl 500mg Tablets, Extended Release 1 tab bid     Diclofenac Sodium 75mg Tablets, Enteric Coated 1 tab bid with food     Diltiazem HCl 120mg Capsules, Extended Release 1 tab daily     ProAir HFA 90mcg/1actuation Oral Inhaler PRN     allergy shots once a week     One Touch UltraMini Monitor Kit check blood sugar 6 times day     Aleve 220mg Tablet     [employer] Pen " Needles 31G 8mm #100         OBJECTIVE:        Vitals:         Historical:     07/31/2019  BP:   121/61 mm Hg ( (right arm, , sitting, );)     07/31/2019  Wt:   253.8lbs        Current: 8/22/2019 3:35:22 PM    Ht:  5 ft, 4.25 in;  Wt: 252.8 lbs;  BMI: 43.1    T: 97.7 F (oral);  BP: 102/64 mm Hg (right arm, sitting);  P: 79 bpm (right arm (BP Cuff), sitting);  sCr: 1.12 mg/dL;  GFR: 69.65        Exams:     PHYSICAL EXAM:     GENERAL: obese;  no apparent distress;     RESPIRATORY: normal respiratory rate and pattern with no distress; normal breath sounds with no rales, rhonchi, wheezes or rubs;     CARDIOVASCULAR: normal rate; rhythm is regular;     Peripheral Pulses: popliteal: 2+ amplitude, no bruits; dorsalis pedis: 2+ amplitude, no bruits; posterior tibial: 2+ amplitude, no bruits; no edema;     MUSCULOSKELETAL: pain with range of motion in: left hip flexion and extension;  left knee flexion and extension;     NEUROLOGIC: mental status: alert and oriented x 3; GROSSLY INTACT     PSYCHIATRIC:  appropriate affect and demeanor; normal speech pattern; grossly normal memory;         Procedures:     Allergic rhinitis     Allergy Injection (2 or more) exp 4/25/20/ pdr             ASSESSMENT           719.45   M25.552  Hip pain              DDx:     719.46   M25.562  Knee pain              DDx:     477.9   J30.9  Allergic rhinitis              DDx:         ORDERS:         Radiology/Test Orders:       [id number]  Left radiologic exam, hip, unilateral; complete, minimum of two views  (Send-Out)         [id number]  Left  radiologic examination, knee; three views  (Send-Out)         [id number]  Professional services for allergen immunotherapy not including provision of allergenic extracts; two  (In-House)                   PLAN:          Hip pain         RADIOLOGY:  I have ordered a left hip x-ray to be done today.      RECOMMENDATIONS given include: RICE therapy and already on diclofenac.  xray pending.  consider PT and / or ortho..             Orders:       [id number]  Left radiologic exam, hip, unilateral; complete, minimum of two views  (Send-Out)            Knee pain         RADIOLOGY:  I have ordered a left knee x-ray to be done today.            Orders:       [id number]  Left  radiologic examination, knee; three views  (Send-Out)            Allergic rhinitis           Orders:       [id number]  Professional services for allergen immunotherapy not including provision of allergenic extracts; two  (In-House)               Patient Recommendations:        For  Hip pain:     Rest the affected area and keep it elevated as much as possible. Apply ice over the affected area. Use a compression wrap, such as an Ace bandage.              CHARGE CAPTURE           **Please note: ICD descriptions below are intended for billing purposes only and may not represent clinical diagnoses**        Primary Diagnosis:         719.45 Hip pain            M25.552    Pain in left hip              Orders:          [id number]   Office/outpatient visit; established patient, level 4  (In-House)           719.46 Knee pain            M25.562    Pain in left knee    477.9 Allergic rhinitis            J30.9    Allergic rhinitis, unspecified              Orders:          [id number]   Professional services for allergen immunotherapy not including provision of allergenic extracts; two  (In-House)

## 2021-05-18 NOTE — PROGRESS NOTES
"Nory RodriguezShawna 1965     Office/Outpatient Visit    Visit Date: Wed, Jul 31, 2019 12:59 pm    Provider: Santa Tobar N.P. (Assistant: Karen Daniel MA)    Location: [facility]        Electronically signed by Santa Tobar N.P. on  07/31/2019 01:58:09 PM                             SUBJECTIVE:        CC:     Cherelle is a 54 year old White female.  presents today due to vomiting, sinus symptoms pt states toujeo is now 120units bid;         HPI:         Patient complains of acute sinusitis, other.  This has been a problem for the past 6 months.  The pattern of symptoms is described as episodic, with unpredictable symptomatic periods.  Her primary symptoms include dry cough, fever to 100.3 degrees and frontal headache.  She denies nasal congestion.  She has already tried NSAIDs ( Aleve ) and acetaminophen.  Began vomiting around midnight and continued until 10am. Took phenergan but unsure if it stayed down. Not eating much but is drinking water ok. Medical history is pertinent for allergies and asthma.      ROS:     CONSTITUTIONAL:  Positive for chills, fatigue and fever.      E/N/T:  Positive for nasal congestion and sinus pressure.   Negative for sore throat.      CARDIOVASCULAR:  Negative for chest pain, orthopnea, paroxysmal nocturnal dyspnea and pedal edema.      RESPIRATORY:  Positive for recent cough.   Negative for dyspnea.      GASTROINTESTINAL:  Negative for abdominal pain, constipation, diarrhea, nausea and vomiting.      NEUROLOGICAL:  Positive for headaches ( \"sinus\" ).      PSYCHIATRIC:  Negative for anxiety, depression, and sleep disturbances.          PMH/FMH/SH:     Last Reviewed on 7/31/2019 01:57 PM by Santa Tobar    Past Medical History:                 PAST MEDICAL HISTORY         Hepatitis: type A;     Allergies    GERD    IDDM    hypercholesterolemia     Back pain     Breast lump     Cellulitis of the chest wall     Classic migraine     Congenital accessory " skin tags     H/O Depression     GERD     chronic hoarseness     Hypertension     H/O Migraine headaches     Obstructive sleep apnea    Peripheral neuropathy     Post-menopausal    h/o Uterine polyp             GYNECOLOGICAL HISTORY:     miscarriage 1    Contraception: S/P tubal ligation;         CURRENT MEDICAL PROVIDERS:    Allergist: Dr. Delgadillo    Ophthalmologist: Ebenezer    Orthopedist: Antony    Therapist, Shabnam Miguel Endocrinologist         PREVENTIVE HEALTH MAINTENANCE             BONE DENSITY: was last done 17 with the following abnormality noted-- Osteopenic at L1 in Lumbar Spine Only     COLORECTAL CANCER SCREENING: Up to date (colonoscopy q10y; sigmoidoscopy q5y; Cologuard q3y) was last done 18, Results are in chart     EYE EXAM: was last done 2019     MAMMOGRAM: Done within last 2 years and results in are chart was last done 12-3-18 with normal results     PAP SMEAR: was last done ZEESHAN BSO no longer needs PAPs         Surgical History:         Cholecystectomy    Hysterectomy: TAHBSO;     cataracts removed b 2018      pericardial window ; Procedures: heart cath          Family History:     Father: Coronary Artery Disease; ;  Cerebrovascular Accident     Mother: Hyperlipidemia;  Endometrial Cancer     Brother(s): Type 2 Diabetes     Paternal Grandfather: Type 2 Diabetes     Paternal Grandmother:      Maternal Grandfather: Type 2 Diabetes     Maternal Grandmother: Breast Cancer;  Alzheimer's Disease         Social History:     Occupation: [employer]     Marital Status:      Children: 1 child and 2 step-children         Tobacco/Alcohol/Supplements:     Last Reviewed on 2019 01:57 PM by Snata Tobar    Tobacco: She has never smoked.  Non-drinker             Current Problems:     Last Reviewed on 2019 01:57 PM by Santa Tobar    Sleep apnea with sleep disturbance     Vitamin D deficiency, unspecified     Moderate asthma  (exacerbation)     Near-syncope     Obesity, NOS     Low back pain     Osteopenia     Other viral hepatitis carrier     Screening for cancer of colon     History of noncompliance with medical treatment     Overweight     GERD     Common migraine     Peripheral neuropathy     Pressure ulcer stage I     Morbid obesity     Chronic insomnia     Degenerative disc disease     Nephrolithiasis     Allergic rhinitis     Congenital accessory skin tags     Post-menopausal HRT     Type II diabetes requiring insulin     Obstructive sleep apnea     Uterine polyp     Hypercholesterolemia     Hypertension     Depression     GE reflux disease     Acute sinusitis, other     Screening for depression     Wrist pain     Leg pain     Toe onychomycosis     Shoulder pain     MRSA wound         Immunizations:     zzFluzone pf-quadrivalent 3 and up 9/29/2015     zzFluzone pf-quadrivalent 3 and up 10/26/2016     zzFluzone pf-quadrivalent 3 and up 9/25/2017     Fluzone (3 + years dose) 9/14/2009     Fluzone (3 + years dose) 10/27/2012     Fluzone pf (3+ years dose) 10/14/2013     Fluzone pf (3+ years dose) 10/7/2014     Fluzone Quadrivalent (3+ years) 9/17/2018     PNEUMOVAX 23 (Pneumococcal PPV23) 11/29/2016     Tdap (Tetanus, reduced diph, acellular pertussis) 9/29/2015         Allergies:     Last Reviewed on 7/31/2019 01:57 PM by Santa Tobar    Levemir:    Cipro:    Nitroglycerin:    Januvia: vomiting (Adverse Reaction)    Bydureon: nausea (Adverse Reaction)    Codeine:    Aspirin:    Sumatriptan: dizziness (Adverse Reaction)        Current Medications:     Last Reviewed on 7/31/2019 01:57 PM by Santa Tobar    Symbicort 160mcg/4.5mcg Oral Inhaler 2 puffs BID     Lisinopril 2.5mg Tablet Take 1 tablet(s) by mouth daily     Singulair  10mg Tablet Take 1 tablet(s) by mouth each evening     Topamax 100mg Tablet 1 tab BID     Atorvastatin Calcium 10mg Tablet 1 tab qhs     Promethazine HCl 25mg Tablet 1/2 - 1 tab q 4-6 hrs prn   prn  "nausea and vomiting     Humalog KwikPen 200units/1ml Pen System, Disposable sliding scale     Toujeo SoloStar 300units/1ml Injection 100 units bid     Trulicity PEN 1.5mg/0.5ml Injection every Sunday     Estradiol 0.5mg Tablet 1 tab daily PO     Omeprazole 40mg Capsules, Extended Release 1 tab BID     Fosamax 70mg Tablet Take 1 tablet(s) by mouth q week     Lancet   Lancet Onet touch Fine Point Lancets use wiht one touch glucometer, check blood sugars 4 times daily     One Touch Ultra Blue Test Strips  Reagent Strips Test Blood Sugar QID     BD Ultra-Fine Original Pen Needle 29G x 1/2\"  Pen Needle Use as directed QID w/Lantus and Novalog Pen     Insulin Syringes 0.5ml Syringe Use as directed     Zyrtec 10mg Tablet 1 bid     Vitamin D3 5,000IU Capsules 1 capsule 4 days a week and 2 capsules 3 days a week     Glucagon Emergency     Hydrochlorothiazide (HCTZ) 12.5mg Tablet 1 po daily     Metformin HCl 500mg Tablets, Extended Release 1 tab bid     Diclofenac Sodium 75mg Tablets, Enteric Coated 1 tab bid with food     Diltiazem HCl 120mg Capsules, Extended Release 1 tab daily     ProAir HFA 90mcg/1actuation Oral Inhaler PRN     allergy shots once a week     One Touch UltraMini Monitor Kit check blood sugar 6 times day     Aleve 220mg Tablet     [employer] Pen Needles 31G 8mm #100     Jardiance 10mg Tablet 1 TAB DAILY         OBJECTIVE:        Vitals:         Current: 7/31/2019 1:06:08 PM    Ht:  5 ft, 4.25 in;  Wt: 253.8 lbs;  BMI: 43.2    T: 98.1 F (oral);  BP: 121/61 mm Hg (right arm, sitting);  P: 78 bpm (right arm (BP Cuff), sitting);  sCr: 1.12 mg/dL;  GFR: 69.77        Exams:     PHYSICAL EXAM:     GENERAL: vital signs recorded - well developed, well nourished;  no apparent distress;     E/N/T: EARS: the right TM is has fluid behind it and yellow;  NOSE: bilateral maxillary and bilateral frontal sinus tenderness present;     RESPIRATORY: normal respiratory rate and pattern with no distress; normal breath sounds with no " rales, rhonchi, wheezes or rubs;     CARDIOVASCULAR: normal rate; rhythm is regular;  no systolic murmur; no edema;     GASTROINTESTINAL: nontender; normal bowel sounds; no organomegaly;     NEUROLOGICAL:  cranial nerves, motor and sensory function, reflexes, gait and coordination are all intact;     PSYCHIATRIC:  appropriate affect and demeanor; normal speech pattern; grossly normal memory;         ASSESSMENT:           461.8   J01.90  Acute sinusitis, other              DDx:         ORDERS:         Meds Prescribed:       Promethazine HCl (Promethazine HCl) 25mg Suppository 1 rectally q 6hrs prn  #15 (Fifteen) suppository(s) Refills: 0       Augmentin (Amoxicillin/Clavulanate) 875mg/125mg Tablet 1 po bid with food  #20 (Twenty) tablet(s) Refills: 0       Guaifenesin 200mg Tablet take 2 tablets twice daily  #40 (Forty) tablet(s) Refills: 0       Diflucan (Fluconazole) 150mg Tablet Take 1 tablet(s) by mouth now and repeat in three days  #2 (Two) tablet(s) Refills: 0                 PLAN:          Acute sinusitis, other         FOLLOW-UP: Advised to call if there is no improvement.   Schedule follow-up appointments on a p.r.n. basis..            Prescriptions:       Promethazine HCl (Promethazine HCl) 25mg Suppository 1 rectally q 6hrs prn  #15 (Fifteen) suppository(s) Refills: 0       Augmentin (Amoxicillin/Clavulanate) 875mg/125mg Tablet 1 po bid with food  #20 (Twenty) tablet(s) Refills: 0       Guaifenesin 200mg Tablet take 2 tablets twice daily  #40 (Forty) tablet(s) Refills: 0       Diflucan (Fluconazole) 150mg Tablet Take 1 tablet(s) by mouth now and repeat in three days  #2 (Two) tablet(s) Refills: 0             Patient Recommendations:        For  Acute sinusitis, other:     Schedule follow-up appointments as needed.                APPOINTMENT INFORMATION:        Monday Tuesday Wednesday Thursday Friday Saturday Sunday            Time:___________________AM  PM   Date:_____________________              CHARGE CAPTURE:           Primary Diagnosis:     461.8 Acute sinusitis, other            J01.90    Acute sinusitis, unspecified              Orders:          [id number]   Office/outpatient visit; established patient, level 3  (In-House)

## 2021-05-18 NOTE — PROGRESS NOTES
"Nory Rodriguez Maren  1965     Office/Outpatient Visit    Visit Date: Mon, Dec 7, 2020 11:19 am    Provider: Mitzi West N.P. (Assistant: Barbara Patel, )    Location: [facility]        Electronically signed by Mitzi West N.P. on  12/07/2020 12:11:06 PM                             Subjective:        CC: Cherelle is a 55 year old White female.  sinus infection, back pain, lower abdominal pain;         HPI: patient reports headache, sinus pressure, sinus drainage,  frontal headaches, congestion for over 1 week now. Denies fever, nausea, vomiting, Frequent sinus infection this time of year. Lower abdominal pain and lower back pain that is described as aching and mild, no history of kidney stones. Denies dysuria, frequency, urgency, blood in urine. Patient is a diabetic.    ROS:     CONSTITUTIONAL:  Negative for chills, fatigue and fever.      EYES:  Negative for blurred vision.      E/N/T:  Positive for nasal congestion, frequent rhinorrhea and sinus pressure.   Negative for ear pain, hoarseness or sore throat.      CARDIOVASCULAR:  Negative for chest pain and pedal edema.      RESPIRATORY:  Negative for recent cough and dyspnea.      GASTROINTESTINAL:  Positive for abdominal pain ( suprapubic ).   Negative for constipation, diarrhea, nausea or vomiting.      GENITOURINARY:  Negative for dysuria and frequent urination.      MUSCULOSKELETAL:  Positive for back pain and myalgias.      NEUROLOGICAL:  Positive for headaches ( \"sinus\" ).      PSYCHIATRIC:  Negative for anxiety, depression, and sleep disturbances.          Past Medical History / Family History / Social History:         Last Reviewed on 12/07/2020 11:51 AM by Mitzi West    Past Medical History:                 PAST MEDICAL HISTORY         Hepatitis: type A;     Allergies    GERD    IDDM    hypercholesterolemia     Back pain     Breast lump     Cellulitis of the chest wall     Classic migraine     Congenital accessory skin tags     " H/O Depression     GERD     chronic hoarseness     Hypertension     H/O Migraine headaches     Obstructive sleep apnea    Peripheral neuropathy     Post-menopausal    h/o Uterine polyp             GYNECOLOGICAL HISTORY:     miscarriage 1    Contraception: S/P tubal ligation;         CURRENT MEDICAL PROVIDERS:    Allergist: Dr. Delgadillo    Ophthalmologist: Ebenezer    Orthopedist: Antony    Therapist, Shabnam Miguel Endocrinologist         PREVENTIVE HEALTH MAINTENANCE             BONE DENSITY: was last done 2019 with the following abnormality noted-- Osteopenic at L1 in Lumbar Spine Only     COLORECTAL CANCER SCREENING: Up to date (colonoscopy q10y; sigmoidoscopy q5y; Cologuard q3y) was last done 18, Results are in chart     EYE EXAM: Diabetic Eye Exam during this calendar year and results are in chart was last done 2020     MAMMOGRAM: Done within last 2 years and results in are chart was last done 2019 with normal results     PAP SMEAR: was last done ZEESHAN BSO no longer needs PAPs         Surgical History:         Cholecystectomy    Hysterectomy: TAHBSO;     cataracts removed b 2018     pericardial window ; Procedures: heart cath          Family History:     Father: Coronary Artery Disease; ;  Cerebrovascular Accident     Mother: Hyperlipidemia;  Endometrial Cancer     Brother(s): Type 2 Diabetes     Paternal Grandfather: Type 2 Diabetes     Paternal Grandmother:      Maternal Grandfather: Type 2 Diabetes     Maternal Grandmother: Breast Cancer;  Alzheimer's Disease         Social History:     Occupation: [employer]     Marital Status:      Children: 1 child and 2 step-children         Tobacco/Alcohol/Supplements:     Last Reviewed on 2020 11:51 AM by Mitzi West    Tobacco: She has never smoked.  Non-drinker         Substance Abuse History:     Last Reviewed on 2020 11:51 AM by Mitzi West        Mental Health History:     Last Reviewed on  "7/31/2019 01:57 PM by Santa Tobar        Communicable Diseases (eg STDs):     Last Reviewed on 7/31/2019 01:57 PM by Santa Tobar        Immunizations:     influenza, injectable, quadrivalent, preservative free 10/3/2020    zzFluzone pf-quadrivalent 3 and up 9/29/2015    zzFluzone pf-quadrivalent 3 and up 10/26/2016    zzFluzone pf-quadrivalent 3 and up 9/25/2017    Fluzone (3 + years dose) 9/14/2009    Fluzone (3 + years dose) 10/27/2012    Fluzone pf (3+ years dose) 10/14/2013    Fluzone pf (3+ years dose) 10/7/2014    Fluzone Quadrivalent (3+ years) 9/17/2018    Fluzone Quadrivalent (3+ years) 9/30/2019    Fluzone Quadrivalent (3+ years) 10/18/2019    PNEUMOVAX 23 (Pneumococcal PPV23) 11/29/2016    Tdap (Tetanus, reduced diph, acellular pertussis) 9/29/2015        Allergies:     Last Reviewed on 12/07/2020 11:51 AM by Mitzi West    Levemir:      Cipro:      Nitroglycerin:      Bydureon: Nausea  (Adverse Reaction)    Diclofenac Sodium: swelling of feet,swelling of ankle  (Adverse Reaction)    cat dander:      Codeine:      Aspirin:      Sumatriptan: dizziness  (Adverse Reaction)        Current Medications:     Last Reviewed on 12/07/2020 11:51 AM by Mitzi West    EpiPen 0.3 mg/0.3 mL injection Auto-Injector [inject 0.3 milliliter (0.3 mg) by intramuscular route once]    Claritin 10 mg oral tablet [take 1 tablet (10 mg) by oral route once daily]    Lancet   Lancet [Onet touch Fine Point Lancets use wiht one touch glucometer, check blood sugars 4 times daily]    lisinopriL 2.5 mg oral tablet [TAKE ONE TABLET BY MOUTH DAILY]    BD Ultra-Fine Short Pen Needle 31 gauge x 5/16\"  [USE 4 TIMES A DAY WITH     HUMALOG]    promethazine 25 mg oral tablet [TAKE ONE-HALF TO ONE TABLET BY MOUTH EVERY 4 TO 6 HOURS AS NEEDED FOR NAUSEA AND VOMITING]    topiramate 100 mg oral tablet [TAKE 1 TABLET TWICE A DAY]    metFORMIN 500 mg oral Tablet, Extended Release 24 hr [1 tab bid]    Aleve 220 mg oral tablet    One " Touch Ultra Blue Test Strips  Reagent Strips [Test Blood Sugar QID]    Estradiol 0.5 mg oral tablet [1 tab daily PO]    atorvastatin 10 mg oral tablet [1 tab qhs]    OneTouch UltraMini kit  [check blood sugar 6 times day]    Insulin Syringes 0.5ml Syringe [Use as directed]    HumaLOG KwikPen Insulin 200 unit/mL (3 mL) subcutaneous Insulin Pen [sliding scale]    Fosamax 70 mg oral tablet [Take 1 tablet(s) by mouth q week]    Toujeo Max U-300 SoloStar 300 unit/mL (3 mL) subcutaneous Insulin Pen [INJECT 100 UNITS  SUBCUTANEOUSLY TWO TIMES A DAY]    montelukast 10 mg oral tablet [TAKE 1 TABLET EVERY EVENING]    allergy shots once a week     ProAir HFA 90 mcg/actuation Inhalation HFA Aerosol Inhaler [PRN]    Symbicort 160mcg/4.5mcg Oral Inhaler [2 puffs BID]    Trulicity 1.5 mg/0.5 mL subcutaneous Pen Injector [every Sunday]    Jardiance 10 mg oral tablet [1 TAB DAILY]    Omeprazole 40 mg oral capsule,delayed release (enteric coated) [1 tab BID]    cholecalciferol (vitamin D3) 125 mcg (5,000 unit) oral capsule [TAKE ONE CAPSULE BY MOUTH DAILY]    Diltiazem HCl 120 mg oral Capsule, Extended Release 24 hr [1 tab daily]    Glucagon Emergency     Dexcom G6 Sensor device  [Use as directed]    ALBUTEROL    NEB 0.083%     traZODone 100 mg oral tablet [TAKE ONE TABLET BY MOUTH EVERY NIGHT AT BEDTIME]        Objective:        Vitals:         Current: 12/7/2020 11:21:45 AM    Ht:  5 ft, 4.25 in;  Wt: 237.2 lbs;  BMI: 40.4T: 96.5 F (temporal);  BP: 112/62 mm Hg (right arm, sitting);  P: 82 bpm (right arm (BP Cuff), sitting);  sCr: 0.91 mg/dL;  GFR: 82.47O2 Sat: 98 % (room air)        Exams:     PHYSICAL EXAM:     GENERAL:  well developed and nourished; appropriately groomed; in no apparent distress;     EYES: PERRL, EOMI     E/N/T: EARS: both TMs are have fluid behind them;  NOSE: nasal mucosa is erythematous;  bilateral maxillary and bilateral frontal sinus tenderness present; OROPHARYNX: oral mucosa reveals erythema;     NECK:   supple, full ROM; no thyromegaly; no carotid bruits;     RESPIRATORY: normal respiratory rate and pattern with no distress; normal breath sounds with no rales, rhonchi, wheezes or rubs;     CARDIOVASCULAR: normal rate; rhythm is regular;  no systolic murmur; no edema;     GASTROINTESTINAL: nontender; normal bowel sounds; no organomegaly;     LYMPHATIC: no enlargement of cervical or facial nodes; no supraclavicular nodes;     BREAST/INTEGUMENT: no rashs or lesions noted;     MUSCULOSKELETAL:  gait normal;     NEUROLOGIC: mental status: alert and oriented x 3; GROSSLY INTACT     PSYCHIATRIC:  appropriate affect and demeanor; normal speech pattern; grossly normal memory;         Lab/Test Results:         Glucose, Urine: >=1000 mg/dL (12/07/2020),     Bilirubin, urine: Negative (12/07/2020),     Ketones, Urine Strip: Negative (12/07/2020),     Specific Gravity, urine: 1.015 (12/07/2020),     Blood in Urine: negative (12/07/2020),     pH, urine: 5.0 (12/07/2020),     Protein Urine QL: negative (12/07/2020),     Urobilinogen, urine: 0.2 E.U./dL (12/07/2020),     Nitrite, Urine: Negative (12/07/2020),     Leukoctyes, urine: Negative (12/07/2020),     Appearance: Clear (12/07/2020),     collection source: Clean-catch (12/07/2020),     Color: Yellow (12/07/2020),     Performed by:: al (12/07/2020),             Assessment:         M54.5   Low back pain       J01.10   Acute frontal sinusitis, unspecified           ORDERS:         Meds Prescribed:       [New Rx] Augmentin 875-125 mg oral tablet [take 1 tablet by oral route every 12 hours for 10 days], #20 (twenty) tablets, Refills: 0 (zero)       [New Rx] guaiFENesin 600 mg oral Tablet,Extended Release 12 hr [take 1 tablet (600 mg) by oral route every 12 hours], #20 (twenty) tablets, Refills: 0 (zero)         Lab Orders:       [id number]  Urinalysis, automated, without microscopy  (In-House)            [id number]  COVID 19 Testing  (Send-Out)            [id number]  [facility] - [facility] Urine Culture   (Send-Out)                      Plan:         Low back painnegative urine, will send for culture due to the low back pain. Educated patient to watch for urinary symptoms, nausea, vomiting, fever. Increase fluids and rest.     LABORATORY:  Labs ordered to be performed today include COVID 19 Testing, , and Urine culture.            Orders:       [id number]  Urinalysis, automated, without microscopy  (In-House)            [id number]  COVID 19 Testing  (Send-Out)            [id number]  [facility] - [facility] Urine Culture  (Send-Out)              Acute frontal sinusitis, unspecified        RECOMMENDATIONS given include: Patient treated for sinus infection at this time. Treated with Augmentin and educated to take OTC mucinex. Hydration was stressed to the patient. Supportive care including rest, fluids and mucus controll discussed. Patient was educated on proper antibiotics use. Patient to call with any new or worsening symptoms..            Prescriptions:       [New Rx] Augmentin 875-125 mg oral tablet [take 1 tablet by oral route every 12 hours for 10 days], #20 (twenty) tablets, Refills: 0 (zero)       [New Rx] guaiFENesin 600 mg oral Tablet,Extended Release 12 hr [take 1 tablet (600 mg) by oral route every 12 hours], #20 (twenty) tablets, Refills: 0 (zero)             Charge Capture:         Primary Diagnosis:     M54.5  Low back pain           Orders:      [id number]  Office/outpatient visit; established patient, level 3  (In-House)            [id number]  Urinalysis, automated, without microscopy  (In-House)              J01.10  Acute frontal sinusitis, unspecified

## 2021-05-18 NOTE — PROGRESS NOTES
Nory Rodriguez Maren  1965     Office/Outpatient Visit    Visit Date: Mon, Feb 1, 2021 02:24 pm    Provider: Eileen Barajas MD (Assistant: Adriel Doss MA)    Location: [facility]        Electronically signed by Eileen Barajas MD on  02/02/2021 05:14:39 PM                             Subjective:        CC: WWE    HPI:           With regard to the encounter for gynecological examination (general) (routine) with abnormal findings, her last physical exam was 1 year ago.  She is status-post hysterectomy.  She is not currently using any form of contraception.  She performs breast self-exams monthly.   Her last Pap smear was 1 year ago.   Her last mammogram was 1 year ago.   Her last DEXA was <1 year ago.   She underwent colonoscopy 3 years ago.   She's had vision screening done in September 2020 and this was normal.   She has never had a hearing test. A treadmill stress test has never been performed. A chest x-ray was done in December 2020.   Preventative Health updated today.  She is current with her pneumococcal and influenza immunization.  Cherelle denies any history of abnormal Pap smears.  Tobacco: She has never smoked.            PHQ-9 Depression Screening: Completed form scanned and in chart; Total Score 1       chronic insomnia, she is falling asleep with melatonin 10 mg and trazodone 100 mg and she is still waking up after 4 hours, she is struggling with her cpap and hasnt been able to use the cpap since she had covid, she is winded easily with stairs          Concerning type 2 diabetes mellitus with diabetic polyneuropathy, specifically, this is type 2, insulin requiring diabetes, complicated by peripheral neuropathy.  Compliance with treatment has been poor; she does not follow a diet and exercise regimen.      Tobacco screen: Non-smoker.  Current meds include an oral hypoglycemic ( Glucophage ), insulin/injectable ( Humalog- about 15 units with meals, SSI and carb counting;  Trulicity; toujeo-125 units bid ), an ACE inhibitor, aspirin, and a lipid lowering agent.  She reports home blood glucose readings have been fairly good, with average fasting glucoses running in the 120-150 mg/dL range. She checks her glucose 3 to 4 times daily.  Most recent lab results include Hemoglobin A1c:  7.6 (%) (07/07/2020), LDL:  53 (mg/dL) (07/07/2020), HDL:  31 (mg/dL) (07/07/2020), Triglycerides:  275 (mg/dL) (07/07/2020), Microalbumin, Urine, rand:  <12.0 (mg/L) (07/07/2020).  Has not fallen recently In regard to preventative care, she performs foot self-exams several days per week and her last ophthalmology exam was in 7/2020-Dr Hernández- NO DIABETIC RETINOPATHY, she has cataracts.      ROS:     CONSTITUTIONAL:  Negative for chills, fatigue, fever, and weight change.      EYES:  Negative for blurred vision, eye pain, and photophobia.      E/N/T:  Negative for hearing problems, E/N/T pain, congestion, rhinorrhea, epistaxis, hoarseness, and dental problems.      CARDIOVASCULAR:  Negative for chest pain, palpitations, tachycardia, orthopnea, and edema.      RESPIRATORY:  Positive for dyspnea.   Negative for recent cough or frequent wheezing.      GASTROINTESTINAL:  Negative for abdominal pain, heartburn, constipation, diarrhea, and stool changes.      GENITOURINARY:  Negative for genital lesions, hematuria, menstrual problems, polyuria, abnormal vaginal bleeding, and vaginal discharge.      MUSCULOSKELETAL:  Negative for arthralgias, back pain, and myalgias.      INTEGUMENTARY/BREAST:  Negative for rash.      NEUROLOGICAL:  Positive for weakness.   Negative for dizziness or headaches.      PSYCHIATRIC:  Positive for sleep disturbance.   Negative for anxiety, sadness or suicidal thoughts.          Past Medical History / Family History / Social History:         Last Reviewed on 2/01/2021 03:49 PM by Eileen Barajas    Past Medical History:                 PAST MEDICAL HISTORY         Hepatitis: type A;      Allergies    GERD    IDDM    hypercholesterolemia     Back pain     Breast lump     Cellulitis of the chest wall     Classic migraine     Congenital accessory skin tags     H/O Depression     GERD     chronic hoarseness     Hypertension     H/O Migraine headaches     Obstructive sleep apnea    Peripheral neuropathy     Post-menopausal    h/o Uterine polyp             GYNECOLOGICAL HISTORY:     miscarriage 1    Contraception: S/P tubal ligation;         CURRENT MEDICAL PROVIDERS:    Allergist: Dr. Delgadillo    Ophthalmologist: Ebenezer    Orthopedist: Antony    Therapist, Shabnam Miguel Endocrinologist         PREVENTIVE HEALTH MAINTENANCE             BONE DENSITY: was last done 2019 with the following abnormality noted-- Osteopenic at L1 in Lumbar Spine Only     COLORECTAL CANCER SCREENING: Up to date (colonoscopy q10y; sigmoidoscopy q5y; Cologuard q3y) was last done 18, Results are in chart     EYE EXAM: Diabetic Eye Exam during this calendar year and results are in chart was last done 2020     MAMMOGRAM: Done within last 2 years and results in are chart was last done 2019 with normal results     PAP SMEAR: was last done ZEESHAN BSO no longer needs PAPs         Surgical History:         Cholecystectomy    Hysterectomy: TAHBSO;     cataracts removed b 2018     pericardial window ; Procedures: heart cath          Family History:     Father: Coronary Artery Disease; ;  Cerebrovascular Accident     Mother: Hyperlipidemia;  Endometrial Cancer     Brother(s): Type 2 Diabetes     Paternal Grandfather: Type 2 Diabetes     Paternal Grandmother:      Maternal Grandfather: Type 2 Diabetes     Maternal Grandmother: Breast Cancer;  Alzheimer's Disease         Social History:     Occupation: [employer] de[employer]     Marital Status:      Children: 1 child and 2 step-children         Tobacco/Alcohol/Supplements:     Last Reviewed on 2021 02:31 PM by Adriel Doss    Tobacco: She  "has never smoked.  Non-drinker         Substance Abuse History:     Last Reviewed on 12/07/2020 11:51 AM by Mitzi West        Mental Health History:     Last Reviewed on 7/31/2019 01:57 PM by Santa Tobar        Communicable Diseases (eg STDs):     Last Reviewed on 7/31/2019 01:57 PM by Santa Tobar        Allergies:     Last Reviewed on 12/21/2020 09:58 AM by Shefali Jimenez    Levemir:      Cipro:      Nitroglycerin:      Bydureon: Nausea  (Adverse Reaction)    Diclofenac Sodium: swelling of feet,swelling of ankle  (Adverse Reaction)    cat dander:      Codeine:      Aspirin:      Sumatriptan: dizziness  (Adverse Reaction)        Current Medications:     Last Reviewed on 12/21/2020 09:58 AM by Shefali Jimenez    EpiPen 0.3 mg/0.3 mL injection Auto-Injector [inject 0.3 milliliter (0.3 mg) by intramuscular route once]    Claritin 10 mg oral tablet [take 1 tablet (10 mg) by oral route once daily]    Lancet   Lancet [Onet touch Fine Point Lancets use wiht one touch glucometer, check blood sugars 4 times daily]    lisinopriL 2.5 mg oral tablet [TAKE 1 TABLET DAILY]    BD Ultra-Fine Short Pen Needle 31 gauge x 5/16\"  [USE 4 TIMES A DAY WITH     HUMALOG]    promethazine 25 mg oral tablet [TAKE ONE-HALF TO ONE TABLET BY MOUTH EVERY 4 TO 6 HOURS AS NEEDED FOR NAUSEA AND VOMITING]    topiramate 100 mg oral tablet [TAKE 1 TABLET TWICE A DAY]    metFORMIN 500 mg oral Tablet, Extended Release 24 hr [1 tab bid]    Aleve 220 mg oral tablet    One Touch Ultra Blue Test Strips  Reagent Strips [Test Blood Sugar QID]    estradioL 0.5 mg oral tablet [TAKE 1 TABLET DAILY]    atorvastatin 10 mg oral tablet [1 tab qhs]    OneTouch UltraMini kit  [check blood sugar 6 times day]    Insulin Syringes 0.5ml Syringe [Use as directed]    HumaLOG KwikPen Insulin 200 unit/mL (3 mL) subcutaneous Insulin Pen [sliding scale]    alendronate 70 mg oral tablet [TAKE 1 TABLET EVERY WEEK]    Toujeo Max U-300 SoloStar 300 unit/mL (3 mL) " subcutaneous Insulin Pen [INJECT 80 UNITS  SUBCUTANEOUSLY TWO TIMES A DAY]    montelukast 10 mg oral tablet [TAKE 1 TABLET EVERY EVENING]    allergy shots once a week     ProAir HFA 90 mcg/actuation Inhalation HFA Aerosol Inhaler [PRN]    Symbicort 160mcg/4.5mcg Oral Inhaler [2 puffs BID]    Trulicity 1.5 mg/0.5 mL subcutaneous Pen Injector [every Sunday]    Jardiance 10 mg oral tablet [1 TAB DAILY]    omeprazole 40 mg oral capsule,delayed release (enteric coated) [TAKE 1 CAPSULE TWICE DAILY]    cholecalciferol (vitamin D3) 125 mcg (5,000 unit) oral capsule [TAKE ONE CAPSULE BY MOUTH DAILY]    Diltiazem HCl 120 mg oral Capsule, Extended Release 24 hr [1 tab daily]    Glucagon Emergency     Dexcom G6 Sensor device  [Use as directed]    ALBUTEROL    NEB 0.083%     traZODone 100 mg oral tablet [TAKE ONE TABLET BY MOUTH EVERY NIGHT AT BEDTIME]    DEXAMETHASON TAB 6MG         Objective:        Vitals:         Current: 2/1/2021 2:32:46 PM    Ht:  5 ft, 4.25 in;  Wt: 243 lbs;  BMI: 41.4T: 96.9 F (temporal);  BP: 123/44 mm Hg (left arm, sitting);  P: 82 bpm (left arm (BP Cuff), sitting);  sCr: 0.91 mg/dL;  GFR: 83.32        Exams:     PHYSICAL EXAM:     GENERAL: vital signs recorded - well developed, well nourished;  no apparent distress;     EYES: extraocular movements intact; conjunctiva and cornea are normal; PERRLA;     E/N/T:  normal EACs, TMs, nasal/oral mucosa, teeth, gingiva, and oropharynx;     NECK: range of motion is normal; thyroid is non-palpable;     RESPIRATORY: normal respiratory rate and pattern with no distress; normal breath sounds with no rales, rhonchi, wheezes or rubs;     CARDIOVASCULAR: normal rate; rhythm is regular;  no systolic murmur; no edema;     GASTROINTESTINAL: nontender; normal bowel sounds; no organomegaly; rectal exam: normal tone; nontender, guaiac negative stool;     GENITOURINARY: external genitalia: normal without lesions or urethral abnormalities;;  vagina: normal with good pelvic  support and no lesions or discharge;; cervix: absent (s/p hyst) noted;;  adnexa: normal with no masses or tenderness     BREAST/INTEGUMENT: BREASTS: breast exam is normal without masses, skin changes, or nipple discharge;     MUSCULOSKELETAL:  Normal range of motion, strength and tone;     NEUROLOGICAL:  cranial nerves, motor and sensory function, reflexes, gait and coordination are all intact;     PSYCHIATRIC:  appropriate affect and demeanor; normal speech pattern; grossly normal memory;         Lab/Test Results:         Glucose, Urine: 100 mg/dL (02/01/2021),     Bilirubin, urine: Negative (02/01/2021),     Ketones, Urine Strip: Negative (02/01/2021),     Specific Gravity, urine: 1.020 (02/01/2021),     Blood in Urine: negative (02/01/2021),     pH, urine: 7.0 (02/01/2021),     Protein Urine QL: negative (02/01/2021),     Urobilinogen, urine: 1.0 E.U./dL (02/01/2021),     Nitrite, Urine: Negative (02/01/2021),     Leukoctyes, urine: Negative (02/01/2021),     Appearance: Clear (02/01/2021),     collection source: Clean-catch (02/01/2021),     Color: Yellow (02/01/2021),     Performed by:: vanita (02/01/2021),             Assessment:         V72.31   Well Woman Exam       Z01.411   Encounter for gynecological examination (general) (routine) with abnormal findings       Z13.31   Encounter for screening for depression       G47.00   Insomnia, unspecified       R06.02   Shortness of breath       I10   Essential (primary) hypertension       E11.42   Type 2 diabetes mellitus with diabetic polyneuropathy           ORDERS:         Meds Prescribed:       [Refilled] traZODone 150 mg oral tablet [take 1 tablet (150 mg) by oral route qhs], #90 (ninety) tablets, Refills: 0 (zero)         Radiology/Test Orders:       3017F  Colorectal CA screen results documented and reviewed ([facility])  (In-House)              Lab Orders:       [id number]  Urinalysis, automated, without microscopy  (In-House)              Other Orders:          Depression screen negative  (In-House)            1101F  Pt screen for fall risk; document no falls in past year or only 1 fall w/o injury in past year ([facility])  (In-House)              Screening mammogram results documented  (Send-Out)                      Plan:         Encounter for gynecological examination (general) (routine) with abnormal findingsno pap performed, breast exam and pelvic exam WNL-will set her up for mammogram (appt in place)          Orders:       [id number]  Urinalysis, automated, without microscopy  (In-House)              Encounter for screening for depression    MIPS PHQ-9 Depression Screening: Completed form scanned and in chart; Total Score 1; Negative Depression Screen           Orders:         Depression screen negative  (In-House)            1101F  Pt screen for fall risk; document no falls in past year or only 1 fall w/o injury in past year ([facility])  (In-House)              Screening mammogram results documented  (Send-Out)            3017F  Colorectal CA screen results documented and reviewed ([facility])  (In-House)              Insomnia, unspecifiedwill increase her trazodone to 150 mg nightly          Prescriptions:       [Refilled] traZODone 150 mg oral tablet [take 1 tablet (150 mg) by oral route qhs], #90 (ninety) tablets, Refills: 0 (zero)         Shortness of breathongoing since covid, I recommend we check her heart via echo if these symptoms persist        Essential (primary) hypertensionvert well controlled        Type 2 diabetes mellitus with diabetic polyneuropathywell controlled, labs reviewed            Charge Capture:         Primary Diagnosis:     V72.31  Well Woman Exam           Orders:      [id number]  Preventive medicine, established patient, age 40-64 years  (In-House)              Z01.411  Encounter for gynecological examination (general) (routine) with abnormal findings           Orders:      [id number]  Urinalysis, automated, without microscopy  (In-House)               Z13.31  Encounter for screening for depression           Orders:      [id number]  Office/outpatient visit; established patient, level 4  (In-House)              Depression screen negative  (In-House)            1101F  Pt screen for fall risk; document no falls in past year or only 1 fall w/o injury in past year ([facility])  (In-House)            3017F  Colorectal CA screen results documented and reviewed (PV)  (In-House)              G47.00  Insomnia, unspecified     R06.02  Shortness of breath     I10  Essential (primary) hypertension     E11.42  Type 2 diabetes mellitus with diabetic polyneuropathy

## 2021-06-28 RX ORDER — LISINOPRIL 2.5 MG/1
TABLET ORAL
Qty: 90 TABLET | Refills: 0 | Status: SHIPPED | OUTPATIENT
Start: 2021-06-28 | End: 2021-07-01 | Stop reason: SDUPTHER

## 2021-07-01 ENCOUNTER — LAB (OUTPATIENT)
Dept: LAB | Facility: HOSPITAL | Age: 56
End: 2021-07-01

## 2021-07-01 ENCOUNTER — TRANSCRIBE ORDERS (OUTPATIENT)
Dept: ADMINISTRATIVE | Facility: HOSPITAL | Age: 56
End: 2021-07-01

## 2021-07-01 VITALS
SYSTOLIC BLOOD PRESSURE: 143 MMHG | HEART RATE: 90 BPM | DIASTOLIC BLOOD PRESSURE: 72 MMHG | HEIGHT: 64 IN | WEIGHT: 268.8 LBS | BODY MASS INDEX: 45.89 KG/M2 | TEMPERATURE: 98.4 F

## 2021-07-01 VITALS
SYSTOLIC BLOOD PRESSURE: 118 MMHG | TEMPERATURE: 98.2 F | HEART RATE: 84 BPM | BODY MASS INDEX: 43.77 KG/M2 | DIASTOLIC BLOOD PRESSURE: 61 MMHG | WEIGHT: 256.4 LBS | HEIGHT: 64 IN

## 2021-07-01 VITALS
BODY MASS INDEX: 43.87 KG/M2 | HEART RATE: 84 BPM | TEMPERATURE: 97.3 F | WEIGHT: 257 LBS | SYSTOLIC BLOOD PRESSURE: 125 MMHG | HEIGHT: 64 IN | DIASTOLIC BLOOD PRESSURE: 78 MMHG

## 2021-07-01 VITALS
SYSTOLIC BLOOD PRESSURE: 129 MMHG | HEIGHT: 64 IN | WEIGHT: 263.4 LBS | BODY MASS INDEX: 44.97 KG/M2 | DIASTOLIC BLOOD PRESSURE: 61 MMHG | TEMPERATURE: 98 F | HEART RATE: 77 BPM

## 2021-07-01 VITALS
DIASTOLIC BLOOD PRESSURE: 62 MMHG | SYSTOLIC BLOOD PRESSURE: 121 MMHG | TEMPERATURE: 98.2 F | HEIGHT: 64 IN | BODY MASS INDEX: 45 KG/M2 | HEART RATE: 71 BPM | WEIGHT: 263.6 LBS

## 2021-07-01 VITALS
HEART RATE: 91 BPM | WEIGHT: 255.6 LBS | HEIGHT: 64 IN | DIASTOLIC BLOOD PRESSURE: 79 MMHG | TEMPERATURE: 99.3 F | SYSTOLIC BLOOD PRESSURE: 125 MMHG | BODY MASS INDEX: 43.64 KG/M2

## 2021-07-01 VITALS
HEIGHT: 64 IN | DIASTOLIC BLOOD PRESSURE: 82 MMHG | BODY MASS INDEX: 44.05 KG/M2 | TEMPERATURE: 98.1 F | HEART RATE: 90 BPM | WEIGHT: 258 LBS | SYSTOLIC BLOOD PRESSURE: 127 MMHG

## 2021-07-01 VITALS
BODY MASS INDEX: 44.2 KG/M2 | DIASTOLIC BLOOD PRESSURE: 77 MMHG | WEIGHT: 258.9 LBS | HEART RATE: 79 BPM | HEIGHT: 64 IN | SYSTOLIC BLOOD PRESSURE: 135 MMHG | TEMPERATURE: 98.7 F

## 2021-07-01 VITALS
BODY MASS INDEX: 43.42 KG/M2 | HEART RATE: 86 BPM | SYSTOLIC BLOOD PRESSURE: 113 MMHG | HEIGHT: 64 IN | TEMPERATURE: 97.4 F | DIASTOLIC BLOOD PRESSURE: 71 MMHG | WEIGHT: 254.3 LBS

## 2021-07-01 VITALS
DIASTOLIC BLOOD PRESSURE: 81 MMHG | SYSTOLIC BLOOD PRESSURE: 143 MMHG | HEIGHT: 64 IN | BODY MASS INDEX: 45 KG/M2 | HEART RATE: 78 BPM | WEIGHT: 263.6 LBS | OXYGEN SATURATION: 97 % | TEMPERATURE: 97.5 F

## 2021-07-01 VITALS
DIASTOLIC BLOOD PRESSURE: 58 MMHG | TEMPERATURE: 97.8 F | HEART RATE: 81 BPM | BODY MASS INDEX: 44.86 KG/M2 | HEIGHT: 64 IN | SYSTOLIC BLOOD PRESSURE: 130 MMHG | WEIGHT: 262.8 LBS

## 2021-07-01 VITALS
TEMPERATURE: 98.2 F | SYSTOLIC BLOOD PRESSURE: 140 MMHG | HEART RATE: 75 BPM | BODY MASS INDEX: 44.73 KG/M2 | DIASTOLIC BLOOD PRESSURE: 75 MMHG | WEIGHT: 262 LBS | HEIGHT: 64 IN

## 2021-07-01 VITALS
BODY MASS INDEX: 44.69 KG/M2 | TEMPERATURE: 97.9 F | DIASTOLIC BLOOD PRESSURE: 55 MMHG | HEART RATE: 74 BPM | HEIGHT: 64 IN | SYSTOLIC BLOOD PRESSURE: 136 MMHG | WEIGHT: 261.8 LBS

## 2021-07-01 VITALS
HEART RATE: 80 BPM | TEMPERATURE: 97.6 F | WEIGHT: 256.6 LBS | HEIGHT: 64 IN | BODY MASS INDEX: 43.81 KG/M2 | DIASTOLIC BLOOD PRESSURE: 50 MMHG | SYSTOLIC BLOOD PRESSURE: 110 MMHG

## 2021-07-01 VITALS
BODY MASS INDEX: 44.05 KG/M2 | TEMPERATURE: 98.5 F | SYSTOLIC BLOOD PRESSURE: 114 MMHG | HEART RATE: 83 BPM | WEIGHT: 258 LBS | DIASTOLIC BLOOD PRESSURE: 67 MMHG | HEIGHT: 64 IN

## 2021-07-01 VITALS
DIASTOLIC BLOOD PRESSURE: 57 MMHG | TEMPERATURE: 97.6 F | HEIGHT: 64 IN | WEIGHT: 263.4 LBS | BODY MASS INDEX: 44.97 KG/M2 | HEART RATE: 75 BPM | SYSTOLIC BLOOD PRESSURE: 117 MMHG

## 2021-07-01 VITALS
DIASTOLIC BLOOD PRESSURE: 64 MMHG | HEART RATE: 79 BPM | HEIGHT: 64 IN | SYSTOLIC BLOOD PRESSURE: 102 MMHG | WEIGHT: 252.8 LBS | TEMPERATURE: 97.7 F | BODY MASS INDEX: 43.16 KG/M2

## 2021-07-01 VITALS
SYSTOLIC BLOOD PRESSURE: 124 MMHG | BODY MASS INDEX: 44.39 KG/M2 | TEMPERATURE: 97.6 F | WEIGHT: 260 LBS | DIASTOLIC BLOOD PRESSURE: 56 MMHG | HEIGHT: 64 IN | HEART RATE: 79 BPM

## 2021-07-01 VITALS
WEIGHT: 253.8 LBS | SYSTOLIC BLOOD PRESSURE: 121 MMHG | DIASTOLIC BLOOD PRESSURE: 61 MMHG | HEIGHT: 64 IN | TEMPERATURE: 98.1 F | HEART RATE: 78 BPM | BODY MASS INDEX: 43.33 KG/M2

## 2021-07-01 VITALS
BODY MASS INDEX: 44.22 KG/M2 | HEIGHT: 64 IN | HEART RATE: 85 BPM | SYSTOLIC BLOOD PRESSURE: 127 MMHG | WEIGHT: 259 LBS | TEMPERATURE: 97.6 F | DIASTOLIC BLOOD PRESSURE: 73 MMHG

## 2021-07-01 VITALS
DIASTOLIC BLOOD PRESSURE: 68 MMHG | WEIGHT: 257.8 LBS | BODY MASS INDEX: 44.01 KG/M2 | SYSTOLIC BLOOD PRESSURE: 120 MMHG | TEMPERATURE: 97 F | HEART RATE: 83 BPM | HEIGHT: 64 IN

## 2021-07-01 VITALS
DIASTOLIC BLOOD PRESSURE: 67 MMHG | HEIGHT: 64 IN | WEIGHT: 259.6 LBS | BODY MASS INDEX: 44.32 KG/M2 | HEART RATE: 77 BPM | TEMPERATURE: 97.8 F | SYSTOLIC BLOOD PRESSURE: 116 MMHG

## 2021-07-01 VITALS
DIASTOLIC BLOOD PRESSURE: 87 MMHG | HEIGHT: 64 IN | BODY MASS INDEX: 44.05 KG/M2 | TEMPERATURE: 97.4 F | HEART RATE: 81 BPM | SYSTOLIC BLOOD PRESSURE: 146 MMHG | WEIGHT: 258 LBS

## 2021-07-01 VITALS
HEART RATE: 86 BPM | DIASTOLIC BLOOD PRESSURE: 58 MMHG | WEIGHT: 250.6 LBS | TEMPERATURE: 98 F | SYSTOLIC BLOOD PRESSURE: 115 MMHG | BODY MASS INDEX: 42.78 KG/M2 | HEIGHT: 64 IN

## 2021-07-01 DIAGNOSIS — I10 HYPERTENSION, UNSPECIFIED TYPE: ICD-10-CM

## 2021-07-01 DIAGNOSIS — I10 HYPERTENSION, UNSPECIFIED TYPE: Primary | ICD-10-CM

## 2021-07-01 DIAGNOSIS — G43.009 MIGRAINE WITHOUT AURA AND WITHOUT STATUS MIGRAINOSUS, NOT INTRACTABLE: Primary | ICD-10-CM

## 2021-07-01 DIAGNOSIS — Z79.890 POSTMENOPAUSAL HORMONE THERAPY: ICD-10-CM

## 2021-07-01 DIAGNOSIS — G47.00 INSOMNIA, UNSPECIFIED TYPE: ICD-10-CM

## 2021-07-01 DIAGNOSIS — E78.00 HYPERCHOLESTEROLEMIA: ICD-10-CM

## 2021-07-01 LAB
ALBUMIN SERPL-MCNC: 4.1 G/DL (ref 3.5–5.2)
ALP SERPL-CCNC: 81 U/L (ref 39–117)
ALT SERPL W P-5'-P-CCNC: 18 U/L (ref 1–33)
AST SERPL-CCNC: 17 U/L (ref 1–32)
BILIRUB CONJ SERPL-MCNC: <0.2 MG/DL (ref 0–0.3)
BILIRUB INDIRECT SERPL-MCNC: NORMAL MG/DL
BILIRUB SERPL-MCNC: 0.2 MG/DL (ref 0–1.2)
PROT SERPL-MCNC: 6.9 G/DL (ref 6–8.5)

## 2021-07-01 PROCEDURE — 36415 COLL VENOUS BLD VENIPUNCTURE: CPT

## 2021-07-01 PROCEDURE — 80076 HEPATIC FUNCTION PANEL: CPT

## 2021-07-01 RX ORDER — ATORVASTATIN CALCIUM 10 MG/1
10 TABLET, FILM COATED ORAL NIGHTLY
Qty: 90 TABLET | Refills: 0 | Status: SHIPPED | OUTPATIENT
Start: 2021-07-01 | End: 2021-11-02

## 2021-07-01 RX ORDER — TOPIRAMATE 100 MG/1
100 TABLET, FILM COATED ORAL 2 TIMES DAILY
Qty: 180 TABLET | Refills: 0 | Status: SHIPPED | OUTPATIENT
Start: 2021-07-01 | End: 2021-10-11

## 2021-07-01 RX ORDER — LISINOPRIL 2.5 MG/1
2.5 TABLET ORAL DAILY
Qty: 90 TABLET | Refills: 0 | Status: SHIPPED | OUTPATIENT
Start: 2021-07-01 | End: 2021-10-11

## 2021-07-01 RX ORDER — TRAZODONE HYDROCHLORIDE 100 MG/1
100 TABLET ORAL NIGHTLY
Qty: 90 TABLET | Refills: 0 | Status: SHIPPED | OUTPATIENT
Start: 2021-07-01 | End: 2021-11-12 | Stop reason: SDUPTHER

## 2021-07-01 RX ORDER — ESTRADIOL 0.5 MG/1
0.5 TABLET ORAL DAILY
Qty: 90 TABLET | Refills: 0 | Status: SHIPPED | OUTPATIENT
Start: 2021-07-01 | End: 2021-11-29

## 2021-07-01 NOTE — TELEPHONE ENCOUNTER
I have refilled her medications for 90 days.  She has seen Dr. Barajas recently and has an upcoming appointment.  Thanks.

## 2021-07-02 VITALS
SYSTOLIC BLOOD PRESSURE: 111 MMHG | HEART RATE: 79 BPM | BODY MASS INDEX: 41.76 KG/M2 | HEIGHT: 64 IN | DIASTOLIC BLOOD PRESSURE: 65 MMHG | WEIGHT: 244.6 LBS | TEMPERATURE: 98.2 F

## 2021-07-02 VITALS
WEIGHT: 245.2 LBS | HEIGHT: 64 IN | BODY MASS INDEX: 41.86 KG/M2 | SYSTOLIC BLOOD PRESSURE: 121 MMHG | HEART RATE: 75 BPM | OXYGEN SATURATION: 95 % | TEMPERATURE: 96.9 F | DIASTOLIC BLOOD PRESSURE: 62 MMHG

## 2021-07-02 VITALS
SYSTOLIC BLOOD PRESSURE: 119 MMHG | HEART RATE: 93 BPM | TEMPERATURE: 97.8 F | BODY MASS INDEX: 42.34 KG/M2 | DIASTOLIC BLOOD PRESSURE: 73 MMHG | WEIGHT: 248 LBS | HEIGHT: 64 IN

## 2021-07-02 VITALS
WEIGHT: 237.2 LBS | OXYGEN SATURATION: 98 % | HEART RATE: 82 BPM | SYSTOLIC BLOOD PRESSURE: 112 MMHG | TEMPERATURE: 96.5 F | HEIGHT: 64 IN | DIASTOLIC BLOOD PRESSURE: 62 MMHG | BODY MASS INDEX: 40.49 KG/M2

## 2021-07-02 VITALS
HEART RATE: 82 BPM | WEIGHT: 243 LBS | DIASTOLIC BLOOD PRESSURE: 44 MMHG | TEMPERATURE: 96.9 F | BODY MASS INDEX: 41.48 KG/M2 | HEIGHT: 64 IN | SYSTOLIC BLOOD PRESSURE: 123 MMHG

## 2021-07-02 VITALS
DIASTOLIC BLOOD PRESSURE: 68 MMHG | SYSTOLIC BLOOD PRESSURE: 117 MMHG | TEMPERATURE: 97.8 F | BODY MASS INDEX: 41.15 KG/M2 | HEIGHT: 64 IN | WEIGHT: 241 LBS | HEART RATE: 71 BPM

## 2021-07-02 VITALS
HEART RATE: 84 BPM | HEIGHT: 64 IN | DIASTOLIC BLOOD PRESSURE: 61 MMHG | TEMPERATURE: 98.2 F | SYSTOLIC BLOOD PRESSURE: 104 MMHG | BODY MASS INDEX: 41.59 KG/M2 | WEIGHT: 243.6 LBS

## 2021-07-02 VITALS
DIASTOLIC BLOOD PRESSURE: 66 MMHG | HEART RATE: 71 BPM | BODY MASS INDEX: 41.62 KG/M2 | WEIGHT: 243.8 LBS | TEMPERATURE: 97.4 F | SYSTOLIC BLOOD PRESSURE: 106 MMHG | HEIGHT: 64 IN

## 2021-07-02 VITALS
HEART RATE: 78 BPM | TEMPERATURE: 97.2 F | SYSTOLIC BLOOD PRESSURE: 124 MMHG | BODY MASS INDEX: 40.6 KG/M2 | WEIGHT: 237.8 LBS | HEIGHT: 64 IN | DIASTOLIC BLOOD PRESSURE: 62 MMHG

## 2021-07-24 DIAGNOSIS — E78.00 HYPERCHOLESTEROLEMIA: ICD-10-CM

## 2021-07-24 DIAGNOSIS — G43.009 MIGRAINE WITHOUT AURA AND WITHOUT STATUS MIGRAINOSUS, NOT INTRACTABLE: ICD-10-CM

## 2021-07-26 RX ORDER — TOPIRAMATE 100 MG/1
TABLET, FILM COATED ORAL
Qty: 180 TABLET | Refills: 0 | OUTPATIENT
Start: 2021-07-26

## 2021-07-26 RX ORDER — ATORVASTATIN CALCIUM 10 MG/1
TABLET, FILM COATED ORAL
Qty: 90 TABLET | Refills: 1 | OUTPATIENT
Start: 2021-07-26

## 2021-08-02 ENCOUNTER — OFFICE VISIT (OUTPATIENT)
Dept: FAMILY MEDICINE CLINIC | Age: 56
End: 2021-08-02

## 2021-08-02 VITALS
SYSTOLIC BLOOD PRESSURE: 98 MMHG | DIASTOLIC BLOOD PRESSURE: 64 MMHG | BODY MASS INDEX: 39.98 KG/M2 | TEMPERATURE: 98.1 F | HEIGHT: 64 IN | HEART RATE: 80 BPM | WEIGHT: 234.2 LBS

## 2021-08-02 DIAGNOSIS — J02.9 ACUTE PHARYNGITIS, UNSPECIFIED ETIOLOGY: Primary | ICD-10-CM

## 2021-08-02 DIAGNOSIS — J01.40 ACUTE NON-RECURRENT PANSINUSITIS: ICD-10-CM

## 2021-08-02 LAB
EXPIRATION DATE: NORMAL
INTERNAL CONTROL: NORMAL
Lab: NORMAL
S PYO AG THROAT QL: NEGATIVE

## 2021-08-02 PROCEDURE — 87880 STREP A ASSAY W/OPTIC: CPT | Performed by: NURSE PRACTITIONER

## 2021-08-02 PROCEDURE — 87081 CULTURE SCREEN ONLY: CPT | Performed by: NURSE PRACTITIONER

## 2021-08-02 PROCEDURE — 99213 OFFICE O/P EST LOW 20 MIN: CPT | Performed by: NURSE PRACTITIONER

## 2021-08-02 RX ORDER — AMOXICILLIN AND CLAVULANATE POTASSIUM 875; 125 MG/1; MG/1
1 TABLET, FILM COATED ORAL 2 TIMES DAILY
Qty: 14 TABLET | Refills: 0 | Status: SHIPPED | OUTPATIENT
Start: 2021-08-02 | End: 2021-08-09

## 2021-08-02 NOTE — PROGRESS NOTES
"Chief Complaint  Sore Throat (sore throat, no voice X 3 days)    Subjective          Nory Rodriguez presents to [facility]  Had COVID vaccines March and April.    URI   This is a new problem. The current episode started in the past 7 days. The problem has been rapidly worsening. There has been no fever. Associated symptoms include coughing (non-productvie), ear pain, headaches, sinus pain and a sore throat. Pertinent negatives include no congestion, diarrhea, joint pain, nausea, rhinorrhea, sneezing, swollen glands, vomiting or wheezing. Associated symptoms comments: Denies myalgia, loss of taste and smell. She has tried acetaminophen for the symptoms. The treatment provided mild relief.       Objective   Vital Signs:   BP 98/64 (BP Location: Left arm, Patient Position: Sitting)   Pulse 80   Temp 98.1 °F (36.7 °C) (Oral)   Ht 163.2 cm (64.25\")   Wt 106 kg (234 lb 3.2 oz)   BMI 39.89 kg/m²     Physical Exam  Constitutional:       Appearance: Normal appearance. She is obese.   HENT:      Head: Normocephalic.      Comments: Left maxillary and frontal sinus tenderness     Right Ear: Tympanic membrane, ear canal and external ear normal.      Left Ear: Tympanic membrane, ear canal and external ear normal.      Nose: Nose normal.      Mouth/Throat:      Mouth: Mucous membranes are moist.      Pharynx: Oropharynx is clear. Posterior oropharyngeal erythema present. No oropharyngeal exudate.      Comments: Strained voice  Eyes:      Conjunctiva/sclera: Conjunctivae normal.      Pupils: Pupils are equal, round, and reactive to light.   Cardiovascular:      Rate and Rhythm: Normal rate and regular rhythm.      Pulses: Normal pulses.      Heart sounds: Normal heart sounds.   Pulmonary:      Effort: Pulmonary effort is normal.      Breath sounds: Normal breath sounds.   Musculoskeletal:      Cervical back: Normal range of motion.   Neurological:      Mental Status: She is alert and " oriented to person, place, and time.   Psychiatric:         Mood and Affect: Mood normal.         Behavior: Behavior normal.         Thought Content: Thought content normal.        Result Review :                 Assessment and Plan    Diagnoses and all orders for this visit:    1. Acute pharyngitis, unspecified etiology (Primary)  -     POCT rapid strep A  -     Beta Strep Culture, Throat - , Throat; Future  -     amoxicillin-clavulanate (Augmentin) 875-125 MG per tablet; Take 1 tablet by mouth 2 (Two) Times a Day for 7 days.  Dispense: 14 tablet; Refill: 0  -     Beta Strep Culture, Throat - Swab, Throat    2. Acute non-recurrent pansinusitis  -     amoxicillin-clavulanate (Augmentin) 875-125 MG per tablet; Take 1 tablet by mouth 2 (Two) Times a Day for 7 days.  Dispense: 14 tablet; Refill: 0    The pt's rapid strep is negative. She has lost her voice and she has left frontal and maxillary sinus tenderness.  Will treat her for sinusitis.    Follow Up   Return if symptoms worsen or fail to improve.  Patient was given instructions and counseling regarding her condition or for health maintenance advice. Please see specific information pulled into the AVS if appropriate.

## 2021-08-04 LAB — BACTERIA SPEC AEROBE CULT: NORMAL

## 2021-08-09 ENCOUNTER — OFFICE VISIT (OUTPATIENT)
Dept: FAMILY MEDICINE CLINIC | Age: 56
End: 2021-08-09

## 2021-08-09 ENCOUNTER — LAB (OUTPATIENT)
Dept: LAB | Facility: HOSPITAL | Age: 56
End: 2021-08-09

## 2021-08-09 ENCOUNTER — TRANSCRIBE ORDERS (OUTPATIENT)
Dept: ADMINISTRATIVE | Facility: HOSPITAL | Age: 56
End: 2021-08-09

## 2021-08-09 VITALS
HEART RATE: 71 BPM | WEIGHT: 234.8 LBS | SYSTOLIC BLOOD PRESSURE: 111 MMHG | DIASTOLIC BLOOD PRESSURE: 56 MMHG | TEMPERATURE: 97.5 F | BODY MASS INDEX: 40.08 KG/M2 | HEIGHT: 64 IN

## 2021-08-09 DIAGNOSIS — I10 ESSENTIAL HYPERTENSION, MALIGNANT: ICD-10-CM

## 2021-08-09 DIAGNOSIS — J04.0 ACUTE LARYNGITIS: Primary | ICD-10-CM

## 2021-08-09 DIAGNOSIS — N30.00 ACUTE CYSTITIS WITHOUT HEMATURIA: ICD-10-CM

## 2021-08-09 DIAGNOSIS — E78.41 ELEVATED LIPOPROTEIN A LEVEL: Primary | ICD-10-CM

## 2021-08-09 DIAGNOSIS — E78.41 ELEVATED LIPOPROTEIN A LEVEL: ICD-10-CM

## 2021-08-09 DIAGNOSIS — Z79.4 TYPE 2 DIABETES MELLITUS WITH HYPERGLYCEMIA, WITH LONG-TERM CURRENT USE OF INSULIN (HCC): ICD-10-CM

## 2021-08-09 DIAGNOSIS — J01.00 ACUTE NON-RECURRENT MAXILLARY SINUSITIS: ICD-10-CM

## 2021-08-09 DIAGNOSIS — E11.65 TYPE 2 DIABETES MELLITUS WITH HYPERGLYCEMIA, WITH LONG-TERM CURRENT USE OF INSULIN (HCC): ICD-10-CM

## 2021-08-09 DIAGNOSIS — M54.50 ACUTE BILATERAL LOW BACK PAIN WITHOUT SCIATICA: ICD-10-CM

## 2021-08-09 LAB
ALBUMIN SERPL-MCNC: 3.8 G/DL (ref 3.5–5.2)
ALBUMIN/GLOB SERPL: 1.3 G/DL
ALP SERPL-CCNC: 75 U/L (ref 39–117)
ALT SERPL W P-5'-P-CCNC: 22 U/L (ref 1–33)
ANION GAP SERPL CALCULATED.3IONS-SCNC: 6.6 MMOL/L (ref 5–15)
AST SERPL-CCNC: 13 U/L (ref 1–32)
BACTERIA UR QL AUTO: ABNORMAL /HPF
BILIRUB SERPL-MCNC: 0.2 MG/DL (ref 0–1.2)
BILIRUB UR QL STRIP: NEGATIVE
BUN SERPL-MCNC: 15 MG/DL (ref 6–20)
BUN/CREAT SERPL: 16.5 (ref 7–25)
CALCIUM SPEC-SCNC: 8.9 MG/DL (ref 8.6–10.5)
CHLORIDE SERPL-SCNC: 102 MMOL/L (ref 98–107)
CHOLEST SERPL-MCNC: 149 MG/DL (ref 0–200)
CLARITY UR: ABNORMAL
CO2 SERPL-SCNC: 27.4 MMOL/L (ref 22–29)
COLOR UR: YELLOW
CREAT SERPL-MCNC: 0.91 MG/DL (ref 0.57–1)
GFR SERPL CREATININE-BSD FRML MDRD: 63 ML/MIN/1.73
GLOBULIN UR ELPH-MCNC: 3 GM/DL
GLUCOSE SERPL-MCNC: 149 MG/DL (ref 65–99)
GLUCOSE UR STRIP-MCNC: ABNORMAL MG/DL
HBA1C MFR BLD: 6.5 % (ref 4.8–5.6)
HDLC SERPL-MCNC: 41 MG/DL (ref 40–60)
HGB UR QL STRIP.AUTO: NEGATIVE
HYALINE CASTS UR QL AUTO: ABNORMAL /LPF
KETONES UR QL STRIP: NEGATIVE
LDLC SERPL CALC-MCNC: 80 MG/DL (ref 0–100)
LDLC/HDLC SERPL: 1.85 {RATIO}
LEUKOCYTE ESTERASE UR QL STRIP.AUTO: NEGATIVE
NITRITE UR QL STRIP: POSITIVE
PH UR STRIP.AUTO: 6.5 [PH] (ref 5–8)
POTASSIUM SERPL-SCNC: 4.1 MMOL/L (ref 3.5–5.2)
PROT SERPL-MCNC: 6.8 G/DL (ref 6–8.5)
PROT UR QL STRIP: NEGATIVE
RBC # UR: ABNORMAL /HPF
REF LAB TEST METHOD: ABNORMAL
SODIUM SERPL-SCNC: 136 MMOL/L (ref 136–145)
SP GR UR STRIP: 1.02 (ref 1–1.03)
SQUAMOUS #/AREA URNS HPF: ABNORMAL /HPF
TRIGL SERPL-MCNC: 161 MG/DL (ref 0–150)
TSH SERPL DL<=0.05 MIU/L-ACNC: 1.81 UIU/ML (ref 0.27–4.2)
UROBILINOGEN UR QL STRIP: ABNORMAL
VLDLC SERPL-MCNC: 28 MG/DL (ref 5–40)
WBC UR QL AUTO: ABNORMAL /HPF
YEAST URNS QL MICRO: ABNORMAL /HPF

## 2021-08-09 PROCEDURE — 87186 SC STD MICRODIL/AGAR DIL: CPT | Performed by: FAMILY MEDICINE

## 2021-08-09 PROCEDURE — 80061 LIPID PANEL: CPT

## 2021-08-09 PROCEDURE — 87077 CULTURE AEROBIC IDENTIFY: CPT | Performed by: FAMILY MEDICINE

## 2021-08-09 PROCEDURE — 36415 COLL VENOUS BLD VENIPUNCTURE: CPT

## 2021-08-09 PROCEDURE — 83036 HEMOGLOBIN GLYCOSYLATED A1C: CPT

## 2021-08-09 PROCEDURE — 81001 URINALYSIS AUTO W/SCOPE: CPT | Performed by: FAMILY MEDICINE

## 2021-08-09 PROCEDURE — 87086 URINE CULTURE/COLONY COUNT: CPT | Performed by: FAMILY MEDICINE

## 2021-08-09 PROCEDURE — 80053 COMPREHEN METABOLIC PANEL: CPT

## 2021-08-09 PROCEDURE — 99214 OFFICE O/P EST MOD 30 MIN: CPT | Performed by: FAMILY MEDICINE

## 2021-08-09 PROCEDURE — 84443 ASSAY THYROID STIM HORMONE: CPT

## 2021-08-09 RX ORDER — DEXAMETHASONE 4 MG/1
4 TABLET ORAL
Qty: 5 TABLET | Refills: 0 | Status: SHIPPED | OUTPATIENT
Start: 2021-08-09 | End: 2021-08-14 | Stop reason: SDUPTHER

## 2021-08-09 RX ORDER — DOXYCYCLINE HYCLATE 100 MG
100 TABLET ORAL 2 TIMES DAILY
Qty: 10 TABLET | Refills: 0 | Status: SHIPPED | OUTPATIENT
Start: 2021-08-09 | End: 2021-08-14 | Stop reason: SDUPTHER

## 2021-08-09 NOTE — PROGRESS NOTES
Nory Rodriguez presents to [facility].    Chief Complaint:laryngitis    Subjective       History of Present Illness:  HPI  BACK PAIN-she thinks she has a kidney infection  She has lost  8 #s in past few months  Her BS are well controlled  Ongoing URI symptoms, she is 10 days in to this infection with ongoing symptoms of coughing (non-productvie), ear pain, headaches, sinus pain and a sore throat. Pertinent negatives include no congestion, diarrhea, joint pain, nausea, rhinorrhea, sneezing, swollen glands, vomiting or wheezing. Associated symptoms comments: Denies myalgia, loss of taste and smell. She was treated with augmentin without improvement.  She is also on acetaminophen for the symptoms  chronic insomnia, she is falling asleep with melatonin 10 mg and trazodone 100 mg and she is still waking up after 4 hours, she is struggling with her cpap and hasnt been able to use the cpap since she had covid, she is winded easily with stairs          Concerning type 2 diabetes mellitus with diabetic polyneuropathy, specifically, this is type 2, insulin requiring diabetes, complicated by peripheral neuropathy.  Compliance with treatment has been poor; she does not follow a diet and exercise regimen.      Tobacco screen: Non-smoker.  Current meds include an oral hypoglycemic ( Glucophage ), insulin/injectable ( Humalog- about 15 units with meals, SSI and carb counting; Trulicity; toujeo-125 units bid ), an ACE inhibitor, aspirin, and a lipid lowering agent.  She reports home blood glucose readings have been fairly good, with average fasting glucoses running in the 120-150 mg/dL range. She checks her glucose 3 to 4 times daily.  Has not fallen recently In regard to preventative care, she performs foot self-exams several days per week and her last ophthalmology exam was in 8/2021-Dr Hernández- NO DIABETIC RETINOPATHY, she has cataracts.       Review of Systems:  Review of Systems   Constitutional:  Positive for fatigue. Negative for chills and fever.   HENT: Positive for ear pain, sinus pressure, sneezing, sore throat and voice change.    Eyes: Negative for blurred vision and double vision.   Respiratory: Positive for cough. Negative for shortness of breath and wheezing.    Cardiovascular: Negative for chest pain and palpitations.   Gastrointestinal: Negative for abdominal pain, constipation, diarrhea, nausea and vomiting.   Skin: Negative for rash.   Neurological: Positive for headache. Negative for dizziness.        Objective   Medical History:  Past Medical History:   • Accessory skin tags    CONGENITAL   • Acute frontal sinusitis, unspecified   • Acute upper respiratory infection, unspecified   • Acute vaginitis   • Allergic rhinitis, unspecified   • Breast lump   • Calculus of kidney   • Carrier of, hepatitis viral (CMS/HCC)   • Cellulitis of chest wall   • Chronic back pain   • Chronic hoarseness   • COVID-19   • Dietary counseling and surveillance   • Dizziness and giddiness   • Effusion, left knee   • Effusion, right knee   • GERD without esophagitis   • Hepatitis A   • Hereditary and idiopathic neuropathy, unspecified   • History of depression   • Hyperlipidemia   • Hypertension   • Kidney stone   • Localized swelling, mass and lump, right lower limb   • Low back pain   • Methicillin resistant Staphylococcus aureus infection as the cause of diseases classified elsewhere   • Migraine without aura, not intractable, without status migrainosus   • Moderate persistent asthma with (acute) exacerbation   • Morbid obesity due to excess calories (CMS/HCC)   • Nausea   • Neuropathy   • Osteoporosis   • Pain in joints of right hand   • Pain in left hip   • Pain in left leg   • Pain in right shoulder   • Paresthesia of skin   • Peripheral neuropathy   • Personal history of other venous thrombosis and embolism   • Post-menopausal   • Pressure ulcer of other site, stage 1   • Primary insomnia   • Pure  hypercholesterolemia, unspecified   • 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)   • 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 (CMS/HCC)   • Shortness of breath   • Sleep apnea   • Sleep apnea, unspecified   • Tinea unguium   • Type 2 diabetes mellitus with diabetic polyneuropathy (CMS/HCC)   • Uterine polyp   • Viral hepatitis   • Vitamin D deficiency, unspecified     Past Surgical History:   • CARDIAC CATHETERIZATION   • CATARACT EXTRACTION, BILATERAL   • CHOLECYSTECTOMY   • HYSTERECTOMY    TAHBSO   • PERICARDIAL WINDOW      Family History   Problem Relation Age of Onset   • Hyperlipidemia Mother    • Endometrial cancer Mother    • Coronary artery disease Father    • Stroke Father    • Diabetes type II Brother    • Breast cancer Maternal Grandmother    • Alzheimer's disease Maternal Grandmother    • Diabetes type II Maternal Grandfather    • Diabetes type II Paternal Grandfather      Social History     Tobacco Use   • Smoking status: Never Smoker   • Smokeless tobacco: Never Used   Substance Use Topics   • Alcohol use: Not Currently       Health Maintenance Due   Topic Date Due   • COLORECTAL CANCER SCREENING  Never done   • ANNUAL PHYSICAL  Never done   • Hepatitis B (1 of 3 - Risk 3-dose series) Never done   • ZOSTER VACCINE (1 of 2) Never done   • HEPATITIS C SCREENING  Never done   • DIABETIC FOOT EXAM  Never done   • DIABETIC EYE EXAM  07/06/2021   • LIPID PANEL  07/07/2021        Immunization History   Administered Date(s) Administered   • COVID-19 (PFIZER) 03/30/2021, 04/20/2021   • Influenza, Unspecified 10/03/2020   • Pneumococcal Polysaccharide (PPSV23) 11/29/2016   • Tdap 09/29/2015       Allergies   Allergen Reactions   • Asa [Aspirin] Anaphylaxis   • Ciprofloxacin Anaphylaxis   • Levemir [Insulin Detemir] GI Intolerance   • Nitroglycerin Hives   • Sumatriptan Dizziness   • Cat Hair Extract Rash   • Codeine Palpitations   • Diclofenac Swelling         Medications:  Current Outpatient Medications on File Prior to Visit   Medication Sig   • acetaminophen (TYLENOL) 325 MG tablet Take 2 tablets by mouth Every 4 (Four) Hours As Needed for Mild Pain .   • albuterol (PROVENTIL) (2.5 MG/3ML) 0.083% nebulizer solution Take 2.5 mg by nebulization Every 4 (Four) Hours As Needed for Wheezing.   • albuterol sulfate  (90 Base) MCG/ACT inhaler Inhale 2 puffs Every 4 (Four) Hours As Needed for Wheezing.   • alendronate (FOSAMAX) 70 MG tablet Take 70 mg by mouth Every 7 (Seven) Days.   • atorvastatin (Lipitor) 10 MG tablet Take 1 tablet by mouth Every Night.   • budesonide-formoterol (SYMBICORT) 160-4.5 MCG/ACT inhaler Inhale 2 puffs 2 (Two) Times a Day.   • cetirizine (zyrTEC) 10 MG tablet Take 10 mg by mouth Daily.   • Cholecalciferol (vitamin D3) 125 MCG (5000 UT) capsule capsule Take 5,000 Units by mouth Daily.   • dilTIAZem CD (CARDIZEM CD) 120 MG 24 hr capsule Take 120 mg by mouth Daily.   • Dulaglutide (Trulicity) 1.5 MG/0.5ML solution pen-injector Inject  under the skin into the appropriate area as directed. Every sunday   • Empagliflozin (JARDIANCE PO) Take 10 mg by mouth Daily.   • EPINEPHrine (EpiPen 2-Lizandro) 0.3 MG/0.3ML solution auto-injector injection    • estradiol (ESTRACE) 0.5 MG tablet Take 1 tablet by mouth Daily.   • Insulin Glargine, 1 Unit Dial, (Toujeo SoloStar) 300 UNIT/ML solution pen-injector injection Inject 80 Units under the skin into the appropriate area as directed 2 (Two) Times a Day. (Patient taking differently: Inject 100 Units under the skin into the appropriate area as directed 2 (Two) Times a Day.)   • insulin lispro (humaLOG) 100 UNIT/ML injection Inject  under the skin into the appropriate area as directed 3 (Three) Times a Day Before Meals. Sliding scale   • lisinopril (PRINIVIL,ZESTRIL) 2.5 MG tablet Take 1 tablet by mouth Daily.   • metFORMIN (GLUCOPHAGE) 500 MG tablet Take 500 mg by mouth 2 (Two) Times a Day With Meals.   •  "montelukast (SINGULAIR) 10 MG tablet Take 10 mg by mouth Every Night.   • naproxen sodium (ALEVE) 220 MG tablet Take 220 mg by mouth 2 (Two) Times a Day As Needed.   • omeprazole (priLOSEC) 40 MG capsule Take 40 mg by mouth Daily.   • promethazine (PHENERGAN) 25 MG tablet Take 25 mg by mouth Every 6 (Six) Hours As Needed for Nausea or Vomiting.   • topiramate (TOPAMAX) 100 MG tablet Take 1 tablet by mouth 2 (Two) Times a Day.   • traZODone (DESYREL) 100 MG tablet Take 1 tablet by mouth Every Night.   • [DISCONTINUED] dexamethasone (DECADRON) 6 MG tablet Take 1 tablet by mouth Daily. Next dose 12/16   • [DISCONTINUED] amoxicillin-clavulanate (Augmentin) 875-125 MG per tablet Take 1 tablet by mouth 2 (Two) Times a Day for 7 days.     No current facility-administered medications on file prior to visit.       Vital Signs:   /56   Pulse 71   Temp 97.5 °F (36.4 °C) (Oral)   Ht 163.2 cm (64.25\")   Wt 107 kg (234 lb 12.8 oz)   BMI 39.99 kg/m²       Physical Exam:  Physical Exam  Vitals and nursing note reviewed.   Constitutional:       General: She is not in acute distress.     Appearance: Normal appearance. She is not ill-appearing, toxic-appearing or diaphoretic.   HENT:      Head: Normocephalic and atraumatic.      Right Ear: Tympanic membrane, ear canal and external ear normal.      Left Ear: Tympanic membrane, ear canal and external ear normal.      Nose: No congestion or rhinorrhea.      Mouth/Throat:      Pharynx: Oropharynx is clear. No oropharyngeal exudate or posterior oropharyngeal erythema.   Eyes:      Extraocular Movements: Extraocular movements intact.      Conjunctiva/sclera: Conjunctivae normal.      Pupils: Pupils are equal, round, and reactive to light.   Cardiovascular:      Rate and Rhythm: Normal rate and regular rhythm.      Pulses:           Dorsalis pedis pulses are 2+ on the right side and 2+ on the left side.      Heart sounds: Normal heart sounds.   Pulmonary:      Breath sounds: " Normal breath sounds. No wheezing, rhonchi or rales.   Abdominal:      General: Abdomen is flat. Bowel sounds are normal.      Palpations: Abdomen is soft.   Musculoskeletal:      Cervical back: Neck supple. No rigidity.      Right foot: No deformity.      Left foot: No deformity.   Feet:      Right foot:      Protective Sensation: 7 sites tested. 7 sites sensed.      Skin integrity: Skin integrity normal. No ulcer or blister.      Toenail Condition: Right toenails are normal.      Left foot:      Protective Sensation: 7 sites tested. 7 sites sensed.      Skin integrity: Skin integrity normal. No ulcer or blister.      Toenail Condition: Left toenails are normal.      Comments:      Lymphadenopathy:      Cervical: No cervical adenopathy.   Skin:     General: Skin is warm and dry.      Capillary Refill: Capillary refill takes less than 2 seconds.   Neurological:      Mental Status: She is alert and oriented to person, place, and time.   Psychiatric:         Mood and Affect: Mood normal.         Behavior: Behavior normal.         Result Review      The following data was reviewed by Eileen Barajas MD on 08/09/2021.  Lab Results   Component Value Date    WBC 4.24 12/14/2020    HGB 12.9 12/14/2020    HCT 38.0 12/14/2020    MCV 90.5 12/14/2020     12/14/2020     Lab Results   Component Value Date    GLUCOSE 122 (H) 12/14/2020    BUN 19 05/03/2021    CREATININE 0.87 05/03/2021    EGFRIFNONA 61 12/14/2020    BCR 22 (H) 05/03/2021    K 4.1 05/03/2021    CO2 23 05/03/2021    CALCIUM 9.4 05/03/2021    ALBUMIN 4.10 07/01/2021    LABIL2 1.1 (L) 05/03/2021    AST 17 07/01/2021    ALT 18 07/01/2021     Lab Results   Component Value Date    CHLPL 177 05/03/2021    TRIG 158 (H) 05/03/2021    HDL 48 05/03/2021    LDL 97 05/03/2021     Lab Results   Component Value Date    TSH 1.470 12/28/2020     Lab Results   Component Value Date    HGBA1C 7.9 (H) 05/03/2021     No results found for: PSA                    Assessment  and Plan:          Diagnoses and all orders for this visit:    1. Acute laryngitis (Primary) due to chronic upper respiratory infection with sinus infection.  We will treat her with Decadron for 5 days  -     dexamethasone (DECADRON) 4 MG tablet; Take 1 tablet by mouth Daily With Breakfast.  Dispense: 5 tablet; Refill: 0    2. Acute non-recurrent maxillary sinusitis-see above.  She is to push fluids and rest and follow-up if no improvement or resolution  -     dexamethasone (DECADRON) 4 MG tablet; Take 1 tablet by mouth Daily With Breakfast.  Dispense: 5 tablet; Refill: 0    3. Type 2 diabetes mellitus with hyperglycemia, with long-term current use of insulin (CMS/[facility])-very well controlled.  She has labs ordered through her endocrinologist and I will have these faxed to me to for further review continue current meds    4. Acute bilateral low back pain without sciatica we will go ahead and rule out UTI which was positive on urine dip today  -     Cancel: Urinalysis With Culture If Indicated -; Future  -     Urinalysis With Culture If Indicated -; Future  -     Urinalysis With Culture If Indicated - Urine, Clean Catch  -     Urinalysis, Microscopic Only - Urine, Clean Catch  -     Urine Culture - Urine, Urine, Clean Catch    5. Acute cystitis without hematuria-we will treat with doxycycline for 5 days if her back pain does not resolve or worsens she is to follow-up  -     doxycycline (VIBRAMYICN) 100 MG tablet; Take 1 tablet by mouth 2 (Two) Times a Day for 5 days. No dairy products within 2 hours of a dose  Dispense: 10 tablet; Refill: 0          Follow Up   No follow-ups on file.  Patient was given instructions and counseling regarding her condition or for health maintenance advice. Please see specific information pulled into the AVS if appropriate.

## 2021-08-11 LAB — BACTERIA SPEC AEROBE CULT: ABNORMAL

## 2021-08-14 DIAGNOSIS — J01.00 ACUTE NON-RECURRENT MAXILLARY SINUSITIS: ICD-10-CM

## 2021-08-14 DIAGNOSIS — J04.0 ACUTE LARYNGITIS: ICD-10-CM

## 2021-08-14 DIAGNOSIS — N30.00 ACUTE CYSTITIS WITHOUT HEMATURIA: ICD-10-CM

## 2021-08-14 RX ORDER — DEXAMETHASONE 4 MG/1
4 TABLET ORAL
Qty: 5 TABLET | Refills: 0 | Status: SHIPPED | OUTPATIENT
Start: 2021-08-14 | End: 2022-03-16

## 2021-08-14 RX ORDER — DOXYCYCLINE HYCLATE 100 MG
100 TABLET ORAL 2 TIMES DAILY
Qty: 10 TABLET | Refills: 0 | Status: SHIPPED | OUTPATIENT
Start: 2021-08-14 | End: 2021-08-19

## 2021-09-21 RX ORDER — PEN NEEDLE, DIABETIC 31 GX5/16"
NEEDLE, DISPOSABLE MISCELLANEOUS
Qty: 360 EACH | Refills: 1 | Status: SHIPPED | OUTPATIENT
Start: 2021-09-21

## 2021-09-21 RX ORDER — INSULIN GLARGINE 300 U/ML
INJECTION, SOLUTION SUBCUTANEOUS
Qty: 54 ML | Refills: 1 | Status: SHIPPED | OUTPATIENT
Start: 2021-09-21

## 2021-09-21 NOTE — TELEPHONE ENCOUNTER
I verified dose with pt on toujeosol because the pharmacy request was different from the ov notes pt said she is taking 100 in the am and 80 units at night

## 2021-10-10 DIAGNOSIS — I10 HYPERTENSION, UNSPECIFIED TYPE: ICD-10-CM

## 2021-10-10 DIAGNOSIS — G43.009 MIGRAINE WITHOUT AURA AND WITHOUT STATUS MIGRAINOSUS, NOT INTRACTABLE: ICD-10-CM

## 2021-10-11 RX ORDER — LISINOPRIL 2.5 MG/1
TABLET ORAL
Qty: 90 TABLET | Refills: 0 | Status: SHIPPED | OUTPATIENT
Start: 2021-10-11 | End: 2022-01-10

## 2021-10-11 RX ORDER — TOPIRAMATE 100 MG/1
TABLET, FILM COATED ORAL
Qty: 180 TABLET | Refills: 0 | Status: SHIPPED | OUTPATIENT
Start: 2021-10-11 | End: 2021-11-08 | Stop reason: SDUPTHER

## 2021-10-26 RX ORDER — OMEPRAZOLE 40 MG/1
40 CAPSULE, DELAYED RELEASE ORAL DAILY
Qty: 90 CAPSULE | Refills: 0 | Status: SHIPPED | OUTPATIENT
Start: 2021-10-26 | End: 2021-11-08 | Stop reason: SDUPTHER

## 2021-11-02 DIAGNOSIS — E78.00 HYPERCHOLESTEROLEMIA: ICD-10-CM

## 2021-11-02 RX ORDER — ATORVASTATIN CALCIUM 10 MG/1
TABLET, FILM COATED ORAL
Qty: 90 TABLET | Refills: 0 | Status: SHIPPED | OUTPATIENT
Start: 2021-11-02 | End: 2022-02-01 | Stop reason: SDUPTHER

## 2021-11-08 ENCOUNTER — OFFICE VISIT (OUTPATIENT)
Dept: FAMILY MEDICINE CLINIC | Age: 56
End: 2021-11-08

## 2021-11-08 VITALS
BODY MASS INDEX: 39.03 KG/M2 | HEIGHT: 64 IN | HEART RATE: 64 BPM | RESPIRATION RATE: 18 BRPM | DIASTOLIC BLOOD PRESSURE: 52 MMHG | WEIGHT: 228.6 LBS | SYSTOLIC BLOOD PRESSURE: 108 MMHG

## 2021-11-08 DIAGNOSIS — K21.00 GASTROESOPHAGEAL REFLUX DISEASE WITH ESOPHAGITIS WITHOUT HEMORRHAGE: ICD-10-CM

## 2021-11-08 DIAGNOSIS — E78.00 PURE HYPERCHOLESTEROLEMIA: ICD-10-CM

## 2021-11-08 DIAGNOSIS — Z79.4 TYPE 2 DIABETES MELLITUS WITH HYPERGLYCEMIA, WITH LONG-TERM CURRENT USE OF INSULIN (HCC): Primary | ICD-10-CM

## 2021-11-08 DIAGNOSIS — E66.01 CLASS 2 SEVERE OBESITY DUE TO EXCESS CALORIES WITH SERIOUS COMORBIDITY AND BODY MASS INDEX (BMI) OF 38.0 TO 38.9 IN ADULT (HCC): ICD-10-CM

## 2021-11-08 DIAGNOSIS — G43.009 MIGRAINE WITHOUT AURA AND WITHOUT STATUS MIGRAINOSUS, NOT INTRACTABLE: ICD-10-CM

## 2021-11-08 DIAGNOSIS — I10 PRIMARY HYPERTENSION: ICD-10-CM

## 2021-11-08 DIAGNOSIS — E11.65 TYPE 2 DIABETES MELLITUS WITH HYPERGLYCEMIA, WITH LONG-TERM CURRENT USE OF INSULIN (HCC): Primary | ICD-10-CM

## 2021-11-08 DIAGNOSIS — Z23 ENCOUNTER FOR IMMUNIZATION: ICD-10-CM

## 2021-11-08 PROCEDURE — 91300 COVID-19 (PFIZER): CPT | Performed by: FAMILY MEDICINE

## 2021-11-08 PROCEDURE — 99214 OFFICE O/P EST MOD 30 MIN: CPT | Performed by: FAMILY MEDICINE

## 2021-11-08 PROCEDURE — 0003A COVID-19 (PFIZER): CPT | Performed by: FAMILY MEDICINE

## 2021-11-08 RX ORDER — OMEPRAZOLE 40 MG/1
40 CAPSULE, DELAYED RELEASE ORAL DAILY
Qty: 90 CAPSULE | Refills: 0 | Status: SHIPPED | OUTPATIENT
Start: 2021-11-08 | End: 2022-02-16 | Stop reason: SDUPTHER

## 2021-11-08 RX ORDER — HYDROCHLOROTHIAZIDE 12.5 MG/1
12.5 TABLET ORAL DAILY
COMMUNITY
End: 2022-06-23

## 2021-11-08 RX ORDER — OMEPRAZOLE 40 MG/1
40 CAPSULE, DELAYED RELEASE ORAL DAILY
Qty: 90 CAPSULE | Refills: 0 | Status: SHIPPED | OUTPATIENT
Start: 2021-11-08 | End: 2021-11-08

## 2021-11-08 RX ORDER — TOPIRAMATE 100 MG/1
100 TABLET, FILM COATED ORAL 2 TIMES DAILY
Qty: 180 TABLET | Refills: 1 | Status: SHIPPED | OUTPATIENT
Start: 2021-11-08 | End: 2022-01-10

## 2021-11-08 NOTE — PROGRESS NOTES
Nory Rodriguez presents to [facility].    Chief Complaint:  Med refills, diabetes follow    Subjective       History of Present Illness:  HPI   With regard to the essential (primary) hypertension, her current cardiac medication regimen includes a diuretic ( hctz ), an ACE inhibitor ( lisinopril 2.5 mg for her diabetes ), and a calcium channel blocker ( diltiazem ).  Compliance with treatment has been good; she takes her medication as directed, maintains her diet and exercise regimen, and follows up as directed.  She is tolerating the medication well without side effects.            In regard to the type 2 diabetes mellitus with diabetic polyneuropathy, specifically, this is type 2, insulin requiring diabetes, complicated by peripheral neuropathy.  Compliance with treatment has been poor; she does not follow a diet and exercise regimen.      Tobacco screen: Non-smoker.  Current meds include an oral hypoglycemic ( Glucophage ), insulin/injectable ( Humalog- about 8 units with meals, SSI and carb counting;Jardiance, Trulicity; toujeo-80 units bid ), an ACE inhibitor, aspirin, and a lipid lowering agent.  She reports home blood glucose readings have been high, with average fasting readings in the 100-200 mg/dL range. She checks her glucose 3 to 4 times daily.  Has not fallen recently In regard to preventative care, she performs foot self-exams several days per week and her last ophthalmology exam was in 7/2021 Dr Eileen Dasilva- NO DIABETIC RETINOPATHY, she has cataracts.  SHE HAS LOST 6 MORE #'S.     chronic insomnia, she is falling asleep with melatonin 10 mg and trazodone 100 mg and she is still waking up after 4 hours, she is struggling with her cpap still and has been since covid      She has GERD with 4 polyps in esophagus with esophagitis on her EGD done 2020 by Dr Zhang, the polyps were removed and biopsies benign and normal colonoscopy at same time.  She had 1 small  hemorrhoid    History of chronic migraines which have become extremely well controlled with Topamax daily    Review of Systems:  Review of Systems   Constitutional: Negative for chills, fatigue and fever.   HENT: Negative for ear pain, sinus pressure and sore throat.    Eyes: Negative for blurred vision and double vision.   Respiratory: Negative for cough, shortness of breath and wheezing.    Cardiovascular: Negative for chest pain and palpitations.   Gastrointestinal: Negative for abdominal pain, blood in stool, constipation, diarrhea, nausea and vomiting.   Skin: Negative for rash.   Neurological: Negative for dizziness and headache.   Psychiatric/Behavioral: Negative for depressed mood.        Objective   Medical History:  Past Medical History:   • Accessory skin tags    CONGENITAL   • Acute frontal sinusitis, unspecified   • Acute upper respiratory infection, unspecified   • Acute vaginitis   • Allergic rhinitis, unspecified   • Breast lump   • Calculus of kidney   • Carrier of, hepatitis viral (HCC)   • Cellulitis of chest wall   • Chronic back pain   • Chronic hoarseness   • COVID-19   • Dietary counseling and surveillance   • Dizziness and giddiness   • Effusion, left knee   • Effusion, right knee   • GERD without esophagitis   • Hepatitis A   • Hereditary and idiopathic neuropathy, unspecified   • History of depression   • Hyperlipidemia   • Hypertension   • Kidney stone   • Localized swelling, mass and lump, right lower limb   • Low back pain   • Methicillin resistant Staphylococcus aureus infection as the cause of diseases classified elsewhere   • Migraine without aura, not intractable, without status migrainosus   • Moderate persistent asthma with (acute) exacerbation   • Morbid obesity due to excess calories (HCC)   • Nausea   • Neuropathy   • Osteoporosis   • Pain in joints of right hand   • Pain in left hip   • Pain in left leg   • Pain in right shoulder   • Paresthesia of skin   • Peripheral  neuropathy   • Personal history of other venous thrombosis and embolism   • Post-menopausal   • Pressure ulcer of other site, stage 1   • Primary insomnia   • Pure hypercholesterolemia, unspecified   • 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)   • 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 (HCC)   • Shortness of breath   • Sleep apnea   • Sleep apnea, unspecified   • Tinea unguium   • Type 2 diabetes mellitus with diabetic polyneuropathy (HCC)   • Uterine polyp   • Viral hepatitis   • Vitamin D deficiency, unspecified     Past Surgical History:   • CARDIAC CATHETERIZATION   • CATARACT EXTRACTION, BILATERAL   • CHOLECYSTECTOMY   • HYSTERECTOMY    TAHBSO   • PERICARDIAL WINDOW      Family History   Problem Relation Age of Onset   • Hyperlipidemia Mother    • Endometrial cancer Mother    • Coronary artery disease Father    • Stroke Father    • Diabetes type II Brother    • Breast cancer Maternal Grandmother    • Alzheimer's disease Maternal Grandmother    • Diabetes type II Maternal Grandfather    • Diabetes type II Paternal Grandfather      Social History     Tobacco Use   • Smoking status: Never Smoker   • Smokeless tobacco: Never Used   Substance Use Topics   • Alcohol use: Not Currently       Health Maintenance Due   Topic Date Due   • COLORECTAL CANCER SCREENING  Never done   • ANNUAL PHYSICAL  Never done   • Hepatitis B (1 of 3 - Risk 3-dose series) Never done   • ZOSTER VACCINE (1 of 2) Never done   • HEPATITIS C SCREENING  Never done   • DIABETIC FOOT EXAM  Never done   • DIABETIC EYE EXAM  07/06/2021   • INFLUENZA VACCINE  08/01/2021        Immunization History   Administered Date(s) Administered   • COVID-19 (PFIZER) 03/30/2021, 04/20/2021, 11/08/2021   • Influenza, Unspecified 10/03/2020   • Pneumococcal Polysaccharide (PPSV23) 11/29/2016   • Tdap 09/29/2015       Allergies   Allergen Reactions   • Asa [Aspirin] Anaphylaxis   • Ciprofloxacin Anaphylaxis   • Levemir  [Insulin Detemir] GI Intolerance   • Nitroglycerin Hives   • Sumatriptan Dizziness   • Cat Hair Extract Rash   • Codeine Palpitations   • Diclofenac Swelling        Medications:  Current Outpatient Medications on File Prior to Visit   Medication Sig   • acetaminophen (TYLENOL) 325 MG tablet Take 2 tablets by mouth Every 4 (Four) Hours As Needed for Mild Pain .   • albuterol (PROVENTIL) (2.5 MG/3ML) 0.083% nebulizer solution Take 2.5 mg by nebulization Every 4 (Four) Hours As Needed for Wheezing.   • albuterol sulfate  (90 Base) MCG/ACT inhaler Inhale 2 puffs Every 4 (Four) Hours As Needed for Wheezing.   • alendronate (FOSAMAX) 70 MG tablet Take 70 mg by mouth Every 7 (Seven) Days.   • atorvastatin (LIPITOR) 10 MG tablet TAKE 1 TABLET EVERY NIGHT   • B-D ULTRAFINE III SHORT PEN 31G X 8 MM misc USE 4 TIMES A DAY WITH     HUMALOG   • budesonide-formoterol (SYMBICORT) 160-4.5 MCG/ACT inhaler Inhale 2 puffs 2 (Two) Times a Day.   • cetirizine (zyrTEC) 10 MG tablet Take 10 mg by mouth Daily.   • Cholecalciferol (vitamin D3) 125 MCG (5000 UT) capsule capsule Take 5,000 Units by mouth Daily.   • dexamethasone (DECADRON) 4 MG tablet Take 1 tablet by mouth Daily With Breakfast.   • dilTIAZem CD (CARDIZEM CD) 120 MG 24 hr capsule Take 120 mg by mouth Daily.   • Dulaglutide (Trulicity) 1.5 MG/0.5ML solution pen-injector Inject  under the skin into the appropriate area as directed. Every sunday   • Empagliflozin (JARDIANCE PO) Take 10 mg by mouth Daily.   • EPINEPHrine (EpiPen 2-Lizandro) 0.3 MG/0.3ML solution auto-injector injection    • estradiol (ESTRACE) 0.5 MG tablet Take 1 tablet by mouth Daily.   • hydroCHLOROthiazide (HYDRODIURIL) 12.5 MG tablet Take 12.5 mg by mouth Daily.   • Insulin Glargine, 2 Unit Dial, (Toujeo Max SoloStar) 300 UNIT/ML solution pen-injector injection Inject 100 units SQ in the am and 80 units SQ in the pm   • insulin lispro (humaLOG) 100 UNIT/ML injection Inject  under the skin into the  "appropriate area as directed 3 (Three) Times a Day Before Meals. Sliding scale   • lisinopril (PRINIVIL,ZESTRIL) 2.5 MG tablet TAKE 1 TABLET DAILY   • metFORMIN (GLUCOPHAGE) 500 MG tablet Take 500 mg by mouth 2 (Two) Times a Day With Meals.   • montelukast (SINGULAIR) 10 MG tablet Take 10 mg by mouth Every Night.   • naproxen sodium (ALEVE) 220 MG tablet Take 220 mg by mouth 2 (Two) Times a Day As Needed.   • promethazine (PHENERGAN) 25 MG tablet Take 25 mg by mouth Every 6 (Six) Hours As Needed for Nausea or Vomiting.   • traZODone (DESYREL) 100 MG tablet Take 1 tablet by mouth Every Night.   • [DISCONTINUED] omeprazole (priLOSEC) 40 MG capsule Take 1 capsule by mouth Daily.   • [DISCONTINUED] topiramate (TOPAMAX) 100 MG tablet TAKE 1 TABLET TWICE A DAY     No current facility-administered medications on file prior to visit.       Vital Signs:   /52 (BP Location: Right arm, Patient Position: Sitting)   Pulse 64   Resp 18   Ht 163.2 cm (64.25\")   Wt 104 kg (228 lb 9.6 oz)   BMI 38.93 kg/m²       Physical Exam:  Physical Exam  Vitals and nursing note reviewed.   Constitutional:       General: She is not in acute distress.     Appearance: Normal appearance. She is not ill-appearing, toxic-appearing or diaphoretic.   HENT:      Head: Normocephalic and atraumatic.      Right Ear: Tympanic membrane, ear canal and external ear normal.      Left Ear: Tympanic membrane, ear canal and external ear normal.      Nose: No congestion or rhinorrhea.      Mouth/Throat:      Pharynx: Oropharynx is clear. No oropharyngeal exudate or posterior oropharyngeal erythema.   Eyes:      Extraocular Movements: Extraocular movements intact.      Conjunctiva/sclera: Conjunctivae normal.   Cardiovascular:      Rate and Rhythm: Normal rate and regular rhythm.      Pulses:           Dorsalis pedis pulses are 2+ on the right side and 2+ on the left side.      Heart sounds: Normal heart sounds.   Pulmonary:      Breath sounds: Normal " breath sounds. No wheezing, rhonchi or rales.   Abdominal:      General: Abdomen is flat.      Palpations: Abdomen is soft.   Musculoskeletal:      Cervical back: Neck supple. No rigidity.   Feet:      Right foot:      Protective Sensation: 7 sites tested. 7 sites sensed.      Skin integrity: Skin integrity normal. No ulcer or blister.      Toenail Condition: Right toenails are normal.      Left foot:      Protective Sensation: 7 sites tested. 7 sites sensed.      Skin integrity: Skin integrity normal. No ulcer or blister.      Toenail Condition: Left toenails are normal.      Comments:      Lymphadenopathy:      Cervical: No cervical adenopathy.   Skin:     General: Skin is warm and dry.   Neurological:      Mental Status: She is alert and oriented to person, place, and time.   Psychiatric:         Mood and Affect: Mood normal.         Behavior: Behavior normal.         Result Review      The following data was reviewed by Eileen Barajas MD on 11/08/2021.  Lab Results   Component Value Date    WBC 4.24 12/14/2020    HGB 12.9 12/14/2020    HCT 38.0 12/14/2020    MCV 90.5 12/14/2020     12/14/2020     Lab Results   Component Value Date    GLUCOSE 149 (H) 08/09/2021    BUN 15 08/09/2021    CREATININE 0.91 08/09/2021    EGFRIFNONA 63 08/09/2021    BCR 16.5 08/09/2021    K 4.1 08/09/2021    CO2 27.4 08/09/2021    CALCIUM 8.9 08/09/2021    ALBUMIN 3.80 08/09/2021    LABIL2 1.1 (L) 05/03/2021    AST 13 08/09/2021    ALT 22 08/09/2021     Lab Results   Component Value Date    CHOL 149 08/09/2021    CHLPL 177 05/03/2021    TRIG 161 (H) 08/09/2021    HDL 41 08/09/2021    LDL 80 08/09/2021     Lab Results   Component Value Date    TSH 1.810 08/09/2021     Lab Results   Component Value Date    HGBA1C 6.50 (H) 08/09/2021     No results found for: PSA                    Assessment and Plan:          Diagnoses and all orders for this visit:    1. Type 2 diabetes mellitus with hyperglycemia, with long-term current use  of insulin ([facility]) (Primary)  Comments:  stable and well controlled with insulin, recommend yearly flu vaccination and eye exams, she sees podiatry for her feet, she has a broken L foot    2. Primary hypertension  Comments:  very well controlled    3. Pure hypercholesterolemia  Comments:  stable on meds    4. Gastroesophageal reflux disease with esophagitis without hemorrhage  Comments:  chronic, on omeprazole, needs repeat EGD every 3 years  Orders:  -     Discontinue: omeprazole (priLOSEC) 40 MG capsule; Take 1 capsule by mouth Daily. Indications: Esophagus Inflammation with Erosion, Heartburn  Dispense: 90 capsule; Refill: 0  -     omeprazole (priLOSEC) 40 MG capsule; Take 1 capsule by mouth Daily. Indications: Esophagus Inflammation with Erosion, Heartburn  Dispense: 90 capsule; Refill: 0    5. Migraine without aura and without status migrainosus, not intractable  Comments:  stable on meds, topamax refilled  Orders:  -     topiramate (TOPAMAX) 100 MG tablet; Take 1 tablet by mouth 2 (Two) Times a Day. Indications: Migraine Headache  Dispense: 180 tablet; Refill: 1    6. Encounter for immunization  -     COVID-19 Vaccine ([employer])    7. Class 2 severe obesity due to excess calories with serious comorbidity and body mass index (BMI) of 38.0 to 38.9 in adult ([facility])  Comments:  she is doing extremely well with diet and exercise          Follow Up   Return in about 3 months (around 2/8/2022) for Annual physical.

## 2021-11-12 DIAGNOSIS — G47.00 INSOMNIA, UNSPECIFIED TYPE: ICD-10-CM

## 2021-11-14 RX ORDER — TRAZODONE HYDROCHLORIDE 100 MG/1
100 TABLET ORAL NIGHTLY
Qty: 90 TABLET | Refills: 0 | Status: SHIPPED | OUTPATIENT
Start: 2021-11-14 | End: 2022-02-01 | Stop reason: SDUPTHER

## 2021-11-18 ENCOUNTER — TRANSCRIBE ORDERS (OUTPATIENT)
Dept: ADMINISTRATIVE | Facility: HOSPITAL | Age: 56
End: 2021-11-18

## 2021-11-18 ENCOUNTER — LAB (OUTPATIENT)
Dept: LAB | Facility: HOSPITAL | Age: 56
End: 2021-11-18

## 2021-11-18 DIAGNOSIS — E11.40 DIABETIC NEUROPATHY WITH NEUROLOGIC COMPLICATION (HCC): Primary | ICD-10-CM

## 2021-11-18 DIAGNOSIS — E11.49 DIABETIC NEUROPATHY WITH NEUROLOGIC COMPLICATION (HCC): ICD-10-CM

## 2021-11-18 DIAGNOSIS — E11.49 DIABETIC NEUROPATHY WITH NEUROLOGIC COMPLICATION (HCC): Primary | ICD-10-CM

## 2021-11-18 DIAGNOSIS — E11.40 DIABETIC NEUROPATHY WITH NEUROLOGIC COMPLICATION (HCC): ICD-10-CM

## 2021-11-18 LAB
ALBUMIN SERPL-MCNC: 4.1 G/DL (ref 3.5–5.2)
ALBUMIN UR-MCNC: 1.7 MG/DL
ALBUMIN/GLOB SERPL: 1.6 G/DL
ALP SERPL-CCNC: 88 U/L (ref 39–117)
ALT SERPL W P-5'-P-CCNC: 19 U/L (ref 1–33)
ANION GAP SERPL CALCULATED.3IONS-SCNC: 8.6 MMOL/L (ref 5–15)
AST SERPL-CCNC: 20 U/L (ref 1–32)
BILIRUB SERPL-MCNC: 0.2 MG/DL (ref 0–1.2)
BUN SERPL-MCNC: 18 MG/DL (ref 6–20)
BUN/CREAT SERPL: 21.2 (ref 7–25)
CALCIUM SPEC-SCNC: 8.9 MG/DL (ref 8.6–10.5)
CHLORIDE SERPL-SCNC: 107 MMOL/L (ref 98–107)
CO2 SERPL-SCNC: 26.4 MMOL/L (ref 22–29)
CREAT SERPL-MCNC: 0.85 MG/DL (ref 0.57–1)
CREAT UR-MCNC: 84.9 MG/DL
FOLATE SERPL-MCNC: 7.31 NG/ML (ref 4.78–24.2)
GFR SERPL CREATININE-BSD FRML MDRD: 69 ML/MIN/1.73
GLOBULIN UR ELPH-MCNC: 2.6 GM/DL
GLUCOSE SERPL-MCNC: 159 MG/DL (ref 65–99)
HBA1C MFR BLD: 7 % (ref 4.8–5.6)
MICROALBUMIN/CREAT UR: 20 MG/G
POTASSIUM SERPL-SCNC: 3.9 MMOL/L (ref 3.5–5.2)
PROT SERPL-MCNC: 6.7 G/DL (ref 6–8.5)
SODIUM SERPL-SCNC: 142 MMOL/L (ref 136–145)
T4 FREE SERPL-MCNC: 1.03 NG/DL (ref 0.93–1.7)
TSH SERPL DL<=0.05 MIU/L-ACNC: 1.45 UIU/ML (ref 0.27–4.2)
VIT B12 BLD-MCNC: 552 PG/ML (ref 211–946)

## 2021-11-18 PROCEDURE — 83036 HEMOGLOBIN GLYCOSYLATED A1C: CPT

## 2021-11-18 PROCEDURE — 84443 ASSAY THYROID STIM HORMONE: CPT

## 2021-11-18 PROCEDURE — 36415 COLL VENOUS BLD VENIPUNCTURE: CPT

## 2021-11-18 PROCEDURE — 82570 ASSAY OF URINE CREATININE: CPT

## 2021-11-18 PROCEDURE — 84439 ASSAY OF FREE THYROXINE: CPT

## 2021-11-18 PROCEDURE — 80053 COMPREHEN METABOLIC PANEL: CPT

## 2021-11-18 PROCEDURE — 82746 ASSAY OF FOLIC ACID SERUM: CPT

## 2021-11-18 PROCEDURE — 82043 UR ALBUMIN QUANTITATIVE: CPT

## 2021-11-18 PROCEDURE — 82607 VITAMIN B-12: CPT

## 2021-11-26 DIAGNOSIS — Z79.890 POSTMENOPAUSAL HORMONE THERAPY: ICD-10-CM

## 2021-11-29 RX ORDER — ESTRADIOL 0.5 MG/1
TABLET ORAL
Qty: 90 TABLET | Refills: 1 | Status: SHIPPED | OUTPATIENT
Start: 2021-11-29 | End: 2022-02-16 | Stop reason: SDUPTHER

## 2021-12-14 RX ORDER — MONTELUKAST SODIUM 10 MG/1
TABLET ORAL
Qty: 90 TABLET | Refills: 1 | Status: SHIPPED | OUTPATIENT
Start: 2021-12-14 | End: 2022-02-16 | Stop reason: SDUPTHER

## 2021-12-28 RX ORDER — EPINEPHRINE 0.3 MG/.3ML
0.3 INJECTION SUBCUTANEOUS ONCE
Qty: 2 EACH | Refills: 0 | Status: SHIPPED | OUTPATIENT
Start: 2021-12-28 | End: 2021-12-28

## 2021-12-28 NOTE — TELEPHONE ENCOUNTER
Please call and d/w pt bid dosing at 40 mg-this should not  be used bid at this dose for long term, I am concerned and do not want her on this dose long term, she may have been on 20 mg bid and this is ok, or the equivalent is 40 mg daily.jossue

## 2021-12-28 NOTE — TELEPHONE ENCOUNTER
Pt is calling her omeprazole used to be BID and the last rx sent was daily pt wants it changed back to BID please advise       ALSO pt needs a refill on her epi pen please

## 2022-01-08 DIAGNOSIS — I10 HYPERTENSION, UNSPECIFIED TYPE: ICD-10-CM

## 2022-01-08 DIAGNOSIS — G43.009 MIGRAINE WITHOUT AURA AND WITHOUT STATUS MIGRAINOSUS, NOT INTRACTABLE: ICD-10-CM

## 2022-01-10 RX ORDER — LISINOPRIL 2.5 MG/1
TABLET ORAL
Qty: 90 TABLET | Refills: 0 | Status: SHIPPED | OUTPATIENT
Start: 2022-01-10 | End: 2022-02-01 | Stop reason: SDUPTHER

## 2022-01-10 RX ORDER — TOPIRAMATE 100 MG/1
TABLET, FILM COATED ORAL
Qty: 180 TABLET | Refills: 0 | Status: SHIPPED | OUTPATIENT
Start: 2022-01-10 | End: 2022-02-01 | Stop reason: SDUPTHER

## 2022-01-27 DIAGNOSIS — Z12.31 SCREENING MAMMOGRAM FOR BREAST CANCER: Primary | ICD-10-CM

## 2022-02-01 ENCOUNTER — TELEPHONE (OUTPATIENT)
Dept: FAMILY MEDICINE CLINIC | Age: 57
End: 2022-02-01

## 2022-02-01 DIAGNOSIS — G43.009 MIGRAINE WITHOUT AURA AND WITHOUT STATUS MIGRAINOSUS, NOT INTRACTABLE: ICD-10-CM

## 2022-02-01 DIAGNOSIS — E78.00 HYPERCHOLESTEROLEMIA: ICD-10-CM

## 2022-02-01 DIAGNOSIS — I10 HYPERTENSION, UNSPECIFIED TYPE: ICD-10-CM

## 2022-02-01 DIAGNOSIS — M81.0 OSTEOPOROSIS, UNSPECIFIED OSTEOPOROSIS TYPE, UNSPECIFIED PATHOLOGICAL FRACTURE PRESENCE: Primary | ICD-10-CM

## 2022-02-01 DIAGNOSIS — G47.00 INSOMNIA, UNSPECIFIED TYPE: ICD-10-CM

## 2022-02-01 RX ORDER — DILTIAZEM HYDROCHLORIDE 120 MG/1
120 CAPSULE, COATED, EXTENDED RELEASE ORAL DAILY
Qty: 90 CAPSULE | Refills: 0 | Status: SHIPPED | OUTPATIENT
Start: 2022-02-01 | End: 2022-05-02

## 2022-02-01 RX ORDER — ATORVASTATIN CALCIUM 10 MG/1
10 TABLET, FILM COATED ORAL NIGHTLY
Qty: 90 TABLET | Refills: 0 | Status: SHIPPED | OUTPATIENT
Start: 2022-02-01 | End: 2022-05-02

## 2022-02-01 RX ORDER — LISINOPRIL 2.5 MG/1
2.5 TABLET ORAL DAILY
Qty: 90 TABLET | Refills: 0 | Status: SHIPPED | OUTPATIENT
Start: 2022-02-01 | End: 2022-05-02

## 2022-02-01 RX ORDER — EPINEPHRINE 0.3 MG/.3ML
0.3 INJECTION SUBCUTANEOUS ONCE
Qty: 2 EACH | Refills: 0 | Status: SHIPPED | OUTPATIENT
Start: 2022-02-01 | End: 2022-02-01

## 2022-02-01 RX ORDER — TOPIRAMATE 100 MG/1
100 TABLET, FILM COATED ORAL 2 TIMES DAILY
Qty: 180 TABLET | Refills: 0 | Status: SHIPPED | OUTPATIENT
Start: 2022-02-01 | End: 2022-08-23

## 2022-02-01 RX ORDER — TRAZODONE HYDROCHLORIDE 100 MG/1
100 TABLET ORAL NIGHTLY
Qty: 90 TABLET | Refills: 0 | Status: SHIPPED | OUTPATIENT
Start: 2022-02-01 | End: 2022-05-27 | Stop reason: SDUPTHER

## 2022-02-01 NOTE — TELEPHONE ENCOUNTER
Caller: Nory Rodriguez NEHEMIAS    Relationship: Self    Best call back number: [phone]    Requested Prescriptions:   Requested Prescriptions     Pending Prescriptions Disp Refills   • atorvastatin (LIPITOR) 10 MG tablet 90 tablet 0     Sig: Take 1 tablet by mouth Every Night.   • topiramate (TOPAMAX) 100 MG tablet 180 tablet 0     Sig: Take 1 tablet by mouth 2 (Two) Times a Day.   • dilTIAZem CD (CARDIZEM CD) 120 MG 24 hr capsule       Sig: Take 1 capsule by mouth Daily.   • lisinopril (PRINIVIL,ZESTRIL) 2.5 MG tablet 90 tablet 0     Sig: Take 1 tablet by mouth Daily.   • traZODone (DESYREL) 100 MG tablet 90 tablet 0     Sig: Take 1 tablet by mouth Every Night.    EPI PEN (PATIENT SAID SHE GETS THIS ONCE YEARLY)  Dulaglutide (Trulicity) ( PATIENT SAID SHE IS NOT SURE IF WE PRESCRIBED THIS MEDICATION AND SHE IS NOW TAKING 3.0 MG)  Pharmacy where request should be sent: [facility] - [place], [place] - [address]  - [phone] [facility] [phone] FX     Additional details provided by patient: PATIENT SAID ALL MEDICATIONS EXCEPT EPI PEN NEED TO BE 90 DAY QTY IN ORDER FOR INSURANCE TO PICK IT UP. PLEASE CALL PATIENT AND ADVISE ABOUT TRULICITY.    Beatris Hairston Rep   02/01/22 09:12 EST

## 2022-02-10 DIAGNOSIS — Z78.0 POSTMENOPAUSAL STATE: Primary | ICD-10-CM

## 2022-02-16 ENCOUNTER — TELEPHONE (OUTPATIENT)
Dept: FAMILY MEDICINE CLINIC | Age: 57
End: 2022-02-16

## 2022-02-16 DIAGNOSIS — K21.00 GASTROESOPHAGEAL REFLUX DISEASE WITH ESOPHAGITIS WITHOUT HEMORRHAGE: ICD-10-CM

## 2022-02-16 DIAGNOSIS — Z79.890 POSTMENOPAUSAL HORMONE THERAPY: ICD-10-CM

## 2022-02-16 RX ORDER — ESTRADIOL 0.5 MG/1
0.5 TABLET ORAL DAILY
Qty: 90 TABLET | Refills: 1 | Status: SHIPPED | OUTPATIENT
Start: 2022-02-16 | End: 2022-08-15

## 2022-02-16 RX ORDER — OMEPRAZOLE 40 MG/1
40 CAPSULE, DELAYED RELEASE ORAL DAILY
Qty: 90 CAPSULE | Refills: 0 | Status: SHIPPED | OUTPATIENT
Start: 2022-02-16 | End: 2022-08-23

## 2022-02-16 RX ORDER — ALENDRONATE SODIUM 70 MG/1
70 TABLET ORAL
Qty: 12 TABLET | Refills: 0 | Status: SHIPPED | OUTPATIENT
Start: 2022-02-16 | End: 2022-05-09

## 2022-02-16 RX ORDER — MONTELUKAST SODIUM 10 MG/1
10 TABLET ORAL EVERY EVENING
Qty: 90 TABLET | Refills: 1 | Status: SHIPPED | OUTPATIENT
Start: 2022-02-16 | End: 2022-08-15

## 2022-02-16 NOTE — TELEPHONE ENCOUNTER
Caller: Nory Rodriguez    Relationship: Self    Best call back number: [phone]    Requested Prescriptions:   Requested Prescriptions     Pending Prescriptions Disp Refills   • omeprazole (priLOSEC) 40 MG capsule 90 capsule 0     Sig: Take 1 capsule by mouth Daily. Indications: Esophagus Inflammation with Erosion, Heartburn   • estradiol (ESTRACE) 0.5 MG tablet 90 tablet 1     Sig: Take 1 tablet by mouth Daily.   • metFORMIN (GLUCOPHAGE) 500 MG tablet       Sig: Take 1 tablet by mouth 2 (Two) Times a Day With Meals.   • montelukast (SINGULAIR) 10 MG tablet 90 tablet 1     Sig: Take 1 tablet by mouth Every Evening.   • alendronate (FOSAMAX) 70 MG tablet       Sig: Take 1 tablet by mouth Every 7 (Seven) Days.        Pharmacy where request should be sent: [facility] [address] [place] [address], [place] [address]  - [phone]  - [phone] FX     Additional details provided by patient: PATIENT SAYS THAT THE PRESCRIPTIONS NEED TO BE 90 DAY SUPPLY FOR THE INSURANCE.     Does the patient have less than a 3 day supply:  [] Yes  [x] No    Beatris Contreras Rep   02/16/22 10:36 EST

## 2022-03-04 ENCOUNTER — TELEPHONE (OUTPATIENT)
Dept: FAMILY MEDICINE CLINIC | Age: 57
End: 2022-03-04

## 2022-03-04 NOTE — TELEPHONE ENCOUNTER
DEXA order is in place, where does she want to have it completed at? Please advise.     Thanks   марина

## 2022-03-16 ENCOUNTER — LAB (OUTPATIENT)
Dept: LAB | Facility: HOSPITAL | Age: 57
End: 2022-03-16

## 2022-03-16 ENCOUNTER — HOSPITAL ENCOUNTER (OUTPATIENT)
Dept: BONE DENSITY | Facility: HOSPITAL | Age: 57
Discharge: HOME | End: 2022-03-16

## 2022-03-16 ENCOUNTER — OFFICE VISIT (OUTPATIENT)
Dept: FAMILY MEDICINE CLINIC | Age: 57
End: 2022-03-16

## 2022-03-16 ENCOUNTER — TRANSCRIBE ORDERS (OUTPATIENT)
Dept: ADMINISTRATIVE | Facility: HOSPITAL | Age: 57
End: 2022-03-16

## 2022-03-16 ENCOUNTER — HOSPITAL ENCOUNTER (OUTPATIENT)
Dept: MAMMOGRAPHY | Facility: HOSPITAL | Age: 57
Discharge: HOME | End: 2022-03-16

## 2022-03-16 VITALS
BODY MASS INDEX: 36.5 KG/M2 | DIASTOLIC BLOOD PRESSURE: 66 MMHG | HEART RATE: 105 BPM | HEIGHT: 64 IN | OXYGEN SATURATION: 92 % | WEIGHT: 213.8 LBS | TEMPERATURE: 98.1 F | SYSTOLIC BLOOD PRESSURE: 124 MMHG

## 2022-03-16 DIAGNOSIS — Z78.0 POSTMENOPAUSAL STATE: ICD-10-CM

## 2022-03-16 DIAGNOSIS — R05.9 COUGH: Primary | ICD-10-CM

## 2022-03-16 DIAGNOSIS — I10 ESSENTIAL HYPERTENSION: ICD-10-CM

## 2022-03-16 DIAGNOSIS — Z79.4 TYPE 2 DIABETES MELLITUS WITH HYPERGLYCEMIA, WITH LONG-TERM CURRENT USE OF INSULIN: ICD-10-CM

## 2022-03-16 DIAGNOSIS — E11.65 TYPE 2 DIABETES MELLITUS WITH HYPERGLYCEMIA, WITH LONG-TERM CURRENT USE OF INSULIN: ICD-10-CM

## 2022-03-16 DIAGNOSIS — E11.40 DIABETIC NEUROPATHY WITH NEUROLOGIC COMPLICATION: Primary | ICD-10-CM

## 2022-03-16 DIAGNOSIS — E11.49 DIABETIC NEUROPATHY WITH NEUROLOGIC COMPLICATION: Primary | ICD-10-CM

## 2022-03-16 DIAGNOSIS — E11.40 DIABETIC NEUROPATHY WITH NEUROLOGIC COMPLICATION: ICD-10-CM

## 2022-03-16 DIAGNOSIS — J01.00 SUBACUTE MAXILLARY SINUSITIS: ICD-10-CM

## 2022-03-16 DIAGNOSIS — E11.49 DIABETIC NEUROPATHY WITH NEUROLOGIC COMPLICATION: ICD-10-CM

## 2022-03-16 DIAGNOSIS — Z12.31 SCREENING MAMMOGRAM FOR BREAST CANCER: ICD-10-CM

## 2022-03-16 DIAGNOSIS — J30.9 ALLERGIC RHINITIS, UNSPECIFIED SEASONALITY, UNSPECIFIED TRIGGER: ICD-10-CM

## 2022-03-16 LAB
ALBUMIN SERPL-MCNC: 4.3 G/DL (ref 3.5–5.2)
ALBUMIN/GLOB SERPL: 1.4 G/DL
ALP SERPL-CCNC: 79 U/L (ref 39–117)
ALT SERPL W P-5'-P-CCNC: 23 U/L (ref 1–33)
ANION GAP SERPL CALCULATED.3IONS-SCNC: 11.9 MMOL/L (ref 5–15)
AST SERPL-CCNC: 20 U/L (ref 1–32)
BILIRUB SERPL-MCNC: 0.3 MG/DL (ref 0–1.2)
BUN SERPL-MCNC: 20 MG/DL (ref 6–20)
BUN/CREAT SERPL: 19 (ref 7–25)
CALCIUM SPEC-SCNC: 9.7 MG/DL (ref 8.6–10.5)
CHLORIDE SERPL-SCNC: 104 MMOL/L (ref 98–107)
CO2 SERPL-SCNC: 24.1 MMOL/L (ref 22–29)
CREAT SERPL-MCNC: 1.05 MG/DL (ref 0.57–1)
EGFRCR SERPLBLD CKD-EPI 2021: 61.7 ML/MIN/1.73
EXPIRATION DATE: NORMAL
FLUAV AG NPH QL: NEGATIVE
FLUBV AG NPH QL: NEGATIVE
GLOBULIN UR ELPH-MCNC: 3.1 GM/DL
GLUCOSE SERPL-MCNC: 178 MG/DL (ref 65–99)
HBA1C MFR BLD: 7.4 % (ref 4.8–5.6)
INTERNAL CONTROL: NORMAL
Lab: NORMAL
POTASSIUM SERPL-SCNC: 3.8 MMOL/L (ref 3.5–5.2)
PROT SERPL-MCNC: 7.4 G/DL (ref 6–8.5)
SODIUM SERPL-SCNC: 140 MMOL/L (ref 136–145)

## 2022-03-16 PROCEDURE — 87804 INFLUENZA ASSAY W/OPTIC: CPT | Performed by: FAMILY MEDICINE

## 2022-03-16 PROCEDURE — 77080 DXA BONE DENSITY AXIAL: CPT

## 2022-03-16 PROCEDURE — 80053 COMPREHEN METABOLIC PANEL: CPT

## 2022-03-16 PROCEDURE — 83036 HEMOGLOBIN GLYCOSYLATED A1C: CPT

## 2022-03-16 PROCEDURE — 77063 BREAST TOMOSYNTHESIS BI: CPT

## 2022-03-16 PROCEDURE — 77067 SCR MAMMO BI INCL CAD: CPT

## 2022-03-16 PROCEDURE — 36415 COLL VENOUS BLD VENIPUNCTURE: CPT

## 2022-03-16 PROCEDURE — 96372 THER/PROPH/DIAG INJ SC/IM: CPT | Performed by: FAMILY MEDICINE

## 2022-03-16 PROCEDURE — 99214 OFFICE O/P EST MOD 30 MIN: CPT | Performed by: FAMILY MEDICINE

## 2022-03-16 RX ORDER — AZITHROMYCIN 250 MG/1
TABLET, FILM COATED ORAL
Qty: 6 TABLET | Refills: 0 | Status: SHIPPED | OUTPATIENT
Start: 2022-03-16 | End: 2022-05-09

## 2022-03-16 RX ORDER — TRIAMCINOLONE ACETONIDE 40 MG/ML
40 INJECTION, SUSPENSION INTRA-ARTICULAR; INTRAMUSCULAR ONCE
Status: COMPLETED | OUTPATIENT
Start: 2022-03-16 | End: 2022-03-16

## 2022-03-16 RX ORDER — EPINEPHRINE 0.3 MG/.3ML
INJECTION SUBCUTANEOUS AS NEEDED
COMMUNITY
Start: 2022-02-02

## 2022-03-16 RX ADMIN — TRIAMCINOLONE ACETONIDE 40 MG: 40 INJECTION, SUSPENSION INTRA-ARTICULAR; INTRAMUSCULAR at 15:20

## 2022-03-16 NOTE — PROGRESS NOTES
Nory Rodriguez presents to [facility].    Chief Complaint: sinus symptoms    Subjective       History of Present Illness:  ABDOUL Villarreal presents with sinus pain for 4 weeks with cough that is dry, and her pain is moderate and sputum is thick and in posterior pharynx.  She recently went to the eye doctor for 4 styes in her R eye (occurred from her make up) and she was treated with bactrim orally and topically abx ointment.  She now has tunneling that occur from 2 styes joining together.  Her vision is improved.  Her sinus symptoms got worse with bactrim.  She does suffer from chronic allergies and the weather has not been great.  Tested and neg for covid 2 times.  OTC cold meds tried. Covid vaccinations are UTD      Concerning type 2 diabetes mellitus with diabetic polyneuropathy, specifically, this is type 2, insulin requiring diabetes, complicated by peripheral neuropathy.  Compliance with treatment has been poor; she does not follow a diet and exercise regimen.   Tobacco screen: Non-smoker.  Current meds include an oral hypoglycemic ( Glucophage ), insulin/injectable ( Humalog- about 15 units with meals, SSI and carb counting; Trulicity; toujeo-125 units bid ), an ACE inhibitor, aspirin, and a lipid lowering agent.  She sees endocrinology Kerri Wagner NP.  She reports home blood glucose readings have been fairly good, with average fasting glucoses running in the 120-150 mg/dL range. She checks her glucose 3 to 4 times daily.  Has not fallen recently In regard to preventative care, she performs foot self-exams several days per week and her last ophthalmology exam was in 8/2021 and Feb 2022 for Kameron Hernández- NO DIABETIC RETINOPATHY, she has cataracts.  she has lost 31# from August 2021      Review of Systems:  Review of Systems   Constitutional: Positive for fatigue. Negative for chills and fever.   HENT: Positive for rhinorrhea and sinus pressure. Negative for ear pain and sore  throat.    Eyes: Positive for pain and visual disturbance. Negative for double vision.   Respiratory: Positive for cough. Negative for shortness of breath and wheezing.    Cardiovascular: Negative for chest pain and palpitations.   Gastrointestinal: Negative for abdominal pain, blood in stool, constipation, diarrhea, nausea and vomiting.   Skin: Negative for rash.   Neurological: Negative for dizziness and headache.   Psychiatric/Behavioral: Negative for depressed mood.        Objective   Medical History:  Past Medical History:   • Accessory skin tags    CONGENITAL   • Acute frontal sinusitis, unspecified   • Acute upper respiratory infection, unspecified   • Acute vaginitis   • Allergic rhinitis, unspecified   • Breast lump   • Calculus of kidney   • Carrier of, hepatitis viral (HCC)   • Cellulitis of chest wall   • Chronic back pain   • Chronic hoarseness   • COVID-19   • Dietary counseling and surveillance   • Dizziness and giddiness   • Effusion, left knee   • Effusion, right knee   • GERD without esophagitis   • Hepatitis A   • Hereditary and idiopathic neuropathy, unspecified   • History of depression   • Hyperlipidemia   • Hypertension   • Kidney stone   • Localized swelling, mass and lump, right lower limb   • Low back pain   • Methicillin resistant Staphylococcus aureus infection as the cause of diseases classified elsewhere   • Migraine without aura, not intractable, without status migrainosus   • Moderate persistent asthma with (acute) exacerbation   • Morbid obesity due to excess calories (HCC)   • Nausea   • Neuropathy   • Osteoporosis   • Pain in joints of right hand   • Pain in left hip   • Pain in left leg   • Pain in right shoulder   • Paresthesia of skin   • Peripheral neuropathy   • Personal history of other venous thrombosis and embolism   • Post-menopausal   • Pressure ulcer of other site, stage 1   • Primary insomnia   • Pure hypercholesterolemia, unspecified   • 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)   • 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 (HCC)   • Shortness of breath   • Sleep apnea   • Sleep apnea, unspecified   • Tinea unguium   • Type 2 diabetes mellitus with diabetic polyneuropathy (HCC)   • Uterine polyp   • Viral hepatitis   • Vitamin D deficiency, unspecified     Past Surgical History:   • CARDIAC CATHETERIZATION   • CATARACT EXTRACTION, BILATERAL   • CHOLECYSTECTOMY   • HYSTERECTOMY    TAHBSO   • PERICARDIAL WINDOW      Family History   Problem Relation Age of Onset   • Hyperlipidemia Mother    • Endometrial cancer Mother    • Coronary artery disease Father    • Stroke Father    • Diabetes type II Brother    • Breast cancer Maternal Grandmother    • Alzheimer's disease Maternal Grandmother    • Diabetes type II Maternal Grandfather    • Diabetes type II Paternal Grandfather      Social History     Tobacco Use   • Smoking status: Never Smoker   • Smokeless tobacco: Never Used   Substance Use Topics   • Alcohol use: Not Currently       Health Maintenance Due   Topic Date Due   • ANNUAL PHYSICAL  Never done   • Hepatitis B (1 of 3 - Risk 3-dose series) Never done   • ZOSTER VACCINE (1 of 2) Never done   • HEPATITIS C SCREENING  Never done   • DIABETIC FOOT EXAM  Never done        Immunization History   Administered Date(s) Administered   • COVID-19 (PFIZER) PURPLE CAP 03/30/2021, 04/20/2021, 11/08/2021   • Influenza, Unspecified 10/03/2020   • Pneumococcal Polysaccharide (PPSV23) 11/29/2016   • Tdap 09/29/2015       Allergies   Allergen Reactions   • Asa [Aspirin] Anaphylaxis   • Ciprofloxacin Anaphylaxis   • Levemir [Insulin Detemir] GI Intolerance   • Nitroglycerin Hives   • Sumatriptan Dizziness   • Cat Hair Extract Rash   • Codeine Palpitations   • Diclofenac Swelling        Medications:  Current Outpatient Medications on File Prior to Visit   Medication Sig   • acetaminophen (TYLENOL) 325 MG tablet Take 2 tablets by mouth Every 4 (Four)  Hours As Needed for Mild Pain .   • albuterol (PROVENTIL) (2.5 MG/3ML) 0.083% nebulizer solution Take 2.5 mg by nebulization Every 4 (Four) Hours As Needed for Wheezing.   • albuterol sulfate  (90 Base) MCG/ACT inhaler Inhale 2 puffs Every 4 (Four) Hours As Needed for Wheezing.   • alendronate (FOSAMAX) 70 MG tablet Take 1 tablet by mouth Every 7 (Seven) Days.   • atorvastatin (LIPITOR) 10 MG tablet Take 1 tablet by mouth Every Night.   • B-D ULTRAFINE III SHORT PEN 31G X 8 MM misc USE 4 TIMES A DAY WITH     HUMALOG   • budesonide-formoterol (SYMBICORT) 160-4.5 MCG/ACT inhaler Inhale 2 puffs 2 (Two) Times a Day.   • cetirizine (zyrTEC) 10 MG tablet Take 10 mg by mouth Daily.   • Cholecalciferol (vitamin D3) 125 MCG (5000 UT) capsule capsule Take 5,000 Units by mouth Daily.   • dilTIAZem CD (CARDIZEM CD) 120 MG 24 hr capsule Take 1 capsule by mouth Daily.   • Dulaglutide (Trulicity) 1.5 MG/0.5ML solution pen-injector Inject  under the skin into the appropriate area as directed. Every sunday   • Empagliflozin (JARDIANCE PO) Take 10 mg by mouth Daily.   • EPINEPHrine (EPIPEN) 0.3 MG/0.3ML solution auto-injector injection As Needed.   • estradiol (ESTRACE) 0.5 MG tablet Take 1 tablet by mouth Daily.   • hydroCHLOROthiazide (HYDRODIURIL) 12.5 MG tablet Take 12.5 mg by mouth Daily.   • Insulin Glargine, 2 Unit Dial, (Toujeo Max SoloStar) 300 UNIT/ML solution pen-injector injection Inject 100 units SQ in the am and 80 units SQ in the pm   • insulin lispro (humaLOG) 100 UNIT/ML injection Inject  under the skin into the appropriate area as directed 3 (Three) Times a Day Before Meals. Sliding scale   • lisinopril (PRINIVIL,ZESTRIL) 2.5 MG tablet Take 1 tablet by mouth Daily.   • metFORMIN (GLUCOPHAGE) 500 MG tablet Take 1 tablet by mouth 2 (Two) Times a Day With Meals.   • montelukast (SINGULAIR) 10 MG tablet Take 1 tablet by mouth Every Evening.   • naproxen sodium (ALEVE) 220 MG tablet Take 220 mg by mouth 2 (Two)  "Times a Day As Needed.   • omeprazole (priLOSEC) 40 MG capsule Take 1 capsule by mouth Daily. Indications: Esophagus Inflammation with Erosion, Heartburn   • promethazine (PHENERGAN) 25 MG tablet Take 25 mg by mouth Every 6 (Six) Hours As Needed for Nausea or Vomiting.   • topiramate (TOPAMAX) 100 MG tablet Take 1 tablet by mouth 2 (Two) Times a Day.   • traZODone (DESYREL) 100 MG tablet Take 1 tablet by mouth Every Night.   • [DISCONTINUED] dexamethasone (DECADRON) 4 MG tablet Take 1 tablet by mouth Daily With Breakfast.     No current facility-administered medications on file prior to visit.       Vital Signs:   /66   Pulse 105   Temp 98.1 °F (36.7 °C) (Oral)   Ht 163.2 cm (64.25\")   Wt 97 kg (213 lb 12.8 oz)   SpO2 92%   BMI 36.41 kg/m²       Physical Exam:  Physical Exam  Vitals and nursing note reviewed.   Constitutional:       General: She is not in acute distress.     Appearance: Normal appearance. She is not ill-appearing, toxic-appearing or diaphoretic.   HENT:      Head: Normocephalic and atraumatic.      Right Ear: Tympanic membrane, ear canal and external ear normal.      Left Ear: Tympanic membrane, ear canal and external ear normal.      Nose: No congestion or rhinorrhea.      Right Sinus: No maxillary sinus tenderness or frontal sinus tenderness.      Left Sinus: Maxillary sinus tenderness and frontal sinus tenderness present.      Mouth/Throat:      Mouth: Mucous membranes are moist.      Pharynx: Oropharynx is clear. No oropharyngeal exudate or posterior oropharyngeal erythema.   Eyes:      Extraocular Movements: Extraocular movements intact.      Conjunctiva/sclera: Conjunctivae normal.      Pupils: Pupils are equal, round, and reactive to light.   Cardiovascular:      Rate and Rhythm: Normal rate and regular rhythm.      Pulses:           Dorsalis pedis pulses are 2+ on the right side and 2+ on the left side.      Heart sounds: Normal heart sounds.   Pulmonary:      Effort: Pulmonary " effort is normal.      Breath sounds: Normal breath sounds. No wheezing, rhonchi or rales.   Abdominal:      General: Abdomen is flat.      Palpations: Abdomen is soft.   Musculoskeletal:      Cervical back: Neck supple. No rigidity.   Feet:      Right foot:      Protective Sensation: 7 sites tested. 7 sites sensed.      Skin integrity: Skin integrity normal. No ulcer or blister.      Toenail Condition: Right toenails are normal.      Left foot:      Protective Sensation: 7 sites tested. 7 sites sensed.      Skin integrity: Skin integrity normal. No ulcer or blister.      Toenail Condition: Left toenails are normal.      Comments:      Lymphadenopathy:      Cervical: No cervical adenopathy.   Skin:     General: Skin is warm and dry.   Neurological:      Mental Status: She is alert and oriented to person, place, and time.   Psychiatric:         Mood and Affect: Mood normal.         Behavior: Behavior normal.         Result Review      The following data was reviewed by Eileen Barajas MD on 03/16/2022.  Lab Results   Component Value Date    WBC 4.24 12/14/2020    HGB 12.9 12/14/2020    HCT 38.0 12/14/2020    MCV 90.5 12/14/2020     12/14/2020     Lab Results   Component Value Date    GLUCOSE 159 (H) 11/18/2021    BUN 18 11/18/2021    CREATININE 0.85 11/18/2021    EGFRIFNONA 69 11/18/2021    BCR 21.2 11/18/2021    K 3.9 11/18/2021    CO2 26.4 11/18/2021    CALCIUM 8.9 11/18/2021    ALBUMIN 4.10 11/18/2021    LABIL2 1.1 (L) 05/03/2021    AST 20 11/18/2021    ALT 19 11/18/2021     Lab Results   Component Value Date    CHOL 149 08/09/2021    CHLPL 177 05/03/2021    TRIG 161 (H) 08/09/2021    HDL 41 08/09/2021    LDL 80 08/09/2021     Lab Results   Component Value Date    TSH 1.450 11/18/2021     Lab Results   Component Value Date    HGBA1C 7.00 (H) 11/18/2021     No results found for: PSA                    Assessment and Plan:          Diagnoses and all orders for this visit:    1. Cough (Primary)  -     POCT  Influenza A/B    2. Subacute maxillary sinusitis  Comments:  will treat with kenalog and zpac  Orders:  -     triamcinolone acetonide (KENALOG-40) injection 40 mg  -     azithromycin (ZITHROMAX) 250 MG tablet; Take 2 tablets PO the first day, then 1 tablet PO daily for 4 days.  Dispense: 6 tablet; Refill: 0    3. Allergic rhinitis, unspecified seasonality, unspecified trigger  Comments:  treat with kenalog  Orders:  -     triamcinolone acetonide (KENALOG-40) injection 40 mg    4. Type 2 diabetes mellitus with hyperglycemia, with long-term current use of insulin (HCC)  Comments:  stable and well controlled, sees endocrinology, doiing amazing with her diet    5. Essential hypertension  Comments:  repeat BP was normal          Follow Up   Return in about 6 months (around 9/16/2022), or if symptoms worsen or fail to improve, for Recheck.

## 2022-05-01 DIAGNOSIS — I10 HYPERTENSION, UNSPECIFIED TYPE: ICD-10-CM

## 2022-05-01 DIAGNOSIS — E78.00 HYPERCHOLESTEROLEMIA: ICD-10-CM

## 2022-05-02 RX ORDER — ATORVASTATIN CALCIUM 10 MG/1
TABLET, FILM COATED ORAL
Qty: 90 TABLET | Refills: 0 | Status: SHIPPED | OUTPATIENT
Start: 2022-05-02 | End: 2022-07-28

## 2022-05-02 RX ORDER — LISINOPRIL 2.5 MG/1
TABLET ORAL
Qty: 90 TABLET | Refills: 0 | Status: SHIPPED | OUTPATIENT
Start: 2022-05-02 | End: 2022-07-28

## 2022-05-02 RX ORDER — DILTIAZEM HYDROCHLORIDE 120 MG/1
CAPSULE, COATED, EXTENDED RELEASE ORAL
Qty: 90 CAPSULE | Refills: 0 | Status: SHIPPED | OUTPATIENT
Start: 2022-05-02 | End: 2022-07-28

## 2022-05-09 ENCOUNTER — LAB (OUTPATIENT)
Dept: LAB | Facility: HOSPITAL | Age: 57
End: 2022-05-09

## 2022-05-09 ENCOUNTER — OFFICE VISIT (OUTPATIENT)
Dept: FAMILY MEDICINE CLINIC | Age: 57
End: 2022-05-09

## 2022-05-09 VITALS
HEIGHT: 64 IN | BODY MASS INDEX: 37.39 KG/M2 | SYSTOLIC BLOOD PRESSURE: 160 MMHG | WEIGHT: 219 LBS | DIASTOLIC BLOOD PRESSURE: 71 MMHG | HEART RATE: 86 BPM

## 2022-05-09 DIAGNOSIS — I10 PRIMARY HYPERTENSION: ICD-10-CM

## 2022-05-09 DIAGNOSIS — J02.9 SORE THROAT: Primary | ICD-10-CM

## 2022-05-09 DIAGNOSIS — R23.3 PETECHIAE: ICD-10-CM

## 2022-05-09 DIAGNOSIS — Z79.4 TYPE 2 DIABETES MELLITUS WITH HYPERGLYCEMIA, WITH LONG-TERM CURRENT USE OF INSULIN: ICD-10-CM

## 2022-05-09 DIAGNOSIS — E11.65 TYPE 2 DIABETES MELLITUS WITH HYPERGLYCEMIA, WITH LONG-TERM CURRENT USE OF INSULIN: ICD-10-CM

## 2022-05-09 LAB
DEPRECATED RDW RBC AUTO: 43.7 FL (ref 37–54)
ERYTHROCYTE [DISTWIDTH] IN BLOOD BY AUTOMATED COUNT: 12 % (ref 12.3–15.4)
ERYTHROCYTE [SEDIMENTATION RATE] IN BLOOD: 26 MM/HR (ref 0–30)
HCT VFR BLD AUTO: 39.4 % (ref 34–46.6)
HGB BLD-MCNC: 12.8 G/DL (ref 12–15.9)
MCH RBC QN AUTO: 31.8 PG (ref 26.6–33)
MCHC RBC AUTO-ENTMCNC: 32.5 G/DL (ref 31.5–35.7)
MCV RBC AUTO: 97.8 FL (ref 79–97)
PLATELET # BLD AUTO: 220 10*3/MM3 (ref 140–450)
PMV BLD AUTO: 11.1 FL (ref 6–12)
RBC # BLD AUTO: 4.03 10*6/MM3 (ref 3.77–5.28)
WBC NRBC COR # BLD: 8.1 10*3/MM3 (ref 3.4–10.8)

## 2022-05-09 PROCEDURE — 87081 CULTURE SCREEN ONLY: CPT | Performed by: FAMILY MEDICINE

## 2022-05-09 PROCEDURE — 83520 IMMUNOASSAY QUANT NOS NONAB: CPT

## 2022-05-09 PROCEDURE — 87880 STREP A ASSAY W/OPTIC: CPT | Performed by: FAMILY MEDICINE

## 2022-05-09 PROCEDURE — 36415 COLL VENOUS BLD VENIPUNCTURE: CPT

## 2022-05-09 PROCEDURE — 86431 RHEUMATOID FACTOR QUANT: CPT

## 2022-05-09 PROCEDURE — 80050 GENERAL HEALTH PANEL: CPT

## 2022-05-09 PROCEDURE — 86037 ANCA TITER EACH ANTIBODY: CPT

## 2022-05-09 PROCEDURE — 82550 ASSAY OF CK (CPK): CPT

## 2022-05-09 PROCEDURE — 99214 OFFICE O/P EST MOD 30 MIN: CPT | Performed by: FAMILY MEDICINE

## 2022-05-09 PROCEDURE — 86200 CCP ANTIBODY: CPT

## 2022-05-09 PROCEDURE — 86038 ANTINUCLEAR ANTIBODIES: CPT

## 2022-05-09 PROCEDURE — 85652 RBC SED RATE AUTOMATED: CPT

## 2022-05-09 PROCEDURE — 86140 C-REACTIVE PROTEIN: CPT

## 2022-05-09 PROCEDURE — 84439 ASSAY OF FREE THYROXINE: CPT

## 2022-05-09 RX ORDER — ALENDRONATE SODIUM 70 MG/1
TABLET ORAL
Qty: 12 TABLET | Refills: 0 | Status: SHIPPED | OUTPATIENT
Start: 2022-05-09 | End: 2022-08-02 | Stop reason: SDUPTHER

## 2022-05-09 NOTE — PROGRESS NOTES
"Nory Rodriguez presents to [facility].    Chief Complaint: skin rash.     Subjective       History of Present Illness:  HPI  Acute onset of petechial red rash under skin, no bumps or puritis, onset  3 months ago, no new meds.  No fatigue/chest pain/new joint pain/arthralgias.  Weight loss has been on purpose.  She has rheumatoid arthritis and she is on (?) shots in shoulder and hip-she gets every 3-6 months and she has been on these shots for about 5 years.  She is on trulicity and jardiance for her diabetes which is well controlled.  No recent covid, no viral illness, no tick bites.  She is having more LE swelling.  She is on ibuprofen and aleve prn but not \"very often\".  Not on fish oil or flaxseed oil.     Review of Systems:  Review of Systems   Constitutional: Negative for chills, fatigue and fever.   HENT: Negative for ear pain, sinus pressure and sore throat.    Eyes: Negative for blurred vision and double vision.   Respiratory: Negative for cough, shortness of breath and wheezing.    Cardiovascular: Positive for leg swelling. Negative for chest pain and palpitations.   Gastrointestinal: Negative for abdominal pain, blood in stool, constipation, diarrhea, nausea and vomiting.   Skin: Negative for rash.   Neurological: Negative for dizziness, weakness and headache.   Psychiatric/Behavioral: Positive for sleep disturbance. Negative for depressed mood.        Objective   Medical History:  Past Medical History:   • Accessory skin tags    CONGENITAL   • Acute frontal sinusitis, unspecified   • Acute upper respiratory infection, unspecified   • Acute vaginitis   • Allergic rhinitis, unspecified   • Breast lump   • Calculus of kidney   • Carrier of, hepatitis viral (HCC)   • Cellulitis of chest wall   • Chronic back pain   • Chronic hoarseness   • COVID-19   • Dietary counseling and surveillance   • Dizziness and giddiness   • Effusion, left knee   • Effusion, right knee   • GERD " without esophagitis   • Hepatitis A   • Hereditary and idiopathic neuropathy, unspecified   • History of depression   • Hyperlipidemia   • Hypertension   • Kidney stone   • Localized swelling, mass and lump, right lower limb   • Low back pain   • Methicillin resistant Staphylococcus aureus infection as the cause of diseases classified elsewhere   • Migraine without aura, not intractable, without status migrainosus   • Moderate persistent asthma with (acute) exacerbation   • Morbid obesity due to excess calories (HCC)   • Nausea   • Neuropathy   • Osteoporosis   • Pain in joints of right hand   • Pain in left hip   • Pain in left leg   • Pain in right shoulder   • Paresthesia of skin   • Peripheral neuropathy   • Personal history of other venous thrombosis and embolism   • Post-menopausal   • Pressure ulcer of other site, stage 1   • Primary insomnia   • Pure hypercholesterolemia, unspecified   • 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[facility])   • 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 ([facility])   • Shortness of breath   • Sleep apnea   • Sleep apnea, unspecified   • Tinea unguium   • Type 2 diabetes mellitus with diabetic polyneuropathy ([facility])   • Uterine polyp   • Viral hepatitis   • Vitamin D deficiency, unspecified     Past Surgical History:   • CARDIAC CATHETERIZATION   • CATARACT EXTRACTION, BILATERAL   • CHOLECYSTECTOMY   • HYSTERECTOMY    TAHBSO   • PERICARDIAL WINDOW      Family History   Problem Relation Age of Onset   • Hyperlipidemia Mother    • Endometrial cancer Mother    • Coronary artery disease Father    • Stroke Father    • Diabetes type II Brother    • Breast cancer Maternal Grandmother    • Alzheimer's disease Maternal Grandmother    • Diabetes type II Maternal Grandfather    • Diabetes type II Paternal Grandfather      Social History     Tobacco Use   • Smoking status: Never Smoker   • Smokeless tobacco: Never Used   Substance Use Topics   • Alcohol use: Not  Currently       Health Maintenance Due   Topic Date Due   • ANNUAL PHYSICAL  Never done   • Hepatitis B (1 of 3 - Risk 3-dose series) Never done   • ZOSTER VACCINE (1 of 2) Never done   • Pneumococcal Vaccine 0-64 (2 - PCV) 11/29/2017   • HEPATITIS C SCREENING  Never done   • DIABETIC FOOT EXAM  Never done        Immunization History   Administered Date(s) Administered   • COVID-19 (PFIZER) PURPLE CAP 03/30/2021, 04/20/2021, 11/08/2021   • FluLaval/Fluarix/Fluzone >6 10/13/2021   • Influenza, Unspecified 10/03/2020   • Pneumococcal Polysaccharide (PPSV23) 11/29/2016   • Tdap 09/29/2015       Allergies   Allergen Reactions   • Asa [Aspirin] Anaphylaxis   • Ciprofloxacin Anaphylaxis   • Levemir [Insulin Detemir] GI Intolerance   • Nitroglycerin Hives   • Sumatriptan Dizziness   • Cat Hair Extract Rash   • Codeine Palpitations   • Diclofenac Swelling        Medications:  Current Outpatient Medications on File Prior to Visit   Medication Sig   • acetaminophen (TYLENOL) 325 MG tablet Take 2 tablets by mouth Every 4 (Four) Hours As Needed for Mild Pain .   • albuterol (PROVENTIL) (2.5 MG/3ML) 0.083% nebulizer solution Take 2.5 mg by nebulization Every 4 (Four) Hours As Needed for Wheezing.   • albuterol sulfate  (90 Base) MCG/ACT inhaler Inhale 2 puffs Every 4 (Four) Hours As Needed for Wheezing.   • atorvastatin (LIPITOR) 10 MG tablet TAKE ONE TABLET BY MOUTH ONCE NIGHTLY   • B-D ULTRAFINE III SHORT PEN 31G X 8 MM misc USE 4 TIMES A DAY WITH     HUMALOG   • budesonide-formoterol (SYMBICORT) 160-4.5 MCG/ACT inhaler Inhale 2 puffs 2 (Two) Times a Day.   • cetirizine (zyrTEC) 10 MG tablet Take 10 mg by mouth Daily.   • Cholecalciferol (vitamin D3) 125 MCG (5000 UT) capsule capsule Take 5,000 Units by mouth Daily.   • dilTIAZem CD (CARDIZEM CD) 120 MG 24 hr capsule TAKE ONE CAPSULE BY MOUTH DAILY   • Dulaglutide (Trulicity) 1.5 MG/0.5ML solution pen-injector Inject  under the skin into the appropriate area as  directed. Every sunday   • Empagliflozin (JARDIANCE PO) Take 10 mg by mouth Daily.   • EPINEPHrine (EPIPEN) 0.3 MG/0.3ML solution auto-injector injection As Needed.   • estradiol (ESTRACE) 0.5 MG tablet Take 1 tablet by mouth Daily.   • hydroCHLOROthiazide (HYDRODIURIL) 12.5 MG tablet Take 12.5 mg by mouth Daily.   • Insulin Glargine, 2 Unit Dial, (Toujeo Max SoloStar) 300 UNIT/ML solution pen-injector injection Inject 100 units SQ in the am and 80 units SQ in the pm   • insulin lispro (humaLOG) 100 UNIT/ML injection Inject  under the skin into the appropriate area as directed 3 (Three) Times a Day Before Meals. Sliding scale   • lisinopril (PRINIVIL,ZESTRIL) 2.5 MG tablet TAKE ONE TABLET BY MOUTH DAILY   • metFORMIN (GLUCOPHAGE) 500 MG tablet Take 1 tablet by mouth 2 (Two) Times a Day With Meals.   • montelukast (SINGULAIR) 10 MG tablet Take 1 tablet by mouth Every Evening.   • naproxen sodium (ALEVE) 220 MG tablet Take 220 mg by mouth 2 (Two) Times a Day As Needed.   • omeprazole (priLOSEC) 40 MG capsule Take 1 capsule by mouth Daily. Indications: Esophagus Inflammation with Erosion, Heartburn   • promethazine (PHENERGAN) 25 MG tablet Take 25 mg by mouth Every 6 (Six) Hours As Needed for Nausea or Vomiting.   • topiramate (TOPAMAX) 100 MG tablet Take 1 tablet by mouth 2 (Two) Times a Day.   • traZODone (DESYREL) 100 MG tablet Take 1 tablet by mouth Every Night.   • [DISCONTINUED] alendronate (FOSAMAX) 70 MG tablet Take 1 tablet by mouth Every 7 (Seven) Days.   • alendronate (FOSAMAX) 70 MG tablet TAKE 1 TABLET BY MOUTH ONCE WEEKLY ON AN EMPTY STOMACH BEFORE BREAKFAST. REMAIN UPRIGHT FOR 30 MINUTES AND TAKE WITH 8 OUNCES OF WATER   • [DISCONTINUED] azithromycin (ZITHROMAX) 250 MG tablet Take 2 tablets PO the first day, then 1 tablet PO daily for 4 days.     No current facility-administered medications on file prior to visit.       Vital Signs:   /71 (BP Location: Right arm, Patient Position: Sitting)   Pulse  "86   Ht 163.2 cm (64.25\")   Wt 99.3 kg (219 lb)   BMI 37.30 kg/m²       Physical Exam:  Physical Exam  Vitals and nursing note reviewed.   Constitutional:       General: She is not in acute distress.     Appearance: Normal appearance. She is not ill-appearing, toxic-appearing or diaphoretic.   HENT:      Head: Normocephalic and atraumatic.      Right Ear: Tympanic membrane, ear canal and external ear normal.      Left Ear: Tympanic membrane, ear canal and external ear normal.      Nose: No congestion or rhinorrhea.      Mouth/Throat:      Mouth: Mucous membranes are moist.      Pharynx: Oropharynx is clear. No oropharyngeal exudate or posterior oropharyngeal erythema.   Eyes:      Extraocular Movements: Extraocular movements intact.      Conjunctiva/sclera: Conjunctivae normal.      Pupils: Pupils are equal, round, and reactive to light.   Cardiovascular:      Rate and Rhythm: Normal rate and regular rhythm.      Heart sounds: Normal heart sounds.   Pulmonary:      Effort: Pulmonary effort is normal.      Breath sounds: Normal breath sounds. No wheezing, rhonchi or rales.   Abdominal:      General: Abdomen is flat.      Palpations: Abdomen is soft.   Musculoskeletal:      Cervical back: Neck supple. No rigidity.   Lymphadenopathy:      Cervical: No cervical adenopathy.   Skin:     General: Skin is warm and dry.   Neurological:      Mental Status: She is alert and oriented to person, place, and time.   Psychiatric:         Mood and Affect: Mood normal.         Behavior: Behavior normal.         Result Review      The following data was reviewed by Eileen Barajas MD on 05/09/2022.  Lab Results   Component Value Date    WBC 8.10 05/09/2022    HGB 12.8 05/09/2022    HCT 39.4 05/09/2022    MCV 97.8 (H) 05/09/2022     05/09/2022     Lab Results   Component Value Date    GLUCOSE 178 (H) 03/16/2022    BUN 20 03/16/2022    CREATININE 1.05 (H) 03/16/2022    EGFRIFNONA 69 11/18/2021    BCR 19.0 03/16/2022    K " 3.8 03/16/2022    CO2 24.1 03/16/2022    CALCIUM 9.7 03/16/2022    ALBUMIN 4.30 03/16/2022    LABIL2 1.1 (L) 05/03/2021    AST 20 03/16/2022    ALT 23 03/16/2022     Lab Results   Component Value Date    CHOL 149 08/09/2021    CHLPL 177 05/03/2021    TRIG 161 (H) 08/09/2021    HDL 41 08/09/2021    LDL 80 08/09/2021     Lab Results   Component Value Date    TSH 1.450 11/18/2021     Lab Results   Component Value Date    HGBA1C 7.40 (H) 03/16/2022     No results found for: PSA                    Assessment and Plan:          Diagnoses and all orders for this visit:    1. Sore throat (Primary)  -     Beta Strep Culture, Throat - , Throat; Future  -     Beta Strep Culture, Throat - Swab, Throat    2. Petechiae  -     Comprehensive Metabolic Panel; Future  -     CBC (No Diff); Future  -     TSH+Free T4; Future  -     ANCA Panel; Future  -     NERISSA Direct Reflex to 11 Biomarker; Future  -     Cyclic citrul peptide antibody, IgG/IgA; Future  -     CK; Future  -     C-reactive protein; Future  -     Rheumatoid factor; Future  -     Sedimentation rate; Future  -     Rapid Strep A Screen - Swab, Throat; Future    3. Primary hypertension  Comments:  Elevated, will check blood pressures at home to see if meds need to be adjusted    4. Type 2 diabetes mellitus with hyperglycemia, with long-term current use of insulin (HCC)  Comments:  Stable and well-controlled with current medication.  No changes are needed in meds or treatment plan.    I am very concerned about up her petechial rash and can help but wonder if this is just areas of trauma from scratching but she denies this.  The areas of rash on her chest and on both forearms.  It is nowhere else.  This could be medication induced.  She is going to discuss with her pharmacist any potential meds that she is on that could cause a petechial rash.  In the meantime I will work her up for vasculitis and other arthritis morbidities      Follow Up   Return if symptoms worsen or fail to  improve.

## 2022-05-10 DIAGNOSIS — R23.3 PETECHIAE: Primary | ICD-10-CM

## 2022-05-10 DIAGNOSIS — R76.8 RHEUMATOID FACTOR POSITIVE: ICD-10-CM

## 2022-05-10 LAB
ALBUMIN SERPL-MCNC: 3.9 G/DL (ref 3.5–5.2)
ALBUMIN/GLOB SERPL: 1.3 G/DL
ALP SERPL-CCNC: 84 U/L (ref 39–117)
ALT SERPL W P-5'-P-CCNC: 18 U/L (ref 1–33)
ANION GAP SERPL CALCULATED.3IONS-SCNC: 7.9 MMOL/L (ref 5–15)
AST SERPL-CCNC: 21 U/L (ref 1–32)
BILIRUB SERPL-MCNC: <0.2 MG/DL (ref 0–1.2)
BUN SERPL-MCNC: 15 MG/DL (ref 6–20)
BUN/CREAT SERPL: 22.4 (ref 7–25)
CALCIUM SPEC-SCNC: 9.2 MG/DL (ref 8.6–10.5)
CHLORIDE SERPL-SCNC: 107 MMOL/L (ref 98–107)
CHROMATIN AB SERPL-ACNC: 89.7 IU/ML (ref 0–14)
CK SERPL-CCNC: 51 U/L (ref 20–180)
CO2 SERPL-SCNC: 25.1 MMOL/L (ref 22–29)
CREAT SERPL-MCNC: 0.67 MG/DL (ref 0.57–1)
CRP SERPL-MCNC: <0.3 MG/DL (ref 0–0.5)
EGFRCR SERPLBLD CKD-EPI 2021: 101.5 ML/MIN/1.73
GLOBULIN UR ELPH-MCNC: 3 GM/DL
GLUCOSE SERPL-MCNC: 126 MG/DL (ref 65–99)
POTASSIUM SERPL-SCNC: 4.3 MMOL/L (ref 3.5–5.2)
PROT SERPL-MCNC: 6.9 G/DL (ref 6–8.5)
SODIUM SERPL-SCNC: 140 MMOL/L (ref 136–145)
T4 FREE SERPL-MCNC: 1.2 NG/DL (ref 0.93–1.7)
TSH SERPL DL<=0.05 MIU/L-ACNC: 1.62 UIU/ML (ref 0.27–4.2)

## 2022-05-11 LAB
ANA SER QL: NEGATIVE
BACTERIA SPEC AEROBE CULT: NORMAL

## 2022-05-12 LAB
C-ANCA TITR SER IF: ABNORMAL TITER
CCP IGA+IGG SERPL IA-ACNC: >250 UNITS (ref 0–19)
MYELOPEROXIDASE AB SER IA-ACNC: <9 U/ML (ref 0–9)
P-ANCA ATYPICAL TITR SER IF: ABNORMAL TITER
P-ANCA TITR SER IF: ABNORMAL TITER
PROTEINASE3 AB SER IA-ACNC: <3.5 U/ML (ref 0–3.5)

## 2022-05-23 ENCOUNTER — TELEPHONE (OUTPATIENT)
Dept: FAMILY MEDICINE CLINIC | Age: 57
End: 2022-05-23

## 2022-05-23 NOTE — TELEPHONE ENCOUNTER
Caller: Nory Rodriguez    Relationship: Self    Best call back number: [phone]     What is the best time to reach you: AASAP    Who are you requesting to speak with (clinical staff, provider,  specific staff member): JASON    Do you know the name of the person who called: JASON    What was the call regarding: TEST RESULTS AND PATIENT STATES HER ARMS HAS GOTTEN WORSE, HAS 3 BIG BLISTERS ON HER ARMS    Do you require a callback: YES, PATIENT WANTS TO BE CALLED ASAP

## 2022-05-23 NOTE — TELEPHONE ENCOUNTER
Labs were positive for rheumatoid arthritis.  I am setting her up with a rheumatology referral, however I need to biopsy one of her lesions and see her for these new sores that have occurred.  Dr. Barajas

## 2022-05-23 NOTE — TELEPHONE ENCOUNTER
Pt inf she said the rheumatologist can not see her until march of next year and she can not come in right now due to work schedule.

## 2022-05-26 ENCOUNTER — TELEPHONE (OUTPATIENT)
Dept: FAMILY MEDICINE CLINIC | Age: 57
End: 2022-05-26

## 2022-05-26 DIAGNOSIS — G47.00 INSOMNIA, UNSPECIFIED TYPE: ICD-10-CM

## 2022-05-26 LAB
EXPIRATION DATE: NORMAL
INTERNAL CONTROL: NORMAL
Lab: NORMAL
S PYO AG THROAT QL: NEGATIVE

## 2022-05-27 RX ORDER — TRAZODONE HYDROCHLORIDE 100 MG/1
TABLET ORAL
Qty: 90 TABLET | Refills: 0 | Status: SHIPPED | OUTPATIENT
Start: 2022-05-27 | End: 2022-08-23

## 2022-06-09 ENCOUNTER — OFFICE VISIT (OUTPATIENT)
Dept: FAMILY MEDICINE CLINIC | Age: 57
End: 2022-06-09

## 2022-06-09 ENCOUNTER — LAB (OUTPATIENT)
Dept: LAB | Facility: HOSPITAL | Age: 57
End: 2022-06-09

## 2022-06-09 VITALS
BODY MASS INDEX: 35.27 KG/M2 | WEIGHT: 206.6 LBS | SYSTOLIC BLOOD PRESSURE: 93 MMHG | TEMPERATURE: 99.5 F | DIASTOLIC BLOOD PRESSURE: 56 MMHG | HEIGHT: 64 IN | HEART RATE: 75 BPM

## 2022-06-09 DIAGNOSIS — T14.8XXA BRUISING: Primary | ICD-10-CM

## 2022-06-09 DIAGNOSIS — T14.8XXA BRUISING: ICD-10-CM

## 2022-06-09 DIAGNOSIS — M05.10 RHEUMATOID LUNG DISEASE WITH RHEUMATOID ARTHRITIS: ICD-10-CM

## 2022-06-09 LAB
APTT PPP: 30.2 SECONDS (ref 24.2–34.2)
INR PPP: 0.96 (ref 0.86–1.15)
PROTHROMBIN TIME: 12.9 SECONDS (ref 11.8–14.9)

## 2022-06-09 PROCEDURE — 85610 PROTHROMBIN TIME: CPT

## 2022-06-09 PROCEDURE — 36415 COLL VENOUS BLD VENIPUNCTURE: CPT

## 2022-06-09 PROCEDURE — 85730 THROMBOPLASTIN TIME PARTIAL: CPT

## 2022-06-09 PROCEDURE — 99213 OFFICE O/P EST LOW 20 MIN: CPT | Performed by: NURSE PRACTITIONER

## 2022-06-09 NOTE — PROGRESS NOTES
Nory Rodriguez presents to [facility].    Chief Complaint:  Rash (Onset about 4 months ago,  wanted her to come in for evaluation but had no availability )         History of Present Illness:  Rash/bruising  Had labs last month, no injuries, left arm brushed up against a card board box   Symptoms started: 4 months ago  Areas effected: just her forearms, mildly tender   Remedies tried: nothing   No changes in any rx over last 4 months     Hx RA, appt is not until 3-2023, would like to go back to Dr ROXANA Langston office in [place]   Past Medical History:         Hepatitis: type A;     Allergies    GERD    IDDM    hypercholesterolemia     Back pain     Breast lump     Cellulitis of the chest wall     Classic migraine     Congenital accessory skin tags     H/O Depression     GERD     chronic hoarseness     Hypertension     H/O Migraine headaches     Obstructive sleep apnea    Peripheral neuropathy     Post-menopausal    h/o Uterine polyp             GYNECOLOGICAL HISTORY:     miscarriage 1    Contraception: S/P tubal ligation;                   Surgical History:       Shoulder surgeries, R X 2     Cholecystectomy    Hysterectomy: TAHBSO;      pericardial window ; Procedures: heart cath          Family History:     Father: Coronary Artery Disease; ;  Cerebrovascular Accident     Mother: Hyperlipidemia;  Endometrial Cancer     Brother(s): Type 2 Diabetes     Paternal Grandfather: Type 2 Diabetes     Paternal Grandmother:      Maternal Grandfather: Type 2 Diabetes     Maternal Grandmother: Breast Cancer;  Alzheimer's Disease         Social History:     Occupation: [employer], teller     Marital Status:      Children: 1 child and 2 step-children         Review of Systems:  Review of Systems   Constitutional: Negative for fatigue and fever.   Respiratory: Negative for cough and shortness of breath.    Cardiovascular: Negative for chest pain, palpitations  and leg swelling.   Neurological: Negative for numbness.          Current Outpatient Medications:   •  albuterol (PROVENTIL) (2.5 MG/3ML) 0.083% nebulizer solution, Take 2.5 mg by nebulization Every 4 (Four) Hours As Needed for Wheezing., Disp: , Rfl:   •  albuterol sulfate  (90 Base) MCG/ACT inhaler, Inhale 2 puffs Every 4 (Four) Hours As Needed for Wheezing., Disp: , Rfl:   •  alendronate (FOSAMAX) 70 MG tablet, TAKE 1 TABLET BY MOUTH ONCE WEEKLY ON AN EMPTY STOMACH BEFORE BREAKFAST. REMAIN UPRIGHT FOR 30 MINUTES AND TAKE WITH 8 OUNCES OF WATER, Disp: 12 tablet, Rfl: 0  •  atorvastatin (LIPITOR) 10 MG tablet, TAKE ONE TABLET BY MOUTH ONCE NIGHTLY, Disp: 90 tablet, Rfl: 0  •  B-D ULTRAFINE III SHORT PEN 31G X 8 MM misc, USE 4 TIMES A DAY WITH     HUMALOG, Disp: 360 each, Rfl: 1  •  budesonide-formoterol (SYMBICORT) 160-4.5 MCG/ACT inhaler, Inhale 2 puffs 2 (Two) Times a Day., Disp: , Rfl:   •  cetirizine (zyrTEC) 10 MG tablet, Take 10 mg by mouth Daily., Disp: , Rfl:   •  Cholecalciferol (vitamin D3) 125 MCG (5000 UT) capsule capsule, Take 5,000 Units by mouth Daily., Disp: , Rfl:   •  dilTIAZem CD (CARDIZEM CD) 120 MG 24 hr capsule, TAKE ONE CAPSULE BY MOUTH DAILY, Disp: 90 capsule, Rfl: 0  •  Dulaglutide (Trulicity) 1.5 MG/0.5ML solution pen-injector, Inject  under the skin into the appropriate area as directed. Every sunday, Disp: , Rfl:   •  Empagliflozin (JARDIANCE PO), Take 10 mg by mouth Daily., Disp: , Rfl:   •  EPINEPHrine (EPIPEN) 0.3 MG/0.3ML solution auto-injector injection, As Needed., Disp: , Rfl:   •  estradiol (ESTRACE) 0.5 MG tablet, Take 1 tablet by mouth Daily., Disp: 90 tablet, Rfl: 1  •  hydroCHLOROthiazide (HYDRODIURIL) 12.5 MG tablet, Take 12.5 mg by mouth Daily., Disp: , Rfl:   •  Insulin Glargine, 2 Unit Dial, (Toujeo Max SoloStar) 300 UNIT/ML solution pen-injector injection, Inject 100 units SQ in the am and 80 units SQ in the pm, Disp: 54 mL, Rfl: 1  •  insulin lispro (humaLOG) 100  "UNIT/ML injection, Inject  under the skin into the appropriate area as directed 3 (Three) Times a Day Before Meals. Sliding scale, Disp: , Rfl:   •  lisinopril (PRINIVIL,ZESTRIL) 2.5 MG tablet, TAKE ONE TABLET BY MOUTH DAILY, Disp: 90 tablet, Rfl: 0  •  metFORMIN (GLUCOPHAGE) 500 MG tablet, Take 1 tablet by mouth 2 (Two) Times a Day With Meals., Disp: 180 tablet, Rfl: 1  •  montelukast (SINGULAIR) 10 MG tablet, Take 1 tablet by mouth Every Evening., Disp: 90 tablet, Rfl: 1  •  omeprazole (priLOSEC) 40 MG capsule, Take 1 capsule by mouth Daily. Indications: Esophagus Inflammation with Erosion, Heartburn, Disp: 90 capsule, Rfl: 0  •  promethazine (PHENERGAN) 25 MG tablet, Take 25 mg by mouth Every 6 (Six) Hours As Needed for Nausea or Vomiting., Disp: , Rfl:   •  topiramate (TOPAMAX) 100 MG tablet, Take 1 tablet by mouth 2 (Two) Times a Day., Disp: 180 tablet, Rfl: 0  •  traZODone (DESYREL) 100 MG tablet, TAKE ONE TABLET BY MOUTH ONCE NIGHTLY, Disp: 90 tablet, Rfl: 0  •  acetaminophen (TYLENOL) 325 MG tablet, Take 2 tablets by mouth Every 4 (Four) Hours As Needed for Mild Pain ., Disp:  , Rfl:   •  naproxen sodium (ALEVE) 220 MG tablet, Take 220 mg by mouth 2 (Two) Times a Day As Needed., Disp: , Rfl:     Vital Signs:   Vitals:    06/09/22 1453 06/09/22 1526   BP: (!) 80/48 93/56   BP Location: Right arm Right arm  Comment: pt has lipoma on L and prefers BP not done on that arm   Patient Position: Sitting Sitting   Pulse: 79 75   Temp: 99.5 °F (37.5 °C)    TempSrc: Oral    Weight: 93.7 kg (206 lb 9.6 oz)    Height: 162.6 cm (64\")          Physical Exam:  Physical Exam  Vitals reviewed.   Constitutional:       General: She is not in acute distress.     Appearance: Normal appearance.   Neck:      Vascular: No carotid bruit.   Cardiovascular:      Rate and Rhythm: Normal rate and regular rhythm.      Heart sounds: Normal heart sounds. No murmur heard.  Pulmonary:      Effort: Pulmonary effort is normal. No respiratory " distress.      Breath sounds: Normal breath sounds.   Musculoskeletal:      Right lower leg: No edema.      Left lower leg: No edema.   Neurological:      Mental Status: She is alert.   Psychiatric:         Mood and Affect: Mood normal.         Behavior: Behavior normal.         Result Review      The following data was reviewed by: JENNIFER Brady on 06/09/2022:    Results for orders placed or performed in visit on 05/09/22   Comprehensive Metabolic Panel    Specimen: Blood   Result Value Ref Range    Glucose 126 (H) 65 - 99 mg/dL    BUN 15 6 - 20 mg/dL    Creatinine 0.67 0.57 - 1.00 mg/dL    Sodium 140 136 - 145 mmol/L    Potassium 4.3 3.5 - 5.2 mmol/L    Chloride 107 98 - 107 mmol/L    CO2 25.1 22.0 - 29.0 mmol/L    Calcium 9.2 8.6 - 10.5 mg/dL    Total Protein 6.9 6.0 - 8.5 g/dL    Albumin 3.90 3.50 - 5.20 g/dL    ALT (SGPT) 18 1 - 33 U/L    AST (SGOT) 21 1 - 32 U/L    Alkaline Phosphatase 84 39 - 117 U/L    Total Bilirubin <0.2 0.0 - 1.2 mg/dL    Globulin 3.0 gm/dL    A/G Ratio 1.3 g/dL    BUN/Creatinine Ratio 22.4 7.0 - 25.0    Anion Gap 7.9 5.0 - 15.0 mmol/L    eGFR 101.5 >60.0 mL/min/1.73   CBC (No Diff)    Specimen: Blood   Result Value Ref Range    WBC 8.10 3.40 - 10.80 10*3/mm3    RBC 4.03 3.77 - 5.28 10*6/mm3    Hemoglobin 12.8 12.0 - 15.9 g/dL    Hematocrit 39.4 34.0 - 46.6 %    MCV 97.8 (H) 79.0 - 97.0 fL    MCH 31.8 26.6 - 33.0 pg    MCHC 32.5 31.5 - 35.7 g/dL    RDW 12.0 (L) 12.3 - 15.4 %    RDW-SD 43.7 37.0 - 54.0 fl    MPV 11.1 6.0 - 12.0 fL    Platelets 220 140 - 450 10*3/mm3   TSH+Free T4    Specimen: Blood   Result Value Ref Range    TSH 1.620 0.270 - 4.200 uIU/mL    Free T4 1.20 0.93 - 1.70 ng/dL   ANCA Panel    Specimen: Blood   Result Value Ref Range    Myeloperoxidase Ab <9.0 0.0 - 9.0 U/mL    Antiproteinase 3 ([place]-3) <3.5 0.0 - 3.5 U/mL    C-ANCA 1:320 (H) Neg:<1:20 titer    P-ANCA <1:20 Neg:<1:20 titer    Atypical pANCA <1:20 Neg:<1:20 titer   NERISSA Direct Reflex to 11 Biomarker     Specimen: Blood   Result Value Ref Range    NERISSA Direct Negative Negative   Cyclic citrul peptide antibody, IgG/IgA    Specimen: Blood   Result Value Ref Range    CCP Antibodies IgG/IgA >250 (H) 0 - 19 units   CK    Specimen: Blood   Result Value Ref Range    Creatine Kinase 51 20 - 180 U/L   C-reactive protein    Specimen: Blood   Result Value Ref Range    C-Reactive Protein <0.30 0.00 - 0.50 mg/dL   Rheumatoid factor    Specimen: Blood   Result Value Ref Range    Rheumatoid Factor Quantitative 89.7 (H) 0.0 - 14.0 IU/mL   Sedimentation rate    Specimen: Blood   Result Value Ref Range    Sed Rate 26 0 - 30 mm/hr               Assessment and Plan:          Diagnoses and all orders for this visit:    1. Bruising (Primary)  Assessment & Plan:  reviewed last month labs, will check a PT/PTT    Orders:  -     Protime-INR; Future  -     APTT; Future    2. 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  Patient was given instructions and counseling regarding her condition or for health maintenance advice. Please see specific information pulled into the AVS if appropriate.

## 2022-06-23 ENCOUNTER — OFFICE VISIT (OUTPATIENT)
Dept: FAMILY MEDICINE CLINIC | Age: 57
End: 2022-06-23

## 2022-06-23 VITALS
WEIGHT: 212 LBS | DIASTOLIC BLOOD PRESSURE: 79 MMHG | SYSTOLIC BLOOD PRESSURE: 117 MMHG | OXYGEN SATURATION: 97 % | HEART RATE: 76 BPM | HEIGHT: 64 IN | BODY MASS INDEX: 36.19 KG/M2

## 2022-06-23 DIAGNOSIS — R60.0 LOWER EXTREMITY EDEMA: ICD-10-CM

## 2022-06-23 DIAGNOSIS — J45.20 MILD INTERMITTENT ASTHMA WITHOUT COMPLICATION: ICD-10-CM

## 2022-06-23 DIAGNOSIS — Z78.0 POSTMENOPAUSAL STATE: ICD-10-CM

## 2022-06-23 DIAGNOSIS — Z01.419 WELL WOMAN EXAM: Primary | ICD-10-CM

## 2022-06-23 DIAGNOSIS — R23.3 PETECHIAE: ICD-10-CM

## 2022-06-23 DIAGNOSIS — Z12.31 ENCOUNTER FOR SCREENING MAMMOGRAM FOR BREAST CANCER: ICD-10-CM

## 2022-06-23 DIAGNOSIS — E78.00 PURE HYPERCHOLESTEROLEMIA: ICD-10-CM

## 2022-06-23 DIAGNOSIS — K21.00 GASTROESOPHAGEAL REFLUX DISEASE WITH ESOPHAGITIS WITHOUT HEMORRHAGE: ICD-10-CM

## 2022-06-23 DIAGNOSIS — I10 PRIMARY HYPERTENSION: ICD-10-CM

## 2022-06-23 DIAGNOSIS — Z12.11 COLON CANCER SCREENING: ICD-10-CM

## 2022-06-23 DIAGNOSIS — E11.65 TYPE 2 DIABETES MELLITUS WITH HYPERGLYCEMIA, WITH LONG-TERM CURRENT USE OF INSULIN: ICD-10-CM

## 2022-06-23 DIAGNOSIS — Z11.59 ENCOUNTER FOR SCREENING FOR OTHER VIRAL DISEASES: ICD-10-CM

## 2022-06-23 DIAGNOSIS — Z23 ENCOUNTER FOR IMMUNIZATION: ICD-10-CM

## 2022-06-23 DIAGNOSIS — M05.10 RHEUMATOID LUNG DISEASE WITH RHEUMATOID ARTHRITIS: ICD-10-CM

## 2022-06-23 DIAGNOSIS — Z79.4 TYPE 2 DIABETES MELLITUS WITH HYPERGLYCEMIA, WITH LONG-TERM CURRENT USE OF INSULIN: ICD-10-CM

## 2022-06-23 DIAGNOSIS — H61.21 IMPACTED CERUMEN OF RIGHT EAR: ICD-10-CM

## 2022-06-23 DIAGNOSIS — I50.32 CHRONIC DIASTOLIC (CONGESTIVE) HEART FAILURE: ICD-10-CM

## 2022-06-23 LAB
BILIRUB UR QL STRIP: NEGATIVE
CLARITY UR: ABNORMAL
COLOR UR: YELLOW
GLUCOSE UR STRIP-MCNC: ABNORMAL MG/DL
HGB UR QL STRIP.AUTO: NEGATIVE
KETONES UR QL STRIP: NEGATIVE
LEUKOCYTE ESTERASE UR QL STRIP.AUTO: NEGATIVE
NITRITE UR QL STRIP: NEGATIVE
PH UR STRIP.AUTO: <=5 [PH] (ref 5–8)
PROT UR QL STRIP: NEGATIVE
SP GR UR STRIP: 1.02 (ref 1–1.03)
UROBILINOGEN UR QL STRIP: ABNORMAL

## 2022-06-23 PROCEDURE — 93000 ELECTROCARDIOGRAM COMPLETE: CPT | Performed by: FAMILY MEDICINE

## 2022-06-23 PROCEDURE — 99214 OFFICE O/P EST MOD 30 MIN: CPT | Performed by: FAMILY MEDICINE

## 2022-06-23 PROCEDURE — 99396 PREV VISIT EST AGE 40-64: CPT | Performed by: FAMILY MEDICINE

## 2022-06-23 PROCEDURE — 81003 URINALYSIS AUTO W/O SCOPE: CPT | Performed by: FAMILY MEDICINE

## 2022-06-23 RX ORDER — HYDROCHLOROTHIAZIDE 25 MG/1
25 TABLET ORAL DAILY
Qty: 90 TABLET | Refills: 1 | Status: SHIPPED | OUTPATIENT
Start: 2022-06-23

## 2022-06-23 NOTE — PROGRESS NOTES
Nory Rodriguez presents to [facility].    Chief Complaint: Well woman exam    Subjective       History of Present Illness:  HPI    With regard to the encounter for gynecological examination (general) (routine), her last physical exam was 1 year ago.  She is status-post hysterectomy.  She is not currently using any form of contraception.  She performs breast self-exams monthly.   Her last Pap smear was 2 years ago.   Her last mammogram was <1 year ago.   Her last DEXA was <1 year ago.   She underwent colonoscopy 3 years ago.   She's had vision screening done in 2021 and this was normal.   She has never had a hearing test. A treadmill stress test has never been performed. A chest x-ray was done in December 2020.   Preventative Health updated today.  She is current with her pneumococcal COVID and influenza immunization.  Cherelle denies any history of abnormal Pap smears.  Tobacco: She has never smoked.        She complains today of new onset of lower extremity edema.  She had an echo back in 2012 that showed diastolic dysfunction with normal systolic function and normal ejection fraction.  EKG performed today overall looked fine.  She is morbidly obese and this could be due to sodium intake and weight.  I will increase her HCTZ to 25 mg daily and set up a repeat echo on her heart.    Cerumen impaction R ear    Petechiae arms B, her blood work-up was positive for rheumatoid arthritis and she has an appointment with her rheumatologist but not until March 2023.  I will try to reschedule her with her previous rheumatologist at a sooner date      With regard to the essential (primary) hypertension, her current cardiac medication regimen includes a diuretic ( hctz ), an ACE inhibitor ( lisinopril 2.5 mg for her diabetes ), and a calcium channel blocker ( diltiazem ).  Compliance with treatment has been good; she takes her medication as directed, maintains her diet and exercise regimen, and  follows up as directed.  She is tolerating the medication well without side effects.            In regard to the type 2 diabetes mellitus with diabetic polyneuropathy, specifically, this is type 2, insulin requiring diabetes, complicated by peripheral neuropathy.  Compliance with treatment has been poor; she does not follow a diet and exercise regimen.      Tobacco screen: Non-smoker.  Current meds include an oral hypoglycemic ( Glucophage ), insulin/injectable ( Humalog- about 8 units with meals, SSI and carb counting; Trulicity; toujeo-100 units bid ), an ACE inhibitor, aspirin, and a lipid lowering agent.  She reports home blood glucose readings have been high, with average fasting readings in the mid-200s mg/dL range. She checks her glucose 3 to 4 times daily.  Most recent lab results include Hemoglobin A1c:  7.6 (%) (07/07/2020), LDL:  53 (mg/dL) (07/07/2020), HDL:  31 (mg/dL) (07/07/2020), Triglycerides:  275 (mg/dL) (07/07/2020), Microalbuminuria:  16.1 (mg/g creat) (07/07/2020), Microalbumin, Urine, rand:  <12.0 (mg/L) (07/07/2020).  Has not fallen recently In regard to preventative care, she performs foot self-exams several days per week and her last ophthalmology exam was in 7/2020-Dr Hernández- NO DIABETIC RETINOPATHY, she has cataracts.  SHE HAS LOST 2 MORE #'S, SHE HAS BEEN GIVEN THE CLEAR TO USE THE TREADMILL AGAIN, she has a  fungal infection.     Review of Systems:  Review of Systems   Constitutional: Negative for chills, fatigue and fever.   HENT: Negative for ear pain, sinus pressure and sore throat.    Eyes: Negative for blurred vision and double vision.   Respiratory: Negative for cough, shortness of breath and wheezing.    Cardiovascular: Negative for chest pain and palpitations.   Gastrointestinal: Negative for abdominal pain, blood in stool, constipation, diarrhea, nausea and vomiting.   Skin: Negative for rash.   Neurological: Negative for dizziness and headache.   Psychiatric/Behavioral:  Negative for depressed mood.        Objective   Medical History:  Past Medical History:   • Accessory skin tags    CONGENITAL   • Acute frontal sinusitis, unspecified   • Acute upper respiratory infection, unspecified   • Acute vaginitis   • Allergic rhinitis, unspecified   • Breast lump   • Calculus of kidney   • Carrier of, hepatitis viral (HCC)   • Cellulitis of chest wall   • Chronic back pain   • Chronic hoarseness   • COVID-19   • Dietary counseling and surveillance   • Dizziness and giddiness   • Effusion, left knee   • Effusion, right knee   • GERD without esophagitis   • Hepatitis A   • Hereditary and idiopathic neuropathy, unspecified   • History of depression   • Hyperlipidemia   • Hypertension   • Kidney stone   • Localized swelling, mass and lump, right lower limb   • Low back pain   • Methicillin resistant Staphylococcus aureus infection as the cause of diseases classified elsewhere   • Migraine without aura, not intractable, without status migrainosus   • Moderate persistent asthma with (acute) exacerbation   • Morbid obesity due to excess calories (HCC)   • Nausea   • Neuropathy   • Osteoporosis   • Pain in joints of right hand   • Pain in left hip   • Pain in left leg   • Pain in right shoulder   • Paresthesia of skin   • Peripheral neuropathy   • Personal history of other venous thrombosis and embolism   • Post-menopausal   • Pressure ulcer of other site, stage 1   • Primary insomnia   • Pure hypercholesterolemia, unspecified   • 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)   • 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 (HCC)   • Shortness of breath   • Sleep apnea   • Sleep apnea, unspecified   • Tinea unguium   • Type 2 diabetes mellitus with diabetic polyneuropathy (HCC)   • Uterine polyp   • Viral hepatitis   • Vitamin D deficiency, unspecified     Past Surgical History:   • CARDIAC CATHETERIZATION   • CATARACT EXTRACTION, BILATERAL   • CHOLECYSTECTOMY   •  HYSTERECTOMY    TAHBSO   • PERICARDIAL WINDOW      Family History   Problem Relation Age of Onset   • Hyperlipidemia Mother    • Endometrial cancer Mother    • Coronary artery disease Father    • Stroke Father    • Diabetes type II Brother    • Breast cancer Maternal Grandmother    • Alzheimer's disease Maternal Grandmother    • Diabetes type II Maternal Grandfather    • Diabetes type II Paternal Grandfather      Social History     Tobacco Use   • Smoking status: Never Smoker   • Smokeless tobacco: Never Used   Substance Use Topics   • Alcohol use: Not Currently       Health Maintenance Due   Topic Date Due   • ZOSTER VACCINE (1 of 2) Never done   • Pneumococcal Vaccine 0-64 (2 - PCV) 11/29/2017   • HEPATITIS C SCREENING  Never done   • COVID-19 Vaccine (4 - Booster for Pfizer series) 03/08/2022        Immunization History   Administered Date(s) Administered   • COVID-19 (PFIZER) PURPLE CAP 03/30/2021, 04/20/2021, 11/08/2021   • FluLaval/Fluarix/Fluzone >6 10/13/2021   • Influenza, Unspecified 10/03/2020   • Pneumococcal Polysaccharide (PPSV23) 11/29/2016   • Tdap 09/29/2015       Allergies   Allergen Reactions   • Asa [Aspirin] Anaphylaxis   • Ciprofloxacin Anaphylaxis   • Levemir [Insulin Detemir] GI Intolerance   • Nitroglycerin Hives   • Sumatriptan Dizziness   • Cat Hair Extract Rash   • Codeine Palpitations   • Diclofenac Swelling        Medications:  Current Outpatient Medications on File Prior to Visit   Medication Sig   • acetaminophen (TYLENOL) 325 MG tablet Take 2 tablets by mouth Every 4 (Four) Hours As Needed for Mild Pain .   • albuterol (PROVENTIL) (2.5 MG/3ML) 0.083% nebulizer solution Take 2.5 mg by nebulization Every 4 (Four) Hours As Needed for Wheezing.   • albuterol sulfate  (90 Base) MCG/ACT inhaler Inhale 2 puffs Every 4 (Four) Hours As Needed for Wheezing.   • alendronate (FOSAMAX) 70 MG tablet TAKE 1 TABLET BY MOUTH ONCE WEEKLY ON AN EMPTY STOMACH BEFORE BREAKFAST. REMAIN UPRIGHT FOR  30 MINUTES AND TAKE WITH 8 OUNCES OF WATER   • atorvastatin (LIPITOR) 10 MG tablet TAKE ONE TABLET BY MOUTH ONCE NIGHTLY   • B-D ULTRAFINE III SHORT PEN 31G X 8 MM misc USE 4 TIMES A DAY WITH     HUMALOG   • budesonide-formoterol (SYMBICORT) 160-4.5 MCG/ACT inhaler Inhale 2 puffs 2 (Two) Times a Day.   • cetirizine (zyrTEC) 10 MG tablet Take 10 mg by mouth Daily.   • Cholecalciferol (vitamin D3) 125 MCG (5000 UT) capsule capsule Take 5,000 Units by mouth Daily.   • dilTIAZem CD (CARDIZEM CD) 120 MG 24 hr capsule TAKE ONE CAPSULE BY MOUTH DAILY   • Dulaglutide (Trulicity) 1.5 MG/0.5ML solution pen-injector Inject 3.5 mg under the skin into the appropriate area as directed. Every sunday   • Empagliflozin (JARDIANCE PO) Take 10 mg by mouth Daily.   • EPINEPHrine (EPIPEN) 0.3 MG/0.3ML solution auto-injector injection As Needed.   • estradiol (ESTRACE) 0.5 MG tablet Take 1 tablet by mouth Daily.   • Insulin Glargine, 2 Unit Dial, (Toujeo Max SoloStar) 300 UNIT/ML solution pen-injector injection Inject 100 units SQ in the am and 80 units SQ in the pm   • insulin lispro (humaLOG) 100 UNIT/ML injection Inject  under the skin into the appropriate area as directed 3 (Three) Times a Day Before Meals. Sliding scale   • lisinopril (PRINIVIL,ZESTRIL) 2.5 MG tablet TAKE ONE TABLET BY MOUTH DAILY   • metFORMIN (GLUCOPHAGE) 500 MG tablet Take 1 tablet by mouth 2 (Two) Times a Day With Meals.   • montelukast (SINGULAIR) 10 MG tablet Take 1 tablet by mouth Every Evening.   • omeprazole (priLOSEC) 40 MG capsule Take 1 capsule by mouth Daily. Indications: Esophagus Inflammation with Erosion, Heartburn   • promethazine (PHENERGAN) 25 MG tablet Take 25 mg by mouth Every 6 (Six) Hours As Needed for Nausea or Vomiting.   • topiramate (TOPAMAX) 100 MG tablet Take 1 tablet by mouth 2 (Two) Times a Day.   • traZODone (DESYREL) 100 MG tablet TAKE ONE TABLET BY MOUTH ONCE NIGHTLY   • [DISCONTINUED] hydroCHLOROthiazide (HYDRODIURIL) 12.5 MG  "tablet Take 12.5 mg by mouth Daily.   • [DISCONTINUED] naproxen sodium (ALEVE) 220 MG tablet Take 220 mg by mouth 2 (Two) Times a Day As Needed.     No current facility-administered medications on file prior to visit.       Vital Signs:   /79   Pulse 76   Ht 162.6 cm (64\")   Wt 96.2 kg (212 lb)   SpO2 97%   BMI 36.39 kg/m²       Physical Exam:  Physical Exam  Exam conducted with a chaperone present.   Constitutional:       General: She is not in acute distress.     Appearance: She is normal weight. She is not ill-appearing.   HENT:      Head: Normocephalic.      Right Ear: Tympanic membrane normal. There is impacted cerumen.      Left Ear: Tympanic membrane normal. There is no impacted cerumen.      Mouth/Throat:      Mouth: Mucous membranes are moist.      Pharynx: Oropharynx is clear.   Eyes:      Extraocular Movements: Extraocular movements intact.      Pupils: Pupils are equal, round, and reactive to light.   Neck:      Vascular: No carotid bruit.   Cardiovascular:      Rate and Rhythm: Normal rate and regular rhythm.      Pulses: Normal pulses.           Dorsalis pedis pulses are 2+ on the right side and 2+ on the left side.      Heart sounds: No murmur heard.  Pulmonary:      Effort: Pulmonary effort is normal.      Breath sounds: No wheezing, rhonchi or rales.   Chest:   Breasts:      Jorge Score is 5.      Right: Normal. No axillary adenopathy.      Left: Normal. No axillary adenopathy.       Abdominal:      General: Bowel sounds are normal.      Palpations: Abdomen is soft.      Tenderness: There is no abdominal tenderness.   Genitourinary:     General: Normal vulva.      Vagina: Normal.      Uterus: Normal.       Adnexa: Right adnexa normal and left adnexa normal.      Rectum: Normal.      Comments: Status post hysterectomy for those results  Musculoskeletal:         General: No swelling. Normal range of motion.      Cervical back: No rigidity or tenderness.   Feet:      Right foot:      " Protective Sensation: 7 sites tested. 7 sites sensed.      Skin integrity: Skin integrity normal. No ulcer or blister.      Toenail Condition: Right toenails are normal.      Left foot:      Protective Sensation: 7 sites tested. 7 sites sensed.      Skin integrity: Skin integrity normal. No ulcer or blister.      Toenail Condition: Left toenails are normal.      Comments: Diabetic Foot Exam Performed and Monofilament Test Performed     Lymphadenopathy:      Cervical: No cervical adenopathy.      Upper Body:      Right upper body: No axillary adenopathy.      Left upper body: No axillary adenopathy.   Skin:     General: Skin is warm and dry.      Comments: Petechia forearms bilaterally and mild mid chest   Neurological:      Mental Status: She is alert.      Gait: Gait normal.   Psychiatric:         Mood and Affect: Mood normal.         Behavior: Behavior normal.         Judgment: Judgment normal.         Result Review      The following data was reviewed by Eileen Barajas MD on 06/23/2022.  Lab Results   Component Value Date    WBC 8.10 05/09/2022    HGB 12.8 05/09/2022    HCT 39.4 05/09/2022    MCV 97.8 (H) 05/09/2022     05/09/2022     Lab Results   Component Value Date    GLUCOSE 126 (H) 05/09/2022    BUN 15 05/09/2022    CREATININE 0.67 05/09/2022    EGFRIFNONA 69 11/18/2021    BCR 22.4 05/09/2022    K 4.3 05/09/2022    CO2 25.1 05/09/2022    CALCIUM 9.2 05/09/2022    ALBUMIN 3.90 05/09/2022    LABIL2 1.1 (L) 05/03/2021    AST 21 05/09/2022    ALT 18 05/09/2022     Lab Results   Component Value Date    CHOL 149 08/09/2021    CHLPL 177 05/03/2021    TRIG 161 (H) 08/09/2021    HDL 41 08/09/2021    LDL 80 08/09/2021     Lab Results   Component Value Date    TSH 1.620 05/09/2022     Lab Results   Component Value Date    HGBA1C 7.40 (H) 03/16/2022     No results found for: PSA             ECG 12 Lead    Date/Time: 6/23/2022 4:06 PM  Performed by: Eileen Barajas MD  Authorized by: Eileen Barajas  MD Gael   Comparison: not compared with previous ECG   Rhythm: sinus rhythm  Rate: normal  Conduction: right bundle branch block  ST Segments: ST segments normal  T inversion: V1  QRS axis: normal  Other: no other findings    Clinical impression: non-specific ECG                Assessment and Plan:          Diagnoses and all orders for this visit:    1. Well woman exam (Primary)  Comments:  Mammogram/colonoscopy/DEXA up-to-date.  Recommend COVID booster, pneumonia 20, and Shingrix vaccines.  Pap performed today  Orders:  -     Urinalysis With Culture If Indicated -; Future  -     Urinalysis With Culture If Indicated - Urine, Clean Catch    2. Primary hypertension  Comments:  Stable and well-controlled.  No changes are needed in current medications or treatment plan.  Avoid salt intake    3. Type 2 diabetes mellitus with hyperglycemia, with long-term current use of insulin (HCC)  Comments:  Very well controlled, I am very proud of her explanation point    4. Gastroesophageal reflux disease with esophagitis without hemorrhage    5. Pure hypercholesterolemia  Comments:  Stable and well-controlled on a statin, she tolerates meds well.    6. Postmenopausal state  Comments:  DEXA is up-to-date    7. Mild intermittent asthma without complication  Comments:  Stable and well-controlled with current medication.  No changes are needed in current medication or treatment plan.    8. 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 Burgos.    9. Encounter for screening mammogram for breast cancer  Comments:  Mammogram is up-to-date and within normal limits    10. Encounter for immunization    11. Colon cancer screening  Comments:  Colonoscopy is up-to-date    12. Lower extremity edema  Comments:  EKG normal sinus rhythm with right bundle branch block, previous echo reviewed and good ejection fraction  Orders:  -     ECG 12  Lead  -     hydroCHLOROthiazide (HYDRODIURIL) 25 MG tablet; Take 1 tablet by mouth Daily.  Dispense: 90 tablet; Refill: 1    13. Petechiae  Comments:  Awaiting eval with dermatology for biopsy.  Petechial rash is very unusual given on upper extremity only from elbow down and mid chest.  No fever/myalgia    14. Impacted cerumen of right ear  Comments:  Status post irrigation of the right ear.  Patient tolerated well    15. Chronic diastolic (congestive) heart failure (HCC)  Comments:  We will repeat echo  Orders:  -     Adult Transthoracic Echo Complete W/ Cont if Necessary Per Protocol; Future    16. Encounter for screening for other viral diseases  -     Hepatitis C antibody; Future          Follow Up   Return in about 3 months (around 9/23/2022) for Recheck.

## 2022-07-14 ENCOUNTER — TELEPHONE (OUTPATIENT)
Dept: FAMILY MEDICINE CLINIC | Age: 57
End: 2022-07-14

## 2022-07-14 NOTE — TELEPHONE ENCOUNTER
----- Message from Renetta Jose LPN sent at 7/5/2022  8:51 AM EDT -----      ----- Message -----  From: SYSTEM  Sent: 7/3/2022   1:31 AM EDT  To: [facility] [place] [facility] [place]

## 2022-07-25 ENCOUNTER — HOSPITAL ENCOUNTER (OUTPATIENT)
Dept: GENERAL RADIOLOGY | Facility: HOSPITAL | Age: 57
Discharge: HOME | End: 2022-07-25
Admitting: PHYSICIAN ASSISTANT

## 2022-07-25 ENCOUNTER — OFFICE VISIT (OUTPATIENT)
Dept: FAMILY MEDICINE CLINIC | Age: 57
End: 2022-07-25

## 2022-07-25 VITALS
DIASTOLIC BLOOD PRESSURE: 82 MMHG | SYSTOLIC BLOOD PRESSURE: 119 MMHG | WEIGHT: 195 LBS | BODY MASS INDEX: 33.29 KG/M2 | HEIGHT: 64 IN | OXYGEN SATURATION: 94 % | TEMPERATURE: 99.2 F | HEART RATE: 92 BPM

## 2022-07-25 DIAGNOSIS — R05.9 COUGH: ICD-10-CM

## 2022-07-25 DIAGNOSIS — U07.1 POSITIVE SELF-ADMINISTERED ANTIGEN TEST FOR COVID-19: Primary | ICD-10-CM

## 2022-07-25 DIAGNOSIS — R06.02 SHORTNESS OF BREATH: ICD-10-CM

## 2022-07-25 DIAGNOSIS — J45.901 EXACERBATION OF ASTHMA, UNSPECIFIED ASTHMA SEVERITY, UNSPECIFIED WHETHER PERSISTENT: ICD-10-CM

## 2022-07-25 PROCEDURE — 99214 OFFICE O/P EST MOD 30 MIN: CPT | Performed by: PHYSICIAN ASSISTANT

## 2022-07-25 PROCEDURE — 71046 X-RAY EXAM CHEST 2 VIEWS: CPT

## 2022-07-25 PROCEDURE — 96372 THER/PROPH/DIAG INJ SC/IM: CPT | Performed by: PHYSICIAN ASSISTANT

## 2022-07-25 RX ORDER — PREDNISONE 20 MG/1
40 TABLET ORAL DAILY
Qty: 10 TABLET | Refills: 0 | Status: SHIPPED | OUTPATIENT
Start: 2022-07-25 | End: 2022-07-30

## 2022-07-25 RX ORDER — TRIAMCINOLONE ACETONIDE 40 MG/ML
60 INJECTION, SUSPENSION INTRA-ARTICULAR; INTRAMUSCULAR ONCE
Status: COMPLETED | OUTPATIENT
Start: 2022-07-25 | End: 2022-07-25

## 2022-07-25 RX ADMIN — TRIAMCINOLONE ACETONIDE 60 MG: 40 INJECTION, SUSPENSION INTRA-ARTICULAR; INTRAMUSCULAR at 14:09

## 2022-07-25 NOTE — PROGRESS NOTES
Subjective     CHIEF COMPLAINT    Chief Complaint   Patient presents with   • Cough     Cough, congestion, Covid + 07.24.22, SOA            This is a 57-year-old female presenting to the clinic complaining of cough, congestion, shortness of breath since yesterday.  She had COVID-19 previously and needed to be hospitalized and was diagnosed with pneumonia so she is very concerned.  She states her shortness of breath feels better when she is able to walk around or when she blows cool air in her face or put ice on her chest and neck.  She has been monitoring her oxygen levels at home and states it ranges from 91% to 95%.  She has been using her inhalers as directed.  Additionally, she had some leftover amoxicillin and has taken 3 doses of 875 mg since yesterday.            Review of Systems   Constitutional: Negative for chills, fatigue and fever.   HENT: Positive for congestion. Negative for rhinorrhea and sore throat.    Respiratory: Positive for cough, shortness of breath and wheezing.    Cardiovascular: Negative for chest pain.   Gastrointestinal: Negative for abdominal pain, diarrhea, nausea and vomiting.   Musculoskeletal: Negative for myalgias.   Skin: Negative for rash.   Neurological: Positive for headaches.            Past Medical History:   Diagnosis Date   • Accessory skin tags     CONGENITAL   • Acute frontal sinusitis, unspecified    • Acute upper respiratory infection, unspecified    • Acute vaginitis    • Allergic rhinitis, unspecified    • Breast lump    • Calculus of kidney    • Carrier of, hepatitis viral (HCC)    • Cellulitis of chest wall    • Chronic back pain    • Chronic hoarseness    • COVID-19    • Dietary counseling and surveillance    • Dizziness and giddiness    • Effusion, left knee    • Effusion, right knee    • GERD without esophagitis    • Hepatitis A    • Hereditary and idiopathic neuropathy, unspecified    • History of depression    • Hyperlipidemia    • Hypertension    • Kidney stone     • Localized swelling, mass and lump, right lower limb    • Low back pain    • Methicillin resistant Staphylococcus aureus infection as the cause of diseases classified elsewhere    • Migraine without aura, not intractable, without status migrainosus    • Moderate persistent asthma with (acute) exacerbation    • Morbid obesity due to excess calories ([facility])    • Nausea    • Neuropathy    • Osteoporosis    • Pain in joints of right hand    • Pain in left hip    • Pain in left leg    • Pain in right shoulder    • Paresthesia of skin    • Peripheral neuropathy    • Personal history of other venous thrombosis and embolism    • Post-menopausal    • Pressure ulcer of other site, stage 1    • Primary insomnia    • Pure hypercholesterolemia, unspecified    • 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[facility])    • 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 ([facility])    • Shortness of breath    • Sleep apnea    • Sleep apnea, unspecified    • Tinea unguium    • Type 2 diabetes mellitus with diabetic polyneuropathy ([facility])    • Uterine polyp    • Viral hepatitis    • Vitamin D deficiency, unspecified             Past Surgical History:   Procedure Laterality Date   • CARDIAC CATHETERIZATION  11/2011   • CATARACT EXTRACTION, BILATERAL  08/2018   • CHOLECYSTECTOMY     • HYSTERECTOMY      TAHBSO   • PERICARDIAL WINDOW  11/2011            Family History   Problem Relation Age of Onset   • Hyperlipidemia Mother    • Endometrial cancer Mother    • Coronary artery disease Father    • Stroke Father    • Diabetes type II Brother    • Breast cancer Maternal Grandmother    • Alzheimer's disease Maternal Grandmother    • Diabetes type II Maternal Grandfather    • Diabetes type II Paternal Grandfather             Social History     Socioeconomic History   • Marital status:    • Number of children: 1   Tobacco Use   • Smoking status: Never Smoker   • Smokeless tobacco: Never Used   Vaping Use   • Vaping Use:  Never used   Substance and Sexual Activity   • Alcohol use: Not Currently   • Drug use: Never   • Sexual activity: Defer            Allergies   Allergen Reactions   • Asa [Aspirin] Anaphylaxis   • Ciprofloxacin Anaphylaxis   • Levemir [Insulin Detemir] GI Intolerance   • Nitroglycerin Hives   • Sumatriptan Dizziness   • Cat Hair Extract Rash   • Codeine Palpitations   • Diclofenac Swelling            Current Outpatient Medications on File Prior to Visit   Medication Sig Dispense Refill   • albuterol (PROVENTIL) (2.5 MG/3ML) 0.083% nebulizer solution Take 2.5 mg by nebulization Every 4 (Four) Hours As Needed for Wheezing.     • albuterol sulfate  (90 Base) MCG/ACT inhaler Inhale 2 puffs Every 4 (Four) Hours As Needed for Wheezing.     • alendronate (FOSAMAX) 70 MG tablet TAKE 1 TABLET BY MOUTH ONCE WEEKLY ON AN EMPTY STOMACH BEFORE BREAKFAST. REMAIN UPRIGHT FOR 30 MINUTES AND TAKE WITH 8 OUNCES OF WATER 12 tablet 0   • atorvastatin (LIPITOR) 10 MG tablet TAKE ONE TABLET BY MOUTH ONCE NIGHTLY 90 tablet 0   • B-D ULTRAFINE III SHORT PEN 31G X 8 MM misc USE 4 TIMES A DAY WITH     HUMALOG 360 each 1   • budesonide-formoterol (SYMBICORT) 160-4.5 MCG/ACT inhaler Inhale 2 puffs 2 (Two) Times a Day.     • cetirizine (zyrTEC) 10 MG tablet Take 10 mg by mouth Daily.     • Cholecalciferol (vitamin D3) 125 MCG (5000 UT) capsule capsule Take 5,000 Units by mouth Daily.     • dilTIAZem CD (CARDIZEM CD) 120 MG 24 hr capsule TAKE ONE CAPSULE BY MOUTH DAILY 90 capsule 0   • Dulaglutide (Trulicity) 1.5 MG/0.5ML solution pen-injector Inject 3.5 mg under the skin into the appropriate area as directed. Every sunday     • Empagliflozin (JARDIANCE PO) Take 10 mg by mouth Daily.     • EPINEPHrine (EPIPEN) 0.3 MG/0.3ML solution auto-injector injection As Needed.     • estradiol (ESTRACE) 0.5 MG tablet Take 1 tablet by mouth Daily. 90 tablet 1   • hydroCHLOROthiazide (HYDRODIURIL) 25 MG tablet Take 1 tablet by mouth Daily. 90 tablet 1  "  • Insulin Glargine, 2 Unit Dial, (Toujeo Max SoloStar) 300 UNIT/ML solution pen-injector injection Inject 100 units SQ in the am and 80 units SQ in the pm 54 mL 1   • insulin lispro (humaLOG) 100 UNIT/ML injection Inject  under the skin into the appropriate area as directed As Needed. Sliding scale     • lisinopril (PRINIVIL,ZESTRIL) 2.5 MG tablet TAKE ONE TABLET BY MOUTH DAILY 90 tablet 0   • metFORMIN (GLUCOPHAGE) 500 MG tablet Take 1 tablet by mouth 2 (Two) Times a Day With Meals. 180 tablet 1   • montelukast (SINGULAIR) 10 MG tablet Take 1 tablet by mouth Every Evening. 90 tablet 1   • omeprazole (priLOSEC) 40 MG capsule Take 1 capsule by mouth Daily. Indications: Esophagus Inflammation with Erosion, Heartburn 90 capsule 0   • promethazine (PHENERGAN) 25 MG tablet Take 25 mg by mouth Every 6 (Six) Hours As Needed for Nausea or Vomiting.     • topiramate (TOPAMAX) 100 MG tablet Take 1 tablet by mouth 2 (Two) Times a Day. 180 tablet 0   • traZODone (DESYREL) 100 MG tablet TAKE ONE TABLET BY MOUTH ONCE NIGHTLY 90 tablet 0   • [DISCONTINUED] acetaminophen (TYLENOL) 325 MG tablet Take 2 tablets by mouth Every 4 (Four) Hours As Needed for Mild Pain .       No current facility-administered medications on file prior to visit.            /82 (BP Location: Right arm, Patient Position: Sitting)   Pulse 92   Temp 99.2 °F (37.3 °C) (Oral)   Ht 162.6 cm (64\")   Wt 88.5 kg (195 lb)   SpO2 94%   BMI 33.47 kg/m²          Objective     Physical Exam  Vitals and nursing note reviewed.   Constitutional:       General: She is not in acute distress.     Appearance: Normal appearance.   HENT:      Head: Normocephalic and atraumatic.   Cardiovascular:      Rate and Rhythm: Normal rate and regular rhythm.      Heart sounds: Normal heart sounds.   Pulmonary:      Effort: Pulmonary effort is normal.      Breath sounds: Wheezing and rhonchi present.      Comments: Difficulty speaking in full sentences without taking deep " breaths.   Skin:     General: Skin is warm and dry.   Neurological:      Mental Status: She is alert and oriented to person, place, and time.   Psychiatric:         Mood and Affect: Mood normal.         Behavior: Behavior normal.              Procedures                    Lab Results (last 24 hours)     ** No results found for the last 24 hours. **                XR Chest PA & Lateral    Result Date: 7/25/2022  PROCEDURE: XR CHEST PA AND LATERAL  COMPARISON: [facility] DEEPTI BECKMAN, CHEST PA/AP & LAT 2V, 9/29/2016, 16:40.  INDICATIONS: COUGH,SHORTNESS OF BREATH X 1 DAY--   TESTED COVID+ 7/24/2022  FINDINGS:  The lungs are clear bilaterally.  The cardiac and mediastinal silhouettes appear normal.  No effusion is seen.        1. No acute cardiopulmonary disease     Ben Gauthier M.D.       Electronically Signed and Approved By: Ben Gauthier M.D. on 7/25/2022 at 15:27                          Nory had pretty significant improvement in wheezing and rhonchi after kenalog injection. She is able to speak in full sentences now. Will treat with PO steroids and Paxlovid. She was instructed to contact the office or go to the ER if she has worsening symptoms.        Diagnoses and all orders for this visit:    1. Positive self-administered antigen test for COVID-19 (Primary)  -     Nirmatrelvir & Ritonavir (PAXLOVID) 20 x 150 MG & 10 x 100MG tablet therapy pack tablet; Take 3 tablets by mouth 2 (Two) Times a Day for 5 days. Do not take Lipitor, Trazodone, Symbicort Inhaler, or Estrace while taking Paxlovid.  Dispense: 30 tablet; Refill: 0    2. Cough  -     Cancel: XR Chest PA & Lateral; Future  -     triamcinolone acetonide (KENALOG-40) injection 60 mg  -     XR Chest PA & Lateral; Future    3. Shortness of breath  -     triamcinolone acetonide (KENALOG-40) injection 60 mg    4. Exacerbation of asthma, unspecified asthma severity, unspecified whether persistent  -     predniSONE (DELTASONE) 20 MG tablet; Take 2  tablets by mouth Daily for 5 days.  Dispense: 10 tablet; Refill: 0                           FOR FULL DISCHARGE INSTRUCTIONS/COMMENTS/HANDOUTS please see the AVS

## 2022-07-25 NOTE — PATIENT INSTRUCTIONS
I am prescribing a COVID-specific treatment called Paxlovid to help treat your infection and keep you out of the hospital. As we discussed, we need to hold some of your medications while you are taking it since it can have some serious interactions.  Stop taking Lipitor (atorvastatin), Trazodone, Symbicort, and Estrace. You may resume taking these medications 3 days after you finish the Paxlovid.  Keep a close eye on your blood sugar, blood pressure, and heart rate while taking Paxlovid. It interacts with some of your blood pressure and diabetes medications, but we do not need you to stop them. If you begin experiencing dizziness, light headedness, or other concerning symptoms please contact the office.    Your chest x-ray did not show pneumonia!    I have provided the CDC isolation guidelines for you as well:    IF YOU tested positive for COVID-19 or have symptoms, regardless of vaccination status: Stay home for at least 5 days and isolate from others in your home. Wear a well-fitting mask if you must be around others in your home.    Ending isolation if you had symptoms: End isolation after 5 full days if you are fever-free for 24 hours (without the use of fever-reducing medication) and your symptoms are improving.    Ending isolation if you did NOT have symptoms: End isolation after at least 5 full days after your positive test.    If you got very sick from COVID-19 or have a weakened immune system you should isolate for at least 10 days. Consult your doctor before ending isolation.    Take precautions until day 10  Wear a well-fitting mask for 10 full days any time you are around others inside your home or in public. Do not go to places where you are unable to wear a mask.    Do not travel until a full 10 days after your symptoms started or the date your positive test was taken if you had no symptoms.    Avoid being around people who are more likely to get very sick from COVID-19.

## 2022-07-28 DIAGNOSIS — I10 HYPERTENSION, UNSPECIFIED TYPE: ICD-10-CM

## 2022-07-28 DIAGNOSIS — E78.00 HYPERCHOLESTEROLEMIA: ICD-10-CM

## 2022-07-28 RX ORDER — DILTIAZEM HYDROCHLORIDE 120 MG/1
CAPSULE, COATED, EXTENDED RELEASE ORAL
Qty: 90 CAPSULE | Refills: 0 | Status: SHIPPED | OUTPATIENT
Start: 2022-07-28 | End: 2022-08-23

## 2022-07-28 RX ORDER — LISINOPRIL 2.5 MG/1
TABLET ORAL
Qty: 90 TABLET | Refills: 0 | Status: SHIPPED | OUTPATIENT
Start: 2022-07-28 | End: 2022-08-23

## 2022-07-28 RX ORDER — ATORVASTATIN CALCIUM 10 MG/1
TABLET, FILM COATED ORAL
Qty: 90 TABLET | Refills: 0 | Status: SHIPPED | OUTPATIENT
Start: 2022-07-28 | End: 2022-08-23

## 2022-08-02 RX ORDER — ALENDRONATE SODIUM 70 MG/1
TABLET ORAL
Qty: 12 TABLET | Refills: 0 | Status: SHIPPED | OUTPATIENT
Start: 2022-08-02 | End: 2022-08-23

## 2022-08-15 DIAGNOSIS — Z79.890 POSTMENOPAUSAL HORMONE THERAPY: ICD-10-CM

## 2022-08-15 RX ORDER — MONTELUKAST SODIUM 10 MG/1
TABLET ORAL
Qty: 90 TABLET | Refills: 1 | Status: SHIPPED | OUTPATIENT
Start: 2022-08-15 | End: 2023-02-09

## 2022-08-15 RX ORDER — ESTRADIOL 0.5 MG/1
TABLET ORAL
Qty: 90 TABLET | Refills: 1 | Status: SHIPPED | OUTPATIENT
Start: 2022-08-15 | End: 2023-02-09

## 2022-08-23 DIAGNOSIS — G43.009 MIGRAINE WITHOUT AURA AND WITHOUT STATUS MIGRAINOSUS, NOT INTRACTABLE: ICD-10-CM

## 2022-08-23 DIAGNOSIS — G47.00 INSOMNIA, UNSPECIFIED TYPE: ICD-10-CM

## 2022-08-23 DIAGNOSIS — K21.00 GASTROESOPHAGEAL REFLUX DISEASE WITH ESOPHAGITIS WITHOUT HEMORRHAGE: ICD-10-CM

## 2022-08-23 DIAGNOSIS — I10 HYPERTENSION, UNSPECIFIED TYPE: ICD-10-CM

## 2022-08-23 DIAGNOSIS — E78.00 HYPERCHOLESTEROLEMIA: ICD-10-CM

## 2022-08-23 RX ORDER — LISINOPRIL 2.5 MG/1
TABLET ORAL
Qty: 90 TABLET | Refills: 0 | Status: SHIPPED | OUTPATIENT
Start: 2022-08-23 | End: 2023-02-01

## 2022-08-23 RX ORDER — TOPIRAMATE 100 MG/1
TABLET, FILM COATED ORAL
Qty: 180 TABLET | Refills: 0 | Status: SHIPPED | OUTPATIENT
Start: 2022-08-23

## 2022-08-23 RX ORDER — OMEPRAZOLE 40 MG/1
CAPSULE, DELAYED RELEASE ORAL
Qty: 90 CAPSULE | Refills: 0 | Status: SHIPPED | OUTPATIENT
Start: 2022-08-23

## 2022-08-23 RX ORDER — ALENDRONATE SODIUM 70 MG/1
TABLET ORAL
Qty: 12 TABLET | Refills: 0 | Status: SHIPPED | OUTPATIENT
Start: 2022-08-23 | End: 2023-03-01 | Stop reason: SDUPTHER

## 2022-08-23 RX ORDER — ATORVASTATIN CALCIUM 10 MG/1
TABLET, FILM COATED ORAL
Qty: 90 TABLET | Refills: 0 | Status: SHIPPED | OUTPATIENT
Start: 2022-08-23

## 2022-08-23 RX ORDER — TRAZODONE HYDROCHLORIDE 100 MG/1
TABLET ORAL
Qty: 90 TABLET | Refills: 0 | Status: SHIPPED | OUTPATIENT
Start: 2022-08-23 | End: 2022-12-16

## 2022-08-23 RX ORDER — DILTIAZEM HYDROCHLORIDE 120 MG/1
CAPSULE, COATED, EXTENDED RELEASE ORAL
Qty: 90 CAPSULE | Refills: 0 | Status: SHIPPED | OUTPATIENT
Start: 2022-08-23 | End: 2023-01-16

## 2022-09-12 ENCOUNTER — TELEPHONE (OUTPATIENT)
Dept: FAMILY MEDICINE CLINIC | Age: 57
End: 2022-09-12

## 2022-09-12 NOTE — TELEPHONE ENCOUNTER
Caller: Nory Rodriguez    Relationship to patient: Self    Best call back number: [phone]    Chief complaint: 3 MONTH FOLLOW UP    Type of visit: OFFICE VISIT    Requested date: FIRST WEEK OF OCTOBER     Vital Signs Last 24 Hrs  T(C): 36.9, Max: 36.9 (02-16 @ 13:36)  T(F): 98.4, Max: 98.4 (02-16 @ 13:36)  HR: 101 (101 - 101)  BP: 138/97 (138/97 - 138/97)  BP(mean): --  RR: 18 (18 - 18)  SpO2: 100% (100% - 100%)      Gen: AAOx3, NAD  RLE: + Splint C/D/I;  + TTP around foot/ankle  Neg TTP Knee/Hip  Brisk Cap Refill < 2 sec  Decreased sensation to toes  Compartments Soft  + EHL/FHL, moving all toes

## 2022-10-06 ENCOUNTER — OFFICE VISIT (OUTPATIENT)
Dept: FAMILY MEDICINE CLINIC | Age: 57
End: 2022-10-06

## 2022-10-06 ENCOUNTER — TRANSCRIBE ORDERS (OUTPATIENT)
Dept: ADMINISTRATIVE | Facility: HOSPITAL | Age: 57
End: 2022-10-06

## 2022-10-06 ENCOUNTER — LAB (OUTPATIENT)
Dept: LAB | Facility: HOSPITAL | Age: 57
End: 2022-10-06

## 2022-10-06 VITALS
HEIGHT: 64 IN | TEMPERATURE: 98.8 F | WEIGHT: 206.6 LBS | OXYGEN SATURATION: 96 % | SYSTOLIC BLOOD PRESSURE: 108 MMHG | BODY MASS INDEX: 35.27 KG/M2 | DIASTOLIC BLOOD PRESSURE: 71 MMHG | HEART RATE: 75 BPM

## 2022-10-06 DIAGNOSIS — I10 PRIMARY HYPERTENSION: ICD-10-CM

## 2022-10-06 DIAGNOSIS — M19.011 PRIMARY OSTEOARTHRITIS OF BOTH SHOULDERS: ICD-10-CM

## 2022-10-06 DIAGNOSIS — E78.5 HYPERLIPIDEMIA, UNSPECIFIED HYPERLIPIDEMIA TYPE: Primary | ICD-10-CM

## 2022-10-06 DIAGNOSIS — M19.012 PRIMARY OSTEOARTHRITIS OF BOTH SHOULDERS: ICD-10-CM

## 2022-10-06 DIAGNOSIS — R23.3 PETECHIAE: ICD-10-CM

## 2022-10-06 DIAGNOSIS — M16.0 PRIMARY OSTEOARTHRITIS OF BOTH HIPS: ICD-10-CM

## 2022-10-06 DIAGNOSIS — E78.5 HYPERLIPIDEMIA, UNSPECIFIED HYPERLIPIDEMIA TYPE: ICD-10-CM

## 2022-10-06 DIAGNOSIS — Z23 NEED FOR VACCINATION: ICD-10-CM

## 2022-10-06 DIAGNOSIS — Z23 FLU VACCINE NEED: Primary | ICD-10-CM

## 2022-10-06 DIAGNOSIS — G25.0 ESSENTIAL TREMOR: ICD-10-CM

## 2022-10-06 DIAGNOSIS — M79.89 LEG SWELLING: ICD-10-CM

## 2022-10-06 DIAGNOSIS — L71.9 ROSACEA: ICD-10-CM

## 2022-10-06 DIAGNOSIS — R76.8 RHEUMATOID FACTOR POSITIVE: ICD-10-CM

## 2022-10-06 LAB
ALBUMIN SERPL-MCNC: 3.7 G/DL (ref 3.5–5.2)
ALBUMIN UR-MCNC: 1.5 MG/DL
ALBUMIN/GLOB SERPL: 1.7 G/DL
ALP SERPL-CCNC: 84 U/L (ref 39–117)
ALT SERPL W P-5'-P-CCNC: 15 U/L (ref 1–33)
ANION GAP SERPL CALCULATED.3IONS-SCNC: 9 MMOL/L (ref 5–15)
AST SERPL-CCNC: 14 U/L (ref 1–32)
BILIRUB SERPL-MCNC: 0.2 MG/DL (ref 0–1.2)
BUN SERPL-MCNC: 25 MG/DL (ref 6–20)
BUN/CREAT SERPL: 29.8 (ref 7–25)
CALCIUM SPEC-SCNC: 9.1 MG/DL (ref 8.6–10.5)
CHLORIDE SERPL-SCNC: 106 MMOL/L (ref 98–107)
CHOLEST SERPL-MCNC: 143 MG/DL (ref 0–200)
CO2 SERPL-SCNC: 26 MMOL/L (ref 22–29)
CREAT SERPL-MCNC: 0.84 MG/DL (ref 0.57–1)
CREAT UR-MCNC: 99.3 MG/DL
EGFRCR SERPLBLD CKD-EPI 2021: 80.2 ML/MIN/1.73
GLOBULIN UR ELPH-MCNC: 2.2 GM/DL
GLUCOSE SERPL-MCNC: 144 MG/DL (ref 65–99)
HBA1C MFR BLD: 7 % (ref 4.8–5.6)
HDLC SERPL-MCNC: 39 MG/DL (ref 40–60)
LDLC SERPL CALC-MCNC: 82 MG/DL (ref 0–100)
LDLC/HDLC SERPL: 2.05 {RATIO}
MICROALBUMIN/CREAT UR: 15.1 MG/G
POTASSIUM SERPL-SCNC: 4.1 MMOL/L (ref 3.5–5.2)
PROT SERPL-MCNC: 5.9 G/DL (ref 6–8.5)
SODIUM SERPL-SCNC: 141 MMOL/L (ref 136–145)
TRIGL SERPL-MCNC: 120 MG/DL (ref 0–150)
TSH SERPL DL<=0.05 MIU/L-ACNC: 2.48 UIU/ML (ref 0.27–4.2)
VLDLC SERPL-MCNC: 22 MG/DL (ref 5–40)

## 2022-10-06 PROCEDURE — 83036 HEMOGLOBIN GLYCOSYLATED A1C: CPT

## 2022-10-06 PROCEDURE — 82043 UR ALBUMIN QUANTITATIVE: CPT

## 2022-10-06 PROCEDURE — 0124A COVID-19 (PFIZER) BIVALENT BOOSTER 12+YRS: CPT | Performed by: FAMILY MEDICINE

## 2022-10-06 PROCEDURE — 80053 COMPREHEN METABOLIC PANEL: CPT

## 2022-10-06 PROCEDURE — 84443 ASSAY THYROID STIM HORMONE: CPT

## 2022-10-06 PROCEDURE — 90471 IMMUNIZATION ADMIN: CPT | Performed by: FAMILY MEDICINE

## 2022-10-06 PROCEDURE — 90686 IIV4 VACC NO PRSV 0.5 ML IM: CPT | Performed by: FAMILY MEDICINE

## 2022-10-06 PROCEDURE — 99214 OFFICE O/P EST MOD 30 MIN: CPT | Performed by: FAMILY MEDICINE

## 2022-10-06 PROCEDURE — 80061 LIPID PANEL: CPT

## 2022-10-06 PROCEDURE — 82570 ASSAY OF URINE CREATININE: CPT

## 2022-10-06 PROCEDURE — 91312 COVID-19 (PFIZER) BIVALENT BOOSTER 12+YRS: CPT | Performed by: FAMILY MEDICINE

## 2022-10-06 PROCEDURE — 36415 COLL VENOUS BLD VENIPUNCTURE: CPT

## 2022-10-06 RX ORDER — METRONIDAZOLE 10 MG/G
GEL TOPICAL DAILY
Qty: 60 G | Refills: 1 | Status: SHIPPED | OUTPATIENT
Start: 2022-10-06

## 2022-10-06 RX ORDER — AMANTADINE HYDROCHLORIDE 100 MG/1
100 TABLET ORAL DAILY
COMMUNITY
Start: 2022-09-06

## 2022-10-06 NOTE — PROGRESS NOTES
"Nory Rodriguez presents to [facility].    Chief Complaint:  Tremor, rash on arms    Subjective       History of Present Illness:  HPI     Petechiae arms B, her blood work-up was positive for rheumatoid arthritis and she has an appointment with her rheumatologist but not until March 2023.  I will try to reschedule her with her previous rheumatologist and she saw the dermatologist who told her that she has lost her fat level in her arms so she is bruising easily and that is all this is.  Her pain is  In her hips and shoulders    MRI hip and shoulders done last week with Dr Hardy and she has \"bad arthritis\"  But is stable and unchanged from the year before.  She was given a \"topical cream\" which is really helping her pain and on further investigation it was amantadine....    She had covid in July and since she had covid she has developed a tremor in her head and hands, at rest and family states it is very prominent.  No FH tremor or parkinsons.  Memory issues are present.  She is on amantadine by Dr Pretty and I am unsure of why.    She is concerned and L LE swelling and she is wearing her stockings and this isnt helping. Echo back in 2012 that showed diastolic dysfunction with normal systolic function and normal ejection fraction.  EKG performed today overall looked fine.  She is morbidly obese and this could be due to sodium intake and weight.  I increased her HCTZ to 25 mg daily without benefit.  She has PN pain and feels like something is stabbing her legs. 2 negative venous duplex's done in 2020 and 2022.  ABD/pelvic CT was neg for pathology.  She has underlying CHF.  S/p hysterectomy/oophretomy. R leg 35 cm and L leg 36 cm today    She only eats once a day.  She has lost 117#s in past 2 years.  She needs counseling on her diet.     She is not sleeping well, on melatonin and trazodone 100 mg.  She gets 4 hours of sleep at night off and on. She has sleep apnea and is off her " cpap-poor tolerance.     With regard to the essential (primary) hypertension, her current cardiac medication regimen includes a diuretic ( hctz ), an ACE inhibitor ( lisinopril 2.5 mg for her diabetes ), and a calcium channel blocker ( diltiazem ).  Compliance with treatment has been good; she takes her medication as directed, maintains her diet and exercise regimen, and follows up as directed.  She is tolerating the medication well without side effects.            In regard to the type 2 diabetes mellitus with diabetic polyneuropathy. Compliance with treatment has been poor; she does not follow a diet and exercise regimen.      Tobacco screen: Non-smoker.  Current meds include an oral hypoglycemic ( Glucophage ), jardiance and trulicity, insulin/injectable ( Humalog- about 8 units with meals which is once a day, SSI and carb counting; Trulicity; toujeo-100 units bid ), an ACE inhibitor, aspirin, and a lipid lowering agent.  She reports home blood glucose readings have been high, with average fasting readings in the mid-100s mg/dL range. She checks her glucose 3 to 4 times daily or with dexcom.  Has not fallen recently In regard to preventative care, she performs foot self-exams several days per week and her last ophthalmology exam was in 7/2020-Dr Hernández- NO DIABETIC RETINOPATHY, she has cataracts.  she has a  fungal infection. She sees [facility] endocrinology    Chronic allergies, stable on singulair and zyrtec and allergy shots    Chronic HAs       Review of Systems:  Review of Systems   Constitutional: Positive for fatigue. Negative for chills and fever.   HENT: Positive for congestion and rhinorrhea. Negative for ear pain, sinus pressure and sore throat.    Eyes: Negative for blurred vision and double vision.   Respiratory: Negative for cough, shortness of breath and wheezing.    Cardiovascular: Negative for chest pain and palpitations.   Gastrointestinal: Negative for abdominal pain, blood in stool,  constipation, diarrhea, nausea and vomiting.   Skin: Positive for rash.   Neurological: Negative for dizziness and headache.        Objective   Medical History:  Past Medical History:   • Accessory skin tags    CONGENITAL   • Acute frontal sinusitis, unspecified   • Acute upper respiratory infection, unspecified   • Acute vaginitis   • Allergic rhinitis, unspecified   • Breast lump   • Calculus of kidney   • Carrier of, hepatitis viral (HCC)   • Cellulitis of chest wall   • Chronic back pain   • Chronic hoarseness   • COVID-19   • Dietary counseling and surveillance   • Dizziness and giddiness   • Effusion, left knee   • Effusion, right knee   • GERD without esophagitis   • Hepatitis A   • Hereditary and idiopathic neuropathy, unspecified   • History of depression   • Hyperlipidemia   • Hypertension   • Kidney stone   • Localized swelling, mass and lump, right lower limb   • Low back pain   • Methicillin resistant Staphylococcus aureus infection as the cause of diseases classified elsewhere   • Migraine without aura, not intractable, without status migrainosus   • Moderate persistent asthma with (acute) exacerbation   • Morbid obesity due to excess calories (HCC)   • Nausea   • Neuropathy   • Osteoporosis   • Pain in joints of right hand   • Pain in left hip   • Pain in left leg   • Pain in right shoulder   • Paresthesia of skin   • Peripheral neuropathy   • Personal history of other venous thrombosis and embolism   • Post-menopausal   • Pressure ulcer of other site, stage 1   • Primary insomnia   • Pure hypercholesterolemia, unspecified   • 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)   • 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 (HCC)   • Shortness of breath   • Sleep apnea   • Sleep apnea, unspecified   • Tinea unguium   • Type 2 diabetes mellitus with diabetic polyneuropathy (HCC)   • Uterine polyp   • Viral hepatitis   • Vitamin D deficiency, unspecified     Past Surgical  History:   • CARDIAC CATHETERIZATION   • CATARACT EXTRACTION, BILATERAL   • CHOLECYSTECTOMY   • HYSTERECTOMY    TAHBSO   • PERICARDIAL WINDOW      Family History   Problem Relation Age of Onset   • Hyperlipidemia Mother    • Endometrial cancer Mother    • Coronary artery disease Father    • Stroke Father    • Diabetes type II Brother    • Breast cancer Maternal Grandmother    • Alzheimer's disease Maternal Grandmother    • Diabetes type II Maternal Grandfather    • Diabetes type II Paternal Grandfather      Social History     Tobacco Use   • Smoking status: Never Smoker   • Smokeless tobacco: Never Used   Substance Use Topics   • Alcohol use: Not Currently       Health Maintenance Due   Topic Date Due   • ZOSTER VACCINE (1 of 2) Never done   • Pneumococcal Vaccine 0-64 (2 - PCV) 11/29/2017   • HEPATITIS C SCREENING  Never done   • LIPID PANEL  08/09/2022        Immunization History   Administered Date(s) Administered   • COVID-19 (PFIZER) BIVALENT BOOSTER 12+YRS 10/06/2022   • COVID-19 (PFIZER) PURPLE CAP 03/30/2021, 04/20/2021, 11/08/2021   • FluLaval/Fluzone >6mos 10/13/2021, 10/06/2022   • Influenza, Unspecified 10/03/2020   • Pneumococcal Polysaccharide (PPSV23) 11/29/2016   • Tdap 09/29/2015       Allergies   Allergen Reactions   • Asa [Aspirin] Anaphylaxis   • Ciprofloxacin Anaphylaxis   • Levemir [Insulin Detemir] GI Intolerance   • Nitroglycerin Hives   • Sumatriptan Dizziness   • Sitagliptin GI Intolerance   • Cat Hair Extract Rash   • Codeine Palpitations   • Diclofenac Swelling        Medications:  Current Outpatient Medications on File Prior to Visit   Medication Sig   • albuterol (PROVENTIL) (2.5 MG/3ML) 0.083% nebulizer solution Take 2.5 mg by nebulization Every 4 (Four) Hours As Needed for Wheezing.   • albuterol sulfate  (90 Base) MCG/ACT inhaler Inhale 2 puffs Every 4 (Four) Hours As Needed for Wheezing.   • alendronate (FOSAMAX) 70 MG tablet TAKE 1 TABLET BY MOUTH ONCE WEEKLY ON AN EMPTY  STOMACH BEFORE BREAKFAST. REMAIN UPRIGHT FOR 30 MINUTES AND TAKE WITH 8 OUNCES OF WATER   • amantadine (SYMMETREL) 100 MG tablet 100 mg Daily.   • atorvastatin (LIPITOR) 10 MG tablet TAKE ONE TABLET BY MOUTH ONCE NIGHTLY   • B-D ULTRAFINE III SHORT PEN 31G X 8 MM misc USE 4 TIMES A DAY WITH     HUMALOG   • budesonide-formoterol (SYMBICORT) 160-4.5 MCG/ACT inhaler Inhale 2 puffs 2 (Two) Times a Day.   • cetirizine (zyrTEC) 10 MG tablet Take 10 mg by mouth Daily.   • Cholecalciferol (vitamin D3) 125 MCG (5000 UT) capsule capsule Take 5,000 Units by mouth Daily.   • dilTIAZem CD (CARDIZEM CD) 120 MG 24 hr capsule TAKE ONE CAPSULE BY MOUTH DAILY   • Dulaglutide (Trulicity) 1.5 MG/0.5ML solution pen-injector Inject 3.5 mg under the skin into the appropriate area as directed. Every sunday   • Empagliflozin (JARDIANCE PO) Take 10 mg by mouth Daily.   • EPINEPHrine (EPIPEN) 0.3 MG/0.3ML solution auto-injector injection As Needed.   • estradiol (ESTRACE) 0.5 MG tablet TAKE ONE TABLET BY MOUTH DAILY   • hydroCHLOROthiazide (HYDRODIURIL) 25 MG tablet Take 1 tablet by mouth Daily.   • Insulin Glargine, 2 Unit Dial, (Toujeo Max SoloStar) 300 UNIT/ML solution pen-injector injection Inject 100 units SQ in the am and 80 units SQ in the pm   • insulin lispro (humaLOG) 100 UNIT/ML injection Inject  under the skin into the appropriate area as directed As Needed. Sliding scale   • lisinopril (PRINIVIL,ZESTRIL) 2.5 MG tablet TAKE ONE TABLET BY MOUTH DAILY   • metFORMIN (GLUCOPHAGE) 500 MG tablet Take 1 tablet by mouth 2 (Two) Times a Day With Meals.   • montelukast (SINGULAIR) 10 MG tablet TAKE ONE TABLET BY MOUTH EVERY EVENING   • mupirocin (BACTROBAN) 2 % ointment As Needed.   • omeprazole (priLOSEC) 40 MG capsule TAKE ONE CAPSULE BY MOUTH DAILY   • promethazine (PHENERGAN) 25 MG tablet Take 25 mg by mouth Every 6 (Six) Hours As Needed for Nausea or Vomiting.   • topiramate (TOPAMAX) 100 MG tablet TAKE ONE TABLET BY MOUTH TWICE A DAY  "  • traZODone (DESYREL) 100 MG tablet TAKE ONE TABLET BY MOUTH ONCE NIGHTLY     No current facility-administered medications on file prior to visit.       Vital Signs:   /71 (BP Location: Right arm, Patient Position: Sitting, Cuff Size: Adult)   Pulse 75   Temp 98.8 °F (37.1 °C) (Oral)   Ht 162.6 cm (64\")   Wt 93.7 kg (206 lb 9.6 oz)   SpO2 96% Comment: Room air  BMI 35.46 kg/m²       Physical Exam:  Physical Exam  Vitals and nursing note reviewed.   Constitutional:       General: She is not in acute distress.     Appearance: Normal appearance. She is not ill-appearing, toxic-appearing or diaphoretic.   HENT:      Head: Normocephalic and atraumatic.      Right Ear: Tympanic membrane, ear canal and external ear normal.      Left Ear: Tympanic membrane, ear canal and external ear normal.      Nose: No congestion or rhinorrhea.      Mouth/Throat:      Mouth: Mucous membranes are moist.      Pharynx: Oropharynx is clear. No oropharyngeal exudate or posterior oropharyngeal erythema.   Eyes:      Extraocular Movements: Extraocular movements intact.      Conjunctiva/sclera: Conjunctivae normal.      Pupils: Pupils are equal, round, and reactive to light.   Cardiovascular:      Rate and Rhythm: Normal rate and regular rhythm.      Pulses:           Dorsalis pedis pulses are 2+ on the right side and 2+ on the left side.      Heart sounds: Normal heart sounds.   Pulmonary:      Effort: Pulmonary effort is normal.      Breath sounds: Normal breath sounds. No wheezing, rhonchi or rales.   Abdominal:      General: Abdomen is flat. Bowel sounds are normal.      Palpations: Abdomen is soft.   Musculoskeletal:      Cervical back: Neck supple. No rigidity.      Comments: L calf pain with palpation and L leg is larger than right leg     R leg 35 cm and L leg 36 cm today     Feet:      Right foot:      Protective Sensation: 7 sites tested. 7 sites sensed.      Skin integrity: Skin integrity normal. No ulcer or blister.     "  Toenail Condition: Right toenails are normal.      Left foot:      Protective Sensation: 7 sites tested. 7 sites sensed.      Skin integrity: Skin integrity normal. No ulcer or blister.      Toenail Condition: Left toenails are normal.      Comments: Diabetic Foot Exam Performed and Monofilament Test Performed     Lymphadenopathy:      Cervical: No cervical adenopathy.   Skin:     General: Skin is warm and dry.      Comments: Petechiae on forearms bilaterally    Rosacea on cheeks B   Neurological:      Mental Status: She is alert and oriented to person, place, and time.   Psychiatric:         Mood and Affect: Mood normal.         Behavior: Behavior normal.         Thought Content: Thought content normal.         Judgment: Judgment normal.         Result Review      The following data was reviewed by Eileen Barajas MD on 10/06/2022.  Lab Results   Component Value Date    WBC 8.10 05/09/2022    HGB 12.8 05/09/2022    HCT 39.4 05/09/2022    MCV 97.8 (H) 05/09/2022     05/09/2022     Lab Results   Component Value Date    GLUCOSE 126 (H) 05/09/2022    BUN 15 05/09/2022    CREATININE 0.67 05/09/2022    EGFRIFNONA 69 11/18/2021    BCR 22.4 05/09/2022    K 4.3 05/09/2022    CO2 25.1 05/09/2022    CALCIUM 9.2 05/09/2022    ALBUMIN 3.90 05/09/2022    LABIL2 1.1 (L) 05/03/2021    AST 21 05/09/2022    ALT 18 05/09/2022     Lab Results   Component Value Date    CHOL 149 08/09/2021    CHLPL 177 05/03/2021    TRIG 161 (H) 08/09/2021    HDL 41 08/09/2021    LDL 80 08/09/2021     Lab Results   Component Value Date    TSH 1.620 05/09/2022     Lab Results   Component Value Date    HGBA1C 7.40 (H) 03/16/2022     No results found for: PSA                    Assessment and Plan:          Diagnoses and all orders for this visit:    1. Flu vaccine need (Primary)  -     FluLaval/Fluzone >6 mos ([id number])    2. Leg swelling  Comments:  L LE only, sees orthopedics and using compression stockings, negative venous duplex x2,  severe OA hips B, needs vascular work-up for lymphedema/venous stasis  Orders:  -     Ambulatory Referral to Vascular Surgery    3. Petechiae  Comments:  Dermatology determined it was benign and simple bruising due to loss of fat layer in the forearms bilaterally    4. Rheumatoid factor positive  Comments:  Pending rheumatology appointment, referral in place    5. Essential tremor  Comments:  No SI/SX Parkinson's, will refer for further work-up with neurology to determine etiology and treatment options  Orders:  -     Ambulatory Referral to Neurology    6. Primary osteoarthritis of both hips  Comments:  On topical amantadine and I am unsure why.  It appears to be working well for her.  Continue follow-up with Dr. Flower as directed    7. Primary osteoarthritis of both shoulders    8. Rosacea  Comments:  We will start her on topical MetroGel  Orders:  -     metroNIDAZOLE (Metrogel) 1 % gel; Apply  topically to the appropriate area as directed Daily.  Dispense: 60 g; Refill: 1    9. Need for vaccination  -     COVID-19 Bivalent Booster (Pfizer) 12+yrs              Follow Up   Return if symptoms worsen or fail to improve.

## 2022-10-10 ENCOUNTER — TELEPHONE (OUTPATIENT)
Dept: FAMILY MEDICINE CLINIC | Age: 57
End: 2022-10-10

## 2022-10-10 NOTE — TELEPHONE ENCOUNTER
----- Message from Eileen Barajas MD sent at 10/6/2022  2:13 PM EDT -----  Call and inform pt the parkinson med amantadine was started by Dr Pretty and I think it is the topical form that she is using-Im not sure why he started her on this med but on chart review of her history there is no signs, diagnosis of Parkinson's.  Dr. Barajas

## 2022-10-10 NOTE — TELEPHONE ENCOUNTER
She called DR Pretty  and they are using it as a muscle relaxer for RA  She will address with RA  in feb and she will stop them if that is what  You recommend

## 2022-10-10 NOTE — TELEPHONE ENCOUNTER
It is fine for her to continue.  I just wanted her aware that she did not have an official Parkinson diagnosis.  Dr. Barajas

## 2022-10-19 ENCOUNTER — TELEPHONE (OUTPATIENT)
Dept: FAMILY MEDICINE CLINIC | Age: 57
End: 2022-10-19

## 2022-10-19 NOTE — TELEPHONE ENCOUNTER
Caller: Nory Rodriguez    Relationship to patient: Self    Best call back number:     Patient is needing: PATIENT IS REQUESTING THAT HER MEDICATION LIST, SURGERY HISTORY AND ALLERGY LIST BE SENT TO HER UPCOMING REFERRAL LOCATIONS.     NEUROLOGY--[phone]   PATIENT'S APPOINTMENT IS SCHEDULED FOR: 11/10/22  CARDIOLOGY FAX: [phone]  PATIENT'S APPOINTMENT IS SCHEDULED FOR  11/11/22      THANK YOU

## 2022-11-11 ENCOUNTER — OFFICE VISIT (OUTPATIENT)
Dept: VASCULAR SURGERY | Facility: HOSPITAL | Age: 57
End: 2022-11-11

## 2022-11-11 ENCOUNTER — TRANSCRIBE ORDERS (OUTPATIENT)
Dept: VASCULAR SURGERY | Facility: HOSPITAL | Age: 57
End: 2022-11-11

## 2022-11-11 VITALS
OXYGEN SATURATION: 97 % | RESPIRATION RATE: 16 BRPM | HEART RATE: 75 BPM | SYSTOLIC BLOOD PRESSURE: 102 MMHG | DIASTOLIC BLOOD PRESSURE: 60 MMHG | TEMPERATURE: 97.1 F

## 2022-11-11 DIAGNOSIS — I87.2 VENOUS (PERIPHERAL) INSUFFICIENCY: Primary | ICD-10-CM

## 2022-11-11 DIAGNOSIS — M79.662 PAIN AND SWELLING OF LEFT LOWER LEG: Primary | ICD-10-CM

## 2022-11-11 DIAGNOSIS — Z79.4 TYPE 2 DIABETES MELLITUS WITH HYPERGLYCEMIA, WITH LONG-TERM CURRENT USE OF INSULIN: ICD-10-CM

## 2022-11-11 DIAGNOSIS — M79.89 PAIN AND SWELLING OF LEFT LOWER LEG: Primary | ICD-10-CM

## 2022-11-11 DIAGNOSIS — E11.65 TYPE 2 DIABETES MELLITUS WITH HYPERGLYCEMIA, WITH LONG-TERM CURRENT USE OF INSULIN: ICD-10-CM

## 2022-11-11 PROCEDURE — 99203 OFFICE O/P NEW LOW 30 MIN: CPT | Performed by: NURSE PRACTITIONER

## 2022-11-11 PROCEDURE — G0463 HOSPITAL OUTPT CLINIC VISIT: HCPCS | Performed by: NURSE PRACTITIONER

## 2022-11-11 NOTE — PROGRESS NOTES
[facility] Vascular Surgery New Patient Office Note     Date of Encounter: 11/11/2022     MRN Number: [id number]  Name: Nory Rodriguez  Phone Number: [phone]     Referred By: Eileen Barajas MD  PCP: Eileen Barajas MD    Chief Complaint:    Chief Complaint   Patient presents with   • Leg Swelling     Patient is here as a new referral from PCP for left leg swelling. Patient states her PCP has had her tested before at [facility] and states she was told test was normal. Patient reports symptoms of dependent edema. No swelling to right leg.        Subjective      History of Present Illness:    Nory Rodriguez is a 57 y.o. female presents for left leg swelling as a referral from Dr. Barajas.  Ms. Rodriguez explains that she has left leg swelling from mid calf to ankle all the time unless she elevates above heart level.  It is not relieved when she sleeps in her bed or sitting in a chair with her feet up at waist level.  She also has compression stockings that she wears daily but does not relieve the swelling.  She has family history of varicose veins.  She is a type II diabetic with long-term insulin use, her last A1c was 7.0 on 10/6/2022. No history of DVT's in the lower extremities, she was told she had a DVT in her right arm several years ago as a result of a procedure.  She is not a smoker.  She is allergic to aspirin. She did state Dr. Hardy ordered a venous duplex several months ago to rule out a DVT that was negative.     Review of Systems:  ROS  Review of Systems   Constitutional: Negative.   HENT: Negative.    Cardiovascular:  Left lower extremity swelling.    Respiratory: Negative.    Skin: Negative.    Musculoskeletal: Negative.    Gastrointestinal: Negative.    Neurological: Negative.    Psychiatric/Behavioral: Negative.     I have reviewed the following portions of the patient's history: allergies, current medications, past family history, past medical history, past social  history, past surgical history and problem list and confirm it's accurate.    Allergies:  Allergies   Allergen Reactions   • Asa [Aspirin] Anaphylaxis   • Ciprofloxacin Anaphylaxis   • Levemir [Insulin Detemir] GI Intolerance   • Nitroglycerin Hives   • Sumatriptan Dizziness   • Sitagliptin GI Intolerance   • Cat Hair Extract Rash   • Codeine Palpitations   • Diclofenac Swelling       Medications:      Current Outpatient Medications:   •  albuterol (PROVENTIL) (2.5 MG/3ML) 0.083% nebulizer solution, Take 2.5 mg by nebulization Every 4 (Four) Hours As Needed for Wheezing., Disp: , Rfl:   •  albuterol sulfate  (90 Base) MCG/ACT inhaler, Inhale 2 puffs Every 4 (Four) Hours As Needed for Wheezing., Disp: , Rfl:   •  alendronate (FOSAMAX) 70 MG tablet, TAKE 1 TABLET BY MOUTH ONCE WEEKLY ON AN EMPTY STOMACH BEFORE BREAKFAST. REMAIN UPRIGHT FOR 30 MINUTES AND TAKE WITH 8 OUNCES OF WATER, Disp: 12 tablet, Rfl: 0  •  amantadine (SYMMETREL) 100 MG tablet, 100 mg Daily., Disp: , Rfl:   •  atorvastatin (LIPITOR) 10 MG tablet, TAKE ONE TABLET BY MOUTH ONCE NIGHTLY, Disp: 90 tablet, Rfl: 0  •  B-D ULTRAFINE III SHORT PEN 31G X 8 MM misc, USE 4 TIMES A DAY WITH     HUMALOG, Disp: 360 each, Rfl: 1  •  budesonide-formoterol (SYMBICORT) 160-4.5 MCG/ACT inhaler, Inhale 2 puffs 2 (Two) Times a Day., Disp: , Rfl:   •  cetirizine (zyrTEC) 10 MG tablet, Take 10 mg by mouth Daily., Disp: , Rfl:   •  Cholecalciferol (vitamin D3) 125 MCG (5000 UT) capsule capsule, Take 5,000 Units by mouth Daily., Disp: , Rfl:   •  dilTIAZem CD (CARDIZEM CD) 120 MG 24 hr capsule, TAKE ONE CAPSULE BY MOUTH DAILY, Disp: 90 capsule, Rfl: 0  •  Dulaglutide (Trulicity) 1.5 MG/0.5ML solution pen-injector, Inject 3.5 mg under the skin into the appropriate area as directed. Every sunday, Disp: , Rfl:   •  Empagliflozin (JARDIANCE PO), Take 10 mg by mouth Daily., Disp: , Rfl:   •  EPINEPHrine (EPIPEN) 0.3 MG/0.3ML solution auto-injector injection, As Needed.,  Disp: , Rfl:   •  estradiol (ESTRACE) 0.5 MG tablet, TAKE ONE TABLET BY MOUTH DAILY, Disp: 90 tablet, Rfl: 1  •  hydroCHLOROthiazide (HYDRODIURIL) 25 MG tablet, Take 1 tablet by mouth Daily., Disp: 90 tablet, Rfl: 1  •  Insulin Glargine, 2 Unit Dial, (Toujeo Max SoloStar) 300 UNIT/ML solution pen-injector injection, Inject 100 units SQ in the am and 80 units SQ in the pm, Disp: 54 mL, Rfl: 1  •  insulin lispro (humaLOG) 100 UNIT/ML injection, Inject  under the skin into the appropriate area as directed As Needed. Sliding scale, Disp: , Rfl:   •  lisinopril (PRINIVIL,ZESTRIL) 2.5 MG tablet, TAKE ONE TABLET BY MOUTH DAILY, Disp: 90 tablet, Rfl: 0  •  metFORMIN (GLUCOPHAGE) 500 MG tablet, Take 1 tablet by mouth 2 (Two) Times a Day With Meals., Disp: 180 tablet, Rfl: 1  •  metroNIDAZOLE (Metrogel) 1 % gel, Apply  topically to the appropriate area as directed Daily., Disp: 60 g, Rfl: 1  •  montelukast (SINGULAIR) 10 MG tablet, TAKE ONE TABLET BY MOUTH EVERY EVENING, Disp: 90 tablet, Rfl: 1  •  mupirocin (BACTROBAN) 2 % ointment, As Needed., Disp: , Rfl:   •  omeprazole (priLOSEC) 40 MG capsule, TAKE ONE CAPSULE BY MOUTH DAILY, Disp: 90 capsule, Rfl: 0  •  promethazine (PHENERGAN) 25 MG tablet, Take 25 mg by mouth Every 6 (Six) Hours As Needed for Nausea or Vomiting., Disp: , Rfl:   •  topiramate (TOPAMAX) 100 MG tablet, TAKE ONE TABLET BY MOUTH TWICE A DAY, Disp: 180 tablet, Rfl: 0  •  traZODone (DESYREL) 100 MG tablet, TAKE ONE TABLET BY MOUTH ONCE NIGHTLY, Disp: 90 tablet, Rfl: 0    History:   Past Medical History:   Diagnosis Date   • Accessory skin tags     CONGENITAL   • Acute frontal sinusitis, unspecified    • Acute upper respiratory infection, unspecified    • Acute vaginitis    • Allergic rhinitis, unspecified    • Breast lump    • Calculus of kidney    • Carrier of, hepatitis viral (HCC)    • Cellulitis of chest wall    • Chronic back pain    • Chronic hoarseness    • COVID-19    • Dietary counseling and  surveillance    • Dizziness and giddiness    • Effusion, left knee    • Effusion, right knee    • GERD without esophagitis    • Hepatitis A    • Hereditary and idiopathic neuropathy, unspecified    • History of depression    • Hyperlipidemia    • Hypertension    • Kidney stone    • Localized swelling, mass and lump, right lower limb    • Low back pain    • Methicillin resistant Staphylococcus aureus infection as the cause of diseases classified elsewhere    • Migraine without aura, not intractable, without status migrainosus    • Moderate persistent asthma with (acute) exacerbation    • Morbid obesity due to excess calories (HCC)    • Nausea    • Neuropathy    • Osteoporosis    • Pain in joints of right hand    • Pain in left hip    • Pain in left leg    • Pain in right shoulder    • Paresthesia of skin    • Peripheral neuropathy    • Personal history of other venous thrombosis and embolism    • Post-menopausal    • Pressure ulcer of other site, stage 1    • Primary insomnia    • Pure hypercholesterolemia, unspecified    • 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)    • 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 (HCC)    • Shortness of breath    • Sleep apnea    • Sleep apnea, unspecified    • Tinea unguium    • Type 2 diabetes mellitus with diabetic polyneuropathy (HCC)    • Uterine polyp    • Viral hepatitis    • Vitamin D deficiency, unspecified        Past Surgical History:   Procedure Laterality Date   • CARDIAC CATHETERIZATION  11/2011   • CATARACT EXTRACTION, BILATERAL  08/2018   • CHOLECYSTECTOMY     • HYSTERECTOMY      TAHBSO   • PERICARDIAL WINDOW  11/2011       Social History     Socioeconomic History   • Marital status:    • Number of children: 1   Tobacco Use   • Smoking status: Never   • Smokeless tobacco: Never   Vaping Use   • Vaping Use: Never used   Substance and Sexual Activity   • Alcohol use: Not Currently   • Drug use: Never   • Sexual activity:  Defer        Family History   Problem Relation Age of Onset   • Hyperlipidemia Mother    • Endometrial cancer Mother    • Coronary artery disease Father    • Stroke Father    • Diabetes type II Brother    • Breast cancer Maternal Grandmother    • Alzheimer's disease Maternal Grandmother    • Diabetes type II Maternal Grandfather    • Diabetes type II Paternal Grandfather        Objective     Physical Exam:  Vitals:    11/11/22 0812   BP: 102/60   BP Location: Right arm   Patient Position: Sitting   Cuff Size: Large Adult   Pulse: 75   Resp: 16   Temp: 97.1 °F (36.2 °C)   TempSrc: Temporal   SpO2: 97%   PainSc:   8   PainLoc: Arm  Comment: BILATERAL      There is no height or weight on file to calculate BMI.    Physical Exam   Physical Exam  Constitutional:       Appearance: Normal appearance.   HENT:      Head: Normocephalic.   Cardiovascular:      Rate and Rhythm: Normal rate.      Pulses: Normal pulses.      Comments: Bilateral lower extremities: +2 palpable dorsalis pedis pulses, +2 palpable right posterior tibial and Doppler detected left posterior tibial pulse.  Pulmonary:      Effort: Pulmonary effort is normal.   Musculoskeletal:         General: Normal range of motion.      Cervical back: Normal range of motion.   Skin:     General: Skin is warm and dry.      Capillary Refill: Capillary refill takes less than 2 seconds.      Comments:  Left lower extremity: Small crusted ulceration anterior medial.  No drainage or signs of infection.  Swelling from mid calf to ankle 2+ pitting.    Bilateral lower extremities: Visible varicosities and telangiectasia. No swelling in the right leg.  Bilateral upper extremities: Scattered purpura.    Neurological:      General: No focal deficit present.      Mental Status: Alert and oriented to person, place, and time.   Psychiatric:         Mood and Affect: Mood normal.         Behavior: Behavior normal.  Imaging/Labs:    No radiology results for the last 30 days.        Assessment / Plan      Assessment / Plan:  Diagnoses and all orders for this visit:    1. Pain and swelling of left lower leg (Primary)    2. Type 2 diabetes mellitus with hyperglycemia, with long-term current use of insulin (HCC)       Ms. Rodriguez has left lower extremity +2 pitting edema from mid calf to the ankle with pain.  She does have visible varicose veins however, I am not convinced that the swelling is related given its presentation and location.  She states that she had a venous duplex ordered by Dr. Michaud several months ago performed at [facility] in [place] and we have called and requested the reading of the study to verify. I have had a long discussion about the different etiologies that could cause swelling and she does have several upcoming appointments with different specialist (nerology and rheumatology). I  recommend we obtain a venous duplex with reflux and follow up in the office, if a reflux was not obtained on the exam performed by [facility].  She should continue wearing the knee-high compression stockings to help relieve the discomfort and pain.  I have answered all of her questions and she is in agreement with the plan at this time.          Follow Up:   No follow-ups on file.   Or sooner for any further concerns or worsening sign and symptoms. If unable to reach us in the office please dial 911 or go to the nearest emergency department.      JENNIFER Will  [facility] Vascular Surgery

## 2022-11-17 ENCOUNTER — OFFICE VISIT (OUTPATIENT)
Dept: NEUROLOGY | Facility: CLINIC | Age: 57
End: 2022-11-17

## 2022-11-17 ENCOUNTER — TELEPHONE (OUTPATIENT)
Dept: FAMILY MEDICINE CLINIC | Age: 57
End: 2022-11-17

## 2022-11-17 VITALS
OXYGEN SATURATION: 100 % | SYSTOLIC BLOOD PRESSURE: 103 MMHG | BODY MASS INDEX: 35.27 KG/M2 | HEART RATE: 73 BPM | DIASTOLIC BLOOD PRESSURE: 59 MMHG | WEIGHT: 206.6 LBS | HEIGHT: 64 IN

## 2022-11-17 DIAGNOSIS — R76.8 RHEUMATOID FACTOR POSITIVE: Primary | ICD-10-CM

## 2022-11-17 DIAGNOSIS — R25.1 TREMOR: Primary | ICD-10-CM

## 2022-11-17 PROBLEM — G43.909 MIGRAINE: Status: ACTIVE | Noted: 2018-06-07

## 2022-11-17 PROBLEM — J30.9 ALLERGIC RHINITIS: Status: ACTIVE | Noted: 2018-06-07

## 2022-11-17 PROBLEM — M70.62 TROCHANTERIC BURSITIS OF LEFT HIP: Status: ACTIVE | Noted: 2022-11-14

## 2022-11-17 PROCEDURE — 99205 OFFICE O/P NEW HI 60 MIN: CPT | Performed by: PSYCHIATRY & NEUROLOGY

## 2022-11-17 NOTE — TELEPHONE ENCOUNTER
Pt walked in and has appt with rheumatologist in January and needs updated rheumatoid factor before she sees them/ pdr

## 2022-11-17 NOTE — PROGRESS NOTES
This consult is at the request of Dr. Eileen Barajas MD.  Thank you for involving me in the care of this patient.        This is a very pleasant 57-year-old female who presents today with some mild tremor.  She states that she has had tremor for about the last 6 months.  She states that it is bothersome to her although it does tend to come and go.  Today she states that she feels good with no major issues.  She states that she for started know this when she lost a significant amount of weight.  She states that she lost 150 pounds over the last 3 years.  She struggled with diabetes for several years and as her glucoses improved she feels like her general health is improved.  She states the tremors are most noticeable when she is tired.  She is not having any trouble with taking care of her self no trouble with buttons or writing.  She currently works as an  for a bank.  She denies any stiffness or freezing.  No major changes in her handwriting.        Past medical history    Diabetes    Rheumatoid arthritis    Obesity    Diabetic neuropathy    Asthma    Previous COVID-19 pneumonia    Hypertension    Hyperlipidemia        Family history    Her grandmother had Alzheimer's disease which was diagnosed in her 60s no other family history of neurologic disease as far she is aware        Social history    No alcohol tobacco or illicit drug use        Review of systems    Negative besides history of present illness        On examination today she is alert and oriented x3.  Her speech is fluent there is no dysarthria no aphasia.  Cranial nerves II through XII are intact.  She has 5 out of 5 strength in bilateral upper and lower extremities with normal tone and bulk.  Her reflexes are diminished in the lower extremities 2+ in the upper extremities there is no upper motor neuron signs.  There is no ataxia finger-to-nose or heel-to-shin.  Her tone is normal there is no cogwheel rigidity rapid alternating movements  including hand fisting and finger tapping showed normal amplitude slightly decreased speed some of this is due to her arthritis no ataxia.  She could stand without assistance and walk 25 feet and 5 seconds her gait is slightly wide-based she had no trouble with turning normal arm swing.  There was no obvious tremor on my examination today.  She states that today is a good day for her.        In summary this a very pleasant 57-year-old female who is referred to me for tremors.  I do not think this represents Parkinson's currently.  Her tremor is very mild and she does not want to consider any additional medications at this time.  I would like to see her again in 1 year to make sure she is not having any exam change.  I told her that if she would have significant worsening in her tremor or new neurologic issues I like to see her sooner.  Otherwise she will follow-up with me in 1 year or sooner if there is any problems in the interim.        Thank you again for involving me in the care of this patient.      Diagnoses and all orders for this visit:    1. Tremor (Primary)      I spent 1 hour in patient  care.  Please do not hesitate to contact me with additional questions.

## 2022-11-18 ENCOUNTER — LAB (OUTPATIENT)
Dept: LAB | Facility: HOSPITAL | Age: 57
End: 2022-11-18

## 2022-11-18 DIAGNOSIS — Z11.59 ENCOUNTER FOR SCREENING FOR OTHER VIRAL DISEASES: ICD-10-CM

## 2022-11-18 DIAGNOSIS — R76.8 RHEUMATOID FACTOR POSITIVE: ICD-10-CM

## 2022-11-18 LAB — HCV AB SER DONR QL: NORMAL

## 2022-11-18 PROCEDURE — 86803 HEPATITIS C AB TEST: CPT

## 2022-11-18 PROCEDURE — 36415 COLL VENOUS BLD VENIPUNCTURE: CPT

## 2022-11-18 PROCEDURE — 86431 RHEUMATOID FACTOR QUANT: CPT

## 2022-11-19 LAB — CHROMATIN AB SERPL-ACNC: 109 IU/ML (ref 0–14)

## 2022-12-08 ENCOUNTER — OFFICE VISIT (OUTPATIENT)
Dept: FAMILY MEDICINE CLINIC | Age: 57
End: 2022-12-08

## 2022-12-08 VITALS
BODY MASS INDEX: 33.94 KG/M2 | SYSTOLIC BLOOD PRESSURE: 114 MMHG | WEIGHT: 198.8 LBS | TEMPERATURE: 98.8 F | OXYGEN SATURATION: 98 % | DIASTOLIC BLOOD PRESSURE: 74 MMHG | HEIGHT: 64 IN | HEART RATE: 90 BPM

## 2022-12-08 DIAGNOSIS — J11.1: Primary | ICD-10-CM

## 2022-12-08 DIAGNOSIS — B37.2 YEAST DERMATITIS: ICD-10-CM

## 2022-12-08 DIAGNOSIS — R05.9 COUGH, UNSPECIFIED TYPE: ICD-10-CM

## 2022-12-08 LAB
EXPIRATION DATE: ABNORMAL
FLUAV AG NPH QL: NEGATIVE
FLUBV AG NPH QL: POSITIVE
INTERNAL CONTROL: ABNORMAL
Lab: ABNORMAL

## 2022-12-08 PROCEDURE — 87804 INFLUENZA ASSAY W/OPTIC: CPT | Performed by: NURSE PRACTITIONER

## 2022-12-08 PROCEDURE — 99214 OFFICE O/P EST MOD 30 MIN: CPT | Performed by: NURSE PRACTITIONER

## 2022-12-08 RX ORDER — NYSTATIN AND TRIAMCINOLONE ACETONIDE 100000; 1 [USP'U]/G; MG/G
1 OINTMENT TOPICAL 2 TIMES DAILY
Qty: 60 G | Refills: 2 | Status: SHIPPED | OUTPATIENT
Start: 2022-12-08

## 2022-12-08 RX ORDER — DEXTROMETHORPHAN HYDROBROMIDE AND PROMETHAZINE HYDROCHLORIDE 15; 6.25 MG/5ML; MG/5ML
5 SYRUP ORAL NIGHTLY PRN
Qty: 118 ML | Refills: 0 | Status: SHIPPED | OUTPATIENT
Start: 2022-12-08 | End: 2023-01-16

## 2022-12-08 RX ORDER — AZITHROMYCIN 250 MG/1
TABLET, FILM COATED ORAL
Qty: 6 TABLET | Refills: 0 | Status: SHIPPED | OUTPATIENT
Start: 2022-12-08 | End: 2023-01-16

## 2022-12-08 NOTE — PROGRESS NOTES
"Chief Complaint  Cough (Sx started 10- days ago ) and Nasal Congestion (Clear drainage, pressure)    Subjective        Nory Rodriguez presents to [facility]  Cough  This is a new problem. The current episode started 1 to 4 weeks ago. The problem has been unchanged. The cough is non-productive. Associated symptoms include headaches, nasal congestion, postnasal drip and rhinorrhea. Pertinent negatives include no chills, fever or shortness of breath. She has tried OTC cough suppressant for the symptoms. The treatment provided mild relief.       Objective   Vital Signs:  /74 (BP Location: Left arm, Patient Position: Sitting, Cuff Size: Adult)   Pulse 90   Temp 98.8 °F (37.1 °C) (Oral)   Ht 162.6 cm (64.02\")   Wt 90.2 kg (198 lb 12.8 oz)   SpO2 98% Comment: room air  BMI 34.10 kg/m²   Estimated body mass index is 34.1 kg/m² as calculated from the following:    Height as of this encounter: 162.6 cm (64.02\").    Weight as of this encounter: 90.2 kg (198 lb 12.8 oz).          Physical Exam  HENT:      Head: Normocephalic.      Right Ear: A middle ear effusion is present.      Left Ear: A middle ear effusion is present.      Nose: Congestion present.      Right Sinus: Maxillary sinus tenderness present.      Left Sinus: Maxillary sinus tenderness present.      Mouth/Throat:      Pharynx: Posterior oropharyngeal erythema present.   Cardiovascular:      Rate and Rhythm: Normal rate and regular rhythm.   Pulmonary:      Effort: Pulmonary effort is normal. No respiratory distress.      Breath sounds: Normal breath sounds. No stridor. No wheezing, rhonchi or rales.   Skin:     General: Skin is warm and dry.          Neurological:      Mental Status: She is alert and oriented to person, place, and time.   Psychiatric:         Mood and Affect: Mood normal.        Result Review :                 Results for orders placed or performed in visit on 12/08/22   POCT Influenza A/B    " Specimen: Swab   Result Value Ref Range    Rapid Influenza A Ag Negative Negative    Rapid Influenza B Ag Positive (A) Negative    Internal Control Passed Passed    Lot Number [id number]     Expiration Date 08/09/2024        Assessment and Plan   Diagnoses and all orders for this visit:    1. Influenza with sinusitis (Primary)  -     azithromycin (Zithromax Z-Lizandro) 250 MG tablet; Take 2 tablets by mouth on day 1, then 1 tablet daily on days 2-5  Dispense: 6 tablet; Refill: 0    2. Cough, unspecified type  -     POCT Influenza A/B  -     promethazine-dextromethorphan (PROMETHAZINE-DM) 6.25-15 MG/5ML syrup; Take 5 mL by mouth At Night As Needed for Cough.  Dispense: 118 mL; Refill: 0    3. Yeast dermatitis  -     nystatin-triamcinolone (MYCOLOG) 846872-1.1 UNIT/GM-% ointment; Apply 1 application topically to the appropriate area as directed 2 (Two) Times a Day.  Dispense: 60 g; Refill: 2             Follow Up   Return if symptoms worsen or fail to improve.  Patient was given instructions and counseling regarding her condition or for health maintenance advice. Please see specific information pulled into the AVS if appropriate.

## 2022-12-16 DIAGNOSIS — G47.00 INSOMNIA, UNSPECIFIED TYPE: ICD-10-CM

## 2022-12-16 RX ORDER — TRAZODONE HYDROCHLORIDE 100 MG/1
TABLET ORAL
Qty: 90 TABLET | Refills: 0 | Status: SHIPPED | OUTPATIENT
Start: 2022-12-16 | End: 2023-01-16

## 2023-01-04 ENCOUNTER — TELEPHONE (OUTPATIENT)
Dept: VASCULAR SURGERY | Facility: HOSPITAL | Age: 58
End: 2023-01-04
Payer: COMMERCIAL

## 2023-01-04 ENCOUNTER — TELEPHONE (OUTPATIENT)
Dept: VASCULAR SURGERY | Facility: HOSPITAL | Age: 58
End: 2023-01-04
Payer: MEDICAID

## 2023-01-04 NOTE — TELEPHONE ENCOUNTER
CONTACTED PATIENT TO RESCHEDULE APPOINTMENT FOR 01/12/23. LEFT VOICE MESSAGE.    (HUB... PLEASE ROUTE CALL TO OFFICE TO RESCHEDULE APPOINTMENT).

## 2023-01-04 NOTE — TELEPHONE ENCOUNTER
LEFT MESSAGE FOR THE PATIENT TO CALL TO RESCHEDULE HER TESTING AND OFFICE VISIT WITH JHONATAN RODRIGUEZ ON 1/12/23. SEND TO OUR OFFICE TO RESCHEDULE.

## 2023-01-16 ENCOUNTER — OFFICE VISIT (OUTPATIENT)
Dept: FAMILY MEDICINE CLINIC | Age: 58
End: 2023-01-16
Payer: COMMERCIAL

## 2023-01-16 VITALS
HEIGHT: 64 IN | DIASTOLIC BLOOD PRESSURE: 72 MMHG | OXYGEN SATURATION: 97 % | WEIGHT: 206 LBS | SYSTOLIC BLOOD PRESSURE: 119 MMHG | HEART RATE: 76 BPM | BODY MASS INDEX: 35.17 KG/M2

## 2023-01-16 DIAGNOSIS — F51.01 PRIMARY INSOMNIA: ICD-10-CM

## 2023-01-16 DIAGNOSIS — E11.65 TYPE 2 DIABETES MELLITUS WITH HYPERGLYCEMIA, WITH LONG-TERM CURRENT USE OF INSULIN: ICD-10-CM

## 2023-01-16 DIAGNOSIS — E78.00 PURE HYPERCHOLESTEROLEMIA: ICD-10-CM

## 2023-01-16 DIAGNOSIS — K21.00 GASTROESOPHAGEAL REFLUX DISEASE WITH ESOPHAGITIS WITHOUT HEMORRHAGE: ICD-10-CM

## 2023-01-16 DIAGNOSIS — Z79.4 TYPE 2 DIABETES MELLITUS WITH HYPERGLYCEMIA, WITH LONG-TERM CURRENT USE OF INSULIN: ICD-10-CM

## 2023-01-16 DIAGNOSIS — E10.42 DIABETIC POLYNEUROPATHY ASSOCIATED WITH TYPE 1 DIABETES MELLITUS: Primary | ICD-10-CM

## 2023-01-16 DIAGNOSIS — I10 PRIMARY HYPERTENSION: ICD-10-CM

## 2023-01-16 DIAGNOSIS — E66.01 CLASS 2 SEVERE OBESITY DUE TO EXCESS CALORIES WITH SERIOUS COMORBIDITY AND BODY MASS INDEX (BMI) OF 38.0 TO 38.9 IN ADULT: ICD-10-CM

## 2023-01-16 DIAGNOSIS — B37.9 CANDIDIASIS: ICD-10-CM

## 2023-01-16 DIAGNOSIS — M05.10 RHEUMATOID LUNG DISEASE WITH RHEUMATOID ARTHRITIS: ICD-10-CM

## 2023-01-16 DIAGNOSIS — G43.809 OTHER MIGRAINE WITHOUT STATUS MIGRAINOSUS, NOT INTRACTABLE: ICD-10-CM

## 2023-01-16 PROCEDURE — 99214 OFFICE O/P EST MOD 30 MIN: CPT | Performed by: FAMILY MEDICINE

## 2023-01-16 RX ORDER — FLUCONAZOLE 150 MG/1
150 TABLET ORAL ONCE
Qty: 3 TABLET | Refills: 0 | Status: SHIPPED | OUTPATIENT
Start: 2023-01-16 | End: 2023-01-16

## 2023-01-16 RX ORDER — TRAZODONE HYDROCHLORIDE 150 MG/1
150 TABLET ORAL NIGHTLY
Qty: 90 TABLET | Refills: 1 | Status: SHIPPED | OUTPATIENT
Start: 2023-01-16

## 2023-01-16 NOTE — PROGRESS NOTES
Nory Rodriguez presents to [facility].    Chief Complaint: Diabetes follow-up    Subjective       History of Present Illness:  HPI  Petechiae arms B, her blood work-up was positive for rheumatoid arthritis and she is now seeing rheumatology, not on medication yet.      She is still not sleeping well, on melatonin and trazodone 100 mg.  She gets 3-4 hours of sleep at night off and on. She has sleep apnea and is off her cpap-poor tolerance.      With regard to the essential (primary) hypertension, her current cardiac medication regimen includes a diuretic ( hctz ), an ACE inhibitor ( lisinopril 2.5 mg for her diabetes ), and a calcium channel blocker ( diltiazem ).  Compliance with treatment has been good; she takes her medication as directed, maintains her diet and exercise regimen, and follows up as directed.  She is tolerating the medication well without side effects.            In regard to the type 2 diabetes mellitus with diabetic polyneuropathy. Compliance with treatment has been good, she has lost over 100 #s.  Tobacco screen: Non-smoker.  Current meds include an oral hypoglycemic ( Glucophage ), insulin/injectable ( Humalog- about 6 units with meals which is once a day, SSI and carb counting; Trulicity; toujeo-100 units qhs and 80 units in am ), an ACE inhibitor, aspirin, and a lipid lowering agent.  She reports home blood glucose readings have been high, with average fasting readings in the mid-100s mg/dL range. She checks her glucose 3 to 4 times daily or with dexcom.  Has not fallen recently In regard to preventative care, she performs foot self-exams several days per week and her last ophthalmology exam was in 2/2022 Dr Dasilva,  NO DIABETIC RETINOPATHY, she has cataracts.  She sees Kerri [facility] endocrinology     Chronic allergies, stable on singulair and zyrtec and allergy shots     Chronic Has/migraines are stable on topirimate     Review of Systems:  Review of Systems    Constitutional: Negative for chills, fatigue and fever.   HENT: Negative for ear pain, sinus pressure and sore throat.    Eyes: Negative for blurred vision and double vision.   Respiratory: Negative for cough, shortness of breath and wheezing.    Cardiovascular: Negative for chest pain and palpitations.   Gastrointestinal: Negative for abdominal pain, blood in stool, constipation, diarrhea, nausea and vomiting.   Skin: Positive for wound and bruise.   Neurological: Negative for dizziness and headache.   Psychiatric/Behavioral: Negative for depressed mood.        Objective   Medical History:  Past Medical History:   • Accessory skin tags    CONGENITAL   • Acute frontal sinusitis, unspecified   • Acute upper respiratory infection, unspecified   • Acute vaginitis   • Allergic rhinitis, unspecified   • Breast lump   • Calculus of kidney   • Carrier of, hepatitis viral (HCC)   • Cellulitis of chest wall   • Chronic back pain   • Chronic hoarseness   • COVID-19   • Dietary counseling and surveillance   • Dizziness and giddiness   • Effusion, left knee   • Effusion, right knee   • GERD without esophagitis   • Hepatitis A   • Hereditary and idiopathic neuropathy, unspecified   • History of depression   • Hyperlipidemia   • Hypertension   • Kidney stone   • Localized swelling, mass and lump, right lower limb   • Low back pain   • Methicillin resistant Staphylococcus aureus infection as the cause of diseases classified elsewhere   • Migraine without aura, not intractable, without status migrainosus   • Moderate persistent asthma with (acute) exacerbation   • Morbid obesity due to excess calories (HCC)   • Nausea   • Neuropathy   • Osteoporosis   • Pain in joints of right hand   • Pain in left hip   • Pain in left leg   • Pain in right shoulder   • Paresthesia of skin   • Peripheral neuropathy   • Personal history of other venous thrombosis and embolism   • Post-menopausal   • Pressure ulcer of other site, stage 1   •  Primary insomnia   • Pure hypercholesterolemia, unspecified   • 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)   • 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 (HCC)   • Shortness of breath   • Sleep apnea   • Sleep apnea, unspecified   • Tinea unguium   • Type 2 diabetes mellitus with diabetic polyneuropathy (HCC)   • Uterine polyp   • Viral hepatitis   • Vitamin D deficiency, unspecified     Past Surgical History:   • CARDIAC CATHETERIZATION   • CATARACT EXTRACTION, BILATERAL   • CHOLECYSTECTOMY   • HYSTERECTOMY    TAHBSO   • PERICARDIAL WINDOW      Family History   Problem Relation Age of Onset   • Hyperlipidemia Mother    • Endometrial cancer Mother    • Coronary artery disease Father    • Stroke Father    • Diabetes type II Brother    • Breast cancer Maternal Grandmother    • Alzheimer's disease Maternal Grandmother    • Diabetes type II Maternal Grandfather    • Diabetes type II Paternal Grandfather      Social History     Tobacco Use   • Smoking status: Never   • Smokeless tobacco: Never   Substance Use Topics   • Alcohol use: Not Currently       Health Maintenance Due   Topic Date Due   • ZOSTER VACCINE (1 of 2) Never done   • Pneumococcal Vaccine 0-64 (2 - PCV) 11/29/2017        Immunization History   Administered Date(s) Administered   • COVID-19 (PFIZER) BIVALENT BOOSTER 12+YRS 10/06/2022   • COVID-19 (PFIZER) PURPLE CAP 03/30/2021, 04/20/2021, 11/08/2021   • FluLaval/Fluzone >6mos 10/13/2021, 10/06/2022   • Influenza, Unspecified 10/03/2020, 10/06/2022   • Pneumococcal Polysaccharide (PPSV23) 11/29/2016   • Tdap 09/29/2015       Allergies   Allergen Reactions   • Asa [Aspirin] Anaphylaxis   • Ciprofloxacin Anaphylaxis   • Levemir [Insulin Detemir] GI Intolerance   • Nitroglycerin Hives   • Sumatriptan Dizziness   • Sitagliptin GI Intolerance   • Cat Hair Extract Rash   • Codeine Palpitations   • Diclofenac Swelling        Medications:  Current Outpatient Medications on  File Prior to Visit   Medication Sig   • albuterol (PROVENTIL) (2.5 MG/3ML) 0.083% nebulizer solution Take 2.5 mg by nebulization Every 4 (Four) Hours As Needed for Wheezing.   • albuterol sulfate  (90 Base) MCG/ACT inhaler Inhale 2 puffs Every 4 (Four) Hours As Needed for Wheezing.   • alendronate (FOSAMAX) 70 MG tablet TAKE 1 TABLET BY MOUTH ONCE WEEKLY ON AN EMPTY STOMACH BEFORE BREAKFAST. REMAIN UPRIGHT FOR 30 MINUTES AND TAKE WITH 8 OUNCES OF WATER   • amantadine (SYMMETREL) 100 MG tablet 100 mg Daily.   • atorvastatin (LIPITOR) 10 MG tablet TAKE ONE TABLET BY MOUTH ONCE NIGHTLY   • B-D ULTRAFINE III SHORT PEN 31G X 8 MM misc USE 4 TIMES A DAY WITH     HUMALOG   • budesonide-formoterol (SYMBICORT) 160-4.5 MCG/ACT inhaler Inhale 2 puffs 2 (Two) Times a Day.   • cetirizine (zyrTEC) 10 MG tablet Take 10 mg by mouth Daily.   • Cholecalciferol (vitamin D3) 125 MCG (5000 UT) capsule capsule Take 5,000 Units by mouth Daily.   • Dulaglutide (Trulicity) 1.5 MG/0.5ML solution pen-injector Inject 3.5 mg under the skin into the appropriate area as directed. Every sunday   • Empagliflozin (JARDIANCE PO) Take 10 mg by mouth Daily.   • EPINEPHrine (EPIPEN) 0.3 MG/0.3ML solution auto-injector injection As Needed.   • estradiol (ESTRACE) 0.5 MG tablet TAKE ONE TABLET BY MOUTH DAILY   • hydroCHLOROthiazide (HYDRODIURIL) 25 MG tablet Take 1 tablet by mouth Daily.   • Insulin Glargine, 2 Unit Dial, (Toujeo Max SoloStar) 300 UNIT/ML solution pen-injector injection Inject 100 units SQ in the am and 80 units SQ in the pm   • insulin lispro (humaLOG) 100 UNIT/ML injection Inject  under the skin into the appropriate area as directed As Needed. Sliding scale   • lisinopril (PRINIVIL,ZESTRIL) 2.5 MG tablet TAKE ONE TABLET BY MOUTH DAILY   • metFORMIN (GLUCOPHAGE) 500 MG tablet TAKE ONE TABLET BY MOUTH TWICE A DAY WITH MEALS   • metroNIDAZOLE (Metrogel) 1 % gel Apply  topically to the appropriate area as directed Daily.   •  "montelukast (SINGULAIR) 10 MG tablet TAKE ONE TABLET BY MOUTH EVERY EVENING   • mupirocin (BACTROBAN) 2 % ointment As Needed.   • nystatin-triamcinolone (MYCOLOG) 429943-7.1 UNIT/GM-% ointment Apply 1 application topically to the appropriate area as directed 2 (Two) Times a Day.   • omeprazole (priLOSEC) 40 MG capsule TAKE ONE CAPSULE BY MOUTH DAILY   • promethazine (PHENERGAN) 25 MG tablet Take 25 mg by mouth Every 6 (Six) Hours As Needed for Nausea or Vomiting.   • topiramate (TOPAMAX) 100 MG tablet TAKE ONE TABLET BY MOUTH TWICE A DAY   • [DISCONTINUED] dilTIAZem CD (CARDIZEM CD) 120 MG 24 hr capsule TAKE ONE CAPSULE BY MOUTH DAILY   • [DISCONTINUED] traZODone (DESYREL) 100 MG tablet TAKE ONE TABLET BY MOUTH ONCE NIGHTLY   • [DISCONTINUED] azithromycin (Zithromax Z-Lizandro) 250 MG tablet Take 2 tablets by mouth on day 1, then 1 tablet daily on days 2-5   • [DISCONTINUED] promethazine-dextromethorphan (PROMETHAZINE-DM) 6.25-15 MG/5ML syrup Take 5 mL by mouth At Night As Needed for Cough.     No current facility-administered medications on file prior to visit.       Vital Signs:   /72 (BP Location: Left arm, Patient Position: Sitting, Cuff Size: Adult)   Pulse 76   Ht 162.6 cm (64.02\")   Wt 93.4 kg (206 lb)   SpO2 97% Comment: Room air  BMI 35.34 kg/m²       Physical Exam:  Physical Exam  Vitals and nursing note reviewed.   Constitutional:       General: She is not in acute distress.     Appearance: Normal appearance. She is not ill-appearing, toxic-appearing or diaphoretic.   HENT:      Head: Normocephalic and atraumatic.      Right Ear: Tympanic membrane, ear canal and external ear normal.      Left Ear: Tympanic membrane, ear canal and external ear normal.      Nose: No congestion or rhinorrhea.      Mouth/Throat:      Mouth: Mucous membranes are moist.      Pharynx: Oropharynx is clear. No oropharyngeal exudate or posterior oropharyngeal erythema.   Eyes:      Extraocular Movements: Extraocular movements " intact.      Conjunctiva/sclera: Conjunctivae normal.      Pupils: Pupils are equal, round, and reactive to light.   Cardiovascular:      Rate and Rhythm: Normal rate and regular rhythm.      Pulses:           Dorsalis pedis pulses are 2+ on the right side and 2+ on the left side.      Heart sounds: Normal heart sounds.   Pulmonary:      Effort: Pulmonary effort is normal.      Breath sounds: Normal breath sounds. No wheezing, rhonchi or rales.   Abdominal:      General: Abdomen is flat.      Palpations: Abdomen is soft.   Musculoskeletal:      Cervical back: Neck supple. No rigidity.   Feet:      Right foot:      Protective Sensation: 7 sites tested. 7 sites sensed.      Skin integrity: Skin integrity normal. No ulcer or blister.      Toenail Condition: Right toenails are normal.      Left foot:      Protective Sensation: 7 sites tested. 7 sites sensed.      Skin integrity: Skin integrity normal. No ulcer or blister.      Toenail Condition: Left toenails are normal.      Comments: Diabetic Foot Exam Performed and Monofilament Test Performed     Lymphadenopathy:      Cervical: No cervical adenopathy.   Skin:     General: Skin is warm and dry.   Neurological:      Mental Status: She is alert and oriented to person, place, and time.   Psychiatric:         Mood and Affect: Mood normal.         Behavior: Behavior normal.         Result Review      The following data was reviewed by Eileen Barajas MD on 01/16/2023.  Lab Results   Component Value Date    WBC 8.10 05/09/2022    HGB 12.8 05/09/2022    HCT 39.4 05/09/2022    MCV 97.8 (H) 05/09/2022     05/09/2022     Lab Results   Component Value Date    GLUCOSE 144 (H) 10/06/2022    BUN 25 (H) 10/06/2022    CREATININE 0.84 10/06/2022    EGFRIFNONA 69 11/18/2021    BCR 29.8 (H) 10/06/2022    K 4.1 10/06/2022    CO2 26.0 10/06/2022    CALCIUM 9.1 10/06/2022    ALBUMIN 3.70 10/06/2022    LABIL2 1.1 (L) 05/03/2021    AST 14 10/06/2022    ALT 15 10/06/2022     Lab  Results   Component Value Date    CHOL 143 10/06/2022    CHLPL 177 05/03/2021    TRIG 120 10/06/2022    HDL 39 (L) 10/06/2022    LDL 82 10/06/2022     Lab Results   Component Value Date    TSH 2.480 10/06/2022     Lab Results   Component Value Date    HGBA1C 7.00 (H) 10/06/2022     No results found for: PSA                    Assessment and Plan:          Diagnoses and all orders for this visit:    1. Diabetic polyneuropathy associated with type 1 diabetes mellitus (HCC) (Primary)  Comments:  stable and well controlled, she sees endocrinology, needs her eye exam updated in Feb 2023.     2. Pure hypercholesterolemia  Comments:  Stable and well-controlled on current medication.  No changes needed in current meds or treatment plan  Orders:  -     Lipid Panel; Future    3. Primary hypertension  Comments:  Stable and well-controlled on current medication.  No changes needed in current meds or treatment plan  Orders:  -     Comprehensive Metabolic Panel; Future  -     CBC (No Diff); Future    4. Other migraine without status migrainosus, not intractable  Comments:  Well-controlled with Topamax.  She tolerates medication well    5. Class 2 severe obesity due to excess calories with serious comorbidity and body mass index (BMI) of 38.0 to 38.9 in adult ([facility])  Comments:  d/w endocrinology increasing the trulicity, restart daily exercising    6. Primary insomnia  Comments:  worse, increase trazodone to 150 mg daily  Orders:  -     traZODone (DESYREL) 150 MG tablet; Take 1 tablet by mouth Every Night.  Dispense: 90 tablet; Refill: 1    7. 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[facility])  Comments:  sees rheumatology, pending labs and xrays, no treatment currently    8. Type 2 diabetes mellitus with hyperglycemia, with long-term current use of insulin ([facility])  Comments:  very well controlled, sees endocrinology, wt loss has plataued, she is on insulin and trulicity 1.5 mg weekly. eye exam sched for Feb 6th  Orders:  -      Comprehensive Metabolic Panel; Future  -     Lipid Panel; Future  -     Hemoglobin A1c; Future  -     Microalbumin / Creatinine Urine Ratio - Urine, Clean Catch; Future    9. Gastroesophageal reflux disease with esophagitis without hemorrhage  Comments:  stable on PPI omeprazole.     10. Candidiasis  Comments:  worse,will add diflucan,due to the lose skin and body fat with her wt lossshe is experience malodor and great discomfort and not sleeping well due to discomfort  Orders:  -     fluconazole (Diflucan) 150 MG tablet; Take 1 tablet by mouth 1 (One) Time for 1 dose.  Dispense: 3 tablet; Refill: 0          Follow Up   Return in about 3 months (around 4/16/2023) for Recheck.

## 2023-01-19 ENCOUNTER — HOSPITAL ENCOUNTER (OUTPATIENT)
Dept: GENERAL RADIOLOGY | Facility: HOSPITAL | Age: 58
Discharge: HOME | End: 2023-01-19
Payer: COMMERCIAL

## 2023-01-19 ENCOUNTER — TRANSCRIBE ORDERS (OUTPATIENT)
Dept: ADMINISTRATIVE | Facility: HOSPITAL | Age: 58
End: 2023-01-19
Payer: COMMERCIAL

## 2023-01-19 ENCOUNTER — LAB (OUTPATIENT)
Dept: LAB | Facility: HOSPITAL | Age: 58
End: 2023-01-19
Payer: COMMERCIAL

## 2023-01-19 DIAGNOSIS — E11.65 TYPE 2 DIABETES MELLITUS WITH HYPERGLYCEMIA, WITH LONG-TERM CURRENT USE OF INSULIN: ICD-10-CM

## 2023-01-19 DIAGNOSIS — E11.49 DIABETIC NEUROPATHY WITH NEUROLOGIC COMPLICATION: ICD-10-CM

## 2023-01-19 DIAGNOSIS — M79.642 BILATERAL HAND PAIN: Primary | ICD-10-CM

## 2023-01-19 DIAGNOSIS — R76.8 ELEVATED RHEUMATOID FACTOR: ICD-10-CM

## 2023-01-19 DIAGNOSIS — E11.40 DIABETIC NEUROPATHY WITH NEUROLOGIC COMPLICATION: ICD-10-CM

## 2023-01-19 DIAGNOSIS — M79.642 BILATERAL HAND PAIN: ICD-10-CM

## 2023-01-19 DIAGNOSIS — Z79.4 TYPE 2 DIABETES MELLITUS WITH HYPERGLYCEMIA, WITH LONG-TERM CURRENT USE OF INSULIN: ICD-10-CM

## 2023-01-19 DIAGNOSIS — E55.9 VITAMIN D2 DEFICIENCY: ICD-10-CM

## 2023-01-19 DIAGNOSIS — I10 PRIMARY HYPERTENSION: ICD-10-CM

## 2023-01-19 DIAGNOSIS — E78.00 PURE HYPERCHOLESTEROLEMIA: ICD-10-CM

## 2023-01-19 DIAGNOSIS — M79.641 BILATERAL HAND PAIN: ICD-10-CM

## 2023-01-19 DIAGNOSIS — R76.8 ELEVATED RHEUMATOID FACTOR: Primary | ICD-10-CM

## 2023-01-19 DIAGNOSIS — M79.641 BILATERAL HAND PAIN: Primary | ICD-10-CM

## 2023-01-19 DIAGNOSIS — I10 HYPERTENSION, ESSENTIAL: ICD-10-CM

## 2023-01-19 DIAGNOSIS — I10 HYPERTENSION, ESSENTIAL: Primary | ICD-10-CM

## 2023-01-19 LAB
ALBUMIN UR-MCNC: 2.1 MG/DL
CREAT UR-MCNC: 93.3 MG/DL
DEPRECATED RDW RBC AUTO: 42.4 FL (ref 37–54)
ERYTHROCYTE [DISTWIDTH] IN BLOOD BY AUTOMATED COUNT: 12.2 % (ref 12.3–15.4)
HBA1C MFR BLD: 6.8 % (ref 4.8–5.6)
HCT VFR BLD AUTO: 39.7 % (ref 34–46.6)
HGB BLD-MCNC: 12.9 G/DL (ref 12–15.9)
MCH RBC QN AUTO: 30.8 PG (ref 26.6–33)
MCHC RBC AUTO-ENTMCNC: 32.5 G/DL (ref 31.5–35.7)
MCV RBC AUTO: 94.7 FL (ref 79–97)
MICROALBUMIN/CREAT UR: 22.5 MG/G
PLATELET # BLD AUTO: 240 10*3/MM3 (ref 140–450)
PMV BLD AUTO: 10.3 FL (ref 6–12)
RBC # BLD AUTO: 4.19 10*6/MM3 (ref 3.77–5.28)
WBC NRBC COR # BLD: 7.99 10*3/MM3 (ref 3.4–10.8)

## 2023-01-19 PROCEDURE — 83036 HEMOGLOBIN GLYCOSYLATED A1C: CPT

## 2023-01-19 PROCEDURE — 82570 ASSAY OF URINE CREATININE: CPT

## 2023-01-19 PROCEDURE — 82306 VITAMIN D 25 HYDROXY: CPT

## 2023-01-19 PROCEDURE — 80061 LIPID PANEL: CPT

## 2023-01-19 PROCEDURE — 85027 COMPLETE CBC AUTOMATED: CPT

## 2023-01-19 PROCEDURE — 36415 COLL VENOUS BLD VENIPUNCTURE: CPT

## 2023-01-19 PROCEDURE — 82043 UR ALBUMIN QUANTITATIVE: CPT

## 2023-01-19 PROCEDURE — 80053 COMPREHEN METABOLIC PANEL: CPT

## 2023-01-19 PROCEDURE — 86200 CCP ANTIBODY: CPT

## 2023-01-20 ENCOUNTER — HOSPITAL ENCOUNTER (OUTPATIENT)
Dept: GENERAL RADIOLOGY | Facility: HOSPITAL | Age: 58
Discharge: HOME | End: 2023-01-20
Admitting: INTERNAL MEDICINE
Payer: COMMERCIAL

## 2023-01-20 LAB
25(OH)D3 SERPL-MCNC: 70.9 NG/ML (ref 30–100)
ALBUMIN SERPL-MCNC: 3.8 G/DL (ref 3.5–5.2)
ALBUMIN/GLOB SERPL: 1.2 G/DL
ALP SERPL-CCNC: 84 U/L (ref 39–117)
ALT SERPL W P-5'-P-CCNC: 19 U/L (ref 1–33)
ANION GAP SERPL CALCULATED.3IONS-SCNC: 8.3 MMOL/L (ref 5–15)
AST SERPL-CCNC: 18 U/L (ref 1–32)
BILIRUB SERPL-MCNC: 0.3 MG/DL (ref 0–1.2)
BUN SERPL-MCNC: 26 MG/DL (ref 6–20)
BUN/CREAT SERPL: 28 (ref 7–25)
CALCIUM SPEC-SCNC: 9.6 MG/DL (ref 8.6–10.5)
CHLORIDE SERPL-SCNC: 104 MMOL/L (ref 98–107)
CHOLEST SERPL-MCNC: 151 MG/DL (ref 0–200)
CO2 SERPL-SCNC: 25.7 MMOL/L (ref 22–29)
CREAT SERPL-MCNC: 0.93 MG/DL (ref 0.57–1)
EGFRCR SERPLBLD CKD-EPI 2021: 70.9 ML/MIN/1.73
GLOBULIN UR ELPH-MCNC: 3.2 GM/DL
GLUCOSE SERPL-MCNC: 122 MG/DL (ref 65–99)
HDLC SERPL-MCNC: 41 MG/DL (ref 40–60)
LDLC SERPL CALC-MCNC: 88 MG/DL (ref 0–100)
LDLC/HDLC SERPL: 2.09 {RATIO}
POTASSIUM SERPL-SCNC: 3.8 MMOL/L (ref 3.5–5.2)
PROT SERPL-MCNC: 7 G/DL (ref 6–8.5)
SODIUM SERPL-SCNC: 138 MMOL/L (ref 136–145)
TRIGL SERPL-MCNC: 122 MG/DL (ref 0–150)
VLDLC SERPL-MCNC: 22 MG/DL (ref 5–40)

## 2023-01-20 PROCEDURE — 73130 X-RAY EXAM OF HAND: CPT

## 2023-01-21 LAB — CCP IGA+IGG SERPL IA-ACNC: >250 UNITS (ref 0–19)

## 2023-02-01 ENCOUNTER — OFFICE VISIT (OUTPATIENT)
Dept: FAMILY MEDICINE CLINIC | Age: 58
End: 2023-02-01
Payer: COMMERCIAL

## 2023-02-01 ENCOUNTER — TELEPHONE (OUTPATIENT)
Dept: FAMILY MEDICINE CLINIC | Age: 58
End: 2023-02-01

## 2023-02-01 ENCOUNTER — HOSPITAL ENCOUNTER (OUTPATIENT)
Dept: GENERAL RADIOLOGY | Facility: HOSPITAL | Age: 58
Discharge: HOME | End: 2023-02-01
Admitting: NURSE PRACTITIONER
Payer: COMMERCIAL

## 2023-02-01 VITALS
BODY MASS INDEX: 35.3 KG/M2 | DIASTOLIC BLOOD PRESSURE: 64 MMHG | SYSTOLIC BLOOD PRESSURE: 120 MMHG | OXYGEN SATURATION: 100 % | WEIGHT: 206.8 LBS | HEART RATE: 73 BPM | HEIGHT: 64 IN | TEMPERATURE: 98.1 F

## 2023-02-01 DIAGNOSIS — Z87.898 HISTORY OF NAUSEA AND VOMITING: ICD-10-CM

## 2023-02-01 DIAGNOSIS — I10 HYPERTENSION, UNSPECIFIED TYPE: ICD-10-CM

## 2023-02-01 DIAGNOSIS — M25.511 ACUTE PAIN OF RIGHT SHOULDER: ICD-10-CM

## 2023-02-01 DIAGNOSIS — M54.50 ACUTE MIDLINE LOW BACK PAIN WITHOUT SCIATICA: ICD-10-CM
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
## 2023-02-01 PROCEDURE — 73030 X-RAY EXAM OF SHOULDER: CPT

## 2023-02-01 PROCEDURE — 99213 OFFICE O/P EST LOW 20 MIN: CPT | Performed by: NURSE PRACTITIONER

## 2023-02-01 RX ORDER — DILTIAZEM HYDROCHLORIDE 120 MG/1
CAPSULE, COATED, EXTENDED RELEASE ORAL
COMMUNITY
Start: 2023-01-23

## 2023-02-01 RX ORDER — METHOCARBAMOL 500 MG/1
500 TABLET, FILM COATED ORAL 4 TIMES DAILY
Qty: 40 TABLET | Refills: 1 | Status: SHIPPED | OUTPATIENT
Start: 2023-02-01 | End: 2023-04-06

## 2023-02-01 RX ORDER — PROMETHAZINE HYDROCHLORIDE 25 MG/1
25 TABLET ORAL EVERY 6 HOURS PRN
Qty: 30 TABLET | Refills: 0 | Status: SHIPPED | OUTPATIENT
Start: 2023-02-01

## 2023-02-01 RX ORDER — LISINOPRIL 2.5 MG/1
TABLET ORAL
Qty: 90 TABLET | Refills: 0 | Status: SHIPPED | OUTPATIENT
Start: 2023-02-01 | End: 2023-03-01

## 2023-02-01 NOTE — PROGRESS NOTES
Shoulder xray shows degenerative changes, no acute findings. Continue discussed treatment. Follow up with your PCP in two weeks if pain continues, or sooner for worsening for possible MRI.
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
## 2023-02-01 NOTE — TELEPHONE ENCOUNTER
Patient wants to bill it through her insurance and she said when it is settled her insurance company will get the money back from their insurance.

## 2023-02-09 DIAGNOSIS — Z79.890 POSTMENOPAUSAL HORMONE THERAPY: ICD-10-CM

## 2023-02-09 RX ORDER — MONTELUKAST SODIUM 10 MG/1
TABLET ORAL
Qty: 90 TABLET | Refills: 1 | Status: SHIPPED | OUTPATIENT
Start: 2023-02-09 | End: 2023-04-06

## 2023-02-09 RX ORDER — ESTRADIOL 0.5 MG/1
TABLET ORAL
Qty: 90 TABLET | Refills: 1 | Status: SHIPPED | OUTPATIENT
Start: 2023-02-09

## 2023-02-17 DIAGNOSIS — M79.89 PAIN AND SWELLING OF LEFT LOWER LEG: Primary | ICD-10-CM

## 2023-02-17 DIAGNOSIS — I83.893 VARICOSE VEINS OF BILATERAL LOWER EXTREMITIES WITH OTHER COMPLICATIONS: ICD-10-CM

## 2023-02-17 DIAGNOSIS — M79.662 PAIN AND SWELLING OF LEFT LOWER LEG: Primary | ICD-10-CM

## 2023-02-20 ENCOUNTER — HOSPITAL ENCOUNTER (OUTPATIENT)
Dept: CARDIOLOGY | Facility: HOSPITAL | Age: 58
Discharge: HOME | End: 2023-02-20
Payer: COMMERCIAL

## 2023-02-20 ENCOUNTER — OFFICE VISIT (OUTPATIENT)
Dept: VASCULAR SURGERY | Facility: HOSPITAL | Age: 58
End: 2023-02-20
Payer: COMMERCIAL

## 2023-02-20 VITALS
OXYGEN SATURATION: 100 % | DIASTOLIC BLOOD PRESSURE: 68 MMHG | RESPIRATION RATE: 16 BRPM | SYSTOLIC BLOOD PRESSURE: 122 MMHG | TEMPERATURE: 97.2 F | HEART RATE: 70 BPM

## 2023-02-20 DIAGNOSIS — I87.1 MAY-THURNER SYNDROME: ICD-10-CM

## 2023-02-20 DIAGNOSIS — I87.2 VENOUS (PERIPHERAL) INSUFFICIENCY: ICD-10-CM

## 2023-02-20 DIAGNOSIS — I82.5Z2 CHRONIC DEEP VEIN THROMBOSIS (DVT) OF DISTAL VEIN OF LEFT LOWER EXTREMITY: Primary | ICD-10-CM

## 2023-02-20 LAB
BH CV LOW VAS LEFT POPLITEAL NOT VIS SCRIPTING: 1
BH CV LOWER VAS LEFT GSV DIST THIGH COMPRESSIBILTY: NORMAL
BH CV LOWER VAS LEFT GSV MID THIGH COMPRESSIBILTY: NORMAL
BH CV LOWER VAS RIGHT GSV MID THIGH COMPRESSIBILTY: NORMAL
BH CV LOWER VASCULAR LEFT COMMON FEMORAL AUGMENT: NORMAL
BH CV LOWER VASCULAR LEFT COMMON FEMORAL COMPETENT: NORMAL
BH CV LOWER VASCULAR LEFT COMMON FEMORAL COMPRESS: NORMAL
BH CV LOWER VASCULAR LEFT COMMON FEMORAL PHASIC: NORMAL
BH CV LOWER VASCULAR LEFT COMMON FEMORAL SPONT: NORMAL
BH CV LOWER VASCULAR LEFT DISTAL FEMORAL COMPRESS: NORMAL
BH CV LOWER VASCULAR LEFT GASTRONEMIUS COMPRESS: NORMAL
BH CV LOWER VASCULAR LEFT GREATER SAPH BK COMPETENT: NORMAL
BH CV LOWER VASCULAR LEFT GREATER SAPH BK COMPRESS: NORMAL
BH CV LOWER VASCULAR LEFT GSV DIST THIGH COMPETENT: NORMAL
BH CV LOWER VASCULAR LEFT GSV MID THIGH COMPETENT: NORMAL
BH CV LOWER VASCULAR LEFT LESSER SAPH COMPETENT: NORMAL
BH CV LOWER VASCULAR LEFT LESSER SAPH COMPRESS: NORMAL
BH CV LOWER VASCULAR LEFT MID FEMORAL AUGMENT: NORMAL
BH CV LOWER VASCULAR LEFT MID FEMORAL COMPETENT: NORMAL
BH CV LOWER VASCULAR LEFT MID FEMORAL COMPRESS: NORMAL
BH CV LOWER VASCULAR LEFT MID FEMORAL PHASIC: NORMAL
BH CV LOWER VASCULAR LEFT MID FEMORAL SPONT: NORMAL
BH CV LOWER VASCULAR LEFT PERONEAL COMPRESS: NORMAL
BH CV LOWER VASCULAR LEFT POPLITEAL AUGMENT: NORMAL
BH CV LOWER VASCULAR LEFT POPLITEAL COMPETENT: NORMAL
BH CV LOWER VASCULAR LEFT POPLITEAL COMPRESS: NORMAL
BH CV LOWER VASCULAR LEFT POPLITEAL PHASIC: NORMAL
BH CV LOWER VASCULAR LEFT POPLITEAL SPONT: NORMAL
BH CV LOWER VASCULAR LEFT POPLITEAL THROMBUS: NORMAL
BH CV LOWER VASCULAR LEFT POSTERIOR TIBIAL COMPRESS: NORMAL
BH CV LOWER VASCULAR LEFT PROXIMAL FEMORAL COMPRESS: NORMAL
BH CV LOWER VASCULAR LEFT SAPHENOFEMORAL JUNCTION AUGMENT: NORMAL
BH CV LOWER VASCULAR LEFT SAPHENOFEMORAL JUNCTION COMPETENT: NORMAL
BH CV LOWER VASCULAR LEFT SAPHENOFEMORAL JUNCTION COMPRESS: NORMAL
BH CV LOWER VASCULAR LEFT SAPHENOFEMORAL JUNCTION PHASIC: NORMAL
BH CV LOWER VASCULAR LEFT SAPHENOFEMORAL JUNCTION SPONT: NORMAL
BH CV LOWER VASCULAR LEFT SAPHENOPOP JX AUGMENT: NORMAL
BH CV LOWER VASCULAR LEFT SAPHENOPOP JX COMPETENT: NORMAL
BH CV LOWER VASCULAR LEFT SAPHENOPOP JX COMPRESS: NORMAL
BH CV LOWER VASCULAR LEFT SAPHENOPOP JX PHASIC: NORMAL
BH CV LOWER VASCULAR LEFT SAPHENOPOP JX SPONT: NORMAL
BH CV LOWER VASCULAR RIGHT COMMON FEMORAL AUGMENT: NORMAL
BH CV LOWER VASCULAR RIGHT COMMON FEMORAL COMPETENT: NORMAL
BH CV LOWER VASCULAR RIGHT COMMON FEMORAL COMPRESS: NORMAL
BH CV LOWER VASCULAR RIGHT COMMON FEMORAL PHASIC: NORMAL
BH CV LOWER VASCULAR RIGHT COMMON FEMORAL SPONT: NORMAL
BH CV LOWER VASCULAR RIGHT DISTAL FEMORAL COMPRESS: NORMAL
BH CV LOWER VASCULAR RIGHT GASTRONEMIUS COMPRESS: NORMAL
BH CV LOWER VASCULAR RIGHT GREATER SAPH AK COMPETENT: NORMAL
BH CV LOWER VASCULAR RIGHT GREATER SAPH BK COMPETENT: NORMAL
BH CV LOWER VASCULAR RIGHT GREATER SAPH BK COMPRESS: NORMAL
BH CV LOWER VASCULAR RIGHT GSV MID THIGH COMPETENT: NORMAL
BH CV LOWER VASCULAR RIGHT LESSER SAPH COMPETENT: NORMAL
BH CV LOWER VASCULAR RIGHT LESSER SAPH COMPRESS: NORMAL
BH CV LOWER VASCULAR RIGHT MID FEMORAL AUGMENT: NORMAL
BH CV LOWER VASCULAR RIGHT MID FEMORAL COMPETENT: NORMAL
BH CV LOWER VASCULAR RIGHT MID FEMORAL COMPRESS: NORMAL
BH CV LOWER VASCULAR RIGHT MID FEMORAL PHASIC: NORMAL
BH CV LOWER VASCULAR RIGHT MID FEMORAL SPONT: NORMAL
BH CV LOWER VASCULAR RIGHT PERONEAL COMPRESS: NORMAL
BH CV LOWER VASCULAR RIGHT POPLITEAL AUGMENT: NORMAL
BH CV LOWER VASCULAR RIGHT POPLITEAL COMPETENT: NORMAL
BH CV LOWER VASCULAR RIGHT POPLITEAL COMPRESS: NORMAL
BH CV LOWER VASCULAR RIGHT POPLITEAL PHASIC: NORMAL
BH CV LOWER VASCULAR RIGHT POPLITEAL SPONT: NORMAL
BH CV LOWER VASCULAR RIGHT POSTERIOR TIBIAL COMPRESS: NORMAL
BH CV LOWER VASCULAR RIGHT PROXIMAL FEMORAL COMPRESS: NORMAL
BH CV LOWER VASCULAR RIGHT SAPHENOFEMORAL JUNCTION AUGMENT: NORMAL
BH CV LOWER VASCULAR RIGHT SAPHENOFEMORAL JUNCTION COMPETENT: NORMAL
BH CV LOWER VASCULAR RIGHT SAPHENOFEMORAL JUNCTION COMPRESS: NORMAL
BH CV LOWER VASCULAR RIGHT SAPHENOFEMORAL JUNCTION PHASIC: NORMAL
BH CV LOWER VASCULAR RIGHT SAPHENOFEMORAL JUNCTION SPONT: NORMAL
BH CV LOWER VASCULAR RIGHT SAPHENOPOP JX AUGMENT: NORMAL
BH CV LOWER VASCULAR RIGHT SAPHENOPOP JX COMPETENT: NORMAL
BH CV LOWER VASCULAR RIGHT SAPHENOPOP JX COMPRESS: NORMAL
BH CV LOWER VASCULAR RIGHT SAPHENOPOP JX PHASIC: NORMAL
BH CV LOWER VASCULAR RIGHT SAPHENOPOP JX SPONT: NORMAL
BH CV VAS RIGHT GSV PROXIMAL HIDDEN LRR COMPRESSIBILTY: NORMAL
MAXIMAL PREDICTED HEART RATE: 161 BPM
STRESS TARGET HR: 137 BPM

## 2023-02-20 PROCEDURE — 99214 OFFICE O/P EST MOD 30 MIN: CPT | Performed by: SURGERY

## 2023-02-20 PROCEDURE — 93970 EXTREMITY STUDY: CPT | Performed by: SURGERY

## 2023-02-20 PROCEDURE — 93970 EXTREMITY STUDY: CPT

## 2023-02-20 NOTE — PROGRESS NOTES
[facility]   Follow up Office    Patient Name: Nory Rodriguez  : 1965  MRN: [id number]  Primary Care Physician:  Eileen Barajas MD      Subjective   Subjective     HPI:    Nory Rodrgiuez is a 57 y.o. female here for follow-up for left leg edema after a venous reflux ultrasound of the bilateral lower extremities.  She does have a history of right upper extremity DVT after multiple IVs and blood draws due to problems with fluid in her lungs.  This was back in .  She also developed about a year later a DVT of her left leg.  There was some other DVT for which reason at some point she underwent evaluation for hypercoagulable syndrome which was negative.  The last time she had a DVT was 10 years ago.  The patient currently has no pain in her legs and the changes have not been acute.      Objective     Vitals:   Temp:  [97.2 °F (36.2 °C)] 97.2 °F (36.2 °C)  Heart Rate:  [70] 70  Resp:  [16] 16  BP: (122)/(68) 122/68    Physical Exam      General: Alert, no acute distress  Extremities: Symmetric with mild edema of the left leg when compared to the right.    Diagnostic studies: Venous duplex in the office today demonstrates no significant reflux.  There is evidence of a left popliteal DVT which appears chronic.  The ultrasound tech is uncertain as to whether it could be subacute.    Assessment & Plan   Assessment / Plan     Diagnoses and all orders for this visit:    1. Chronic deep vein thrombosis (DVT) of distal vein of left lower extremity (HCC) (Primary)  -     Duplex Venous Lower Extremity - Left CV-READ; Future    2. May-Thurner syndrome  -     CT abdomen pelvis w contrast; Future       Assessment/Plan:   Mrs. Rodriguez has left leg edema of the years duration which a prior history of DVT.  No significant reflux identified at this time although the venous duplex suggested presence of a DVT of unclear age.  From that standpoint the plan will be to obtain a follow-up duplex in 1 week to ensure that  there is no progression or extension.  I am also recommending that we obtain a CT scan of the abdomen and pelvis with IV contrast to evaluate for any evidence of outflow obstruction.  She will follow-up with me after the the CT.        Electronically signed by Bay Zurita MD, 02/20/23, 4:13 PM EST.

## 2023-02-24 ENCOUNTER — TRANSCRIBE ORDERS (OUTPATIENT)
Dept: ADMINISTRATIVE | Facility: HOSPITAL | Age: 58
End: 2023-02-24
Payer: COMMERCIAL

## 2023-02-24 DIAGNOSIS — M79.641 BILATERAL HAND PAIN: Primary | ICD-10-CM

## 2023-02-24 DIAGNOSIS — M79.642 BILATERAL HAND PAIN: Primary | ICD-10-CM

## 2023-02-27 ENCOUNTER — HOSPITAL ENCOUNTER (OUTPATIENT)
Dept: CT IMAGING | Facility: HOSPITAL | Age: 58
Discharge: HOME | End: 2023-02-27
Admitting: SURGERY
Payer: COMMERCIAL

## 2023-02-27 DIAGNOSIS — I87.1 MAY-THURNER SYNDROME: ICD-10-CM

## 2023-02-27 LAB
CREAT BLDA-MCNC: 0.9 MG/DL
EGFRCR SERPLBLD CKD-EPI 2021: 73.8 ML/MIN/1.73

## 2023-02-27 PROCEDURE — 74177 CT ABD & PELVIS W/CONTRAST: CPT

## 2023-02-27 PROCEDURE — 82565 ASSAY OF CREATININE: CPT

## 2023-02-27 PROCEDURE — 25510000001 IOPAMIDOL PER 1 ML: Performed by: SURGERY

## 2023-02-27 RX ADMIN — IOPAMIDOL 100 ML: 755 INJECTION, SOLUTION INTRAVENOUS at 13:57

## 2023-03-01 ENCOUNTER — HOSPITAL ENCOUNTER (OUTPATIENT)
Dept: CARDIOLOGY | Facility: HOSPITAL | Age: 58
Discharge: HOME | End: 2023-03-01
Admitting: SURGERY
Payer: COMMERCIAL

## 2023-03-01 ENCOUNTER — OFFICE VISIT (OUTPATIENT)
Dept: FAMILY MEDICINE CLINIC | Age: 58
End: 2023-03-01
Payer: COMMERCIAL

## 2023-03-01 VITALS
SYSTOLIC BLOOD PRESSURE: 111 MMHG | WEIGHT: 206.6 LBS | OXYGEN SATURATION: 100 % | DIASTOLIC BLOOD PRESSURE: 50 MMHG | HEART RATE: 81 BPM | TEMPERATURE: 98.6 F | BODY MASS INDEX: 35.27 KG/M2 | HEIGHT: 64 IN

## 2023-03-01 DIAGNOSIS — E11.65 TYPE 2 DIABETES MELLITUS WITH HYPERGLYCEMIA, WITH LONG-TERM CURRENT USE OF INSULIN: ICD-10-CM

## 2023-03-01 DIAGNOSIS — F40.240 CLAUSTROPHOBIA: ICD-10-CM

## 2023-03-01 DIAGNOSIS — M54.2 NECK PAIN ON RIGHT SIDE: ICD-10-CM

## 2023-03-01 DIAGNOSIS — M25.532 PAIN IN BOTH WRISTS: ICD-10-CM

## 2023-03-01 DIAGNOSIS — M79.662 PAIN AND SWELLING OF LEFT LOWER LEG: Primary | ICD-10-CM

## 2023-03-01 DIAGNOSIS — Z79.4 TYPE 2 DIABETES MELLITUS WITH HYPERGLYCEMIA, WITH LONG-TERM CURRENT USE OF INSULIN: ICD-10-CM

## 2023-03-01 DIAGNOSIS — M25.511 ACUTE PAIN OF RIGHT SHOULDER: ICD-10-CM
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
## 2023-03-01 DIAGNOSIS — I82.5Z2 CHRONIC DEEP VEIN THROMBOSIS (DVT) OF DISTAL VEIN OF LEFT LOWER EXTREMITY: ICD-10-CM

## 2023-03-01 DIAGNOSIS — M85.9 LOW BONE DENSITY: ICD-10-CM

## 2023-03-01 DIAGNOSIS — M25.531 PAIN IN BOTH WRISTS: ICD-10-CM

## 2023-03-01 DIAGNOSIS — M79.89 PAIN AND SWELLING OF LEFT LOWER LEG: Primary | ICD-10-CM

## 2023-03-01 LAB
BH CV LOW VAS LEFT POPLITEAL SPONT: 1
BH CV LOW VAS LEFT POSTERIOR TIBIAL VESSEL: 1
BH CV LOWER VASCULAR LEFT DISTAL FEMORAL COMPRESS: NORMAL
BH CV LOWER VASCULAR LEFT GASTRONEMIUS COMPRESS: NORMAL
BH CV LOWER VASCULAR LEFT GREATER SAPH AK COMPRESS: NORMAL
BH CV LOWER VASCULAR LEFT GREATER SAPH BK COMPRESS: NORMAL
BH CV LOWER VASCULAR LEFT LESSER SAPH COMPRESS: NORMAL
BH CV LOWER VASCULAR LEFT MID FEMORAL AUGMENT: NORMAL
BH CV LOWER VASCULAR LEFT MID FEMORAL COMPETENT: NORMAL
BH CV LOWER VASCULAR LEFT MID FEMORAL COMPRESS: NORMAL
BH CV LOWER VASCULAR LEFT MID FEMORAL PHASIC: NORMAL
BH CV LOWER VASCULAR LEFT MID FEMORAL SPONT: NORMAL
BH CV LOWER VASCULAR LEFT PERONEAL COMPRESS: NORMAL
BH CV LOWER VASCULAR LEFT POPLITEAL COMPRESS: NORMAL
BH CV LOWER VASCULAR LEFT POPLITEAL SPONT: NORMAL
BH CV LOWER VASCULAR LEFT POSTERIOR TIBIAL COMPRESS: NORMAL
BH CV LOWER VASCULAR LEFT PROXIMAL FEMORAL COMPRESS: NORMAL
BH CV LOWER VASCULAR RIGHT COMMON FEMORAL AUGMENT: NORMAL
BH CV LOWER VASCULAR RIGHT COMMON FEMORAL COMPETENT: NORMAL
BH CV LOWER VASCULAR RIGHT COMMON FEMORAL COMPRESS: NORMAL
BH CV LOWER VASCULAR RIGHT COMMON FEMORAL PHASIC: NORMAL
BH CV LOWER VASCULAR RIGHT COMMON FEMORAL SPONT: NORMAL
MAXIMAL PREDICTED HEART RATE: 161 BPM
STRESS TARGET HR: 137 BPM

## 2023-03-01 PROCEDURE — 93971 EXTREMITY STUDY: CPT

## 2023-03-01 PROCEDURE — 99214 OFFICE O/P EST MOD 30 MIN: CPT | Performed by: NURSE PRACTITIONER

## 2023-03-01 PROCEDURE — 93971 EXTREMITY STUDY: CPT | Performed by: SURGERY

## 2023-03-01 RX ORDER — DIOSMIN COMPLEX NO.1 630 MG
1 TABLET ORAL DAILY
Qty: 30 TABLET | Refills: 11 | Status: SHIPPED | OUTPATIENT
Start: 2023-03-01

## 2023-03-01 RX ORDER — DIAZEPAM 5 MG/1
5 TABLET ORAL ONCE
Qty: 2 TABLET | Refills: 0 | Status: SHIPPED | OUTPATIENT
Start: 2023-03-01 | End: 2023-03-01

## 2023-03-01 RX ORDER — ALENDRONATE SODIUM 70 MG/1
70 TABLET ORAL
Qty: 4 TABLET | Refills: 0 | Status: SHIPPED | OUTPATIENT
Start: 2023-03-01

## 2023-03-01 RX ORDER — TRAMADOL HYDROCHLORIDE 50 MG/1
50 TABLET ORAL EVERY 6 HOURS PRN
Qty: 18 TABLET | Refills: 0 | Status: SHIPPED | OUTPATIENT
Start: 2023-03-01

## 2023-03-01 NOTE — PROGRESS NOTES
Called patient to discuss ultrasound results.  No evidence of extension of the popliteal chronic DVT.  The report indicates the presence of acute DVT of the posterior tibial vein.  On my review this appears consistent with chronic changes.  The CT scan demonstrates no evidence of venous outflow obstruction.  Discussed both results with the patient.  No surgical intervention recommended from my standpoint.  Discussed the continuation of compression which only controls the edema does not fix it.  Recommend Vasculera.  A prescription has been sent.  Follow-up as needed.
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
## 2023-03-10 ENCOUNTER — TRANSCRIBE ORDERS (OUTPATIENT)
Dept: FAMILY MEDICINE CLINIC | Age: 58
End: 2023-03-10
Payer: COMMERCIAL

## 2023-03-10 ENCOUNTER — PATIENT OUTREACH (OUTPATIENT)
Dept: CASE MANAGEMENT | Facility: OTHER | Age: 58
End: 2023-03-10
Payer: COMMERCIAL

## 2023-03-10 DIAGNOSIS — M79.641 RIGHT HAND PAIN: Primary | ICD-10-CM

## 2023-03-10 DIAGNOSIS — Z12.31 SCREENING MAMMOGRAM FOR BREAST CANCER: Primary | ICD-10-CM

## 2023-03-10 NOTE — OUTREACH NOTE
"AMBULATORY CASE MANAGEMENT NOTE    Name and Relationship of Patient/Support Person: Nory Rodriguez \"Cherelle\" - Self    Cherelle was identified on [facility] as presenting to the Er for hip pain. She states her pain was an 11/10 and she was told most likely she twisted.  She is using heat with some relief.  She cannot take the prescribed pain medication due to nausea. She did call Dr. Hardy's office as directed and is supposed to call them for an appointment if she is not better.  She also was in contact with her rheumatologist who have placed lab orders, she is coming in today to have completed. She denies needing an appointment with PCP, her needs are being addressed with specialists.  Called eye doctor for last office visit for comprehensive exam.  Updated care team to reflect current doctors/specialists.  She is requesting an order for a mammogram prior to her appointment, will pend order for PCP to sign.   She does not need/denies ACM services at this time.     Education Documentation  No documentation found.        Ayde HOPPER  Ambulatory Case Management    3/10/2023, 11:09 EST  "

## 2023-03-14 ENCOUNTER — TRANSCRIBE ORDERS (OUTPATIENT)
Dept: ADMINISTRATIVE | Facility: HOSPITAL | Age: 58
End: 2023-03-14
Payer: COMMERCIAL

## 2023-03-14 ENCOUNTER — LAB (OUTPATIENT)
Dept: LAB | Facility: HOSPITAL | Age: 58
End: 2023-03-14
Payer: COMMERCIAL

## 2023-03-14 DIAGNOSIS — M79.642 LEFT HAND PAIN: ICD-10-CM

## 2023-03-14 DIAGNOSIS — M79.641 RIGHT HAND PAIN: ICD-10-CM

## 2023-03-14 DIAGNOSIS — M79.641 RIGHT HAND PAIN: Primary | ICD-10-CM

## 2023-03-14 DIAGNOSIS — M79.641 BILATERAL HAND PAIN: ICD-10-CM

## 2023-03-14 DIAGNOSIS — M79.642 BILATERAL HAND PAIN: ICD-10-CM

## 2023-03-14 LAB — ERYTHROCYTE [SEDIMENTATION RATE] IN BLOOD: 40 MM/HR (ref 0–30)

## 2023-03-14 PROCEDURE — 85652 RBC SED RATE AUTOMATED: CPT

## 2023-03-14 PROCEDURE — 36415 COLL VENOUS BLD VENIPUNCTURE: CPT

## 2023-03-15 RX ORDER — TRAZODONE HYDROCHLORIDE 100 MG/1
TABLET ORAL
Qty: 90 TABLET | OUTPATIENT
Start: 2023-03-15

## 2023-04-06 ENCOUNTER — OFFICE VISIT (OUTPATIENT)
Dept: FAMILY MEDICINE CLINIC | Age: 58
End: 2023-04-06
Payer: COMMERCIAL

## 2023-04-06 VITALS
HEIGHT: 64 IN | BODY MASS INDEX: 34.15 KG/M2 | DIASTOLIC BLOOD PRESSURE: 51 MMHG | WEIGHT: 200 LBS | SYSTOLIC BLOOD PRESSURE: 113 MMHG | HEART RATE: 70 BPM | OXYGEN SATURATION: 98 %

## 2023-04-06 DIAGNOSIS — E11.65 TYPE 2 DIABETES MELLITUS WITH HYPERGLYCEMIA, WITH LONG-TERM CURRENT USE OF INSULIN: Primary | ICD-10-CM

## 2023-04-06 DIAGNOSIS — F51.01 PRIMARY INSOMNIA: ICD-10-CM

## 2023-04-06 DIAGNOSIS — M05.9 RHEUMATOID ARTHRITIS WITH POSITIVE RHEUMATOID FACTOR, INVOLVING UNSPECIFIED SITE: ICD-10-CM

## 2023-04-06 DIAGNOSIS — K21.00 GASTROESOPHAGEAL REFLUX DISEASE WITH ESOPHAGITIS WITHOUT HEMORRHAGE: ICD-10-CM

## 2023-04-06 DIAGNOSIS — Z79.4 TYPE 2 DIABETES MELLITUS WITH HYPERGLYCEMIA, WITH LONG-TERM CURRENT USE OF INSULIN: Primary | ICD-10-CM

## 2023-04-06 DIAGNOSIS — Z23 ENCOUNTER FOR IMMUNIZATION: ICD-10-CM

## 2023-04-06 DIAGNOSIS — I10 HYPERTENSION, UNSPECIFIED TYPE: ICD-10-CM

## 2023-04-06 DIAGNOSIS — B37.9 CANDIDIASIS: ICD-10-CM

## 2023-04-06 DIAGNOSIS — E78.00 PURE HYPERCHOLESTEROLEMIA: ICD-10-CM

## 2023-04-06 DIAGNOSIS — E66.01 CLASS 2 SEVERE OBESITY DUE TO EXCESS CALORIES WITH SERIOUS COMORBIDITY AND BODY MASS INDEX (BMI) OF 38.0 TO 38.9 IN ADULT: ICD-10-CM

## 2023-04-06 DIAGNOSIS — E10.42 DIABETIC POLYNEUROPATHY ASSOCIATED WITH TYPE 1 DIABETES MELLITUS: ICD-10-CM

## 2023-04-06 RX ORDER — CYCLOBENZAPRINE HCL 10 MG
TABLET ORAL
COMMUNITY
Start: 2023-03-09

## 2023-04-06 RX ORDER — MONTELUKAST SODIUM 10 MG/1
10 TABLET ORAL DAILY
COMMUNITY
Start: 2023-02-10

## 2023-04-06 RX ORDER — LISINOPRIL 2.5 MG/1
2.5 TABLET ORAL DAILY
COMMUNITY
Start: 2023-02-01

## 2023-04-06 RX ORDER — FLUCONAZOLE 150 MG/1
150 TABLET ORAL
COMMUNITY
Start: 2023-01-16

## 2023-04-06 RX ORDER — METHYLPREDNISOLONE 4 MG/1
TABLET ORAL
COMMUNITY
Start: 2023-03-09 | End: 2023-04-06

## 2023-04-06 RX ORDER — OXYCODONE HYDROCHLORIDE AND ACETAMINOPHEN 5; 325 MG/1; MG/1
TABLET ORAL
COMMUNITY
Start: 2023-03-09

## 2023-04-06 RX ORDER — METHOCARBAMOL 500 MG/1
1000 TABLET, FILM COATED ORAL
COMMUNITY
Start: 2023-02-01

## 2023-04-06 RX ORDER — DIAZEPAM 5 MG/1
TABLET ORAL
COMMUNITY
Start: 2023-03-01
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
## 2023-04-10 DIAGNOSIS — E78.00 HYPERCHOLESTEROLEMIA: ICD-10-CM

## 2023-04-10 RX ORDER — ATORVASTATIN CALCIUM 10 MG/1
TABLET, FILM COATED ORAL
Qty: 90 TABLET | Refills: 0 | Status: SHIPPED | OUTPATIENT
Start: 2023-04-10

## 2023-04-17 ENCOUNTER — TELEPHONE (OUTPATIENT)
Dept: FAMILY MEDICINE CLINIC | Age: 58
End: 2023-04-17
Payer: COMMERCIAL

## 2023-04-17 NOTE — TELEPHONE ENCOUNTER
"  Caller: Nory Rodriguez \"Cherelle\"    Relationship: Self    Best call back number: [phone]    What is the best time to reach you: ANY TIME    Who are you requesting to speak with (clinical staff, provider,  specific staff member): JASON    What was the call regarding: PATIENT HAS TO CHANGE  PHARMACY DUE TO INSURANCE CHANGE ALL PRESCRIPTIONS NEED TO BE SENT TO [facility] MAIL ORDER THEY ARE REQUESTING A LIST OF CURRENT MEDICATIONS    Do you require a callback: YES PATIENT WOULD LIKE TO SPEAK WITH JASON"

## 2023-04-22 ENCOUNTER — HOSPITAL ENCOUNTER (OUTPATIENT)
Dept: MRI IMAGING | Facility: HOSPITAL | Age: 58
Discharge: HOME | End: 2023-04-22
Payer: COMMERCIAL

## 2023-04-22 DIAGNOSIS — M25.531 PAIN IN BOTH WRISTS: ICD-10-CM

## 2023-04-22 DIAGNOSIS — M25.532 PAIN IN BOTH WRISTS: ICD-10-CM

## 2023-04-22 PROCEDURE — 73221 MRI JOINT UPR EXTREM W/O DYE: CPT

## 2023-04-24 NOTE — PROGRESS NOTES
Left wrist MRI normal. Right wrist shows no acute changes, a ganglion cyst is seen. Can refer to hand surgeon if patient is having a lot of discomfort or numbness to discuss possible removal and treatment.

## 2023-04-25 ENCOUNTER — OFFICE VISIT (OUTPATIENT)
Dept: FAMILY MEDICINE CLINIC | Age: 58
End: 2023-04-25
Payer: COMMERCIAL

## 2023-04-25 VITALS
OXYGEN SATURATION: 99 % | WEIGHT: 202 LBS | TEMPERATURE: 98.2 F | DIASTOLIC BLOOD PRESSURE: 68 MMHG | SYSTOLIC BLOOD PRESSURE: 138 MMHG | HEIGHT: 64 IN | BODY MASS INDEX: 34.49 KG/M2 | HEART RATE: 72 BPM

## 2023-04-25 DIAGNOSIS — Z87.898 HISTORY OF NAUSEA AND VOMITING: ICD-10-CM

## 2023-04-25 DIAGNOSIS — U07.1 COVID: Primary | ICD-10-CM

## 2023-04-25 RX ORDER — PROMETHAZINE HYDROCHLORIDE 25 MG/1
25 TABLET ORAL EVERY 6 HOURS PRN
Qty: 30 TABLET | Refills: 0 | Status: SHIPPED | OUTPATIENT
Start: 2023-04-25

## 2023-04-25 NOTE — PROGRESS NOTES
"Chief Complaint  covid (Pt tested pos this am ), Shortness of Breath, Nasal Congestion, Cough, and Headache    Subjective        Nory Rodriguez presents to [facility]  History of Present Illness  Patient here today for concerns of possible covid infection or URI     Known Exposure to positive case?   yes  Date of exposure?   4/23/2023  Date of symptoms start?   4/24/2023  (Symptoms may appear 2-14 days after exposure )    Fever or chills?   yes  Cough?   yes  Shortness of breath or difficulty breathing?  yes  Fatigue?   yes  Muscle or body aches?  yes   Headache?   yes  New loss of taste or smell? no  Sore throat?  yes  Congestion or runny nose? yes  Nausea or vomiting?   no  Diarrhea?   no  Any  emergency warning signs for COVID-19.   Trouble breathing?  no  Persistent pain or pressure in the chest?   no  New confusion? no  Inability to wake or stay awake?no  Pale, gray, or blue-colored skin, lips, or nail beds, depending on skin tone?   no    Taking any medications at home to help with symptoms?no  Any prior vaccine to covid?   yes  Any significant health problems / existing lung / heart problems?  yes  Interested in monoclonal antibody treatment if test result is positive? no  (must be symptomatic, have a positive PCR covid test and meet ONE of the following criteria and be at least 18 YOA)  Age 65 or older  Obesity (BMI of 25 or more)  Pregnancy  Immunosuppressed  Diabetes  CKD  Cardiovascular disease, hypertension, chronic lung disease  Sickle cell disease   Neurodevelopment disorder  Objective   Vital Signs:  /68 (BP Location: Right arm, Patient Position: Sitting, Cuff Size: Adult)   Pulse 72   Temp 98.2 °F (36.8 °C) (Oral)   Ht 162.6 cm (64.02\")   Wt 91.6 kg (202 lb)   SpO2 99% Comment: room air  BMI 34.65 kg/m²   Estimated body mass index is 34.65 kg/m² as calculated from the following:    Height as of this encounter: 162.6 cm (64.02\").    Weight as of this " encounter: 91.6 kg (202 lb).             Physical Exam  HENT:      Nose: Rhinorrhea present.   Cardiovascular:      Rate and Rhythm: Normal rate and regular rhythm.   Pulmonary:      Effort: Pulmonary effort is normal. No respiratory distress.      Breath sounds: Normal breath sounds. No stridor. No wheezing, rhonchi or rales.   Skin:     General: Skin is warm and dry.   Neurological:      Mental Status: She is alert and oriented to person, place, and time.   Psychiatric:         Mood and Affect: Mood normal.        Result Review :                   Assessment and Plan   Diagnoses and all orders for this visit:    1. COVID (Primary)  -     Nirmatrelvir&Ritonavir 300/100 (PAXLOVID) 20 x 150 MG & 10 x 100MG tablet therapy pack tablet; Take 3 tablets by mouth 2 (Two) Times a Day for 5 days.  Dispense: 30 tablet; Refill: 0    2. History of nausea and vomiting  -     promethazine (PHENERGAN) 25 MG tablet; Take 1 tablet by mouth Every 6 (Six) Hours As Needed for Nausea or Vomiting.  Dispense: 30 tablet; Refill: 0      CDC guidelines regarding isolation/quarantine discussed.  We will send in prescription for Paxlovid.  Notified of EUA designation.  Caution regarding drug drug interactions.  Guidance given for signs and symptoms that would indicate a need to seek immediate medical attention.         Follow Up   Return if symptoms worsen or fail to improve.  Patient was given instructions and counseling regarding her condition or for health maintenance advice. Please see specific information pulled into the AVS if appropriate.

## 2023-04-29 DIAGNOSIS — M25.532 PAIN IN BOTH WRISTS: Primary | ICD-10-CM

## 2023-04-29 DIAGNOSIS — M67.431 GANGLION CYST OF WRIST, RIGHT: ICD-10-CM

## 2023-04-29 DIAGNOSIS — M25.531 PAIN IN BOTH WRISTS: Primary | ICD-10-CM

## 2023-05-01 ENCOUNTER — TRANSCRIBE ORDERS (OUTPATIENT)
Dept: ADMINISTRATIVE | Facility: HOSPITAL | Age: 58
End: 2023-05-01
Payer: COMMERCIAL

## 2023-05-01 ENCOUNTER — HOSPITAL ENCOUNTER (OUTPATIENT)
Dept: GENERAL RADIOLOGY | Facility: HOSPITAL | Age: 58
Discharge: HOME | End: 2023-05-01
Payer: COMMERCIAL

## 2023-05-01 DIAGNOSIS — M79.642 BILATERAL HAND PAIN: Primary | ICD-10-CM

## 2023-05-01 DIAGNOSIS — M79.642 BILATERAL HAND PAIN: ICD-10-CM

## 2023-05-01 DIAGNOSIS — M79.641 BILATERAL HAND PAIN: Primary | ICD-10-CM

## 2023-05-01 DIAGNOSIS — M79.641 BILATERAL HAND PAIN: ICD-10-CM

## 2023-05-01 PROCEDURE — 73130 X-RAY EXAM OF HAND: CPT

## 2023-05-04 RX ORDER — DILTIAZEM HYDROCHLORIDE 120 MG/1
CAPSULE, COATED, EXTENDED RELEASE ORAL
Qty: 90 CAPSULE | Refills: 0 | Status: SHIPPED | OUTPATIENT
Start: 2023-05-04

## 2023-05-25 ENCOUNTER — HOSPITAL ENCOUNTER (OUTPATIENT)
Dept: MAMMOGRAPHY | Facility: HOSPITAL | Age: 58
Discharge: HOME | End: 2023-05-25
Admitting: FAMILY MEDICINE
Payer: COMMERCIAL

## 2023-05-25 DIAGNOSIS — Z12.31 SCREENING MAMMOGRAM FOR BREAST CANCER: ICD-10-CM

## 2023-05-25 PROCEDURE — 77063 BREAST TOMOSYNTHESIS BI: CPT

## 2023-05-25 PROCEDURE — 77067 SCR MAMMO BI INCL CAD: CPT

## 2023-06-01 ENCOUNTER — TRANSCRIBE ORDERS (OUTPATIENT)
Dept: ADMINISTRATIVE | Facility: HOSPITAL | Age: 58
End: 2023-06-01
Payer: COMMERCIAL

## 2023-06-01 ENCOUNTER — LAB (OUTPATIENT)
Dept: LAB | Facility: HOSPITAL | Age: 58
End: 2023-06-01
Payer: COMMERCIAL

## 2023-06-01 DIAGNOSIS — E11.49 TYPE II DIABETES MELLITUS WITH NEUROLOGICAL MANIFESTATIONS: Primary | ICD-10-CM

## 2023-06-01 DIAGNOSIS — E11.49 TYPE II DIABETES MELLITUS WITH NEUROLOGICAL MANIFESTATIONS: ICD-10-CM

## 2023-06-01 DIAGNOSIS — E78.5 HYPERLIPIDEMIA, UNSPECIFIED HYPERLIPIDEMIA TYPE: ICD-10-CM

## 2023-06-01 DIAGNOSIS — I10 ESSENTIAL HYPERTENSION: ICD-10-CM

## 2023-06-01 LAB — HBA1C MFR BLD: 6.8 % (ref 4.8–5.6)

## 2023-06-01 PROCEDURE — 80053 COMPREHEN METABOLIC PANEL: CPT

## 2023-06-01 PROCEDURE — 83036 HEMOGLOBIN GLYCOSYLATED A1C: CPT

## 2023-06-01 PROCEDURE — 36415 COLL VENOUS BLD VENIPUNCTURE: CPT

## 2023-06-02 LAB
ALBUMIN SERPL-MCNC: 3.6 G/DL (ref 3.5–5.2)
ALBUMIN/GLOB SERPL: 1.1 G/DL
ALP SERPL-CCNC: 99 U/L (ref 39–117)
ALT SERPL W P-5'-P-CCNC: 16 U/L (ref 1–33)
ANION GAP SERPL CALCULATED.3IONS-SCNC: 14.7 MMOL/L (ref 5–15)
AST SERPL-CCNC: 16 U/L (ref 1–32)
BILIRUB SERPL-MCNC: 0.2 MG/DL (ref 0–1.2)
BUN SERPL-MCNC: 21 MG/DL (ref 6–20)
BUN/CREAT SERPL: 22.3 (ref 7–25)
CALCIUM SPEC-SCNC: 9.2 MG/DL (ref 8.6–10.5)
CHLORIDE SERPL-SCNC: 103 MMOL/L (ref 98–107)
CO2 SERPL-SCNC: 22.3 MMOL/L (ref 22–29)
CREAT SERPL-MCNC: 0.94 MG/DL (ref 0.57–1)
EGFRCR SERPLBLD CKD-EPI 2021: 70 ML/MIN/1.73
GLOBULIN UR ELPH-MCNC: 3.2 GM/DL
GLUCOSE SERPL-MCNC: 128 MG/DL (ref 65–99)
POTASSIUM SERPL-SCNC: 4.1 MMOL/L (ref 3.5–5.2)
PROT SERPL-MCNC: 6.8 G/DL (ref 6–8.5)
SODIUM SERPL-SCNC: 140 MMOL/L (ref 136–145)

## 2023-06-08 ENCOUNTER — TELEPHONE (OUTPATIENT)
Dept: FAMILY MEDICINE CLINIC | Age: 58
End: 2023-06-08
Payer: COMMERCIAL

## 2023-06-08 DIAGNOSIS — L98.7 EXCESSIVE SKIN AND SUBCUTANEOUS TISSUE: Primary | ICD-10-CM

## 2023-06-08 NOTE — TELEPHONE ENCOUNTER
"Caller: Nory Rodriguez \"Cherelle\"    Relationship: Self    Best call back number:     [phone]       What is the medical concern/diagnosis: RASHES DUE TO WEIGHT LOSS    What specialty or service is being requested: PLASTIC SURGERY     Any additional details: PATIENT STATES SHE WAS GIVEN THE OKAY BY HER DIABETIC DOCTOR FOR THE REFERRAL FOR PLASTIC SURGERY TO BE DONE. PATIENT WOULD LIKE TO BE NOTIFIED AS SOON AS IT DONE.     "

## 2023-06-12 NOTE — TELEPHONE ENCOUNTER
Pt needed referral to plastic surgery to get rid of her excess fat its causing her to have a lot yeast infections, she said her diabteic dr approved and that she has discussed this w/ you before

## 2023-06-28 ENCOUNTER — TELEPHONE (OUTPATIENT)
Dept: FAMILY MEDICINE CLINIC | Age: 58
End: 2023-06-28

## 2023-06-28 NOTE — TELEPHONE ENCOUNTER
Pt called stating she is needing a letter on letter head stating she cant be in a small enclosure w/ cats needing by tomorrow she is flying on Friday pt said she spoke to [facility] about this but I didn't see anything in chart

## 2023-06-29 NOTE — TELEPHONE ENCOUNTER
Letter was done and is on printer.  I will need to sign and we will need to put a seal on it for her to .  Dr. Barajas

## 2023-08-01 DIAGNOSIS — K21.00 GASTROESOPHAGEAL REFLUX DISEASE WITH ESOPHAGITIS WITHOUT HEMORRHAGE: ICD-10-CM

## 2023-08-01 RX ORDER — DILTIAZEM HYDROCHLORIDE 120 MG/1
CAPSULE, COATED, EXTENDED RELEASE ORAL
Qty: 30 CAPSULE | Refills: 5 | Status: SHIPPED | OUTPATIENT
Start: 2023-08-01

## 2023-08-01 RX ORDER — OMEPRAZOLE 40 MG/1
CAPSULE, DELAYED RELEASE ORAL
Qty: 90 CAPSULE | Refills: 1 | Status: SHIPPED | OUTPATIENT
Start: 2023-08-01

## 2023-08-03 ENCOUNTER — LAB (OUTPATIENT)
Dept: LAB | Facility: HOSPITAL | Age: 58
End: 2023-08-03
Payer: COMMERCIAL

## 2023-08-03 ENCOUNTER — TRANSCRIBE ORDERS (OUTPATIENT)
Dept: ADMINISTRATIVE | Facility: HOSPITAL | Age: 58
End: 2023-08-03
Payer: COMMERCIAL

## 2023-08-03 DIAGNOSIS — Z79.899 ENCOUNTER FOR LONG-TERM (CURRENT) USE OF OTHER MEDICATIONS: ICD-10-CM

## 2023-08-03 DIAGNOSIS — M05.9 SEROPOSITIVE RHEUMATOID ARTHRITIS: Primary | ICD-10-CM

## 2023-08-03 DIAGNOSIS — M05.9 SEROPOSITIVE RHEUMATOID ARTHRITIS: ICD-10-CM

## 2023-08-03 LAB
ALBUMIN SERPL-MCNC: 4 G/DL (ref 3.5–5.2)
ALP SERPL-CCNC: 86 U/L (ref 39–117)
ALT SERPL W P-5'-P-CCNC: 13 U/L (ref 1–33)
AST SERPL-CCNC: 15 U/L (ref 1–32)
BASOPHILS # BLD AUTO: 0.04 10*3/MM3 (ref 0–0.2)
BASOPHILS NFR BLD AUTO: 0.5 % (ref 0–1.5)
BILIRUB CONJ SERPL-MCNC: <0.2 MG/DL (ref 0–0.3)
BILIRUB INDIRECT SERPL-MCNC: NORMAL MG/DL
BILIRUB SERPL-MCNC: 0.2 MG/DL (ref 0–1.2)
CREAT SERPL-MCNC: 0.89 MG/DL (ref 0.57–1)
DEPRECATED RDW RBC AUTO: 44.3 FL (ref 37–54)
EGFRCR SERPLBLD CKD-EPI 2021: 74.3 ML/MIN/1.73
EOSINOPHIL # BLD AUTO: 0.33 10*3/MM3 (ref 0–0.4)
EOSINOPHIL NFR BLD AUTO: 4.4 % (ref 0.3–6.2)
ERYTHROCYTE [DISTWIDTH] IN BLOOD BY AUTOMATED COUNT: 12.3 % (ref 12.3–15.4)
ERYTHROCYTE [SEDIMENTATION RATE] IN BLOOD: 25 MM/HR (ref 0–30)
HAV IGM SERPL QL IA: NORMAL
HBV CORE IGM SERPL QL IA: NORMAL
HBV SURFACE AG SERPL QL IA: NORMAL
HCT VFR BLD AUTO: 38.9 % (ref 34–46.6)
HCV AB SER DONR QL: NORMAL
HGB BLD-MCNC: 12.2 G/DL (ref 12–15.9)
IMM GRANULOCYTES # BLD AUTO: 0.02 10*3/MM3 (ref 0–0.05)
IMM GRANULOCYTES NFR BLD AUTO: 0.3 % (ref 0–0.5)
LYMPHOCYTES # BLD AUTO: 2 10*3/MM3 (ref 0.7–3.1)
LYMPHOCYTES NFR BLD AUTO: 26.5 % (ref 19.6–45.3)
MCH RBC QN AUTO: 30.9 PG (ref 26.6–33)
MCHC RBC AUTO-ENTMCNC: 31.4 G/DL (ref 31.5–35.7)
MCV RBC AUTO: 98.5 FL (ref 79–97)
MONOCYTES # BLD AUTO: 0.72 10*3/MM3 (ref 0.1–0.9)
MONOCYTES NFR BLD AUTO: 9.5 % (ref 5–12)
NEUTROPHILS NFR BLD AUTO: 4.45 10*3/MM3 (ref 1.7–7)
NEUTROPHILS NFR BLD AUTO: 58.8 % (ref 42.7–76)
NRBC BLD AUTO-RTO: 0 /100 WBC (ref 0–0.2)
PLATELET # BLD AUTO: 234 10*3/MM3 (ref 140–450)
PMV BLD AUTO: 11.2 FL (ref 6–12)
PROT SERPL-MCNC: 6.5 G/DL (ref 6–8.5)
RBC # BLD AUTO: 3.95 10*6/MM3 (ref 3.77–5.28)
WBC NRBC COR # BLD: 7.56 10*3/MM3 (ref 3.4–10.8)

## 2023-08-03 PROCEDURE — 85652 RBC SED RATE AUTOMATED: CPT

## 2023-08-03 PROCEDURE — 82565 ASSAY OF CREATININE: CPT

## 2023-08-03 PROCEDURE — 85025 COMPLETE CBC W/AUTO DIFF WBC: CPT

## 2023-08-03 PROCEDURE — 80076 HEPATIC FUNCTION PANEL: CPT

## 2023-08-03 PROCEDURE — 80074 ACUTE HEPATITIS PANEL: CPT

## 2023-08-03 PROCEDURE — 36415 COLL VENOUS BLD VENIPUNCTURE: CPT

## 2023-08-07 DIAGNOSIS — Z79.890 POSTMENOPAUSAL HORMONE THERAPY: ICD-10-CM

## 2023-08-08 RX ORDER — ESTRADIOL 0.5 MG/1
TABLET ORAL
Qty: 90 TABLET | Refills: 1 | Status: SHIPPED | OUTPATIENT
Start: 2023-08-08

## 2023-08-28 DIAGNOSIS — F51.01 PRIMARY INSOMNIA: ICD-10-CM

## 2023-08-28 DIAGNOSIS — E78.00 HYPERCHOLESTEROLEMIA: ICD-10-CM

## 2023-08-28 DIAGNOSIS — M85.9 LOW BONE DENSITY: ICD-10-CM

## 2023-08-28 RX ORDER — ATORVASTATIN CALCIUM 10 MG/1
10 TABLET, FILM COATED ORAL NIGHTLY
Qty: 90 TABLET | Refills: 0 | Status: SHIPPED | OUTPATIENT
Start: 2023-08-28

## 2023-08-28 RX ORDER — ALENDRONATE SODIUM 70 MG/1
70 TABLET ORAL
Qty: 13 TABLET | Refills: 1 | Status: SHIPPED | OUTPATIENT
Start: 2023-08-28

## 2023-08-28 RX ORDER — TRAZODONE HYDROCHLORIDE 150 MG/1
150 TABLET ORAL NIGHTLY
Qty: 90 TABLET | Refills: 1 | Status: SHIPPED | OUTPATIENT
Start: 2023-08-28

## 2023-08-28 NOTE — TELEPHONE ENCOUNTER
"    Caller: Michael Nory NEHEMIAS \"Cherelle\"    Relationship: Self    Best call back number: [phone]    Requested Prescriptions:   Requested Prescriptions     Pending Prescriptions Disp Refills    traZODone (DESYREL) 150 MG tablet 90 tablet 1     Sig: Take 1 tablet by mouth Every Night.    atorvastatin (LIPITOR) 10 MG tablet 30 tablet 0     Sig: Take 1 tablet by mouth Every Night.    alendronate (FOSAMAX) 70 MG tablet 4 tablet 0        Pharmacy where request should be sent: [facility]/PHARMACY #[id number] - [place], [place] - [address] [phone] [facility] [phone] FX     Last office visit with prescribing clinician: 7/13/2023   Last telemedicine visit with prescribing clinician: Visit date not found   Next office visit with prescribing clinician: 10/10/2023     Additional details provided by patient: PATIENT IS COMPLETELY OUT OF THESE MEDICATIONS. HER INSURANCE HAS CHANGED AND SHE HAS TO USE THIS PHARMACY NOW. PLEASE SEND NEW PRESCRIPTIONS WITH 90 DAY REFILLS EACH TIME AS THIS IS ALLOWED NOW AT THIS PHARMACY.    Does the patient have less than a 3 day supply:  [x] Yes  [] No    Would you like a call back once the refill request has been completed: [] Yes [] No    If the office needs to give you a call back, can they leave a voicemail: [] Yes [] No    Beatris Contreras   08/28/23 08:08 EDT         "

## 2023-09-05 ENCOUNTER — OFFICE VISIT (OUTPATIENT)
Dept: FAMILY MEDICINE CLINIC | Age: 58
End: 2023-09-05
Payer: COMMERCIAL

## 2023-09-05 VITALS
HEIGHT: 64 IN | DIASTOLIC BLOOD PRESSURE: 69 MMHG | BODY MASS INDEX: 33.9 KG/M2 | WEIGHT: 198.6 LBS | SYSTOLIC BLOOD PRESSURE: 138 MMHG | HEART RATE: 76 BPM | OXYGEN SATURATION: 98 %

## 2023-09-05 DIAGNOSIS — Z79.4 TYPE 2 DIABETES MELLITUS WITH HYPERGLYCEMIA, WITH LONG-TERM CURRENT USE OF INSULIN: ICD-10-CM

## 2023-09-05 DIAGNOSIS — J30.1 SEASONAL ALLERGIC RHINITIS DUE TO POLLEN: ICD-10-CM

## 2023-09-05 DIAGNOSIS — I10 HYPERTENSION, UNSPECIFIED TYPE: ICD-10-CM

## 2023-09-05 DIAGNOSIS — E11.65 TYPE 2 DIABETES MELLITUS WITH HYPERGLYCEMIA, WITH LONG-TERM CURRENT USE OF INSULIN: ICD-10-CM

## 2023-09-05 DIAGNOSIS — R21 RASH: ICD-10-CM

## 2023-09-05 DIAGNOSIS — H61.23 BILATERAL IMPACTED CERUMEN: Primary | ICD-10-CM

## 2023-09-05 DIAGNOSIS — M05.9 RHEUMATOID ARTHRITIS WITH POSITIVE RHEUMATOID FACTOR, INVOLVING UNSPECIFIED SITE: ICD-10-CM

## 2023-09-05 DIAGNOSIS — E66.09 CLASS 1 OBESITY DUE TO EXCESS CALORIES WITH SERIOUS COMORBIDITY AND BODY MASS INDEX (BMI) OF 34.0 TO 34.9 IN ADULT: ICD-10-CM

## 2023-09-05 PROCEDURE — 99214 OFFICE O/P EST MOD 30 MIN: CPT | Performed by: FAMILY MEDICINE

## 2023-09-05 RX ORDER — LEFLUNOMIDE 10 MG/1
10 TABLET ORAL DAILY
COMMUNITY

## 2023-09-05 NOTE — PROGRESS NOTES
Nory Rodriguez presents to [facility].    Chief Complaint:  diabetes    Subjective     History of Present Illness:  Hearing Problem  Associated symptoms include a rash. Pertinent negatives include no abdominal pain, chest pain, chills, coughing, fatigue, fever, nausea, sore throat or vomiting.   Blister  Associated symptoms include a rash. Pertinent negatives include no abdominal pain, chest pain, chills, coughing, fatigue, fever, nausea, sore throat or vomiting.   Dizziness  Associated symptoms include a rash. Pertinent negatives include no abdominal pain, chest pain, chills, coughing, fatigue, fever, nausea, sore throat or vomiting.     Type 2 diabetes mellitus with diabetic polyneuropathy. Compliance with treatment has been good, she has lost over 100 #s and she continues to lose weight.  Tobacco screen: Non-smoker.  Current meds include an oral hypoglycemic ( Glucophage ), JARDIANCE, insulin/injectable ( Humalog- about 6 units with meals which is once a day, SSI and carb counting; Trulicity (recently increased to 4.5); toujeo-80 in am, 100 units qhs ), an ACE inhibitor, aspirin, and a lipid lowering agent.  She reports home blood glucose readings have been good with average fasting readings in the 125-150 mg/dL range. She checks her glucose 3 to 4 times daily or with dexcom.   In regard to preventative care, she performs foot self-exams several days per week and her last ophthalmology exam was in 3/27/2023 Dr Dasilva,  NO DIABETIC RETINOPATHY, she has cataracts s/p replacement.  She sees Kerri [facility] endocrinology who manages her insulin       She now has dx of RA and she sees Dr Turner, rheumatology, she is on tramadol for her chronic LBP and RA pain. She is newly on 2 new chemo drugs Arava and continues to take Plaquenil.  She is to watch for side effects including diarrhea and perform her eye exams every 3 months      She is sleeping well on melatonin and trazodone 150 mg.  She has  sleep apnea and is no longer using her cpap     Cherelle presents with essential (primary) hypertension, her current cardiac medication regimen includes a diuretic ( hctz ), an ACE inhibitor ( lisinopril 2.5 mg for her diabetes ), and a calcium channel blocker ( diltiazem ).  Compliance with treatment has been good; she takes her medication as directed, maintains her diet and exercise regimen, and follows up as directed.  She is tolerating the medication well without side effects.  She is continuing to work on wt loss, healthy diet and avoids Na in diet    Cherelle has chronic allergies, stable on singulair/zyrtec/allergy shots once a month, she is stable and doing well at this time     Chronic HAs/migraines are stable and well controlled on topirimate, no acute issues, she is happy with her current status    Rash on L lateral shin-2 small 8 mm blisters    She also complains today that her right ear is plugged and she thinks there is water trapped behind it  Ongoing leg edema         Result Review   The following data was reviewed by Eileen Barajas MD on 09/05/2023.  Lab Results   Component Value Date    WBC 7.56 08/03/2023    HGB 12.2 08/03/2023    HCT 38.9 08/03/2023    MCV 98.5 (H) 08/03/2023     08/03/2023     Lab Results   Component Value Date    GLUCOSE 128 (H) 06/01/2023    BUN 21 (H) 06/01/2023    CREATININE 0.89 08/03/2023    EGFR 74.3 08/03/2023    BCR 22.3 06/01/2023    K 4.1 06/01/2023    CO2 22.3 06/01/2023    CALCIUM 9.2 06/01/2023    ALBUMIN 4.0 08/03/2023    BILITOT 0.2 08/03/2023    AST 15 08/03/2023    ALT 13 08/03/2023     Lab Results   Component Value Date    CHOL 151 01/19/2023    CHLPL 177 05/03/2021    TRIG 122 01/19/2023    HDL 41 01/19/2023    LDL 88 01/19/2023     Lab Results   Component Value Date    TSH 2.480 10/06/2022     Lab Results   Component Value Date    HGBA1C 6.80 (H) 06/01/2023     No results found for: PSA      Lab Results   Component Value Date    IAUW78CN 70.9  01/19/2023               Assessment and Plan:   Diagnoses and all orders for this visit:    1. Bilateral impacted cerumen (Primary)  Comments:  Status post irrigation of the ears bilaterally.  Orders:  -     Cancel: Ear Cerumen Removal  -     Ear Cerumen Removal    2. Type 2 diabetes mellitus with hyperglycemia, with long-term current use of insulin  Comments:  She is overall stable and doing much better with her diabetes.  She is also get her weight under control.  Continue yearly eye exams    3. Class 1 obesity due to excess calories with serious comorbidity and body mass index (BMI) of 34.0 to 34.9 in adult  Comments:  Continue current healthy low calorie diet plan and work on daily exercise, 30 minutes aerobic 5 days a week    4. Seasonal allergic rhinitis due to pollen  Comments:  Stable on Singulair, Flonase and Zyrtec.  No changes needed current meds or treatment plan    5. Hypertension, unspecified type  Comments:  BP overall stable and well-controlled on current meds.  No changes needed current meds or treatment plan    6. Rheumatoid arthritis with positive rheumatoid factor, involving unspecified site  Comments:  Follow-up with rheumatology as directed and continue her oral medications Arava and Plaquenil.    7. Rash  Comments:  Try topical Caladryl lotion twice daily x1 week              Objective     Medications:  Current Outpatient Medications   Medication Instructions    albuterol (PROVENTIL) 2.5 mg, Nebulization, Every 4 Hours PRN    albuterol sulfate  (90 Base) MCG/ACT inhaler 2 puffs, Inhalation, Every 4 Hours PRN    alendronate (FOSAMAX) 70 mg, Oral, Every 7 Days    amantadine (SYMMETREL) 100 mg, Daily    atorvastatin (LIPITOR) 10 mg, Oral, Nightly    B-D ULTRAFINE III SHORT PEN 31G X 8 MM misc USE 4 TIMES A DAY WITH     HUMALOG    budesonide-formoterol (SYMBICORT) 160-4.5 MCG/ACT inhaler 2 puffs, Inhalation, 2 Times Daily - RT    cetirizine (ZYRTEC) 10 mg, Oral, Daily    cyclobenzaprine  (FLEXERIL) 10 MG tablet No dose, route, or frequency recorded.    diazePAM (VALIUM) 5 MG tablet No dose, route, or frequency recorded.    Dietary Management Product (Vasculera) tablet 1 tablet, Oral, Daily    dilTIAZem CD (CARDIZEM CD) 120 MG 24 hr capsule TAKE ONE CAPSULE BY MOUTH DAILY    Empagliflozin (JARDIANCE PO) 10 mg, Oral, Daily    EPINEPHrine (EPIPEN) 0.3 MG/0.3ML solution auto-injector injection As Needed    estradiol (ESTRACE) 0.5 MG tablet TAKE ONE TABLET BY MOUTH DAILY    hydroCHLOROthiazide (HYDRODIURIL) 25 mg, Oral, Daily    hydroxychloroquine (PLAQUENIL) 200 mg, Oral, Daily    Insulin Glargine, 2 Unit Dial, (Toujeo Max SoloStar) 300 UNIT/ML solution pen-injector injection Inject 100 units SQ in the am and 80 units SQ in the pm    insulin lispro (HUMALOG) 100 Units, Subcutaneous, As Needed, Sliding scale     leflunomide (ARAVA) 10 mg, Oral, Daily    lisinopril (PRINIVIL,ZESTRIL) 2.5 mg, Oral, Daily    metFORMIN (GLUCOPHAGE) 500 MG tablet TAKE ONE TABLET BY MOUTH TWICE A DAY WITH MEALS    methocarbamol (ROBAXIN) 1,000 mg, Oral    metroNIDAZOLE (Metrogel) 1 % gel Topical, Daily    montelukast (SINGULAIR) 10 mg, Oral, Daily    mupirocin (BACTROBAN) 2 % ointment As Needed    nystatin (MYCOSTATIN) 235959 UNIT/GM cream 1 application , Topical, 2 Times Daily PRN    nystatin-triamcinolone (MYCOLOG) 926576-4.1 UNIT/GM-% ointment 1 application , Topical, 2 Times Daily    omeprazole (priLOSEC) 40 MG capsule TAKE ONE CAPSULE BY MOUTH DAILY    promethazine (PHENERGAN) 25 mg, Oral, Every 6 Hours PRN    Spiriva Respimat 1.25 MCG/ACT aerosol solution inhaler     topiramate (TOPAMAX) 100 MG tablet TAKE ONE TABLET BY MOUTH TWICE A DAY    traMADol (ULTRAM) 50 mg, Oral, Every 6 Hours PRN    traZODone (DESYREL) 150 mg, Oral, Nightly    Trulicity 4.5 mg, Subcutaneous, Every sunday     vitamin D3 5,000 Units, Oral, Daily        Vital Signs:   /69 (BP Location: Right arm, Patient Position: Sitting)   Pulse 76   Ht  "162.6 cm (64.02\")   Wt 90.1 kg (198 lb 9.6 oz)   SpO2 98%   BMI 34.07 kg/m²             Physical Exam:  Physical Exam  Vitals and nursing note reviewed.   Constitutional:       General: She is not in acute distress.     Appearance: Normal appearance. She is not ill-appearing, toxic-appearing or diaphoretic.   HENT:      Head: Normocephalic and atraumatic.      Right Ear: There is impacted cerumen.      Left Ear: There is impacted cerumen.      Nose: No congestion or rhinorrhea.      Mouth/Throat:      Mouth: Mucous membranes are moist.      Pharynx: Oropharynx is clear. No oropharyngeal exudate or posterior oropharyngeal erythema.   Eyes:      Extraocular Movements: Extraocular movements intact.      Conjunctiva/sclera: Conjunctivae normal.      Pupils: Pupils are equal, round, and reactive to light.   Cardiovascular:      Rate and Rhythm: Normal rate and regular rhythm.      Heart sounds: Normal heart sounds.   Pulmonary:      Effort: Pulmonary effort is normal.      Breath sounds: Normal breath sounds. No wheezing, rhonchi or rales.   Abdominal:      General: Abdomen is flat.      Palpations: Abdomen is soft. There is no mass.      Tenderness: There is no abdominal tenderness.      Hernia: No hernia is present.   Musculoskeletal:      Cervical back: Neck supple. No rigidity.      Right lower leg: No edema.      Left lower leg: No edema.   Lymphadenopathy:      Cervical: No cervical adenopathy.   Skin:     General: Skin is warm and dry.          Neurological:      General: No focal deficit present.      Mental Status: She is alert and oriented to person, place, and time. Mental status is at baseline.   Psychiatric:         Mood and Affect: Mood normal.         Behavior: Behavior normal.         Thought Content: Thought content normal.         Judgment: Judgment normal.         Review of Systems:  Review of Systems   Constitutional:  Negative for chills, fatigue and fever.   HENT:  Negative for ear pain, sinus " pressure and sore throat.    Eyes:  Negative for blurred vision and double vision.   Respiratory:  Negative for cough, shortness of breath and wheezing.    Cardiovascular:  Negative for chest pain and palpitations.   Gastrointestinal:  Negative for abdominal pain, blood in stool, constipation, diarrhea, nausea and vomiting.   Skin:  Positive for rash.   Neurological:  Positive for dizziness. Negative for headache.   Psychiatric/Behavioral:  Negative for sleep disturbance, suicidal ideas and depressed mood. The patient is not nervous/anxious.             Follow Up   Return in about 6 months (around 3/5/2024) for Recheck.    Part of this note may be an electronic transcription/translation of spoken language to printed   text using the Dragon Dictation System.            Medical History:  There are no discontinued medications.   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, hepatitis viral    Cellulitis of chest wall    Chronic back pain    Chronic hoarseness    COVID-19    Dietary counseling and surveillance    Dizziness and giddiness    Effusion, left knee    Effusion, right knee    GERD without esophagitis    Hepatitis A    Hereditary and idiopathic neuropathy, unspecified    History of depression    Hyperlipidemia    Hypertension    Kidney stone    Localized swelling, mass and lump, right lower limb    Low back pain    Methicillin resistant Staphylococcus aureus infection as the cause of diseases classified elsewhere    Migraine without aura, not intractable, without status migrainosus    Moderate persistent asthma with (acute) exacerbation    Morbid obesity due to excess calories    Nausea    Neuropathy    Osteoporosis    Pain in joints of right hand    Pain in left hip    Pain in left leg    Pain in right  shoulder    Paresthesia of skin    Peripheral neuropathy    Personal history of other venous thrombosis and embolism    Post-menopausal    Pressure ulcer of other site, stage 1    Primary insomnia    Pure hypercholesterolemia, unspecified    Rheumatoid lung disease with rheumatoid arthritis    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    Shortness of breath    Sleep apnea    Sleep apnea, unspecified    Tinea unguium    Type 2 diabetes mellitus with diabetic polyneuropathy    Uterine polyp    Viral hepatitis    Vitamin D deficiency, unspecified     Past Surgical History:    CARDIAC CATHETERIZATION    CATARACT EXTRACTION, BILATERAL    CHOLECYSTECTOMY    HYSTERECTOMY    TAHBSO    PERICARDIAL WINDOW      Family History   Problem Relation Age of Onset    Hyperlipidemia Mother     Endometrial cancer Mother     Coronary artery disease Father     Stroke Father     Diabetes type II Brother     Breast cancer Maternal Grandmother     Alzheimer's disease Maternal Grandmother     Diabetes type II Maternal Grandfather     Diabetes type II Paternal Grandfather      Social History     Tobacco Use    Smoking status: Never    Smokeless tobacco: Never   Substance Use Topics    Alcohol use: Not Currently       There are no preventive care reminders to display for this patient.     Immunization History   Administered Date(s) Administered    COVID-19 (PFIZER) BIVALENT 12+YRS 10/06/2022    COVID-19 (PFIZER) Purple Cap Monovalent 03/30/2021, 04/20/2021, 11/08/2021    Fluzone >6mos 10/13/2021, 10/06/2022    Influenza Quad Vaccine (Inpatient) 10/14/2013    Influenza Seasonal Injectable 10/27/2012    Influenza, Unspecified 10/03/2020, 10/06/2022    Pneumococcal Conjugate 20-Valent (PCV20) 04/06/2023    Pneumococcal Polysaccharide (PPSV23) 11/29/2016    Shingrix 04/06/2023, 07/13/2023    Tdap 09/29/2015       Allergies   Allergen Reactions    Asa [Aspirin] Anaphylaxis    Ciprofloxacin Anaphylaxis    Exenatide Nausea  Only    Levemir [Insulin Detemir] GI Intolerance    Nitroglycerin Hives    Sumatriptan Dizziness    Sitagliptin GI Intolerance    Cat Hair Extract Rash    Codeine Palpitations    Diclofenac Swelling

## 2023-09-11 DIAGNOSIS — E78.00 HYPERCHOLESTEROLEMIA: ICD-10-CM

## 2023-09-11 RX ORDER — ATORVASTATIN CALCIUM 10 MG/1
TABLET, FILM COATED ORAL
Qty: 90 TABLET | Refills: 0 | OUTPATIENT
Start: 2023-09-11

## 2023-09-11 NOTE — TELEPHONE ENCOUNTER
"    Caller: Nory Rodriguez \"Cherelle\"    Relationship: Self    Best call back number: [phone]     Requested Prescriptions:   Requested Prescriptions     Pending Prescriptions Disp Refills    atorvastatin (LIPITOR) 10 MG tablet [Pharmacy Med Name: ATORVASTATIN 10 MG TABLET] 90 tablet 0     Sig: TAKE 1 TABLET BY MOUTH EVERY DAY AT NIGHT    metFORMIN (GLUCOPHAGE) 500 MG tablet 180 tablet 1     Sig: Take 1 tablet by mouth 2 (Two) Times a Day With Meals.        Pharmacy where request should be sent: [facility]/PHARMACY #[id number] - [place], [place] - [address] [phone] [facility] [phone] FX     Last office visit with prescribing clinician: 9/5/2023   Last telemedicine visit with prescribing clinician: Visit date not found   Next office visit with prescribing clinician: 10/3/2023     Additional details provided by patient: REQUESTING A 90 DAY SUPPLY     Does the patient have less than a 3 day supply:  [] Yes  [x] No    Would you like a call back once the refill request has been completed: [] Yes [] No    If the office needs to give you a call back, can they leave a voicemail: [] Yes [] No    Beatris Roy Rep   09/11/23 08:38 EDT         "

## 2023-09-21 ENCOUNTER — TRANSCRIBE ORDERS (OUTPATIENT)
Dept: ADMINISTRATIVE | Facility: HOSPITAL | Age: 58
End: 2023-09-21

## 2023-09-21 ENCOUNTER — LAB (OUTPATIENT)
Dept: LAB | Facility: HOSPITAL | Age: 58
End: 2023-09-21

## 2023-09-21 DIAGNOSIS — Z79.899 ENCOUNTER FOR LONG-TERM (CURRENT) USE OF OTHER MEDICATIONS: Primary | ICD-10-CM

## 2023-09-21 DIAGNOSIS — Z79.899 ENCOUNTER FOR LONG-TERM (CURRENT) USE OF OTHER MEDICATIONS: ICD-10-CM

## 2023-09-21 DIAGNOSIS — M05.9 JUVENILE SEROPOSITIVE ARTHRITIS: ICD-10-CM

## 2023-09-21 DIAGNOSIS — E11.49 DIABETIC NEUROPATHY WITH NEUROLOGIC COMPLICATION: Primary | ICD-10-CM

## 2023-09-21 DIAGNOSIS — E11.40 DIABETIC NEUROPATHY WITH NEUROLOGIC COMPLICATION: Primary | ICD-10-CM

## 2023-09-21 DIAGNOSIS — E11.40 DIABETIC NEUROPATHY WITH NEUROLOGIC COMPLICATION: ICD-10-CM

## 2023-09-21 DIAGNOSIS — E55.9 VITAMIN D DEFICIENCY: ICD-10-CM

## 2023-09-21 DIAGNOSIS — E11.49 DIABETIC NEUROPATHY WITH NEUROLOGIC COMPLICATION: ICD-10-CM

## 2023-09-21 LAB
25(OH)D3 SERPL-MCNC: 43.1 NG/ML (ref 30–100)
ALBUMIN SERPL-MCNC: 3.9 G/DL (ref 3.5–5.2)
ALBUMIN UR-MCNC: 2.5 MG/DL
ALBUMIN/GLOB SERPL: 1.2 G/DL
ALP SERPL-CCNC: 80 U/L (ref 39–117)
ALT SERPL W P-5'-P-CCNC: 14 U/L (ref 1–33)
ANION GAP SERPL CALCULATED.3IONS-SCNC: 9.4 MMOL/L (ref 5–15)
AST SERPL-CCNC: 19 U/L (ref 1–32)
BASOPHILS # BLD AUTO: 0.03 10*3/MM3 (ref 0–0.2)
BASOPHILS NFR BLD AUTO: 0.4 % (ref 0–1.5)
BILIRUB CONJ SERPL-MCNC: <0.2 MG/DL (ref 0–0.3)
BILIRUB SERPL-MCNC: 0.2 MG/DL (ref 0–1.2)
BUN SERPL-MCNC: 26 MG/DL (ref 8–23)
BUN/CREAT SERPL: 26.8 (ref 7–25)
CALCIUM SPEC-SCNC: 9.6 MG/DL (ref 8.6–10.5)
CHLORIDE SERPL-SCNC: 107 MMOL/L (ref 98–107)
CHOLEST SERPL-MCNC: 145 MG/DL (ref 0–200)
CO2 SERPL-SCNC: 24.6 MMOL/L (ref 22–29)
CREAT SERPL-MCNC: 0.97 MG/DL (ref 0.57–1)
CREAT UR-MCNC: 75.8 MG/DL
DEPRECATED RDW RBC AUTO: 40.6 FL (ref 37–54)
EGFRCR SERPLBLD CKD-EPI 2021: 67 ML/MIN/1.73
EOSINOPHIL # BLD AUTO: 0.22 10*3/MM3 (ref 0–0.4)
EOSINOPHIL NFR BLD AUTO: 3.2 % (ref 0.3–6.2)
ERYTHROCYTE [DISTWIDTH] IN BLOOD BY AUTOMATED COUNT: 11.8 % (ref 12.3–15.4)
ERYTHROCYTE [SEDIMENTATION RATE] IN BLOOD: 12 MM/HR (ref 0–30)
FOLATE SERPL-MCNC: 11.2 NG/ML (ref 4.78–24.2)
GLOBULIN UR ELPH-MCNC: 3.3 GM/DL
GLUCOSE SERPL-MCNC: 125 MG/DL (ref 65–99)
HBA1C MFR BLD: 6 % (ref 4.8–5.6)
HCT VFR BLD AUTO: 41 % (ref 34–46.6)
HDLC SERPL-MCNC: 42 MG/DL (ref 40–60)
HGB BLD-MCNC: 13.2 G/DL (ref 12–15.9)
IMM GRANULOCYTES # BLD AUTO: 0.01 10*3/MM3 (ref 0–0.05)
IMM GRANULOCYTES NFR BLD AUTO: 0.1 % (ref 0–0.5)
LDLC SERPL CALC-MCNC: 83 MG/DL (ref 0–100)
LDLC/HDLC SERPL: 1.94 {RATIO}
LYMPHOCYTES # BLD AUTO: 1.62 10*3/MM3 (ref 0.7–3.1)
LYMPHOCYTES NFR BLD AUTO: 23.8 % (ref 19.6–45.3)
MCH RBC QN AUTO: 30 PG (ref 26.6–33)
MCHC RBC AUTO-ENTMCNC: 32.2 G/DL (ref 31.5–35.7)
MCV RBC AUTO: 93.2 FL (ref 79–97)
MICROALBUMIN/CREAT UR: 33 MG/G
MONOCYTES # BLD AUTO: 0.67 10*3/MM3 (ref 0.1–0.9)
MONOCYTES NFR BLD AUTO: 9.8 % (ref 5–12)
NEUTROPHILS NFR BLD AUTO: 4.27 10*3/MM3 (ref 1.7–7)
NEUTROPHILS NFR BLD AUTO: 62.7 % (ref 42.7–76)
PLATELET # BLD AUTO: 230 10*3/MM3 (ref 140–450)
PMV BLD AUTO: 11.1 FL (ref 6–12)
POTASSIUM SERPL-SCNC: 4.3 MMOL/L (ref 3.5–5.2)
PROT SERPL-MCNC: 7.2 G/DL (ref 6–8.5)
RBC # BLD AUTO: 4.4 10*6/MM3 (ref 3.77–5.28)
SODIUM SERPL-SCNC: 141 MMOL/L (ref 136–145)
T4 FREE SERPL-MCNC: 1.22 NG/DL (ref 0.93–1.7)
TRIGL SERPL-MCNC: 107 MG/DL (ref 0–150)
TSH SERPL DL<=0.05 MIU/L-ACNC: 2.24 UIU/ML (ref 0.27–4.2)
VIT B12 BLD-MCNC: 515 PG/ML (ref 211–946)
VLDLC SERPL-MCNC: 20 MG/DL (ref 5–40)
WBC NRBC COR # BLD: 6.82 10*3/MM3 (ref 3.4–10.8)

## 2023-09-21 PROCEDURE — 85025 COMPLETE CBC W/AUTO DIFF WBC: CPT

## 2023-09-21 PROCEDURE — 82607 VITAMIN B-12: CPT

## 2023-09-21 PROCEDURE — 36415 COLL VENOUS BLD VENIPUNCTURE: CPT

## 2023-09-21 PROCEDURE — 82306 VITAMIN D 25 HYDROXY: CPT

## 2023-09-21 PROCEDURE — 80053 COMPREHEN METABOLIC PANEL: CPT

## 2023-09-21 PROCEDURE — 82248 BILIRUBIN DIRECT: CPT

## 2023-09-21 PROCEDURE — 84439 ASSAY OF FREE THYROXINE: CPT

## 2023-09-21 PROCEDURE — 84443 ASSAY THYROID STIM HORMONE: CPT

## 2023-09-21 PROCEDURE — 85652 RBC SED RATE AUTOMATED: CPT

## 2023-09-21 PROCEDURE — 82570 ASSAY OF URINE CREATININE: CPT

## 2023-09-21 PROCEDURE — 82746 ASSAY OF FOLIC ACID SERUM: CPT

## 2023-09-21 PROCEDURE — 83036 HEMOGLOBIN GLYCOSYLATED A1C: CPT

## 2023-09-21 PROCEDURE — 80061 LIPID PANEL: CPT

## 2023-09-21 PROCEDURE — 82043 UR ALBUMIN QUANTITATIVE: CPT

## 2023-10-03 ENCOUNTER — OFFICE VISIT (OUTPATIENT)
Dept: FAMILY MEDICINE CLINIC | Age: 58
End: 2023-10-03
Payer: COMMERCIAL

## 2023-10-03 VITALS
HEIGHT: 64 IN | BODY MASS INDEX: 33.8 KG/M2 | TEMPERATURE: 97.7 F | OXYGEN SATURATION: 97 % | HEART RATE: 73 BPM | WEIGHT: 198 LBS | SYSTOLIC BLOOD PRESSURE: 111 MMHG | DIASTOLIC BLOOD PRESSURE: 76 MMHG

## 2023-10-03 DIAGNOSIS — E66.09 CLASS 1 OBESITY DUE TO EXCESS CALORIES WITHOUT SERIOUS COMORBIDITY WITH BODY MASS INDEX (BMI) OF 33.0 TO 33.9 IN ADULT: ICD-10-CM

## 2023-10-03 DIAGNOSIS — Z79.4 TYPE 2 DIABETES MELLITUS WITH HYPERGLYCEMIA, WITH LONG-TERM CURRENT USE OF INSULIN: ICD-10-CM

## 2023-10-03 DIAGNOSIS — Z23 ENCOUNTER FOR IMMUNIZATION: Primary | ICD-10-CM

## 2023-10-03 DIAGNOSIS — M05.9 RHEUMATOID ARTHRITIS WITH POSITIVE RHEUMATOID FACTOR, INVOLVING UNSPECIFIED SITE: ICD-10-CM

## 2023-10-03 DIAGNOSIS — J30.1 SEASONAL ALLERGIC RHINITIS DUE TO POLLEN: ICD-10-CM

## 2023-10-03 DIAGNOSIS — L98.7 EXCESSIVE SKIN AND SUBCUTANEOUS TISSUE: ICD-10-CM

## 2023-10-03 DIAGNOSIS — I10 HYPERTENSION, UNSPECIFIED TYPE: ICD-10-CM

## 2023-10-03 DIAGNOSIS — B37.9 CANDIDIASIS: ICD-10-CM

## 2023-10-03 DIAGNOSIS — E11.65 TYPE 2 DIABETES MELLITUS WITH HYPERGLYCEMIA, WITH LONG-TERM CURRENT USE OF INSULIN: ICD-10-CM

## 2023-10-03 DIAGNOSIS — J30.81 AIRBORNE CAT ALLERGY: ICD-10-CM

## 2023-10-03 DIAGNOSIS — E78.00 HYPERCHOLESTEROLEMIA: ICD-10-CM

## 2023-10-03 PROCEDURE — 90662 IIV NO PRSV INCREASED AG IM: CPT | Performed by: FAMILY MEDICINE

## 2023-10-03 PROCEDURE — 99214 OFFICE O/P EST MOD 30 MIN: CPT | Performed by: FAMILY MEDICINE

## 2023-10-03 PROCEDURE — 90471 IMMUNIZATION ADMIN: CPT | Performed by: FAMILY MEDICINE

## 2023-10-03 RX ORDER — ACYCLOVIR 400 MG/1
TABLET ORAL
COMMUNITY
Start: 2023-10-02

## 2023-10-03 RX ORDER — AZITHROMYCIN 250 MG/1
TABLET, FILM COATED ORAL
COMMUNITY
Start: 2023-09-29

## 2023-10-03 NOTE — PROGRESS NOTES
Nory Rodriguez presents to [facility].    Chief Complaint: diabetes follow up    Subjective     History of Present Illness:  Diabetes  Pertinent negatives for hypoglycemia include no dizziness or nervousness/anxiousness. Pertinent negatives for diabetes include no blurred vision, no chest pain and no fatigue.   Type 2 diabetes mellitus with diabetic polyneuropathy. Compliance with treatment has been good.  Tobacco screen: Non-smoker.  Current meds include an oral hypoglycemic ( Glucophage ), JARDIANCE, insulin/injectable OFF SSI; Trulicity (recently increased to 4.5); toujeo-80 in am, 100 units qhs ), an ACE inhibitor, aspirin, and a lipid lowering agent.  She reports home blood glucose readings have been good with average fasting readings in the 125-150 mg/dL range. She checks her glucose 3 to 4 times daily or with dexcom.   In regard to preventative care, she performs foot self-exams several days per week and her last ophthalmology exam was in 3/27/2023 Dr Dasilva,  NO DIABETIC RETINOPATHY, she has cataracts s/p replacement.  She sees Kerri [facility] endocrinology who manages her insulin.  HGB1C was 6% and she is under good control.  She still has mild fungal infections in groin area. She has lost  137#s and is ready to have plastic surgery for her excessive skin.      She now has dx of RA and she sees Dr Turner, rheumatology, she is on tramadol for her chronic LBP and RA pain. Today is her F2F.  No si/sx are diversion or abuse.She is newly on 2 new chemo drugs Arava and and Plaquenil and she is doing much better, hand pain is under great control.  Diarrhea is manageable.  She is to get her eye exams every 3 months.      She is sleeping well on melatonin and trazodone 150 mg.  She has sleep apnea and is not using her cpap     Cherelle presents with essential (primary) hypertension, her current cardiac medication regimen includes a diuretic ( hctz ), an ACE inhibitor ( lisinopril 2.5 mg for  her diabetes ), and a calcium channel blocker ( diltiazem ).  Compliance with treatment has been good; she takes her medication as directed, maintains her diet and exercise regimen, and follows up as directed.  She is tolerating the medication well without side effects.  She is continuing to work on wt loss, healthy diet and avoids Na in diet     Cherelle has chronic allergies, stable on singulair/zyrtec/allergy shots once a month, she is stable      Chronic HAs/migraines are stable and well controlled on topirimate, no acute issues, she is happy with her current status     She has a sinus infection and she is on a zpac and doing better, primarily moderate sincus congestion    She is traveling next week on an airplane and needs a note for her severe cat allergy       Result Review   The following data was reviewed by Eileen Barajas MD on 10/03/2023.  Lab Results   Component Value Date    WBC 6.82 09/21/2023    HGB 13.2 09/21/2023    HCT 41.0 09/21/2023    MCV 93.2 09/21/2023     09/21/2023     Lab Results   Component Value Date    GLUCOSE 125 (H) 09/21/2023    BUN 26 (H) 09/21/2023    CREATININE 0.97 09/21/2023    EGFR 67.0 09/21/2023    BCR 26.8 (H) 09/21/2023    K 4.3 09/21/2023    CO2 24.6 09/21/2023    CALCIUM 9.6 09/21/2023    ALBUMIN 3.9 09/21/2023    BILITOT 0.2 09/21/2023    AST 19 09/21/2023    ALT 14 09/21/2023     Lab Results   Component Value Date    CHOL 145 09/21/2023    CHLPL 177 05/03/2021    TRIG 107 09/21/2023    HDL 42 09/21/2023    LDL 83 09/21/2023     Lab Results   Component Value Date    TSH 2.240 09/21/2023     Lab Results   Component Value Date    HGBA1C 6.00 (H) 09/21/2023     No results found for: PSA      Lab Results   Component Value Date    XBYS77VL 43.1 09/21/2023               Assessment and Plan:   Diagnoses and all orders for this visit:    1. Encounter for immunization (Primary)  Comments:  Recommend COVID booster and flu vaccination  Orders:  -     Fluzone High-Dose  65+yrs (1922-8981)    2. Type 2 diabetes mellitus with hyperglycemia, with long-term current use of insulin  Comments:  She is doing incredibly well with a hemoglobin A1c of 6% and I am very proud of her.  Rec yearly eye exams.    3. Hypercholesterolemia  Comments:  Stable on atorvastatin and she tolerates medication well.  Labs reviewed no changes needed    4. Class 1 obesity due to excess calories without serious comorbidity with body mass index (BMI) of 33.0 to 33.9 in adult  Comments:  she is down 137 #s, doing awesome.  She defers nutrition referral at this time    5. Seasonal allergic rhinitis due to pollen  Comments:  Overall stable and well-controlled.  No changes needed current medications    6. Hypertension, unspecified type  Comments:  Stable and very well controlled.  No changes needed in current medication    7. Rheumatoid arthritis with positive rheumatoid factor, involving unspecified site  Comments:  She is doing great with her new injection Arava and Plaquenil.  She is to follow-up with rheumatology as directed    8. Excessive skin and subcutaneous tissue  Comments:  We will refer her back to plastic surgery to eval and treat    9. Candidiasis  Comments:  skin is clear of candidiasis    10. Airborne cat allergy  Comments:  Letter given for her upcoming flight              Objective     Medications:  Current Outpatient Medications   Medication Instructions    albuterol (PROVENTIL) 2.5 mg, Nebulization, Every 4 Hours PRN    albuterol sulfate  (90 Base) MCG/ACT inhaler 2 puffs, Inhalation, Every 4 Hours PRN    alendronate (FOSAMAX) 70 mg, Oral, Every 7 Days    amantadine (SYMMETREL) 100 mg, Daily    atorvastatin (LIPITOR) 10 mg, Oral, Nightly    azithromycin (ZITHROMAX) 250 MG tablet     B-D ULTRAFINE III SHORT PEN 31G X 8 MM misc USE 4 TIMES A DAY WITH     HUMALOG    budesonide-formoterol (SYMBICORT) 160-4.5 MCG/ACT inhaler 2 puffs, Inhalation, 2 Times Daily - RT    cetirizine (ZYRTEC) 10 mg,  "Oral, Daily    Continuous Blood Gluc Sensor (Dexcom G7 Sensor) misc     cyclobenzaprine (FLEXERIL) 10 MG tablet No dose, route, or frequency recorded.    diazePAM (VALIUM) 5 MG tablet No dose, route, or frequency recorded.    Dietary Management Product (Vasculera) tablet 1 tablet, Oral, Daily    dilTIAZem CD (CARDIZEM CD) 120 MG 24 hr capsule TAKE ONE CAPSULE BY MOUTH DAILY    Empagliflozin (JARDIANCE PO) 10 mg, Oral, Daily    EPINEPHrine (EPIPEN) 0.3 MG/0.3ML solution auto-injector injection As Needed    estradiol (ESTRACE) 0.5 MG tablet TAKE ONE TABLET BY MOUTH DAILY    hydroCHLOROthiazide (HYDRODIURIL) 25 mg, Oral, Daily    hydroxychloroquine (PLAQUENIL) 200 mg, Oral, Daily    Insulin Glargine, 2 Unit Dial, (Toujeo Max SoloStar) 300 UNIT/ML solution pen-injector injection Inject 100 units SQ in the am and 80 units SQ in the pm    leflunomide (ARAVA) 10 mg, Oral, Daily    lisinopril (PRINIVIL,ZESTRIL) 2.5 mg, Oral, Daily    metFORMIN (GLUCOPHAGE) 500 mg, Oral, 2 Times Daily With Meals    methocarbamol (ROBAXIN) 1,000 mg, Oral    metroNIDAZOLE (Metrogel) 1 % gel Topical, Daily    montelukast (SINGULAIR) 10 mg, Oral, Daily    mupirocin (BACTROBAN) 2 % ointment As Needed    nystatin (MYCOSTATIN) 450654 UNIT/GM cream 1 application , Topical, 2 Times Daily PRN    nystatin-triamcinolone (MYCOLOG) 679520-2.1 UNIT/GM-% ointment 1 application , Topical, 2 Times Daily    omeprazole (priLOSEC) 40 MG capsule TAKE ONE CAPSULE BY MOUTH DAILY    promethazine (PHENERGAN) 25 mg, Oral, Every 6 Hours PRN    Spiriva Respimat 1.25 MCG/ACT aerosol solution inhaler     topiramate (TOPAMAX) 100 MG tablet TAKE ONE TABLET BY MOUTH TWICE A DAY    traMADol (ULTRAM) 50 mg, Oral, Every 6 Hours PRN    traZODone (DESYREL) 150 mg, Oral, Nightly    Trulicity 4.5 mg, Subcutaneous, Every sunday         Vital Signs:   /76 (BP Location: Right arm, Patient Position: Sitting)   Pulse 73   Temp 97.7 °F (36.5 °C) (Oral)   Ht 162.6 cm (64.02\")   " Wt 89.8 kg (198 lb)   SpO2 97%   BMI 33.97 kg/m²             Physical Exam:  Physical Exam  Vitals and nursing note reviewed.   Constitutional:       General: She is not in acute distress.     Appearance: Normal appearance. She is not ill-appearing, toxic-appearing or diaphoretic.   HENT:      Head: Normocephalic and atraumatic.      Right Ear: Tympanic membrane, ear canal and external ear normal.      Left Ear: Tympanic membrane, ear canal and external ear normal.      Nose: No congestion or rhinorrhea.      Mouth/Throat:      Mouth: Mucous membranes are moist.      Pharynx: Oropharynx is clear. No oropharyngeal exudate or posterior oropharyngeal erythema.   Eyes:      Extraocular Movements: Extraocular movements intact.      Conjunctiva/sclera: Conjunctivae normal.      Pupils: Pupils are equal, round, and reactive to light.   Cardiovascular:      Rate and Rhythm: Normal rate and regular rhythm.      Heart sounds: Normal heart sounds.   Pulmonary:      Effort: Pulmonary effort is normal.      Breath sounds: Normal breath sounds. No wheezing, rhonchi or rales.   Abdominal:      General: Abdomen is flat.      Palpations: Abdomen is soft. There is no mass.      Tenderness: There is no abdominal tenderness.      Hernia: No hernia is present.   Musculoskeletal:      Cervical back: Neck supple. No rigidity.      Right lower leg: No edema.      Left lower leg: No edema.   Lymphadenopathy:      Cervical: No cervical adenopathy.   Skin:     General: Skin is warm and dry.   Neurological:      General: No focal deficit present.      Mental Status: She is alert and oriented to person, place, and time. Mental status is at baseline.   Psychiatric:         Mood and Affect: Mood normal.         Behavior: Behavior normal.         Thought Content: Thought content normal.         Judgment: Judgment normal.         Review of Systems:  Review of Systems   Constitutional:  Negative for chills, fatigue and fever.   HENT:  Negative for  ear pain, sinus pressure and sore throat.    Eyes:  Negative for blurred vision and double vision.   Respiratory:  Negative for cough, shortness of breath and wheezing.    Cardiovascular:  Negative for chest pain and palpitations.   Gastrointestinal:  Negative for abdominal pain, blood in stool, constipation, diarrhea, nausea and vomiting.   Skin:  Negative for rash.   Neurological:  Negative for dizziness and headache.   Psychiatric/Behavioral:  Negative for sleep disturbance, suicidal ideas and depressed mood. The patient is not nervous/anxious.             Follow Up   Return in about 3 months (around 1/3/2024) for Recheck.    Part of this note may be an electronic transcription/translation of spoken language to printed   text using the Dragon Dictation System.            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, hepatitis viral    Cellulitis of chest wall    Chronic back pain    Chronic hoarseness    COVID-19    Dietary counseling and surveillance    Dizziness and giddiness    Effusion, left knee    Effusion, right knee    GERD without esophagitis    Hepatitis A    Hereditary and idiopathic neuropathy, unspecified    History of depression    Hyperlipidemia    Hypertension    Kidney stone    Localized swelling, mass and lump, right lower limb    Low back pain    Methicillin resistant Staphylococcus aureus infection as the cause of diseases classified elsewhere    Migraine without aura, not intractable, without status migrainosus    Moderate persistent asthma with (acute) exacerbation     Morbid obesity due to excess calories    Nausea    Neuropathy    Osteoporosis    Pain in joints of right hand    Pain in left hip    Pain in left leg    Pain in right shoulder    Paresthesia of skin    Peripheral neuropathy    Personal history of other venous thrombosis and embolism    Post-menopausal    Pressure ulcer of other site, stage 1    Primary insomnia    Pure hypercholesterolemia, unspecified    Rheumatoid lung disease with rheumatoid arthritis    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    Shortness of breath    Sleep apnea    Sleep apnea, unspecified    Tinea unguium    Type 2 diabetes mellitus with diabetic polyneuropathy    Uterine polyp    Viral hepatitis    Vitamin D deficiency, unspecified     Past Surgical History:    CARDIAC CATHETERIZATION    CATARACT EXTRACTION, BILATERAL    CHOLECYSTECTOMY    HYSTERECTOMY    TAHBSO    PERICARDIAL WINDOW      Family History   Problem Relation Age of Onset    Hyperlipidemia Mother     Endometrial cancer Mother     Coronary artery disease Father     Stroke Father     Diabetes type II Brother     Breast cancer Maternal Grandmother     Alzheimer's disease Maternal Grandmother     Diabetes type II Maternal Grandfather     Diabetes type II Paternal Grandfather      Social History     Tobacco Use    Smoking status: Never    Smokeless tobacco: Never   Substance Use Topics    Alcohol use: Not Currently       There are no preventive care reminders to display for this patient.       Immunization History   Administered Date(s) Administered    COVID-19 (PFIZER) BIVALENT 12+YRS 10/06/2022    COVID-19 (PFIZER) Purple Cap Monovalent 03/30/2021, 04/20/2021, 11/08/2021    Fluzone (or Fluarix & Flulaval for VFC) >6mos 10/13/2021, 10/06/2022    Fluzone High-Dose 65+yrs 10/03/2023    Influenza Quad Vaccine (Inpatient) 10/14/2013    Influenza Seasonal Injectable 10/27/2012    Influenza, Unspecified 10/03/2020, 10/06/2022    Pneumococcal Conjugate  20-Valent (PCV20) 04/06/2023    Pneumococcal Polysaccharide (PPSV23) 11/29/2016    Shingrix 04/06/2023, 07/13/2023    Tdap 09/29/2015       Allergies   Allergen Reactions    Asa [Aspirin] Anaphylaxis    Ciprofloxacin Anaphylaxis    Exenatide Nausea Only    Levemir [Insulin Detemir] GI Intolerance    Nitroglycerin Hives    Sumatriptan Dizziness    Sitagliptin GI Intolerance    Cat Hair Extract Rash    Codeine Palpitations    Diclofenac Swelling

## 2023-10-10 ENCOUNTER — TRANSCRIBE ORDERS (OUTPATIENT)
Dept: FAMILY MEDICINE CLINIC | Age: 58
End: 2023-10-10

## 2023-10-10 DIAGNOSIS — L98.7 EXCESSIVE SKIN AND SUBCUTANEOUS TISSUE: Primary | ICD-10-CM

## 2023-11-16 DIAGNOSIS — E78.00 HYPERCHOLESTEROLEMIA: ICD-10-CM

## 2023-11-17 RX ORDER — ATORVASTATIN CALCIUM 10 MG/1
10 TABLET, FILM COATED ORAL
Qty: 90 TABLET | Refills: 1 | Status: SHIPPED | OUTPATIENT
Start: 2023-11-17

## 2023-11-30 ENCOUNTER — TELEPHONE (OUTPATIENT)
Dept: FAMILY MEDICINE CLINIC | Age: 58
End: 2023-11-30

## 2023-11-30 NOTE — TELEPHONE ENCOUNTER
"  Caller: Nory Rodriguez \"Cherelle\"    Relationship: Self    Best call back number: [phone]     What is the best time to reach you: ANYTIME     Who are you requesting to speak with (clinical staff, provider,  specific staff member): CLINICAL       What was the call regarding: PATIENT IN THE NEAR FUTURE WILL BE GOING ON A CRUISE, AND WILL NEED SOMETHING FOR SEA SICKNESS , PATIENT IS OKAY TO  DISCUSS IN THE OFFICE VISIT ON 01/15/2024 , SIX WEEKS PRIOR TO TRIP.   "

## 2023-12-08 ENCOUNTER — LAB (OUTPATIENT)
Dept: LAB | Facility: HOSPITAL | Age: 58
End: 2023-12-08
Payer: COMMERCIAL

## 2023-12-08 ENCOUNTER — TRANSCRIBE ORDERS (OUTPATIENT)
Dept: LAB | Facility: HOSPITAL | Age: 58
End: 2023-12-08
Payer: COMMERCIAL

## 2023-12-08 DIAGNOSIS — E55.9 VITAMIN D DEFICIENCY: Primary | ICD-10-CM

## 2023-12-08 DIAGNOSIS — M05.9 RHEUMATOID ARTHRITIS, SEROPOSITIVE: ICD-10-CM

## 2023-12-08 DIAGNOSIS — Z79.899 ENCOUNTER FOR LONG-TERM (CURRENT) USE OF OTHER MEDICATIONS: ICD-10-CM

## 2023-12-08 DIAGNOSIS — E55.9 VITAMIN D DEFICIENCY: ICD-10-CM

## 2023-12-08 LAB
25(OH)D3 SERPL-MCNC: 39.5 NG/ML (ref 30–100)
ALBUMIN SERPL-MCNC: 3.9 G/DL (ref 3.5–5.2)
ALP SERPL-CCNC: 94 U/L (ref 39–117)
ALT SERPL W P-5'-P-CCNC: 17 U/L (ref 1–33)
AST SERPL-CCNC: 19 U/L (ref 1–32)
BASOPHILS # BLD AUTO: 0.04 10*3/MM3 (ref 0–0.2)
BASOPHILS NFR BLD AUTO: 0.7 % (ref 0–1.5)
BILIRUB CONJ SERPL-MCNC: <0.2 MG/DL (ref 0–0.3)
BILIRUB INDIRECT SERPL-MCNC: NORMAL MG/DL
BILIRUB SERPL-MCNC: 0.2 MG/DL (ref 0–1.2)
CREAT SERPL-MCNC: 1.04 MG/DL (ref 0.57–1)
DEPRECATED RDW RBC AUTO: 44 FL (ref 37–54)
EGFRCR SERPLBLD CKD-EPI 2021: 61.7 ML/MIN/1.73
EOSINOPHIL # BLD AUTO: 0.2 10*3/MM3 (ref 0–0.4)
EOSINOPHIL NFR BLD AUTO: 3.5 % (ref 0.3–6.2)
ERYTHROCYTE [DISTWIDTH] IN BLOOD BY AUTOMATED COUNT: 12.3 % (ref 12.3–15.4)
ERYTHROCYTE [SEDIMENTATION RATE] IN BLOOD: 12 MM/HR (ref 0–30)
HCT VFR BLD AUTO: 37 % (ref 34–46.6)
HGB BLD-MCNC: 11.7 G/DL (ref 12–15.9)
IMM GRANULOCYTES # BLD AUTO: 0.02 10*3/MM3 (ref 0–0.05)
IMM GRANULOCYTES NFR BLD AUTO: 0.4 % (ref 0–0.5)
LYMPHOCYTES # BLD AUTO: 1.56 10*3/MM3 (ref 0.7–3.1)
LYMPHOCYTES NFR BLD AUTO: 27.4 % (ref 19.6–45.3)
MCH RBC QN AUTO: 30.4 PG (ref 26.6–33)
MCHC RBC AUTO-ENTMCNC: 31.6 G/DL (ref 31.5–35.7)
MCV RBC AUTO: 96.1 FL (ref 79–97)
MONOCYTES # BLD AUTO: 0.63 10*3/MM3 (ref 0.1–0.9)
MONOCYTES NFR BLD AUTO: 11.1 % (ref 5–12)
NEUTROPHILS NFR BLD AUTO: 3.24 10*3/MM3 (ref 1.7–7)
NEUTROPHILS NFR BLD AUTO: 56.9 % (ref 42.7–76)
PLATELET # BLD AUTO: 221 10*3/MM3 (ref 140–450)
PMV BLD AUTO: 10.8 FL (ref 6–12)
PROT SERPL-MCNC: 6.6 G/DL (ref 6–8.5)
RBC # BLD AUTO: 3.85 10*6/MM3 (ref 3.77–5.28)
WBC NRBC COR # BLD AUTO: 5.69 10*3/MM3 (ref 3.4–10.8)

## 2023-12-08 PROCEDURE — 85652 RBC SED RATE AUTOMATED: CPT

## 2023-12-08 PROCEDURE — 82565 ASSAY OF CREATININE: CPT

## 2023-12-08 PROCEDURE — 85025 COMPLETE CBC W/AUTO DIFF WBC: CPT

## 2023-12-08 PROCEDURE — 82306 VITAMIN D 25 HYDROXY: CPT

## 2023-12-08 PROCEDURE — 36415 COLL VENOUS BLD VENIPUNCTURE: CPT

## 2023-12-08 PROCEDURE — 80076 HEPATIC FUNCTION PANEL: CPT

## 2024-01-02 RX ORDER — MONTELUKAST SODIUM 10 MG/1
10 TABLET ORAL EVERY EVENING
Qty: 90 TABLET | Refills: 1 | Status: SHIPPED | OUTPATIENT
Start: 2024-01-02

## 2024-01-02 RX ORDER — DILTIAZEM HYDROCHLORIDE 120 MG/1
120 CAPSULE, COATED, EXTENDED RELEASE ORAL DAILY
Qty: 90 CAPSULE | Refills: 1 | Status: SHIPPED | OUTPATIENT
Start: 2024-01-02

## 2024-01-15 ENCOUNTER — TELEMEDICINE (OUTPATIENT)
Dept: FAMILY MEDICINE CLINIC | Age: 59
End: 2024-01-15
Payer: COMMERCIAL

## 2024-01-15 VITALS — BODY MASS INDEX: 34.66 KG/M2 | HEIGHT: 64 IN | WEIGHT: 203 LBS

## 2024-01-15 DIAGNOSIS — I10 HYPERTENSION, UNSPECIFIED TYPE: ICD-10-CM

## 2024-01-15 DIAGNOSIS — R60.0 LOWER EXTREMITY EDEMA: ICD-10-CM

## 2024-01-15 DIAGNOSIS — F51.01 PRIMARY INSOMNIA: ICD-10-CM

## 2024-01-15 DIAGNOSIS — R09.81 SINUS CONGESTION: ICD-10-CM

## 2024-01-15 DIAGNOSIS — Z12.31 ENCOUNTER FOR SCREENING MAMMOGRAM FOR BREAST CANCER: ICD-10-CM

## 2024-01-15 DIAGNOSIS — T75.3XXA SEA SICKNESS, INITIAL ENCOUNTER: Primary | ICD-10-CM

## 2024-01-15 DIAGNOSIS — Z79.4 TYPE 2 DIABETES MELLITUS WITH HYPERGLYCEMIA, WITH LONG-TERM CURRENT USE OF INSULIN: ICD-10-CM

## 2024-01-15 DIAGNOSIS — E11.65 TYPE 2 DIABETES MELLITUS WITH HYPERGLYCEMIA, WITH LONG-TERM CURRENT USE OF INSULIN: ICD-10-CM

## 2024-01-15 RX ORDER — SCOLOPAMINE TRANSDERMAL SYSTEM 1 MG/1
1 PATCH, EXTENDED RELEASE TRANSDERMAL
Qty: 4 PATCH | Refills: 0 | Status: SHIPPED | OUTPATIENT
Start: 2024-01-15

## 2024-01-15 RX ORDER — HYDROCHLOROTHIAZIDE 25 MG/1
25 TABLET ORAL DAILY
Qty: 90 TABLET | Refills: 1 | Status: SHIPPED | OUTPATIENT
Start: 2024-01-15

## 2024-01-15 RX ORDER — CEPHALEXIN 500 MG/1
CAPSULE ORAL
COMMUNITY
Start: 2023-12-07

## 2024-01-15 NOTE — PROGRESS NOTES
Nory Rodriguez presents for a doximetry telehealth visit [facility].  She is at home and I am at my home performing the televideo.  She understands that this will be charged as a regular office visit.    Chief Complaint: diabetes follow up    Subjective     History of Present Illness:    Type 2 diabetes mellitus with diabetic polyneuropathy. Compliance with treatment has been good.  Tobacco screen: Non-smoker.  Current meds include an oral hypoglycemic ( Glucophage ), JARDIANCE (increased from 10 to 25), insulin/injectable OFF SSI; Trulicity (recently increased to 4.5); toujeo-60 in am, 80 units qhs ), an ACE inhibitor, aspirin, and a lipid lowering agent.  She reports home blood glucose readings have been good with average fasting readings in the 125-150 mg/dL range. She checks her glucose 3 to 4 times daily or with dexcom.   In regard to preventative care, she performs foot self-exams several days per week and her last ophthalmology exam was in 3/27/2023 Dr Dasilva,  NO DIABETIC RETINOPATHY, she has cataracts s/p replacement.  She sees [facility] endocrinology who manages her insulin.  HGB1C was 6% and she is under good control.  She still has mild fungal infections in groin area. She has lost  137#s and is ready to have plastic surgery for her excessive skin.     She gets recurrent sinus infections and presents with sinus pain and pressure.  She has a clear cough but moderated sinus pressure and pain.  She is on muccinex DM.   Covid test was negative.  She can't do a nose spray due to nasal bleeding.  Symptoms onset 4-5 days ago.  I recc she come in and get a flu test.     She is about to go on a cruise and she is worried about sea sickness and was wanting to get something in case she gets sick.      She now has dx of RA and she sees Dr Turner, rheumatology, she is on tramadol for her chronic LBP and RA pain. Today is her F2F.  No si/sx are diversion or abuse.She is newly on 2 new  chemo drugs Arava and and Plaquenil and she is doing much better, hand pain is under great control.  Diarrhea is manageable.  She is to get her eye exams every 3 months.  She is unable to go to the gym due to her RA, she is trying to walk more.       She is still not sleeping well, she is currently on melatonin and trazodone 150 mg.  She has sleep apnea and is not using her cpap     Cherelle presents with essential (primary) hypertension, her current cardiac medication regimen includes a diuretic (hctz), an ACE inhibitor ( lisinopril 2.5 mg for her diabetes ), and a calcium channel blocker ( diltiazem ).  Compliance with treatment has been good; she takes her medication as directed, maintains her diet and exercise regimen, and follows up as directed.  She is tolerating the medication well without side effects.  She is continuing to work on wt loss, healthy diet and avoids Na in diet     She presents with chronic allergies, stable on singulair/zyrtec/allergy shots once a month, she is stable      She has chronic HAs/migraines are stable and well controlled on topirimate, no acute issues, she is happy with her current status      Result Review    closeThe following data was reviewed by Eileen Barajas MD on 10/03/2023.  Lab Results   Component Value Date    WBC 5.69 12/08/2023    HGB 11.7 (L) 12/08/2023    HCT 37.0 12/08/2023    MCV 96.1 12/08/2023     12/08/2023     Lab Results   Component Value Date    GLUCOSE 125 (H) 09/21/2023    BUN 26 (H) 09/21/2023    CREATININE 1.04 (H) 12/08/2023    EGFR 61.7 12/08/2023    BCR 26.8 (H) 09/21/2023    K 4.3 09/21/2023    CO2 24.6 09/21/2023    CALCIUM 9.6 09/21/2023    ALBUMIN 3.9 12/08/2023    BILITOT 0.2 12/08/2023    AST 19 12/08/2023    ALT 17 12/08/2023     Lab Results   Component Value Date    CHOL 145 09/21/2023    CHLPL 177 05/03/2021    TRIG 107 09/21/2023    HDL 42 09/21/2023    LDL 83 09/21/2023     Lab Results   Component Value Date    TSH 2.240 09/21/2023  "    Lab Results   Component Value Date    HGBA1C 6.00 (H) 09/21/2023     No results found for: \"PSA\"      Lab Results   Component Value Date    FXUV01KU 39.5 12/08/2023               Assessment and Plan:   There are no diagnoses linked to this encounter.            Objective     Medications:  Current Outpatient Medications   Medication Instructions   • albuterol (PROVENTIL) 2.5 mg, Nebulization, Every 4 Hours PRN   • alendronate (FOSAMAX) 70 mg, Oral, Every 7 Days   • amantadine (SYMMETREL) 100 mg, Daily   • atorvastatin (LIPITOR) 10 mg, Oral, Every Night at Bedtime   • azithromycin (ZITHROMAX) 250 MG tablet    • B-D ULTRAFINE III SHORT PEN 31G X 8 MM misc USE 4 TIMES A DAY WITH     HUMALOG   • budesonide-formoterol (SYMBICORT) 160-4.5 MCG/ACT inhaler 2 puffs, Inhalation, 2 Times Daily - RT   • cephalexin (KEFLEX) 500 MG capsule    • cetirizine (ZYRTEC) 10 mg, Oral, Daily   • Continuous Blood Gluc Sensor (Dexcom G7 Sensor) misc    • cyclobenzaprine (FLEXERIL) 10 MG tablet No dose, route, or frequency recorded.   • diazePAM (VALIUM) 5 MG tablet No dose, route, or frequency recorded.   • Dietary Management Product (Vasculera) tablet 1 tablet, Oral, Daily   • dilTIAZem CD (CARDIZEM CD) 120 mg, Oral, Daily   • Empagliflozin (JARDIANCE PO) 10 mg, Oral, Daily   • EPINEPHrine (EPIPEN) 0.3 MG/0.3ML solution auto-injector injection As Needed   • estradiol (ESTRACE) 0.5 MG tablet TAKE ONE TABLET BY MOUTH DAILY   • hydroCHLOROthiazide (HYDRODIURIL) 25 mg, Oral, Daily   • hydroxychloroquine (PLAQUENIL) 200 mg, Oral, Daily   • Insulin Glargine, 2 Unit Dial, (Toujeo Max SoloStar) 300 UNIT/ML solution pen-injector injection Inject 100 units SQ in the am and 80 units SQ in the pm   • leflunomide (ARAVA) 10 mg, Oral, Daily   • lisinopril (PRINIVIL,ZESTRIL) 2.5 mg, Oral, Daily   • metFORMIN (GLUCOPHAGE) 500 mg, Oral, 2 Times Daily With Meals   • methocarbamol (ROBAXIN) 1,000 mg, Oral   • metroNIDAZOLE (Metrogel) 1 % gel Topical, " "Daily   • montelukast (SINGULAIR) 10 mg, Oral, Every Evening   • mupirocin (BACTROBAN) 2 % ointment As Needed   • nystatin (MYCOSTATIN) 698673 UNIT/GM cream 1 application , Topical, 2 Times Daily PRN   • nystatin-triamcinolone (MYCOLOG) 427344-8.1 UNIT/GM-% ointment 1 application , Topical, 2 Times Daily   • omeprazole (priLOSEC) 40 MG capsule TAKE ONE CAPSULE BY MOUTH DAILY   • promethazine (PHENERGAN) 25 mg, Oral, Every 6 Hours PRN   • Spiriva Respimat 1.25 MCG/ACT aerosol solution inhaler    • topiramate (TOPAMAX) 100 MG tablet TAKE ONE TABLET BY MOUTH TWICE A DAY   • traMADol (ULTRAM) 50 mg, Oral, Every 6 Hours PRN   • traZODone (DESYREL) 150 mg, Oral, Nightly   • Trulicity 4.5 mg, Subcutaneous, Every sunday         Vital Signs:   Ht 162.6 cm (64.02\")   Wt 91.6 kg (202 lb)   BMI 34.66 kg/m²             Physical Exam:  Physical Exam  Vitals reviewed.   Constitutional:       General: She is not in acute distress.     Appearance: Normal appearance. She is normal weight. She is not ill-appearing.   HENT:      Head: Normocephalic and atraumatic.   Eyes:      Extraocular Movements: Extraocular movements intact.      Pupils: Pupils are equal, round, and reactive to light.   Pulmonary:      Effort: Pulmonary effort is normal.   Neurological:      General: No focal deficit present.      Mental Status: She is alert and oriented to person, place, and time.   Psychiatric:         Mood and Affect: Mood normal.         Behavior: Behavior normal.         Thought Content: Thought content normal.         Judgment: Judgment normal.         Review of Systems:  Review of Systems   Constitutional:  Negative for chills and fever.   HENT:  Negative for ear pain, sinus pressure and sore throat.    Eyes:  Negative for blurred vision and double vision.   Respiratory:  Negative for cough, shortness of breath and wheezing.    Cardiovascular:  Negative for chest pain and palpitations.   Gastrointestinal:  Negative for abdominal pain, " blood in stool, constipation, diarrhea, nausea and vomiting.   Skin:  Negative for rash.   Neurological:  Negative for dizziness and headache.   Psychiatric/Behavioral:  Negative for sleep disturbance, suicidal ideas and depressed mood. The patient is not nervous/anxious.               Follow Up   No follow-ups on file.    Part of this note may be an electronic transcription/translation of spoken language to printed   text using the Dragon Dictation System.            Medical History:  Medications Discontinued During This Encounter   Medication Reason   • albuterol sulfate  (90 Base) MCG/ACT inhaler *Therapy completed      Past Medical History:   • Accessory skin tags    CONGENITAL   • Acute frontal sinusitis, unspecified   • Acute upper respiratory infection, unspecified   • Acute vaginitis   • Allergic rhinitis, unspecified   • Breast lump   • Calculus of kidney   • Carrier of, hepatitis viral   • Cellulitis of chest wall   • Chronic back pain   • Chronic hoarseness   • COVID-19   • Dietary counseling and surveillance   • Dizziness and giddiness   • Effusion, left knee   • Effusion, right knee   • GERD without esophagitis   • Hepatitis A   • Hereditary and idiopathic neuropathy, unspecified   • History of depression   • Hyperlipidemia   • Hypertension   • Kidney stone   • Localized swelling, mass and lump, right lower limb   • Low back pain   • Methicillin resistant Staphylococcus aureus infection as the cause of diseases classified elsewhere   • Migraine without aura, not intractable, without status migrainosus   • Moderate persistent asthma with (acute) exacerbation   • Morbid obesity due to excess calories   • Nausea   • Neuropathy   • Osteoporosis   • Pain in joints of right hand   • Pain in left hip   • Pain in left leg   • Pain in right shoulder   • Paresthesia of skin   • Peripheral neuropathy   • Personal history of other venous thrombosis and embolism   • Post-menopausal   • Pressure ulcer of other  site, stage 1   • Primary insomnia   • Pure hypercholesterolemia, unspecified   • Rheumatoid lung disease with rheumatoid arthritis   • 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   • Shortness of breath   • Sleep apnea   • Sleep apnea, unspecified   • Tinea unguium   • Type 2 diabetes mellitus with diabetic polyneuropathy   • Uterine polyp   • Viral hepatitis   • Vitamin D deficiency, unspecified     Past Surgical History:   • CARDIAC CATHETERIZATION   • CATARACT EXTRACTION, BILATERAL   • CHOLECYSTECTOMY   • HYSTERECTOMY    TAHBSO   • PERICARDIAL WINDOW      Family History   Problem Relation Age of Onset   • Hyperlipidemia Mother    • Endometrial cancer Mother    • Coronary artery disease Father    • Stroke Father    • Diabetes type II Brother    • Breast cancer Maternal Grandmother    • Alzheimer's disease Maternal Grandmother    • Diabetes type II Maternal Grandfather    • Diabetes type II Paternal Grandfather      Social History     Tobacco Use   • Smoking status: Never   • Smokeless tobacco: Never   Substance Use Topics   • Alcohol use: Not Currently       Health Maintenance Due   Topic Date Due   • COVID-19 Vaccine (5 - 2023-24 season) 09/01/2023          Immunization History   Administered Date(s) Administered   • COVID-19 (PFIZER) BIVALENT 12+YRS 10/06/2022   • COVID-19 (PFIZER) Purple Cap Monovalent 03/30/2021, 04/20/2021, 11/08/2021   • Fluzone (or Fluarix & Flulaval for VFC) >6mos 10/13/2021, 10/06/2022   • Fluzone High-Dose 65+yrs 10/03/2023   • Influenza Quad Vaccine (Inpatient) 10/14/2013   • Influenza Seasonal Injectable 10/27/2012   • Influenza, Unspecified 10/03/2020, 10/06/2022   • Pneumococcal Conjugate 20-Valent (PCV20) 04/06/2023   • Pneumococcal Polysaccharide (PPSV23) 11/29/2016   • Shingrix 04/06/2023, 07/13/2023   • Tdap 09/29/2015       Allergies   Allergen Reactions   • Asa [Aspirin] Anaphylaxis   • Ciprofloxacin Anaphylaxis   • Exenatide Nausea Only   • Levemir  [Insulin Detemir] GI Intolerance   • Nitroglycerin Hives   • Sumatriptan Dizziness   • Sitagliptin GI Intolerance   • Cat Hair Extract Rash   • Codeine Palpitations   • Diclofenac Swelling

## 2024-01-30 ENCOUNTER — LAB (OUTPATIENT)
Dept: LAB | Facility: HOSPITAL | Age: 59
End: 2024-01-30
Payer: COMMERCIAL

## 2024-01-30 ENCOUNTER — TRANSCRIBE ORDERS (OUTPATIENT)
Dept: ADMINISTRATIVE | Facility: HOSPITAL | Age: 59
End: 2024-01-30
Payer: COMMERCIAL

## 2024-01-30 DIAGNOSIS — E78.5 HYPERLIPIDEMIA, UNSPECIFIED HYPERLIPIDEMIA TYPE: ICD-10-CM

## 2024-01-30 DIAGNOSIS — E11.49 TYPE II DIABETES MELLITUS WITH NEUROLOGICAL MANIFESTATIONS: Primary | ICD-10-CM

## 2024-01-30 DIAGNOSIS — I10 HYPERTENSION, ESSENTIAL: ICD-10-CM

## 2024-01-30 DIAGNOSIS — E11.49 TYPE II DIABETES MELLITUS WITH NEUROLOGICAL MANIFESTATIONS: ICD-10-CM

## 2024-01-30 DIAGNOSIS — Z79.4 INSULIN LONG-TERM USE: ICD-10-CM

## 2024-01-30 LAB
ALBUMIN SERPL-MCNC: 4 G/DL (ref 3.5–5.2)
ALBUMIN/GLOB SERPL: 1.4 G/DL
ALP SERPL-CCNC: 91 U/L (ref 39–117)
ALT SERPL W P-5'-P-CCNC: 14 U/L (ref 1–33)
ANION GAP SERPL CALCULATED.3IONS-SCNC: 9.2 MMOL/L (ref 5–15)
AST SERPL-CCNC: 17 U/L (ref 1–32)
BILIRUB SERPL-MCNC: <0.2 MG/DL (ref 0–1.2)
BUN SERPL-MCNC: 21 MG/DL (ref 8–23)
BUN/CREAT SERPL: 22.3 (ref 7–25)
CALCIUM SPEC-SCNC: 9.2 MG/DL (ref 8.6–10.5)
CHLORIDE SERPL-SCNC: 109 MMOL/L (ref 98–107)
CO2 SERPL-SCNC: 23.8 MMOL/L (ref 22–29)
CREAT SERPL-MCNC: 0.94 MG/DL (ref 0.57–1)
EGFRCR SERPLBLD CKD-EPI 2021: 69.6 ML/MIN/1.73
GLOBULIN UR ELPH-MCNC: 2.8 GM/DL
GLUCOSE SERPL-MCNC: 114 MG/DL (ref 65–99)
HBA1C MFR BLD: 5.9 % (ref 4.8–5.6)
POTASSIUM SERPL-SCNC: 4.4 MMOL/L (ref 3.5–5.2)
PROT SERPL-MCNC: 6.8 G/DL (ref 6–8.5)
SODIUM SERPL-SCNC: 142 MMOL/L (ref 136–145)

## 2024-01-30 PROCEDURE — 80053 COMPREHEN METABOLIC PANEL: CPT

## 2024-01-30 PROCEDURE — 83036 HEMOGLOBIN GLYCOSYLATED A1C: CPT

## 2024-01-30 PROCEDURE — 36415 COLL VENOUS BLD VENIPUNCTURE: CPT

## 2024-02-06 DIAGNOSIS — M85.9 LOW BONE DENSITY: ICD-10-CM

## 2024-02-06 DIAGNOSIS — F51.01 PRIMARY INSOMNIA: ICD-10-CM

## 2024-02-06 RX ORDER — TRAZODONE HYDROCHLORIDE 150 MG/1
150 TABLET ORAL
Qty: 90 TABLET | Refills: 1 | Status: SHIPPED | OUTPATIENT
Start: 2024-02-06

## 2024-02-06 RX ORDER — ALENDRONATE SODIUM 70 MG/1
70 TABLET ORAL
Qty: 12 TABLET | Refills: 2 | Status: SHIPPED | OUTPATIENT
Start: 2024-02-06

## 2024-02-28 ENCOUNTER — LAB (OUTPATIENT)
Dept: LAB | Facility: HOSPITAL | Age: 59
End: 2024-02-28
Payer: COMMERCIAL

## 2024-02-28 ENCOUNTER — TRANSCRIBE ORDERS (OUTPATIENT)
Dept: ADMINISTRATIVE | Facility: HOSPITAL | Age: 59
End: 2024-02-28
Payer: COMMERCIAL

## 2024-02-28 DIAGNOSIS — M05.9 JUVENILE SEROPOSITIVE ARTHRITIS: ICD-10-CM

## 2024-02-28 DIAGNOSIS — Z79.899 ENCOUNTER FOR LONG-TERM (CURRENT) USE OF OTHER MEDICATIONS: ICD-10-CM

## 2024-02-28 DIAGNOSIS — Z79.899 ENCOUNTER FOR LONG-TERM (CURRENT) USE OF OTHER MEDICATIONS: Primary | ICD-10-CM

## 2024-02-28 LAB
ALBUMIN SERPL-MCNC: 4.1 G/DL (ref 3.5–5.2)
ALP SERPL-CCNC: 94 U/L (ref 39–117)
ALT SERPL W P-5'-P-CCNC: 15 U/L (ref 1–33)
AST SERPL-CCNC: 16 U/L (ref 1–32)
BASOPHILS # BLD AUTO: 0.04 10*3/MM3 (ref 0–0.2)
BASOPHILS NFR BLD AUTO: 0.5 % (ref 0–1.5)
BILIRUB CONJ SERPL-MCNC: <0.2 MG/DL (ref 0–0.3)
BILIRUB INDIRECT SERPL-MCNC: NORMAL MG/DL
BILIRUB SERPL-MCNC: 0.2 MG/DL (ref 0–1.2)
CREAT SERPL-MCNC: 0.94 MG/DL (ref 0.57–1)
DEPRECATED RDW RBC AUTO: 41.5 FL (ref 37–54)
EGFRCR SERPLBLD CKD-EPI 2021: 69.6 ML/MIN/1.73
EOSINOPHIL # BLD AUTO: 0.41 10*3/MM3 (ref 0–0.4)
EOSINOPHIL NFR BLD AUTO: 5.6 % (ref 0.3–6.2)
ERYTHROCYTE [DISTWIDTH] IN BLOOD BY AUTOMATED COUNT: 11.9 % (ref 12.3–15.4)
ERYTHROCYTE [SEDIMENTATION RATE] IN BLOOD: 4 MM/HR (ref 0–30)
HCT VFR BLD AUTO: 41.7 % (ref 34–46.6)
HGB BLD-MCNC: 13.4 G/DL (ref 12–15.9)
IMM GRANULOCYTES # BLD AUTO: 0.01 10*3/MM3 (ref 0–0.05)
IMM GRANULOCYTES NFR BLD AUTO: 0.1 % (ref 0–0.5)
LYMPHOCYTES # BLD AUTO: 1.87 10*3/MM3 (ref 0.7–3.1)
LYMPHOCYTES NFR BLD AUTO: 25.4 % (ref 19.6–45.3)
MCH RBC QN AUTO: 30.5 PG (ref 26.6–33)
MCHC RBC AUTO-ENTMCNC: 32.1 G/DL (ref 31.5–35.7)
MCV RBC AUTO: 94.8 FL (ref 79–97)
MONOCYTES # BLD AUTO: 0.68 10*3/MM3 (ref 0.1–0.9)
MONOCYTES NFR BLD AUTO: 9.3 % (ref 5–12)
NEUTROPHILS NFR BLD AUTO: 4.34 10*3/MM3 (ref 1.7–7)
NEUTROPHILS NFR BLD AUTO: 59.1 % (ref 42.7–76)
PLATELET # BLD AUTO: 231 10*3/MM3 (ref 140–450)
PMV BLD AUTO: 11.7 FL (ref 6–12)
PROT SERPL-MCNC: 6.8 G/DL (ref 6–8.5)
RBC # BLD AUTO: 4.4 10*6/MM3 (ref 3.77–5.28)
WBC NRBC COR # BLD AUTO: 7.35 10*3/MM3 (ref 3.4–10.8)

## 2024-02-28 PROCEDURE — 85025 COMPLETE CBC W/AUTO DIFF WBC: CPT

## 2024-02-28 PROCEDURE — 85652 RBC SED RATE AUTOMATED: CPT

## 2024-02-28 PROCEDURE — 80076 HEPATIC FUNCTION PANEL: CPT

## 2024-02-28 PROCEDURE — 36415 COLL VENOUS BLD VENIPUNCTURE: CPT

## 2024-02-28 PROCEDURE — 82565 ASSAY OF CREATININE: CPT

## 2024-03-28 DIAGNOSIS — K21.00 GASTROESOPHAGEAL REFLUX DISEASE WITH ESOPHAGITIS WITHOUT HEMORRHAGE: ICD-10-CM

## 2024-03-28 RX ORDER — OMEPRAZOLE 40 MG/1
CAPSULE, DELAYED RELEASE ORAL
Qty: 90 CAPSULE | Refills: 0 | Status: SHIPPED | OUTPATIENT
Start: 2024-03-28

## 2024-05-04 DIAGNOSIS — E78.00 HYPERCHOLESTEROLEMIA: ICD-10-CM

## 2024-05-06 RX ORDER — ATORVASTATIN CALCIUM 10 MG/1
10 TABLET, FILM COATED ORAL
Qty: 90 TABLET | Refills: 1 | Status: SHIPPED | OUTPATIENT
Start: 2024-05-06

## 2024-06-05 ENCOUNTER — TRANSCRIBE ORDERS (OUTPATIENT)
Dept: ADMINISTRATIVE | Facility: HOSPITAL | Age: 59
End: 2024-06-05
Payer: COMMERCIAL

## 2024-06-05 ENCOUNTER — LAB (OUTPATIENT)
Dept: LAB | Facility: HOSPITAL | Age: 59
End: 2024-06-05
Payer: COMMERCIAL

## 2024-06-05 DIAGNOSIS — E11.40 DIABETIC NEUROPATHY WITH NEUROLOGIC COMPLICATION: Primary | ICD-10-CM

## 2024-06-05 DIAGNOSIS — E11.49 DIABETIC NEUROPATHY WITH NEUROLOGIC COMPLICATION: Primary | ICD-10-CM

## 2024-06-05 DIAGNOSIS — M05.9 SEROPOSITIVE RHEUMATOID ARTHRITIS: Primary | ICD-10-CM

## 2024-06-05 DIAGNOSIS — M05.9 SEROPOSITIVE RHEUMATOID ARTHRITIS: ICD-10-CM

## 2024-06-05 DIAGNOSIS — Z79.899 ENCOUNTER FOR LONG-TERM (CURRENT) USE OF OTHER MEDICATIONS: ICD-10-CM

## 2024-06-05 DIAGNOSIS — E11.49 DIABETIC NEUROPATHY WITH NEUROLOGIC COMPLICATION: ICD-10-CM

## 2024-06-05 DIAGNOSIS — E11.40 DIABETIC NEUROPATHY WITH NEUROLOGIC COMPLICATION: ICD-10-CM

## 2024-06-05 LAB
ALBUMIN SERPL-MCNC: 4.1 G/DL (ref 3.5–5.2)
ALBUMIN UR-MCNC: 2.5 MG/DL
ALBUMIN/GLOB SERPL: 1.5 G/DL
ALP SERPL-CCNC: 92 U/L (ref 39–117)
ALT SERPL W P-5'-P-CCNC: 15 U/L (ref 1–33)
ANION GAP SERPL CALCULATED.3IONS-SCNC: 7 MMOL/L (ref 5–15)
AST SERPL-CCNC: 13 U/L (ref 1–32)
BASOPHILS # BLD AUTO: 0.04 10*3/MM3 (ref 0–0.2)
BASOPHILS NFR BLD AUTO: 0.6 % (ref 0–1.5)
BILIRUB CONJ SERPL-MCNC: <0.2 MG/DL (ref 0–0.3)
BILIRUB SERPL-MCNC: 0.2 MG/DL (ref 0–1.2)
BUN SERPL-MCNC: 20 MG/DL (ref 8–23)
BUN/CREAT SERPL: 21.1 (ref 7–25)
CALCIUM SPEC-SCNC: 9.2 MG/DL (ref 8.6–10.5)
CHLORIDE SERPL-SCNC: 106 MMOL/L (ref 98–107)
CHOLEST SERPL-MCNC: 143 MG/DL (ref 0–200)
CO2 SERPL-SCNC: 29 MMOL/L (ref 22–29)
CREAT SERPL-MCNC: 0.95 MG/DL (ref 0.57–1)
CREAT UR-MCNC: 96.1 MG/DL
DEPRECATED RDW RBC AUTO: 42.8 FL (ref 37–54)
EGFRCR SERPLBLD CKD-EPI 2021: 68.7 ML/MIN/1.73
EOSINOPHIL # BLD AUTO: 0.3 10*3/MM3 (ref 0–0.4)
EOSINOPHIL NFR BLD AUTO: 4.5 % (ref 0.3–6.2)
ERYTHROCYTE [DISTWIDTH] IN BLOOD BY AUTOMATED COUNT: 12 % (ref 12.3–15.4)
ERYTHROCYTE [SEDIMENTATION RATE] IN BLOOD: 4 MM/HR (ref 0–30)
FOLATE SERPL-MCNC: 12.7 NG/ML (ref 4.78–24.2)
GLOBULIN UR ELPH-MCNC: 2.7 GM/DL
GLUCOSE SERPL-MCNC: 116 MG/DL (ref 65–99)
HBA1C MFR BLD: 6.1 % (ref 4.8–5.6)
HCT VFR BLD AUTO: 41.6 % (ref 34–46.6)
HDLC SERPL-MCNC: 43 MG/DL (ref 40–60)
HGB BLD-MCNC: 13.2 G/DL (ref 12–15.9)
IMM GRANULOCYTES # BLD AUTO: 0.01 10*3/MM3 (ref 0–0.05)
IMM GRANULOCYTES NFR BLD AUTO: 0.1 % (ref 0–0.5)
LDLC SERPL CALC-MCNC: 77 MG/DL (ref 0–100)
LDLC/HDLC SERPL: 1.72 {RATIO}
LYMPHOCYTES # BLD AUTO: 1.61 10*3/MM3 (ref 0.7–3.1)
LYMPHOCYTES NFR BLD AUTO: 24.1 % (ref 19.6–45.3)
MCH RBC QN AUTO: 30.2 PG (ref 26.6–33)
MCHC RBC AUTO-ENTMCNC: 31.7 G/DL (ref 31.5–35.7)
MCV RBC AUTO: 95.2 FL (ref 79–97)
MICROALBUMIN/CREAT UR: 26 MG/G (ref 0–29)
MONOCYTES # BLD AUTO: 0.67 10*3/MM3 (ref 0.1–0.9)
MONOCYTES NFR BLD AUTO: 10 % (ref 5–12)
NEUTROPHILS NFR BLD AUTO: 4.06 10*3/MM3 (ref 1.7–7)
NEUTROPHILS NFR BLD AUTO: 60.7 % (ref 42.7–76)
PLATELET # BLD AUTO: 218 10*3/MM3 (ref 140–450)
PMV BLD AUTO: 11.7 FL (ref 6–12)
POTASSIUM SERPL-SCNC: 4.1 MMOL/L (ref 3.5–5.2)
PROT SERPL-MCNC: 6.8 G/DL (ref 6–8.5)
RBC # BLD AUTO: 4.37 10*6/MM3 (ref 3.77–5.28)
SODIUM SERPL-SCNC: 142 MMOL/L (ref 136–145)
T4 FREE SERPL-MCNC: 1.18 NG/DL (ref 0.92–1.68)
TRIGL SERPL-MCNC: 131 MG/DL (ref 0–150)
TSH SERPL DL<=0.05 MIU/L-ACNC: 1.49 UIU/ML (ref 0.27–4.2)
VIT B12 BLD-MCNC: 671 PG/ML (ref 211–946)
VLDLC SERPL-MCNC: 23 MG/DL (ref 5–40)
WBC NRBC COR # BLD AUTO: 6.69 10*3/MM3 (ref 3.4–10.8)

## 2024-06-05 PROCEDURE — 84439 ASSAY OF FREE THYROXINE: CPT

## 2024-06-05 PROCEDURE — 82043 UR ALBUMIN QUANTITATIVE: CPT

## 2024-06-05 PROCEDURE — 82607 VITAMIN B-12: CPT

## 2024-06-05 PROCEDURE — 82746 ASSAY OF FOLIC ACID SERUM: CPT

## 2024-06-05 PROCEDURE — 85025 COMPLETE CBC W/AUTO DIFF WBC: CPT

## 2024-06-05 PROCEDURE — 85652 RBC SED RATE AUTOMATED: CPT

## 2024-06-05 PROCEDURE — 83036 HEMOGLOBIN GLYCOSYLATED A1C: CPT

## 2024-06-05 PROCEDURE — 82248 BILIRUBIN DIRECT: CPT

## 2024-06-05 PROCEDURE — 36415 COLL VENOUS BLD VENIPUNCTURE: CPT

## 2024-06-05 PROCEDURE — 82570 ASSAY OF URINE CREATININE: CPT

## 2024-06-05 PROCEDURE — 80061 LIPID PANEL: CPT

## 2024-06-05 PROCEDURE — 84443 ASSAY THYROID STIM HORMONE: CPT

## 2024-06-05 PROCEDURE — 80053 COMPREHEN METABOLIC PANEL: CPT

## 2024-06-17 ENCOUNTER — TELEPHONE (OUTPATIENT)
Dept: FAMILY MEDICINE CLINIC | Age: 59
End: 2024-06-17
Payer: COMMERCIAL

## 2024-06-17 ENCOUNTER — HOSPITAL ENCOUNTER (OUTPATIENT)
Dept: MAMMOGRAPHY | Facility: HOSPITAL | Age: 59
Discharge: HOME | End: 2024-06-17
Admitting: FAMILY MEDICINE
Payer: COMMERCIAL

## 2024-06-17 DIAGNOSIS — M05.10 RHEUMATOID LUNG DISEASE WITH RHEUMATOID ARTHRITIS: Primary | ICD-10-CM

## 2024-06-17 DIAGNOSIS — Z12.31 ENCOUNTER FOR SCREENING MAMMOGRAM FOR BREAST CANCER: ICD-10-CM

## 2024-06-17 PROCEDURE — 77063 BREAST TOMOSYNTHESIS BI: CPT

## 2024-06-17 PROCEDURE — 77067 SCR MAMMO BI INCL CAD: CPT

## 2024-06-17 NOTE — TELEPHONE ENCOUNTER
Patient called stating her current rheumatologist  is retiring and she is needing a new referral to be seen with a new provider. Patient states she just seen her today and got a refill on her medication for 3 months however, is needing to be seen before September with a new rheumatologist when her medication will need to be refilled. Patient was recommended to call [facility] and they stated they could not see patient until April 2025. Patient states she will go to any location as long as she can be seen before September. Please advise.

## 2024-06-23 DIAGNOSIS — K21.00 GASTROESOPHAGEAL REFLUX DISEASE WITH ESOPHAGITIS WITHOUT HEMORRHAGE: ICD-10-CM

## 2024-06-24 RX ORDER — OMEPRAZOLE 40 MG/1
CAPSULE, DELAYED RELEASE ORAL
Qty: 90 CAPSULE | Refills: 0 | Status: SHIPPED | OUTPATIENT
Start: 2024-06-24

## 2024-06-24 RX ORDER — MONTELUKAST SODIUM 10 MG/1
10 TABLET ORAL EVERY EVENING
Qty: 90 TABLET | Refills: 0 | Status: SHIPPED | OUTPATIENT
Start: 2024-06-24

## 2024-06-24 RX ORDER — DILTIAZEM HYDROCHLORIDE 120 MG/1
120 CAPSULE, COATED, EXTENDED RELEASE ORAL DAILY
Qty: 90 CAPSULE | Refills: 0 | Status: SHIPPED | OUTPATIENT
Start: 2024-06-24

## 2024-07-08 DIAGNOSIS — R60.0 LOWER EXTREMITY EDEMA: ICD-10-CM

## 2024-07-08 RX ORDER — HYDROCHLOROTHIAZIDE 25 MG/1
25 TABLET ORAL DAILY
Qty: 90 TABLET | Refills: 1 | Status: SHIPPED | OUTPATIENT
Start: 2024-07-08

## 2024-07-16 ENCOUNTER — OFFICE VISIT (OUTPATIENT)
Dept: FAMILY MEDICINE CLINIC | Age: 59
End: 2024-07-16
Payer: COMMERCIAL

## 2024-07-16 VITALS
OXYGEN SATURATION: 95 % | TEMPERATURE: 97.9 F | SYSTOLIC BLOOD PRESSURE: 121 MMHG | DIASTOLIC BLOOD PRESSURE: 63 MMHG | HEART RATE: 76 BPM | BODY MASS INDEX: 36.95 KG/M2 | WEIGHT: 216.4 LBS | HEIGHT: 64 IN

## 2024-07-16 DIAGNOSIS — Z12.11 COLON CANCER SCREENING: ICD-10-CM

## 2024-07-16 DIAGNOSIS — Z00.00 ANNUAL PHYSICAL EXAM: ICD-10-CM

## 2024-07-16 DIAGNOSIS — Z79.4 TYPE 2 DIABETES MELLITUS WITH HYPERGLYCEMIA, WITH LONG-TERM CURRENT USE OF INSULIN: ICD-10-CM

## 2024-07-16 DIAGNOSIS — I10 PRIMARY HYPERTENSION: ICD-10-CM

## 2024-07-16 DIAGNOSIS — E11.65 TYPE 2 DIABETES MELLITUS WITH HYPERGLYCEMIA, WITH LONG-TERM CURRENT USE OF INSULIN: ICD-10-CM

## 2024-07-16 DIAGNOSIS — Z23 ENCOUNTER FOR IMMUNIZATION: ICD-10-CM

## 2024-07-16 DIAGNOSIS — M79.2 NEUROPATHIC PAIN: ICD-10-CM

## 2024-07-16 DIAGNOSIS — F51.01 PRIMARY INSOMNIA: ICD-10-CM

## 2024-07-16 DIAGNOSIS — Z12.31 ENCOUNTER FOR SCREENING MAMMOGRAM FOR BREAST CANCER: ICD-10-CM

## 2024-07-16 DIAGNOSIS — T63.301D SPIDER BITE WOUND, ACCIDENTAL OR UNINTENTIONAL, SUBSEQUENT ENCOUNTER: Primary | ICD-10-CM

## 2024-07-16 DIAGNOSIS — Z78.0 POSTMENOPAUSAL STATE: ICD-10-CM

## 2024-07-16 DIAGNOSIS — Z01.419 WELL WOMAN EXAM: ICD-10-CM

## 2024-07-16 LAB
BACTERIA UR QL AUTO: ABNORMAL /HPF
BILIRUB UR QL STRIP: NEGATIVE
CLARITY UR: ABNORMAL
COLOR UR: YELLOW
DEVELOPER EXPIRATION DATE: NORMAL
DEVELOPER LOT NUMBER: NORMAL
EXPIRATION DATE: NORMAL
FECAL OCCULT BLOOD SCREEN, POC: NEGATIVE
GLUCOSE UR STRIP-MCNC: ABNORMAL MG/DL
HGB UR QL STRIP.AUTO: NEGATIVE
KETONES UR QL STRIP: NEGATIVE
LEUKOCYTE ESTERASE UR QL STRIP.AUTO: NEGATIVE
Lab: 232
NEGATIVE CONTROL: NEGATIVE
NITRITE UR QL STRIP: NEGATIVE
PH UR STRIP.AUTO: <=5 [PH] (ref 5–8)
POSITIVE CONTROL: POSITIVE
PROT UR QL STRIP: NEGATIVE
RBC # UR STRIP: ABNORMAL /HPF
REF LAB TEST METHOD: ABNORMAL
SP GR UR STRIP: >=1.03 (ref 1–1.03)
SQUAMOUS #/AREA URNS HPF: ABNORMAL /HPF
UROBILINOGEN UR QL STRIP: ABNORMAL
WBC # UR STRIP: ABNORMAL /HPF

## 2024-07-16 PROCEDURE — 82270 OCCULT BLOOD FECES: CPT | Performed by: FAMILY MEDICINE

## 2024-07-16 PROCEDURE — 81001 URINALYSIS AUTO W/SCOPE: CPT | Performed by: FAMILY MEDICINE

## 2024-07-16 PROCEDURE — 99214 OFFICE O/P EST MOD 30 MIN: CPT | Performed by: FAMILY MEDICINE

## 2024-07-16 PROCEDURE — 99396 PREV VISIT EST AGE 40-64: CPT | Performed by: FAMILY MEDICINE

## 2024-07-16 RX ORDER — ESZOPICLONE 1 MG/1
1 TABLET, FILM COATED ORAL NIGHTLY
Qty: 30 TABLET | Refills: 2 | Status: SHIPPED | OUTPATIENT
Start: 2024-07-16

## 2024-07-16 RX ORDER — CLINDAMYCIN HYDROCHLORIDE 300 MG/1
CAPSULE ORAL
COMMUNITY
Start: 2024-07-12

## 2024-07-16 RX ORDER — GABAPENTIN 100 MG/1
CAPSULE ORAL
Qty: 40 CAPSULE | Refills: 0 | Status: SHIPPED | OUTPATIENT
Start: 2024-07-16

## 2024-07-16 RX ORDER — INSULIN LISPRO 100 [IU]/ML
INJECTION, SOLUTION INTRAVENOUS; SUBCUTANEOUS
COMMUNITY

## 2024-07-16 RX ORDER — IBUPROFEN 600 MG/1
TABLET ORAL
COMMUNITY
Start: 2024-04-03

## 2024-07-16 NOTE — PROGRESS NOTES
Nory Rodriguez presents to [facility].    Chief Complaint: spider bite    Subjective     History of Present Illness:  HPI    Patient is in today for yearly physical and WWE.  Last Menses: hysterectomy  Last Pap: hysterectomy  Birth control: menopause and hysterectomy  Last mammogram: 6/17/2024  Last DEXA: 3/16/2022- normal  Last colonoscopy:  7/2/2018  Urinary symptoms: none  Sexual concerns: none  EtOH use: NONE  Tobacco use: NONE  She does not wear a seatbelt in the car-she was counseled today on importance of wearing it  Drug use: NONE  Last eye exam: 12/2023-no diabetic retinopathy  Dental exams every 6 months  IMMUNIZATIONS:  COVID BOOSTER, FLU in Fall, RSV vaccing  DIET:low carb diet  EXERCISE: most days walking  HEARING:  none    Type 2 diabetes mellitus with diabetic polyneuropathy. Compliance with treatment has been good.   Current meds include an oral hypoglycemic ( Glucophage ), JARDIANCE (increased from 10 to 25), insulin/injectable OFF SSI; Trulicity ( 4.5 milligrams weekly and her weight has plateaued); toujeo-60 in am, 80 units qhs ), an ACE inhibitor, aspirin, and a lipid lowering agent.  She reports home blood glucose readings have been good with average fasting readings in the 125-150 mg/dL range. She checks her glucose 3 to 4 times daily or with dexcom.   In regard to preventative care, she performs foot self-exams several days per week and her last ophthalmology exam was in 3/27/2023 Dr Dasilva,  NO DIABETIC RETINOPATHY, she has cataracts s/p replacement.  She sees [facility] endocrinology who manages her insulin.  HGB1C was 6% and she is under good control.  She still has mild fungal infections in groin area. She has lost  137#s and is ready to have plastic surgery for her excessive skin.       She presents with RA and she sees Dr Turner, rheumatology who is retiring and she may need me to fill her meds to bridge her to use her new referral, for a new  rheumatologist.  She is on tramadol for her chronic LBP and RA pain.  No si/sx are diversion or abuse. She is newly on 2 new chemo drugs Arava and and Plaquenil and she is doing really well, her hand pain is under great control.  She is to get her eye exams every 3 months.  She is now able to go to the gym and walk daily      She is still not sleeping well, she is currently on melatonin and trazodone 150 mg.  She has sleep apnea and is not using her cpap     She presents with chronic essential (primary) hypertension, her current cardiac medication regimen includes a diuretic (hctz), an ACE inhibitor ( lisinopril 2.5 mg for her diabetes ), and a calcium channel blocker ( diltiazem ).  Compliance with treatment has been good; she takes her medication as directed, maintains her diet and exercise regimen, and follows up as directed.  She is tolerating the medication well without side effects.  She is continuing to work on wt loss, healthy diet and avoids Na in diet     She presents with chronic allergies, she remains stable on singulair/zyrtec/allergy shots once a month     Her chronic HAs/migraines are stable and well controlled on topirimate, she tolerates med well      Result Review   The following data was reviewed by Eileen Barajas MD on 07/16/2024.  Lab Results   Component Value Date    WBC 6.69 06/05/2024    HGB 13.2 06/05/2024    HCT 41.6 06/05/2024    MCV 95.2 06/05/2024     06/05/2024     Lab Results   Component Value Date    GLUCOSE 116 (H) 06/05/2024    BUN 20 06/05/2024    CREATININE 0.95 06/05/2024    EGFR 68.7 06/05/2024    BCR 21.1 06/05/2024    K 4.1 06/05/2024    CO2 29.0 06/05/2024    CALCIUM 9.2 06/05/2024    ALBUMIN 4.1 06/05/2024    BILITOT 0.2 06/05/2024    AST 13 06/05/2024    ALT 15 06/05/2024     Lab Results   Component Value Date    CHOL 143 06/05/2024    CHLPL 177 05/03/2021    TRIG 131 06/05/2024    HDL 43 06/05/2024    LDL 77 06/05/2024     Lab Results   Component Value Date     "TSH 1.490 06/05/2024     Lab Results   Component Value Date    HGBA1C 6.10 (H) 06/05/2024     No results found for: \"PSA\"      Lab Results   Component Value Date    JBCO87TQ 39.5 12/08/2023               Assessment and Plan:   Diagnoses and all orders for this visit:    1. Spider bite wound, accidental or unintentional, subsequent encounter (Primary)  Comments:  Keep wound clean and dressed, start topical Bactroban.  Continue Keflex and follow-up on Thursday to review  Orders:  -     mupirocin (BACTROBAN) 2 % ointment; Apply  topically to the appropriate area as directed 2 (Two) Times a Day.  Dispense: 30 g; Refill: 0  -     gabapentin (NEURONTIN) 100 MG capsule; 1-3 pills 3 x a day  Dispense: 40 capsule; Refill: 0    2. Annual physical exam  -     Urinalysis With Microscopic - Urine, Clean Catch; Future  -     Urinalysis With Microscopic - Urine, Clean Catch    3. Well woman exam    4. Colon cancer screening  Comments:  Colonoscopy is up-to-date  Orders:  -     POC Occult Blood Stool    5. Encounter for immunization  Comments:  Recommend COVID booster and RSV vaccination    6. Postmenopausal state  Comments:  DEXA up-to-date.  Was normal in 2022 so I recommend repeat in 2025    7. Encounter for screening mammogram for breast cancer  Comments:  Screening breast exam WNL, mammogram is up-to-date    8. Neuropathic pain  Comments:  Due to spider bite.  Will treat her with gabapentin to help with pain  Orders:  -     gabapentin (NEURONTIN) 100 MG capsule; 1-3 pills 3 x a day  Dispense: 40 capsule; Refill: 0    9. Type 2 diabetes mellitus with hyperglycemia, with long-term current use of insulin  Comments:  Stable and well-controlled.  Will consider switch her over to a different GLP-1 due to plateau in weight loss, rec yearly eye exam, foot exam WNL    10. Primary hypertension  Comments:  Stable and well-controlled.  No changes with current meds or treatment plan.  She is avoid NA in diet    11. Primary " insomnia  Comments:  Will try her on Lunesta to help with sleep.  Stop trazodone and melatonin  Orders:  -     eszopiclone (Lunesta) 1 MG tablet; Take 1 tablet by mouth Every Night. Take immediately before bedtime  Dispense: 30 tablet; Refill: 2              Objective     Medications:  Current Outpatient Medications   Medication Instructions    albuterol (PROVENTIL) 2.5 mg, Nebulization, Every 4 Hours PRN    alendronate (FOSAMAX) 70 mg, Oral, Every 7 Days    amantadine (SYMMETREL) 100 mg, Daily    atorvastatin (LIPITOR) 10 mg, Oral, Every Night at Bedtime    B-D ULTRAFINE III SHORT PEN 31G X 8 MM misc USE 4 TIMES A DAY WITH     HUMALOG    budesonide-formoterol (SYMBICORT) 160-4.5 MCG/ACT inhaler 2 puffs, Inhalation, 2 Times Daily - RT    cephalexin (KEFLEX) 500 MG capsule     cetirizine (ZYRTEC) 10 mg, Oral, Daily    clindamycin (CLEOCIN) 300 MG capsule     Continuous Blood Gluc Sensor (Dexcom G7 Sensor) misc     cyclobenzaprine (FLEXERIL) 10 MG tablet No dose, route, or frequency recorded.    diazePAM (VALIUM) 5 MG tablet No dose, route, or frequency recorded.    Dietary Management Product (Vasculera) tablet 1 tablet, Oral, Daily    dilTIAZem CD (CARDIZEM CD) 120 mg, Oral, Daily    Empagliflozin (JARDIANCE PO) 10 mg, Oral, Daily    EPINEPHrine (EPIPEN) 0.3 MG/0.3ML solution auto-injector injection As Needed    estradiol (ESTRACE) 0.5 MG tablet TAKE ONE TABLET BY MOUTH DAILY    eszopiclone (LUNESTA) 1 mg, Oral, Nightly, Take immediately before bedtime    gabapentin (NEURONTIN) 100 MG capsule 1-3 pills 3 x a day    hydroCHLOROthiazide 25 mg, Oral, Daily    hydroxychloroquine (PLAQUENIL) 200 mg, Oral, Daily     MG tablet     Insulin Glargine, 2 Unit Dial, (Toujeo Max SoloStar) 300 UNIT/ML solution pen-injector injection Inject 100 units SQ in the am and 80 units SQ in the pm    Insulin Lispro (humaLOG) 100 UNIT/ML injection Subcutaneous    leflunomide (ARAVA) 10 mg, Oral, Daily    lisinopril (PRINIVIL,ZESTRIL)  "2.5 mg, Oral, Daily    metFORMIN (GLUCOPHAGE) 500 mg, Oral, 2 Times Daily With Meals    methocarbamol (ROBAXIN) 1,000 mg, Oral    metroNIDAZOLE (Metrogel) 1 % gel Topical, Daily    montelukast (SINGULAIR) 10 mg, Oral, Every Evening    mupirocin (BACTROBAN) 2 % ointment Topical, 2 Times Daily    nystatin (MYCOSTATIN) 658971 UNIT/GM cream 1 application , Topical, 2 Times Daily PRN    nystatin-triamcinolone (MYCOLOG) 171835-3.1 UNIT/GM-% ointment 1 application , Topical, 2 Times Daily    omeprazole (priLOSEC) 40 MG capsule TAKE ONE CAPSULE BY MOUTH DAILY    promethazine (PHENERGAN) 25 mg, Oral, Every 6 Hours PRN    Scopolamine 1 MG/3DAYS patch 1 patch, Transdermal, Every 72 Hours    Spiriva Respimat 1.25 MCG/ACT aerosol solution inhaler     topiramate (TOPAMAX) 100 MG tablet TAKE ONE TABLET BY MOUTH TWICE A DAY    traMADol (ULTRAM) 50 mg, Oral, Every 6 Hours PRN    Trulicity 4.5 mg, Subcutaneous, Every sunday     vitamin D3 (vitamin d) 125 MCG (5000 UT) capsule capsule 1 capsule, Oral, Daily        Vital Signs:   /63 (BP Location: Right arm, Patient Position: Sitting, Cuff Size: Large Adult)   Pulse 76   Temp 97.9 °F (36.6 °C) (Oral)   Ht 162.6 cm (64.02\")   Wt 98.2 kg (216 lb 6.4 oz)   SpO2 95%   BMI 37.13 kg/m²     Class 2 Severe Obesity (BMI >=35 and <=39.9). Obesity-related health conditions include the following: hypertension, diabetes mellitus, and dyslipidemias. Obesity is improving with treatment. BMI is is above average; BMI management plan is completed. We discussed portion control and increasing exercise.       Physical Exam:  Physical Exam  Constitutional:       General: She is not in acute distress.     Appearance: She is normal weight. She is not ill-appearing.   HENT:      Head: Normocephalic.      Right Ear: Tympanic membrane normal. There is no impacted cerumen.      Left Ear: Tympanic membrane normal. There is no impacted cerumen.      Mouth/Throat:      Mouth: Mucous membranes are moist. "      Pharynx: Oropharynx is clear.   Eyes:      Extraocular Movements: Extraocular movements intact.      Pupils: Pupils are equal, round, and reactive to light.   Neck:      Vascular: No carotid bruit.   Cardiovascular:      Rate and Rhythm: Normal rate and regular rhythm.      Pulses: Normal pulses.      Heart sounds: No murmur heard.  Pulmonary:      Effort: Pulmonary effort is normal.      Breath sounds: No wheezing, rhonchi or rales.   Chest:   Breasts:     Jorge Score is 5.      Right: Normal.      Left: Normal.   Abdominal:      General: Bowel sounds are normal.      Palpations: Abdomen is soft.      Tenderness: There is no abdominal tenderness.      Hernia: There is no hernia in the left inguinal area or right inguinal area.   Genitourinary:     General: Normal vulva.      Labia:         Right: No rash, tenderness, lesion or injury.         Left: No rash, tenderness, lesion or injury.       Urethra: No prolapse, urethral pain, urethral swelling or urethral lesion.      Vagina: Normal.      Uterus: Absent.       Adnexa: Right adnexa normal and left adnexa normal.      Rectum: Guaiac result negative. External hemorrhoid present. No mass, tenderness, anal fissure or internal hemorrhoid. Normal anal tone.   Musculoskeletal:         General: No swelling. Normal range of motion.      Cervical back: No rigidity or tenderness.   Lymphadenopathy:      Cervical: No cervical adenopathy.      Upper Body:      Right upper body: No axillary adenopathy.      Left upper body: No axillary adenopathy.      Lower Body: No right inguinal adenopathy. No left inguinal adenopathy.   Skin:     General: Skin is warm and dry.   Neurological:      Mental Status: She is alert.      Gait: Gait normal.   Psychiatric:         Mood and Affect: Mood normal.         Behavior: Behavior normal.         Judgment: Judgment normal.           Review of Systems:  Review of Systems   Constitutional:  Negative for chills, fatigue and fever.   HENT:   Negative for ear pain, sinus pressure and sore throat.    Eyes:  Negative for blurred vision and double vision.   Respiratory:  Negative for cough, shortness of breath and wheezing.    Cardiovascular:  Negative for chest pain and palpitations.   Gastrointestinal:  Negative for abdominal pain, blood in stool, constipation, diarrhea, nausea and vomiting.   Skin:  Positive for rash.   Neurological:  Negative for dizziness and headache.   Psychiatric/Behavioral:  Positive for sleep disturbance. Negative for suicidal ideas and depressed mood. The patient is not nervous/anxious.               Follow Up   Return in about 3 months (around 10/16/2024), or if symptoms worsen or fail to improve, for Recheck.    Part of this note may be an electronic transcription/translation of spoken language to printed   text using the Dragon Dictation System.            Medical History:  Medications Discontinued During This Encounter   Medication Reason    mupirocin (BACTROBAN) 2 % ointment Reorder    traZODone (DESYREL) 150 MG tablet *Therapy completed      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, hepatitis viral    Cellulitis of chest wall    Chronic back pain    Chronic hoarseness    COVID-19    Dietary counseling and surveillance    Dizziness and giddiness    Effusion, left knee    Effusion, right knee    GERD without esophagitis    Hepatitis A    Hereditary and idiopathic neuropathy, unspecified    History of depression    Hyperlipidemia    Hypertension    Kidney stone    Localized swelling, mass and lump, right lower limb    Low back pain    Methicillin resistant Staphylococcus aureus infection as the cause of diseases classified elsewhere    Migraine without aura, not intractable, without status migrainosus    Moderate  persistent asthma with (acute) exacerbation    Morbid obesity due to excess calories    Nausea    Neuropathy    Osteoporosis    Pain in joints of right hand    Pain in left hip    Pain in left leg    Pain in right shoulder    Paresthesia of skin    Peripheral neuropathy    Personal history of other venous thrombosis and embolism    Post-menopausal    Pressure ulcer of other site, stage 1    Primary insomnia    Pure hypercholesterolemia, unspecified    Rheumatoid lung disease with rheumatoid arthritis    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    Shortness of breath    Sleep apnea    Sleep apnea, unspecified    Tinea unguium    Type 2 diabetes mellitus with diabetic polyneuropathy    Uterine polyp    Viral hepatitis    Vitamin D deficiency, unspecified     Past Surgical History:    CARDIAC CATHETERIZATION    CATARACT EXTRACTION, BILATERAL    CHOLECYSTECTOMY    HYSTERECTOMY    TAHBSO    PERICARDIAL WINDOW      Family History   Problem Relation Age of Onset    Hyperlipidemia Mother     Endometrial cancer Mother     Coronary artery disease Father     Stroke Father     Diabetes type II Brother     Breast cancer Maternal Grandmother     Alzheimer's disease Maternal Grandmother     Diabetes type II Maternal Grandfather     Diabetes type II Paternal Grandfather      Social History     Tobacco Use    Smoking status: Never    Smokeless tobacco: Never   Substance Use Topics    Alcohol use: Not Currently       Health Maintenance Due   Topic Date Due    COVID-19 Vaccine (5 - 2023-24 season) 09/01/2023    DXA SCAN  03/16/2024    DIABETIC FOOT EXAM  04/06/2024    ANNUAL PHYSICAL  07/13/2024    BMI FOLLOWUP  07/13/2024        Immunization History   Administered Date(s) Administered    COVID-19 (PFIZER) BIVALENT 12+YRS 10/06/2022    COVID-19 (PFIZER) Purple Cap Monovalent 03/30/2021, 04/20/2021, 11/08/2021    Fluzone (or Fluarix & Flulaval for VFC) >6mos 10/13/2021, 10/06/2022    Fluzone High-Dose  65+yrs 10/03/2023    Influenza Quad Vaccine (Inpatient) 10/14/2013    Influenza Seasonal Injectable 10/27/2012    Influenza, Unspecified 10/03/2020, 10/06/2022    Pneumococcal Conjugate 20-Valent (PCV20) 04/06/2023    Pneumococcal Polysaccharide (PPSV23) 11/29/2016    Shingrix 04/06/2023, 07/13/2023    Tdap 09/29/2015       Allergies   Allergen Reactions    Asa [Aspirin] Anaphylaxis    Ciprofloxacin Anaphylaxis    Exenatide Nausea Only    Levemir [Insulin Detemir] GI Intolerance    Nitroglycerin Hives    Sumatriptan Dizziness    Sitagliptin GI Intolerance    Cat Hair Extract Rash    Codeine Palpitations    Diclofenac Swelling

## 2024-07-24 ENCOUNTER — TELEPHONE (OUTPATIENT)
Dept: FAMILY MEDICINE CLINIC | Age: 59
End: 2024-07-24
Payer: COMMERCIAL

## 2024-07-24 NOTE — TELEPHONE ENCOUNTER
"Caller: Nory Rodriguez \"Cherelle\"    Relationship: Self    Best call back number:     [phone]       Which medication are you concerned about: eszopiclone (Lunesta) 1 MG tablet     Who prescribed you this medication: MD STORE[facility]    When did you start taking this medication: 4 DAYS    What are your concerns: CAUSING SHAKES UNCONTROLLED AND NO SLEEP, PATIENT STATES SHE HAS STOPPED TAKING MEDICATION AND WOULD LIKE TO START BACK THE MEDICATION SHE WAS TAKING PRIOR TO THIS.     How long have you had these concerns:           "

## 2024-07-25 DIAGNOSIS — F51.01 PRIMARY INSOMNIA: Primary | ICD-10-CM

## 2024-07-25 RX ORDER — TRAZODONE HYDROCHLORIDE 150 MG/1
150 TABLET ORAL NIGHTLY
Qty: 90 TABLET | Refills: 0 | Status: SHIPPED | OUTPATIENT
Start: 2024-07-25

## 2024-08-04 DIAGNOSIS — F51.01 PRIMARY INSOMNIA: ICD-10-CM

## 2024-08-05 RX ORDER — TRAZODONE HYDROCHLORIDE 150 MG/1
150 TABLET ORAL
Qty: 90 TABLET | Refills: 1 | OUTPATIENT
Start: 2024-08-05

## 2024-08-07 DIAGNOSIS — Z79.899 ENCOUNTER FOR LONG-TERM (CURRENT) USE OF OTHER MEDICATIONS: Primary | ICD-10-CM

## 2024-08-07 DIAGNOSIS — Z11.1 SCREENING-PULMONARY TB: ICD-10-CM

## 2024-08-09 DIAGNOSIS — G43.009 MIGRAINE WITHOUT AURA AND WITHOUT STATUS MIGRAINOSUS, NOT INTRACTABLE: ICD-10-CM

## 2024-08-09 RX ORDER — TOPIRAMATE 100 MG/1
100 TABLET, FILM COATED ORAL 2 TIMES DAILY
Qty: 180 TABLET | Refills: 0 | Status: SHIPPED | OUTPATIENT
Start: 2024-08-09

## 2024-08-09 NOTE — TELEPHONE ENCOUNTER
"Caller: Nory Rodriguez \"Cherelle\"    Relationship: Self    Best call back number: [phone]     Requested Prescriptions:   Requested Prescriptions     Pending Prescriptions Disp Refills    topiramate (TOPAMAX) 100 MG tablet 180 tablet 0     Sig: Take 1 tablet by mouth 2 (Two) Times a Day.        Pharmacy where request should be sent: [facility] - [place] - [address] AT [address] TO [facility] - [phone]  - [phone]      Last office visit with prescribing clinician: 7/16/2024   Last telemedicine visit with prescribing clinician: Visit date not found   Next office visit with prescribing clinician: Visit date not found     Additional details provided by patient:     Does the patient have less than a 3 day supply:  [] Yes  [x] No    Would you like a call back once the refill request has been completed: [] Yes [x] No    If the office needs to give you a call back, can they leave a voicemail: [] Yes [] No    Beatris Patiño   08/09/24 08:19 EDT         "

## 2024-08-27 ENCOUNTER — OFFICE VISIT (OUTPATIENT)
Dept: FAMILY MEDICINE CLINIC | Age: 59
End: 2024-08-27
Payer: COMMERCIAL

## 2024-08-27 VITALS
DIASTOLIC BLOOD PRESSURE: 55 MMHG | SYSTOLIC BLOOD PRESSURE: 122 MMHG | HEART RATE: 88 BPM | OXYGEN SATURATION: 94 % | TEMPERATURE: 98.4 F | BODY MASS INDEX: 36.54 KG/M2 | WEIGHT: 214 LBS | HEIGHT: 64 IN

## 2024-08-27 DIAGNOSIS — J01.00 ACUTE NON-RECURRENT MAXILLARY SINUSITIS: Primary | ICD-10-CM

## 2024-08-27 DIAGNOSIS — R05.1 ACUTE COUGH: ICD-10-CM

## 2024-08-27 DIAGNOSIS — J45.901 ASTHMA WITH ACUTE EXACERBATION, UNSPECIFIED ASTHMA SEVERITY, UNSPECIFIED WHETHER PERSISTENT: ICD-10-CM

## 2024-08-27 PROCEDURE — 99213 OFFICE O/P EST LOW 20 MIN: CPT

## 2024-08-27 RX ORDER — DEXTROMETHORPHAN HYDROBROMIDE AND PROMETHAZINE HYDROCHLORIDE 15; 6.25 MG/5ML; MG/5ML
5 SYRUP ORAL NIGHTLY PRN
Qty: 118 ML | Refills: 0 | Status: SHIPPED | OUTPATIENT
Start: 2024-08-27

## 2024-08-27 NOTE — PROGRESS NOTES
Subjective     CHIEF COMPLAINT    Chief Complaint   Patient presents with    Nasal Congestion     X 1-2 weeks. Pt was tested for covid and flu at  on 8/24. Pt declines testing today.     Facial Pain     History of Present Illness  Patient is a 59-year-old female, presenting to the clinic today with complaints of congestion, sinus pressure and pain, cough.  Also has some shortness of breath particularly at night.  She does have a history of asthma.  She states that she went to urgent care on 8- with similar symptoms.  They recommended following up with her PCP if her symptoms did not improve by today.  She states her symptoms have been present for 10 days.  Denies any fever or chills.  No chest pain, nausea or vomiting.  She states that her daughter heard her wheezing.  Her cough is mildly productive she states she was tested for COVID and flu at the urgent care was negative.      Review of Systems   Constitutional:  Negative for chills and fever.   HENT:  Positive for congestion, sinus pressure and sinus pain. Negative for rhinorrhea and sore throat.    Respiratory:  Positive for cough, shortness of breath (at night) and wheezing.    Cardiovascular:  Negative for chest pain.   Gastrointestinal:  Negative for nausea and vomiting.   Musculoskeletal:  Negative for myalgias.   Neurological:  Negative for headaches.       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, hepatitis viral     Cellulitis of chest wall     Chronic back pain     Chronic hoarseness     COVID-19     Dietary counseling and surveillance     Dizziness and giddiness     Effusion, left knee     Effusion, right knee     GERD without esophagitis     Hepatitis A     Hereditary and idiopathic neuropathy, unspecified      History of depression     Hyperlipidemia     Hypertension     Kidney stone     Localized swelling, mass and lump, right lower limb     Low back pain     Methicillin resistant Staphylococcus aureus infection as the cause of diseases classified elsewhere     Migraine without aura, not intractable, without status migrainosus     Moderate persistent asthma with (acute) exacerbation     Morbid obesity due to excess calories     Nausea     Neuropathy     Osteoporosis     Pain in joints of right hand     Pain in left hip     Pain in left leg     Pain in right shoulder     Paresthesia of skin     Peripheral neuropathy     Personal history of other venous thrombosis and embolism     Post-menopausal     Pressure ulcer of other site, stage 1     Primary insomnia     Pure hypercholesterolemia, unspecified     Rheumatoid lung disease with rheumatoid arthritis     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     Shortness of breath     Sleep apnea     Sleep apnea, unspecified     Tinea unguium     Type 2 diabetes mellitus with diabetic polyneuropathy     Uterine polyp     Viral hepatitis     Vitamin D deficiency, unspecified             Past Surgical History:   Procedure Laterality Date    CARDIAC CATHETERIZATION  11/2011    CATARACT EXTRACTION, BILATERAL  08/2018    CHOLECYSTECTOMY      HYSTERECTOMY      TAHBSO    PERICARDIAL WINDOW  11/2011            Family History   Problem Relation Age of Onset    Hyperlipidemia Mother     Endometrial cancer Mother     Coronary artery disease Father     Stroke Father     Diabetes type II Brother     Breast cancer Maternal Grandmother     Alzheimer's disease Maternal Grandmother     Diabetes type II Maternal Grandfather     Diabetes type II Paternal Grandfather             Social History     Socioeconomic History    Marital status:     Number of children: 1   Tobacco Use    Smoking status: Never    Smokeless tobacco: Never   Vaping Use    Vaping status: Never  Used   Substance and Sexual Activity    Alcohol use: Not Currently    Drug use: Never    Sexual activity: Defer            Allergies   Allergen Reactions    Asa [Aspirin] Anaphylaxis    Ciprofloxacin Anaphylaxis    Exenatide Nausea Only    Levemir [Insulin Detemir] GI Intolerance    Nitroglycerin Hives    Sumatriptan Dizziness    Sitagliptin GI Intolerance    Cat Hair Extract Rash    Codeine Palpitations    Diclofenac Swelling            Current Outpatient Medications on File Prior to Visit   Medication Sig Dispense Refill    albuterol (PROVENTIL) (2.5 MG/3ML) 0.083% nebulizer solution Take 2.5 mg by nebulization Every 4 (Four) Hours As Needed for Wheezing.      alendronate (FOSAMAX) 70 MG tablet TAKE 1 TABLET BY MOUTH EVERY 7 DAYS. 12 tablet 2    amantadine (SYMMETREL) 100 MG tablet 1 tablet Daily.      atorvastatin (LIPITOR) 10 MG tablet TAKE 1 TABLET BY MOUTH EVERY DAY AT NIGHT 90 tablet 1    B-D ULTRAFINE III SHORT PEN 31G X 8 MM misc USE 4 TIMES A DAY WITH     HUMALOG 360 each 1    budesonide-formoterol (SYMBICORT) 160-4.5 MCG/ACT inhaler Inhale 2 puffs 2 (Two) Times a Day.      cetirizine (zyrTEC) 10 MG tablet Take 1 tablet by mouth Daily.      Continuous Blood Gluc Sensor (Dexcom G7 Sensor) misc       cyclobenzaprine (FLEXERIL) 10 MG tablet       diazePAM (VALIUM) 5 MG tablet       Dietary Management Product (Vasculera) tablet Take 1 tablet by mouth Daily. 30 tablet 11    dilTIAZem CD (CARDIZEM CD) 120 MG 24 hr capsule TAKE 1 CAPSULE BY MOUTH EVERY DAY 90 capsule 0    Dulaglutide (Trulicity) 1.5 MG/0.5ML solution pen-injector Inject 4.5 mg under the skin into the appropriate area as directed. Every sunday      Empagliflozin (JARDIANCE PO) Take 10 mg by mouth Daily.      EPINEPHrine (EPIPEN) 0.3 MG/0.3ML solution auto-injector injection As Needed.      estradiol (ESTRACE) 0.5 MG tablet TAKE ONE TABLET BY MOUTH DAILY 90 tablet 1    gabapentin (NEURONTIN) 100 MG capsule 1-3 pills 3 x a day 40 capsule 0     hydroCHLOROthiazide 25 MG tablet TAKE 1 TABLET BY MOUTH EVERY DAY 90 tablet 1    hydroxychloroquine (PLAQUENIL) 200 MG tablet Take 1 tablet by mouth Daily.       MG tablet       Insulin Glargine, 2 Unit Dial, (Touosbaldo MarxoStar) 300 UNIT/ML solution pen-injector injection Inject 100 units SQ in the am and 80 units SQ in the pm 54 mL 1    Insulin Lispro (humaLOG) 100 UNIT/ML injection Inject  under the skin into the appropriate area as directed.      leflunomide (ARAVA) 10 MG tablet Take 1 tablet by mouth Daily.      lisinopril (PRINIVIL,ZESTRIL) 2.5 MG tablet Take 1 tablet by mouth Daily.      metFORMIN (GLUCOPHAGE) 500 MG tablet TAKE 1 TABLET BY MOUTH TWICE A DAY WITH FOOD 180 tablet 1    methocarbamol (ROBAXIN) 500 MG tablet Take 2 tablets by mouth.      metroNIDAZOLE (Metrogel) 1 % gel Apply  topically to the appropriate area as directed Daily. 60 g 1    montelukast (SINGULAIR) 10 MG tablet TAKE 1 TABLET BY MOUTH EVERY DAY IN THE EVENING 90 tablet 0    mupirocin (BACTROBAN) 2 % ointment Apply  topically to the appropriate area as directed 2 (Two) Times a Day. 30 g 0    nystatin (MYCOSTATIN) 555000 UNIT/GM cream Apply 1 application topically to the appropriate area as directed 2 (Two) Times a Day As Needed (yeast rash). 30 g 0    nystatin-triamcinolone (MYCOLOG) 940992-3.1 UNIT/GM-% ointment Apply 1 application topically to the appropriate area as directed 2 (Two) Times a Day. 60 g 2    omeprazole (priLOSEC) 40 MG capsule TAKE ONE CAPSULE BY MOUTH DAILY 90 capsule 0    promethazine (PHENERGAN) 25 MG tablet Take 1 tablet by mouth Every 6 (Six) Hours As Needed for Nausea or Vomiting. 30 tablet 0    Scopolamine 1 MG/3DAYS patch Place 1 patch on the skin as directed by provider Every 72 (Seventy-Two) Hours. 4 patch 0    Spiriva Respimat 1.25 MCG/ACT aerosol solution inhaler       topiramate (TOPAMAX) 100 MG tablet Take 1 tablet by mouth 2 (Two) Times a Day. 180 tablet 0    traMADol (ULTRAM) 50 MG tablet Take  "1 tablet by mouth Every 6 (Six) Hours As Needed for Moderate Pain. 18 tablet 0    traZODone (DESYREL) 150 MG tablet Take 1 tablet by mouth Every Night. 90 tablet 0    [DISCONTINUED] clindamycin (CLEOCIN) 300 MG capsule       [DISCONTINUED] cephalexin (KEFLEX) 500 MG capsule  (Patient not taking: Reported on 8/27/2024)      [DISCONTINUED] vitamin D3 (vitamin d) 125 MCG (5000 UT) capsule capsule Take 1 capsule by mouth Daily. (Patient not taking: Reported on 8/27/2024)       No current facility-administered medications on file prior to visit.     /55   Pulse 88   Temp 98.4 °F (36.9 °C) (Oral)   Ht 162.6 cm (64.02\")   Wt 97.1 kg (214 lb)   SpO2 94% Comment: room air  BMI 36.71 kg/m²     Objective     Physical Exam  Vitals and nursing note reviewed.   Constitutional:       General: She is not in acute distress.     Appearance: Normal appearance. She is not ill-appearing.   HENT:      Head: Normocephalic.      Right Ear: Tympanic membrane, ear canal and external ear normal.      Left Ear: Tympanic membrane, ear canal and external ear normal.      Nose: Nose normal.      Mouth/Throat:      Lips: Pink.      Mouth: Mucous membranes are moist.      Pharynx: Oropharynx is clear. Uvula midline. No pharyngeal swelling, oropharyngeal exudate, posterior oropharyngeal erythema or uvula swelling.   Eyes:      Pupils: Pupils are equal, round, and reactive to light.   Cardiovascular:      Rate and Rhythm: Normal rate and regular rhythm.      Heart sounds: Normal heart sounds. No murmur heard.  Pulmonary:      Effort: Pulmonary effort is normal. No accessory muscle usage or respiratory distress.      Breath sounds: Normal breath sounds. No wheezing or rhonchi.   Musculoskeletal:      Cervical back: Normal range of motion.   Lymphadenopathy:      Cervical: No cervical adenopathy.   Skin:     General: Skin is warm and dry.   Neurological:      General: No focal deficit present.      Mental Status: She is alert and oriented " to person, place, and time.   Psychiatric:         Mood and Affect: Mood and affect normal.         Behavior: Behavior normal.       Assessment & Plan  Acute non-recurrent maxillary sinusitis  Concern for bacterial sinusitis due to duration of symptoms.  Will treat with Augmentin.  Recommend continuing allergy medications, humidification in room.  Cough medication also provided.  Acute cough    Asthma with acute exacerbation, unspecified asthma severity, unspecified whether persistent  No wheezing heard on exam today.  Patient does report some wheezing at home as well as some shortness of breath at night.  We discussed the use of oral steroids.  She states that she does not do well with oral steroids and would like to hold off on steroid use at this time.  She will notify the clinic if her symptoms worsen or fail to improve.  She does also reports she got a shot at the urgent care for her RA, may have been some steroids.        Follow up:  Return if symptoms worsen or fail to improve.  Patient was given instructions and counseling regarding her condition or for health maintenance advice. Please see specific information pulled into the AVS if appropriate.

## 2024-08-27 NOTE — ASSESSMENT & PLAN NOTE
No wheezing heard on exam today.  Patient does report some wheezing at home as well as some shortness of breath at night.  We discussed the use of oral steroids.  She states that she does not do well with oral steroids and would like to hold off on steroid use at this time.  She will notify the clinic if her symptoms worsen or fail to improve.  She does also reports she got a shot at the urgent care for her RA, may have been some steroids.

## 2024-09-11 DIAGNOSIS — F51.01 PRIMARY INSOMNIA: ICD-10-CM

## 2024-09-11 DIAGNOSIS — K21.00 GASTROESOPHAGEAL REFLUX DISEASE WITH ESOPHAGITIS WITHOUT HEMORRHAGE: ICD-10-CM

## 2024-09-12 RX ORDER — DILTIAZEM HYDROCHLORIDE 120 MG/1
120 CAPSULE, COATED, EXTENDED RELEASE ORAL DAILY
Qty: 90 CAPSULE | Refills: 0 | Status: SHIPPED | OUTPATIENT
Start: 2024-09-12

## 2024-09-12 RX ORDER — TRAZODONE HYDROCHLORIDE 150 MG/1
150 TABLET ORAL
Qty: 90 TABLET | Refills: 0 | Status: SHIPPED | OUTPATIENT
Start: 2024-09-12

## 2024-09-12 RX ORDER — OMEPRAZOLE 40 MG/1
CAPSULE, DELAYED RELEASE ORAL
Qty: 30 CAPSULE | Refills: 0 | Status: SHIPPED | OUTPATIENT
Start: 2024-09-12

## 2024-09-12 RX ORDER — MONTELUKAST SODIUM 10 MG/1
10 TABLET ORAL EVERY EVENING
Qty: 90 TABLET | Refills: 0 | Status: SHIPPED | OUTPATIENT
Start: 2024-09-12

## 2024-10-03 ENCOUNTER — LAB (OUTPATIENT)
Dept: LAB | Facility: HOSPITAL | Age: 59
End: 2024-10-03
Payer: COMMERCIAL

## 2024-10-03 DIAGNOSIS — Z11.1 SCREENING-PULMONARY TB: ICD-10-CM

## 2024-10-03 DIAGNOSIS — Z79.899 ENCOUNTER FOR LONG-TERM (CURRENT) USE OF OTHER MEDICATIONS: ICD-10-CM

## 2024-10-03 LAB
ALBUMIN SERPL-MCNC: 3.9 G/DL (ref 3.5–5.2)
ALT SERPL W P-5'-P-CCNC: 13 U/L (ref 1–33)
AST SERPL-CCNC: 17 U/L (ref 1–32)
BASOPHILS # BLD AUTO: 0.05 10*3/MM3 (ref 0–0.2)
BASOPHILS NFR BLD AUTO: 0.8 % (ref 0–1.5)
CREAT SERPL-MCNC: 1.27 MG/DL (ref 0.57–1)
CRP SERPL-MCNC: <0.3 MG/DL (ref 0–0.5)
DEPRECATED RDW RBC AUTO: 42.4 FL (ref 37–54)
EGFRCR SERPLBLD CKD-EPI 2021: 48.2 ML/MIN/1.73
EOSINOPHIL # BLD AUTO: 0.39 10*3/MM3 (ref 0–0.4)
EOSINOPHIL NFR BLD AUTO: 5.9 % (ref 0.3–6.2)
ERYTHROCYTE [DISTWIDTH] IN BLOOD BY AUTOMATED COUNT: 12.2 % (ref 12.3–15.4)
HCT VFR BLD AUTO: 40.6 % (ref 34–46.6)
HGB BLD-MCNC: 12.9 G/DL (ref 12–15.9)
IMM GRANULOCYTES # BLD AUTO: 0 10*3/MM3 (ref 0–0.05)
IMM GRANULOCYTES NFR BLD AUTO: 0 % (ref 0–0.5)
LYMPHOCYTES # BLD AUTO: 1.67 10*3/MM3 (ref 0.7–3.1)
LYMPHOCYTES NFR BLD AUTO: 25.3 % (ref 19.6–45.3)
MCH RBC QN AUTO: 29.7 PG (ref 26.6–33)
MCHC RBC AUTO-ENTMCNC: 31.8 G/DL (ref 31.5–35.7)
MCV RBC AUTO: 93.5 FL (ref 79–97)
MONOCYTES # BLD AUTO: 0.74 10*3/MM3 (ref 0.1–0.9)
MONOCYTES NFR BLD AUTO: 11.2 % (ref 5–12)
NEUTROPHILS NFR BLD AUTO: 3.74 10*3/MM3 (ref 1.7–7)
NEUTROPHILS NFR BLD AUTO: 56.8 % (ref 42.7–76)
PLATELET # BLD AUTO: 262 10*3/MM3 (ref 140–450)
PMV BLD AUTO: 12 FL (ref 6–12)
RBC # BLD AUTO: 4.34 10*6/MM3 (ref 3.77–5.28)
URATE SERPL-MCNC: 4.4 MG/DL (ref 2.4–5.7)
WBC NRBC COR # BLD AUTO: 6.59 10*3/MM3 (ref 3.4–10.8)

## 2024-10-03 PROCEDURE — 82040 ASSAY OF SERUM ALBUMIN: CPT

## 2024-10-03 PROCEDURE — 85025 COMPLETE CBC W/AUTO DIFF WBC: CPT

## 2024-10-03 PROCEDURE — 86480 TB TEST CELL IMMUN MEASURE: CPT

## 2024-10-03 PROCEDURE — 36415 COLL VENOUS BLD VENIPUNCTURE: CPT

## 2024-10-03 PROCEDURE — 84450 TRANSFERASE (AST) (SGOT): CPT

## 2024-10-03 PROCEDURE — 82565 ASSAY OF CREATININE: CPT

## 2024-10-03 PROCEDURE — 86140 C-REACTIVE PROTEIN: CPT

## 2024-10-03 PROCEDURE — 84460 ALANINE AMINO (ALT) (SGPT): CPT

## 2024-10-03 PROCEDURE — 84550 ASSAY OF BLOOD/URIC ACID: CPT

## 2024-10-05 LAB
GAMMA INTERFERON BACKGROUND BLD IA-ACNC: 0.02 IU/ML
M TB IFN-G BLD-IMP: NEGATIVE
M TB IFN-G CD4+ BCKGRND COR BLD-ACNC: 0.04 IU/ML
M TB IFN-G CD4+CD8+ BCKGRND COR BLD-ACNC: 0.03 IU/ML
MITOGEN IGNF BCKGRD COR BLD-ACNC: 6.44 IU/ML
QUANTIFERON INCUBATION: NORMAL
SERVICE CMNT-IMP: NORMAL

## 2024-10-09 DIAGNOSIS — K21.00 GASTROESOPHAGEAL REFLUX DISEASE WITH ESOPHAGITIS WITHOUT HEMORRHAGE: ICD-10-CM

## 2024-10-11 RX ORDER — OMEPRAZOLE 40 MG/1
40 CAPSULE, DELAYED RELEASE ORAL DAILY
Qty: 90 CAPSULE | Refills: 0 | Status: SHIPPED | OUTPATIENT
Start: 2024-10-11

## 2024-10-14 DIAGNOSIS — Z79.4 ENCOUNTER FOR LONG-TERM (CURRENT) USE OF INSULIN: Primary | ICD-10-CM

## 2024-10-20 DIAGNOSIS — G43.009 MIGRAINE WITHOUT AURA AND WITHOUT STATUS MIGRAINOSUS, NOT INTRACTABLE: ICD-10-CM

## 2024-10-21 RX ORDER — TOPIRAMATE 100 MG/1
100 TABLET, FILM COATED ORAL 2 TIMES DAILY
Qty: 180 TABLET | Refills: 0 | Status: SHIPPED | OUTPATIENT
Start: 2024-10-21

## 2024-10-26 DIAGNOSIS — E78.00 HYPERCHOLESTEROLEMIA: ICD-10-CM

## 2024-10-28 RX ORDER — ATORVASTATIN CALCIUM 10 MG/1
10 TABLET, FILM COATED ORAL
Qty: 90 TABLET | Refills: 1 | Status: SHIPPED | OUTPATIENT
Start: 2024-10-28

## 2024-11-25 ENCOUNTER — TELEPHONE (OUTPATIENT)
Dept: FAMILY MEDICINE CLINIC | Age: 59
End: 2024-11-25
Payer: COMMERCIAL

## 2024-11-25 NOTE — TELEPHONE ENCOUNTER
Pt is calling upset that her appointment got cancelled in dec she only has dec the 5th to come in after 1:15 . She said she needs this before the end of the year

## 2024-12-03 ENCOUNTER — TRANSCRIBE ORDERS (OUTPATIENT)
Dept: LAB | Facility: HOSPITAL | Age: 59
End: 2024-12-03
Payer: COMMERCIAL

## 2024-12-03 ENCOUNTER — LAB (OUTPATIENT)
Dept: LAB | Facility: HOSPITAL | Age: 59
End: 2024-12-03
Payer: COMMERCIAL

## 2024-12-03 DIAGNOSIS — E11.49 TYPE II DIABETES MELLITUS WITH NEUROLOGICAL MANIFESTATIONS: ICD-10-CM

## 2024-12-03 DIAGNOSIS — Z79.4 ENCOUNTER FOR LONG-TERM (CURRENT) USE OF INSULIN: ICD-10-CM

## 2024-12-03 DIAGNOSIS — E11.49 TYPE II DIABETES MELLITUS WITH NEUROLOGICAL MANIFESTATIONS: Primary | ICD-10-CM

## 2024-12-03 LAB
ALBUMIN SERPL-MCNC: 3.9 G/DL (ref 3.5–5.2)
ALBUMIN/GLOB SERPL: 1.3 G/DL
ALP SERPL-CCNC: 111 U/L (ref 39–117)
ALT SERPL W P-5'-P-CCNC: 9 U/L (ref 1–33)
ANION GAP SERPL CALCULATED.3IONS-SCNC: 11.1 MMOL/L (ref 5–15)
AST SERPL-CCNC: 15 U/L (ref 1–32)
BASOPHILS # BLD AUTO: 0.03 10*3/MM3 (ref 0–0.2)
BASOPHILS NFR BLD AUTO: 0.5 % (ref 0–1.5)
BILIRUB SERPL-MCNC: <0.2 MG/DL (ref 0–1.2)
BUN SERPL-MCNC: 21 MG/DL (ref 8–23)
BUN/CREAT SERPL: 20.2 (ref 7–25)
CALCIUM SPEC-SCNC: 9.2 MG/DL (ref 8.6–10.5)
CHLORIDE SERPL-SCNC: 105 MMOL/L (ref 98–107)
CO2 SERPL-SCNC: 23.9 MMOL/L (ref 22–29)
CREAT SERPL-MCNC: 1.04 MG/DL (ref 0.57–1)
CRP SERPL-MCNC: <0.3 MG/DL (ref 0–0.5)
DEPRECATED RDW RBC AUTO: 40.9 FL (ref 37–54)
EGFRCR SERPLBLD CKD-EPI 2021: 61.3 ML/MIN/1.73
EOSINOPHIL # BLD AUTO: 0.29 10*3/MM3 (ref 0–0.4)
EOSINOPHIL NFR BLD AUTO: 4.7 % (ref 0.3–6.2)
ERYTHROCYTE [DISTWIDTH] IN BLOOD BY AUTOMATED COUNT: 12.4 % (ref 12.3–15.4)
GLOBULIN UR ELPH-MCNC: 3.1 GM/DL
GLUCOSE SERPL-MCNC: 119 MG/DL (ref 65–99)
HBA1C MFR BLD: 6.4 % (ref 4.8–5.6)
HCT VFR BLD AUTO: 39.2 % (ref 34–46.6)
HGB BLD-MCNC: 12.9 G/DL (ref 12–15.9)
IMM GRANULOCYTES # BLD AUTO: 0.01 10*3/MM3 (ref 0–0.05)
IMM GRANULOCYTES NFR BLD AUTO: 0.2 % (ref 0–0.5)
LYMPHOCYTES # BLD AUTO: 1.87 10*3/MM3 (ref 0.7–3.1)
LYMPHOCYTES NFR BLD AUTO: 30.6 % (ref 19.6–45.3)
MCH RBC QN AUTO: 29.8 PG (ref 26.6–33)
MCHC RBC AUTO-ENTMCNC: 32.9 G/DL (ref 31.5–35.7)
MCV RBC AUTO: 90.5 FL (ref 79–97)
MONOCYTES # BLD AUTO: 0.64 10*3/MM3 (ref 0.1–0.9)
MONOCYTES NFR BLD AUTO: 10.5 % (ref 5–12)
NEUTROPHILS NFR BLD AUTO: 3.28 10*3/MM3 (ref 1.7–7)
NEUTROPHILS NFR BLD AUTO: 53.5 % (ref 42.7–76)
NRBC BLD AUTO-RTO: 0 /100 WBC (ref 0–0.2)
PLATELET # BLD AUTO: 216 10*3/MM3 (ref 140–450)
PMV BLD AUTO: 12.7 FL (ref 6–12)
POTASSIUM SERPL-SCNC: 3.9 MMOL/L (ref 3.5–5.2)
PROT SERPL-MCNC: 7 G/DL (ref 6–8.5)
RBC # BLD AUTO: 4.33 10*6/MM3 (ref 3.77–5.28)
SODIUM SERPL-SCNC: 140 MMOL/L (ref 136–145)
WBC NRBC COR # BLD AUTO: 6.12 10*3/MM3 (ref 3.4–10.8)

## 2024-12-03 PROCEDURE — 83036 HEMOGLOBIN GLYCOSYLATED A1C: CPT

## 2024-12-03 PROCEDURE — 36415 COLL VENOUS BLD VENIPUNCTURE: CPT

## 2024-12-03 PROCEDURE — 86140 C-REACTIVE PROTEIN: CPT

## 2024-12-03 PROCEDURE — 85025 COMPLETE CBC W/AUTO DIFF WBC: CPT

## 2024-12-03 PROCEDURE — 80053 COMPREHEN METABOLIC PANEL: CPT

## 2024-12-05 ENCOUNTER — OFFICE VISIT (OUTPATIENT)
Dept: FAMILY MEDICINE CLINIC | Age: 59
End: 2024-12-05
Payer: COMMERCIAL

## 2024-12-05 VITALS
HEIGHT: 64 IN | HEART RATE: 72 BPM | WEIGHT: 203 LBS | BODY MASS INDEX: 34.66 KG/M2 | SYSTOLIC BLOOD PRESSURE: 114 MMHG | OXYGEN SATURATION: 96 % | TEMPERATURE: 97.5 F | DIASTOLIC BLOOD PRESSURE: 76 MMHG

## 2024-12-05 DIAGNOSIS — I10 PRIMARY HYPERTENSION: ICD-10-CM

## 2024-12-05 DIAGNOSIS — K21.00 GASTROESOPHAGEAL REFLUX DISEASE WITH ESOPHAGITIS WITHOUT HEMORRHAGE: ICD-10-CM

## 2024-12-05 DIAGNOSIS — G43.009 MIGRAINE WITHOUT AURA AND WITHOUT STATUS MIGRAINOSUS, NOT INTRACTABLE: ICD-10-CM

## 2024-12-05 DIAGNOSIS — E78.00 HYPERCHOLESTEROLEMIA: ICD-10-CM

## 2024-12-05 DIAGNOSIS — Z79.4 TYPE 2 DIABETES MELLITUS WITH HYPERGLYCEMIA, WITH LONG-TERM CURRENT USE OF INSULIN: Primary | ICD-10-CM

## 2024-12-05 DIAGNOSIS — Z79.890 POSTMENOPAUSAL HORMONE THERAPY: ICD-10-CM

## 2024-12-05 DIAGNOSIS — R60.0 LOWER EXTREMITY EDEMA: ICD-10-CM

## 2024-12-05 DIAGNOSIS — M79.2 NEUROPATHIC PAIN: ICD-10-CM

## 2024-12-05 DIAGNOSIS — E11.65 TYPE 2 DIABETES MELLITUS WITH HYPERGLYCEMIA, WITH LONG-TERM CURRENT USE OF INSULIN: Primary | ICD-10-CM

## 2024-12-05 DIAGNOSIS — Z23 IMMUNIZATION DUE: ICD-10-CM

## 2024-12-05 PROCEDURE — 90471 IMMUNIZATION ADMIN: CPT | Performed by: FAMILY MEDICINE

## 2024-12-05 PROCEDURE — 90656 IIV3 VACC NO PRSV 0.5 ML IM: CPT | Performed by: FAMILY MEDICINE

## 2024-12-05 PROCEDURE — 91320 SARSCV2 VAC 30MCG TRS-SUC IM: CPT | Performed by: FAMILY MEDICINE

## 2024-12-05 PROCEDURE — 99214 OFFICE O/P EST MOD 30 MIN: CPT | Performed by: FAMILY MEDICINE

## 2024-12-05 PROCEDURE — 90480 ADMN SARSCOV2 VAC 1/ONLY CMP: CPT | Performed by: FAMILY MEDICINE

## 2024-12-05 RX ORDER — OMEPRAZOLE 40 MG/1
40 CAPSULE, DELAYED RELEASE ORAL DAILY
Qty: 90 CAPSULE | Refills: 1 | Status: SHIPPED | OUTPATIENT
Start: 2024-12-05

## 2024-12-05 RX ORDER — TOPIRAMATE 100 MG/1
100 TABLET, FILM COATED ORAL 2 TIMES DAILY
Qty: 180 TABLET | Refills: 1 | Status: SHIPPED | OUTPATIENT
Start: 2024-12-05

## 2024-12-05 RX ORDER — HYDROCHLOROTHIAZIDE 25 MG/1
25 TABLET ORAL DAILY
Qty: 90 TABLET | Refills: 1 | Status: SHIPPED | OUTPATIENT
Start: 2024-12-05

## 2024-12-05 RX ORDER — MONTELUKAST SODIUM 10 MG/1
10 TABLET ORAL EVERY EVENING
Qty: 90 TABLET | Refills: 1 | Status: SHIPPED | OUTPATIENT
Start: 2024-12-05

## 2024-12-05 RX ORDER — LISINOPRIL 2.5 MG/1
2.5 TABLET ORAL DAILY
Qty: 90 TABLET | Refills: 1 | Status: SHIPPED | OUTPATIENT
Start: 2024-12-05

## 2024-12-05 RX ORDER — DILTIAZEM HYDROCHLORIDE 120 MG/1
120 CAPSULE, COATED, EXTENDED RELEASE ORAL DAILY
Qty: 90 CAPSULE | Refills: 1 | Status: SHIPPED | OUTPATIENT
Start: 2024-12-05

## 2024-12-05 RX ORDER — ATORVASTATIN CALCIUM 10 MG/1
10 TABLET, FILM COATED ORAL
Qty: 90 TABLET | Refills: 1 | Status: SHIPPED | OUTPATIENT
Start: 2024-12-05

## 2024-12-05 RX ORDER — ESTRADIOL 0.5 MG/1
0.5 TABLET ORAL DAILY
Qty: 90 TABLET | Refills: 1 | Status: SHIPPED | OUTPATIENT
Start: 2024-12-05

## 2024-12-05 NOTE — PROGRESS NOTES
Nory Rodriguez presents to [facility].    Chief Complaint: Diabetes and blood pressure follow-up    Subjective     History of Present Illness:  Diabetes  Pertinent negatives for hypoglycemia include no dizziness or nervousness/anxiousness. Pertinent negatives for diabetes include no blurred vision and no chest pain.           Type 2 diabetes mellitus with diabetic polyneuropathy. Compliance with treatment has been good.   Current meds include an oral hypoglycemic ( Glucophage ), JARDIANCE (increased from 10 to 25), insulin/injectable OFF SSI; Trulicity ( 4.5 milligrams weekly and her weight has plateaued); toujeo-60 in am, 80 units qhs ), an ACE inhibitor, aspirin, and a lipid lowering agent.  She reports home blood glucose readings have been good with average fasting readings in the 125-150 mg/dL range. She checks her glucose 3 to 4 times daily or with dexcom.   In regard to preventative care, she performs foot self-exams several days per week and her last ophthalmology exam was in 3/27/2023 Dr Dasilva,  NO DIABETIC RETINOPATHY, she has cataracts s/p replacement.  She sees Kerri Sanford endocrinology who manages her insulin.  HGB1C was 6% and she is under good control.  She still has mild fungal infections in groin area. She has lost  137#s and is ready to have plastic surgery for her excessive skin.       She presents with RA and she sees Dr Turner, rheumatology who is retiring and she may need me to fill her meds to bridge her to use her new referral, for a new rheumatologist.  She is on tramadol for her chronic LBP and RA pain.  No si/sx are diversion or abuse. She is newly on 2 new chemo drugs Arava and and Plaquenil and she is doing really well, her hand pain is under great control.  She is to get her eye exams every 3 months.  She is now able to go to the gym and walk daily      She is still not sleeping well, she is currently on melatonin and trazodone 150 mg.  She has sleep apnea  "and is not using her cpap     She presents with chronic essential (primary) hypertension, her current cardiac medication regimen includes a diuretic (hctz), an ACE inhibitor ( lisinopril 2.5 mg for her diabetes ), and a calcium channel blocker ( diltiazem ).  Compliance with treatment has been good; she takes her medication as directed, maintains her diet and exercise regimen, and follows up as directed.  She is tolerating the medication well without side effects.  She is continuing to work on wt loss, healthy diet and avoids Na in diet     She presents with chronic allergies, she remains stable on singulair/zyrtec/allergy shots once a month     Her chronic HAs/migraines are stable and well controlled on topirimate, she tolerates med well       Result Review   The following data was reviewed by Eileen Barajas MD on 12/05/2024.  Lab Results   Component Value Date    WBC 6.12 12/03/2024    HGB 12.9 12/03/2024    HCT 39.2 12/03/2024    MCV 90.5 12/03/2024     12/03/2024     Lab Results   Component Value Date    GLUCOSE 119 (H) 12/03/2024    BUN 21 12/03/2024    CREATININE 1.04 (H) 12/03/2024     12/03/2024    K 3.9 12/03/2024     12/03/2024    CALCIUM 9.2 12/03/2024    PROTEINTOT 7.0 12/03/2024    ALBUMIN 3.9 12/03/2024    ALT 9 12/03/2024    AST 15 12/03/2024    ALKPHOS 111 12/03/2024    BILITOT <0.2 12/03/2024    GLOB 3.1 12/03/2024    AGRATIO 1.3 12/03/2024    BCR 20.2 12/03/2024    ANIONGAP 11.1 12/03/2024    EGFR 61.3 12/03/2024     Lab Results   Component Value Date    CHOL 143 06/05/2024    CHLPL 177 05/03/2021    TRIG 131 06/05/2024    HDL 43 06/05/2024    LDL 77 06/05/2024     Lab Results   Component Value Date    TSH 1.490 06/05/2024     Lab Results   Component Value Date    HGBA1C 6.40 (H) 12/03/2024     No results found for: \"PSA\"  No results found for: \"IRON\"   Lab Results   Component Value Date    NBYU62OM 39.5 12/08/2023               Assessment and Plan:   Diagnoses and all " orders for this visit:    1. Type 2 diabetes mellitus with hyperglycemia, with long-term current use of insulin (Primary)  Comments:  Hemoglobin A1c of 6.4%.  Recommend yearly eye exams.  Foot exam WNL.  Continue current treatment plan and low carbohydrate diet    2. Primary hypertension  Comments:  Stable and well-controlled with current meds.  No changes needed current meds or treatment plan    3. Hypercholesterolemia  Comments:  Stable on atorvastatin she tolerates meds well.  No changes to current meds or treatment plan.  Continue low-cholesterol diet  Orders:  -     atorvastatin (LIPITOR) 10 MG tablet; Take 1 tablet by mouth every night at bedtime.  Dispense: 90 tablet; Refill: 1    4. Postmenopausal hormone therapy  Comments:  Stable on estrogen.  She tolerates well.  She does need mammograms yearly  Orders:  -     estradiol (ESTRACE) 0.5 MG tablet; Take 1 tablet by mouth Daily.  Dispense: 90 tablet; Refill: 1    5. Neuropathic pain  Comments:  She is doing better and spider bite has healed.  She is off the gabapentin and I have removed it from her last    6. Lower extremity edema  Comments:  Stable on hydrochlorothiazide.  Recommend she avoid sodium in her diet and elevate her legs at night  Orders:  -     hydroCHLOROthiazide 25 MG tablet; Take 1 tablet by mouth Daily.  Dispense: 90 tablet; Refill: 1    7. Immunization due  -     Fluzone >6mos ([id number])  -     COVID-19 (Pfizer) 12yrs+ (COMIRNATY)    8. Gastroesophageal reflux disease with esophagitis without hemorrhage  Comments:  chronic, on omeprazole, needs repeat EGD every 3 years and she is to avoid spicy and acidic foods  Orders:  -     omeprazole (priLOSEC) 40 MG capsule; Take 1 capsule by mouth Daily.  Dispense: 90 capsule; Refill: 1    9. Migraine without aura and without status migrainosus, not intractable  Comments:  stable on meds, topamax refilled  Orders:  -     topiramate (TOPAMAX) 100 MG tablet; Take 1 tablet by mouth 2 (Two) Times a Day.   Dispense: 180 tablet; Refill: 1    Other orders  -     dilTIAZem CD (CARDIZEM CD) 120 MG 24 hr capsule; Take 1 capsule by mouth Daily.  Dispense: 90 capsule; Refill: 1  -     lisinopril (PRINIVIL,ZESTRIL) 2.5 MG tablet; Take 1 tablet by mouth Daily.  Dispense: 90 tablet; Refill: 1  -     metFORMIN (GLUCOPHAGE) 500 MG tablet; Take 1 tablet by mouth 2 (Two) Times a Day With Meals.  Dispense: 180 tablet; Refill: 1  -     montelukast (SINGULAIR) 10 MG tablet; Take 1 tablet by mouth Every Evening.  Dispense: 90 tablet; Refill: 1              Objective     Medications:  Current Outpatient Medications   Medication Instructions    albuterol (PROVENTIL) 2.5 mg, Every 4 Hours PRN    alendronate (FOSAMAX) 70 mg, Oral, Every 7 Days    amantadine (SYMMETREL) 100 mg, Daily    atorvastatin (LIPITOR) 10 mg, Oral, Every Night at Bedtime    B-D ULTRAFINE III SHORT PEN 31G X 8 MM misc USE 4 TIMES A DAY WITH     HUMALOG    budesonide-formoterol (SYMBICORT) 160-4.5 MCG/ACT inhaler 2 puffs, 2 Times Daily - RT    cetirizine (ZYRTEC) 10 mg, Daily    Continuous Blood Gluc Sensor (Dexcom G7 Sensor) misc     cyclobenzaprine (FLEXERIL) 10 MG tablet No dose, route, or frequency recorded.    diazePAM (VALIUM) 5 MG tablet No dose, route, or frequency recorded.    Dietary Management Product (Vasculera) tablet 1 tablet, Oral, Daily    dilTIAZem CD (CARDIZEM CD) 120 mg, Oral, Daily    Empagliflozin (JARDIANCE PO) 10 mg, Daily    EPINEPHrine (EPIPEN) 0.3 MG/0.3ML solution auto-injector injection As Needed    estradiol (ESTRACE) 0.5 mg, Oral, Daily    gabapentin (NEURONTIN) 100 MG capsule 1-3 pills 3 x a day    hydroCHLOROthiazide 25 mg, Oral, Daily    hydroxychloroquine (PLAQUENIL) 200 mg, Daily     MG tablet     Insulin Glargine, 2 Unit Dial, (Toujeo Max SoloStar) 300 UNIT/ML solution pen-injector injection Inject 100 units SQ in the am and 80 units SQ in the pm    Insulin Lispro (humaLOG) 100 UNIT/ML injection Inject  under the skin into the  "appropriate area as directed.    leflunomide (ARAVA) 10 mg, Daily    lisinopril (PRINIVIL,ZESTRIL) 2.5 mg, Oral, Daily    metFORMIN (GLUCOPHAGE) 500 mg, Oral, 2 Times Daily With Meals    methocarbamol (ROBAXIN) 1,000 mg    metroNIDAZOLE (Metrogel) 1 % gel Topical, Daily    montelukast (SINGULAIR) 10 mg, Oral, Every Evening    mupirocin (BACTROBAN) 2 % ointment Topical, 2 Times Daily    nystatin (MYCOSTATIN) 873232 UNIT/GM cream 1 application , Topical, 2 Times Daily PRN    nystatin-triamcinolone (MYCOLOG) 465830-1.1 UNIT/GM-% ointment 1 application , Topical, 2 Times Daily    omeprazole (PRILOSEC) 40 mg, Oral, Daily    promethazine (PHENERGAN) 25 mg, Oral, Every 6 Hours PRN    promethazine-dextromethorphan (PROMETHAZINE-DM) 6.25-15 MG/5ML syrup 5 mL, Oral, Nightly PRN    Scopolamine 1 MG/3DAYS patch 1 patch, Transdermal, Every 72 Hours    Spiriva Respimat 1.25 MCG/ACT aerosol solution inhaler     topiramate (TOPAMAX) 100 mg, Oral, 2 Times Daily    traZODone (DESYREL) 150 mg, Oral, Every Night at Bedtime    Trulicity 4.5 mg        Vital Signs:   /76 (BP Location: Right arm, Patient Position: Sitting, Cuff Size: Large Adult)   Pulse 72   Temp 97.5 °F (36.4 °C) (Oral)   Ht 162.6 cm (64.02\")   Wt 92.1 kg (203 lb)   SpO2 96%   BMI 34.83 kg/m²             Physical Exam:  Physical Exam  Vitals and nursing note reviewed.   Constitutional:       General: She is not in acute distress.     Appearance: Normal appearance. She is obese. She is not ill-appearing, toxic-appearing or diaphoretic.   HENT:      Head: Normocephalic and atraumatic.      Right Ear: Tympanic membrane, ear canal and external ear normal.      Left Ear: Tympanic membrane, ear canal and external ear normal.      Nose: Nose normal. No congestion or rhinorrhea.      Mouth/Throat:      Pharynx: Oropharynx is clear. No oropharyngeal exudate or posterior oropharyngeal erythema.   Eyes:      Conjunctiva/sclera: Conjunctivae normal.   Cardiovascular:     "  Rate and Rhythm: Normal rate.      Pulses: Normal pulses.           Dorsalis pedis pulses are 2+ on the right side and 2+ on the left side.   Pulmonary:      Effort: Pulmonary effort is normal. No respiratory distress.      Breath sounds: Normal breath sounds. No stridor. No wheezing, rhonchi or rales.   Abdominal:      General: There is no distension.      Palpations: There is no mass.      Tenderness: There is no abdominal tenderness. There is no guarding or rebound.      Hernia: No hernia is present.   Musculoskeletal:         General: Normal range of motion.      Cervical back: Normal range of motion and neck supple. No rigidity.   Feet:      Right foot:      Protective Sensation: 7 sites tested.  7 sites sensed.      Skin integrity: Callus and dry skin present. No ulcer or blister.      Toenail Condition: Right toenails are normal.      Left foot:      Protective Sensation: 7 sites tested.  7 sites sensed.      Skin integrity: Callus and dry skin present. No ulcer or blister.      Toenail Condition: Left toenails are normal.      Comments: Diabetic Foot Exam Performed and Monofilament Test Performed       Dry skin, hammer toes B  Lymphadenopathy:      Cervical: No cervical adenopathy.   Skin:     General: Skin is warm and dry.      Capillary Refill: Capillary refill takes less than 2 seconds.   Neurological:      Mental Status: She is alert and oriented to person, place, and time.   Psychiatric:         Mood and Affect: Mood normal.         Behavior: Behavior normal.         Thought Content: Thought content normal.         Judgment: Judgment normal.           Review of Systems:  Review of Systems   Constitutional:  Negative for chills and fever.   HENT:  Negative for ear pain, sinus pressure and sore throat.    Eyes:  Negative for blurred vision and double vision.   Respiratory:  Negative for cough, shortness of breath and wheezing.    Cardiovascular:  Negative for chest pain, palpitations and leg swelling.    Gastrointestinal:  Negative for abdominal pain, blood in stool, constipation, diarrhea, nausea and vomiting.   Skin:  Negative for rash.   Neurological:  Negative for dizziness and headache.   Psychiatric/Behavioral:  Negative for sleep disturbance, suicidal ideas and depressed mood. The patient is not nervous/anxious.               Follow Up   Return in about 6 months (around 6/5/2025) for Medicare Wellness.    Part of this note may be an electronic transcription/translation of spoken language to printed text using the Dragon Dictation System.            Medical History:  Medications Discontinued During This Encounter   Medication Reason    traMADol (ULTRAM) 50 MG tablet *Therapy completed    lisinopril (PRINIVIL,ZESTRIL) 2.5 MG tablet Reorder    estradiol (ESTRACE) 0.5 MG tablet Reorder    metFORMIN (GLUCOPHAGE) 500 MG tablet Reorder    hydroCHLOROthiazide 25 MG tablet Reorder    montelukast (SINGULAIR) 10 MG tablet Reorder    dilTIAZem CD (CARDIZEM CD) 120 MG 24 hr capsule Reorder    omeprazole (priLOSEC) 40 MG capsule Reorder    topiramate (TOPAMAX) 100 MG tablet Reorder    atorvastatin (LIPITOR) 10 MG tablet Reorder      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, hepatitis viral    Cellulitis of chest wall    Chronic back pain    Chronic hoarseness    COVID-19    Dietary counseling and surveillance    Dizziness and giddiness    Effusion, left knee    Effusion, right knee    GERD without esophagitis    Hepatitis A    Hereditary and idiopathic neuropathy, unspecified    History of depression    Hyperlipidemia    Hypertension    Kidney stone    Localized swelling, mass and lump, right lower limb    Low back pain    Methicillin resistant Staphylococcus aureus infection as the cause of diseases classified  elsewhere    Migraine without aura, not intractable, without status migrainosus    Moderate persistent asthma with (acute) exacerbation    Morbid obesity due to excess calories    Nausea    Neuropathy    Osteoporosis    Pain in joints of right hand    Pain in left hip    Pain in left leg    Pain in right shoulder    Paresthesia of skin    Peripheral neuropathy    Personal history of other venous thrombosis and embolism    Post-menopausal    Pressure ulcer of other site, stage 1    Primary insomnia    Pure hypercholesterolemia, unspecified    Rheumatoid lung disease with rheumatoid arthritis    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    Shortness of breath    Sleep apnea    Sleep apnea, unspecified    Tinea unguium    Type 2 diabetes mellitus with diabetic polyneuropathy    Uterine polyp    Viral hepatitis    Vitamin D deficiency, unspecified     Past Surgical History:    CARDIAC CATHETERIZATION    CATARACT EXTRACTION, BILATERAL    CHOLECYSTECTOMY    HYSTERECTOMY    TAHBSO    PERICARDIAL WINDOW      Family History   Problem Relation Age of Onset    Hyperlipidemia Mother     Endometrial cancer Mother     Coronary artery disease Father     Stroke Father     Diabetes type II Brother     Breast cancer Maternal Grandmother     Alzheimer's disease Maternal Grandmother     Diabetes type II Maternal Grandfather     Diabetes type II Paternal Grandfather      Social History     Tobacco Use    Smoking status: Never     Passive exposure: Never    Smokeless tobacco: Never   Substance Use Topics    Alcohol use: Not Currently       There are no preventive care reminders to display for this patient.       Immunization History   Administered Date(s) Administered    COVID-19 (PFIZER) 12YRS+ (COMIRNATY) 12/05/2024    COVID-19 (PFIZER) BIVALENT 12+YRS 10/06/2022    COVID-19 (PFIZER) Purple Cap Monovalent 03/30/2021, 04/20/2021, 11/08/2021    Fluzone  >6mos 10/14/2013, 12/05/2024    Fluzone (or Fluarix &  Flulaval for VFC) >6mos 10/13/2021, 10/06/2022    Fluzone High-Dose 65+yrs 10/03/2023    Influenza Seasonal Injectable 10/27/2012    Influenza, Unspecified 10/03/2020, 10/06/2022    Pneumococcal Conjugate 20-Valent (PCV20) 04/06/2023    Pneumococcal Polysaccharide (PPSV23) 11/29/2016    Shingrix 04/06/2023, 07/13/2023    Tdap 09/29/2015       Allergies   Allergen Reactions    Asa [Aspirin] Anaphylaxis    Ciprofloxacin Anaphylaxis    Exenatide Nausea Only    Levemir [Insulin Detemir] GI Intolerance    Nitroglycerin Hives    Sumatriptan Dizziness    Sitagliptin GI Intolerance    Cat Hair Extract Rash    Codeine Palpitations    Diclofenac Swelling

## 2024-12-17 ENCOUNTER — TELEPHONE (OUTPATIENT)
Dept: FAMILY MEDICINE CLINIC | Age: 59
End: 2024-12-17
Payer: COMMERCIAL

## 2024-12-17 NOTE — TELEPHONE ENCOUNTER
"  Caller: Nory Rodriguez \"Cherelle\"    Relationship: Self    Best call back number: [phone]    Which medication are you concerned about: MONTELUKAST    Who prescribed you this medication: LISSA STOREY    When did you start taking this medication: BEEN TAKING IT FOR YEARS    What are your concerns: PATIENT RECEIVED A 4 PAGE LETTER FROM THE PHARMACY WHICH INCLUDED WARNINGS AND SEVERAL SIDE EFFECTS, ADVISING HER TO CONSULT HER DOCTOR BEFORE CONTINUING TO TAKE THE MEDICATION   PLEASE CONTACT AND ADVISE   SHE CAN BRING THE LETTER TO THE OFFICE TOMORROW MORNING     How long have you had these concerns: SINCE RECEIVING THE LETTER         "

## 2025-01-08 ENCOUNTER — OFFICE VISIT (OUTPATIENT)
Dept: FAMILY MEDICINE CLINIC | Age: 60
End: 2025-01-08
Payer: COMMERCIAL

## 2025-01-08 ENCOUNTER — HOSPITAL ENCOUNTER (OUTPATIENT)
Dept: GENERAL RADIOLOGY | Facility: HOSPITAL | Age: 60
Discharge: HOME | End: 2025-01-08
Admitting: INTERNAL MEDICINE
Payer: COMMERCIAL

## 2025-01-08 VITALS
SYSTOLIC BLOOD PRESSURE: 111 MMHG | TEMPERATURE: 98 F | DIASTOLIC BLOOD PRESSURE: 64 MMHG | OXYGEN SATURATION: 98 % | BODY MASS INDEX: 35.17 KG/M2 | HEIGHT: 64 IN | WEIGHT: 206 LBS | HEART RATE: 75 BPM

## 2025-01-08 DIAGNOSIS — J06.9 UPPER RESPIRATORY TRACT INFECTION, UNSPECIFIED TYPE: Primary | ICD-10-CM

## 2025-01-08 DIAGNOSIS — R05.1 ACUTE COUGH: ICD-10-CM

## 2025-01-08 DIAGNOSIS — H61.21 IMPACTED CERUMEN OF RIGHT EAR: ICD-10-CM

## 2025-01-08 DIAGNOSIS — R09.81 NASAL CONGESTION: ICD-10-CM

## 2025-01-08 DIAGNOSIS — Z11.1 SCREENING-PULMONARY TB: ICD-10-CM

## 2025-01-08 DIAGNOSIS — H92.01 RIGHT EAR PAIN: ICD-10-CM

## 2025-01-08 DIAGNOSIS — J34.89 SINUS PRESSURE: ICD-10-CM

## 2025-01-08 DIAGNOSIS — R06.02 SOB (SHORTNESS OF BREATH): ICD-10-CM

## 2025-01-08 LAB
EXPIRATION DATE: NORMAL
FLUAV AG UPPER RESP QL IA.RAPID: NOT DETECTED
FLUBV AG UPPER RESP QL IA.RAPID: NOT DETECTED
INTERNAL CONTROL: NORMAL
Lab: NORMAL
SARS-COV-2 AG UPPER RESP QL IA.RAPID: NOT DETECTED

## 2025-01-08 PROCEDURE — 69210 REMOVE IMPACTED EAR WAX UNI: CPT | Performed by: STUDENT IN AN ORGANIZED HEALTH CARE EDUCATION/TRAINING PROGRAM

## 2025-01-08 PROCEDURE — 99214 OFFICE O/P EST MOD 30 MIN: CPT | Performed by: STUDENT IN AN ORGANIZED HEALTH CARE EDUCATION/TRAINING PROGRAM

## 2025-01-08 PROCEDURE — 71046 X-RAY EXAM CHEST 2 VIEWS: CPT

## 2025-01-08 PROCEDURE — 87428 SARSCOV & INF VIR A&B AG IA: CPT | Performed by: STUDENT IN AN ORGANIZED HEALTH CARE EDUCATION/TRAINING PROGRAM

## 2025-01-08 RX ORDER — AZITHROMYCIN 250 MG/1
TABLET, FILM COATED ORAL
Qty: 6 TABLET | Refills: 0 | Status: SHIPPED | OUTPATIENT
Start: 2025-01-08

## 2025-01-08 RX ORDER — PREDNISONE 10 MG/1
10 TABLET ORAL DAILY
Qty: 5 TABLET | Refills: 0 | Status: SHIPPED | OUTPATIENT
Start: 2025-01-08

## 2025-01-08 RX ORDER — BENZONATATE 100 MG/1
100 CAPSULE ORAL 3 TIMES DAILY PRN
Qty: 30 CAPSULE | Refills: 0 | Status: SHIPPED | OUTPATIENT
Start: 2025-01-08

## 2025-01-08 NOTE — PROGRESS NOTES
Ear Cerumen Removal    Date/Time: 1/8/2025 2:53 PM    Performed by: James Lepe MA  Authorized by: Mel Soto APRN    Anesthesia:  Local Anesthetic: none  Location details: right ear  Comments: Ear irrigation was unsuccessful. FPC through the procedure patient stated it was beginning to become painful. Procedure was stopped & provider was notified.   Procedure type: irrigation   Sedation:  Patient sedated: no

## 2025-01-08 NOTE — PROGRESS NOTES
Chief Complaint     Headache (X 2 days /), Cough (Productive cough (clear) X 1 month  . Worsened over the last couple days /Pt taking promethazine for Sx ), Nasal Congestion (X 1 month ), Earache (RT ear pain X 3 days ), and Shortness of Breath (Using albuterol )    History of Present Illness     Nory Rodriguez is a 59 y.o. female who presents to [facility] with complaints of nasal congestion, sinus pressure, HA, right earache, post nasal drip, cough with clear/green sputum, SOA when coughing or when laying flat. Has tried tylenol sinus and cold and flu and cold.  States she think she needs a Z-Lizandro as that is what usually helps her when she is having the symptoms.    Denies any chest pain, fever, chills, body aches, nausea, vomiting, diarrhea.     History      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, hepatitis viral     Cellulitis of chest wall     Chronic back pain     Chronic hoarseness     COVID-19     Dietary counseling and surveillance     Dizziness and giddiness     Effusion, left knee     Effusion, right knee     GERD without esophagitis     Hepatitis A     Hereditary and idiopathic neuropathy, unspecified     History of depression     Hyperlipidemia     Hypertension     Kidney stone     Localized swelling, mass and lump, right lower limb     Low back pain     Methicillin resistant Staphylococcus aureus infection as the cause of diseases classified elsewhere     Migraine without aura, not intractable, without status migrainosus     Moderate persistent asthma with (acute) exacerbation     Morbid obesity due to excess calories     Nausea     Neuropathy     Osteoporosis     Pain in joints of right hand     Pain in left hip     Pain in left leg      Pain in right shoulder     Paresthesia of skin     Peripheral neuropathy     Personal history of other venous thrombosis and embolism     Post-menopausal     Pressure ulcer of other site, stage 1     Primary insomnia     Pure hypercholesterolemia, unspecified     Rheumatoid lung disease with rheumatoid arthritis     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     Shortness of breath     Sleep apnea     Sleep apnea, unspecified     Tinea unguium     Type 2 diabetes mellitus with diabetic polyneuropathy     Uterine polyp     Viral hepatitis     Vitamin D deficiency, unspecified        Past Surgical History:   Procedure Laterality Date    CARDIAC CATHETERIZATION  11/2011    CATARACT EXTRACTION, BILATERAL  08/2018    CHOLECYSTECTOMY      HYSTERECTOMY      TAHBSO    PERICARDIAL WINDOW  11/2011       Family History   Problem Relation Age of Onset    Hyperlipidemia Mother     Endometrial cancer Mother     Coronary artery disease Father     Stroke Father     Diabetes type II Brother     Breast cancer Maternal Grandmother     Alzheimer's disease Maternal Grandmother     Diabetes type II Maternal Grandfather     Diabetes type II Paternal Grandfather         Current Medications        Current Outpatient Medications:     albuterol (PROVENTIL) (2.5 MG/3ML) 0.083% nebulizer solution, Take 2.5 mg by nebulization Every 4 (Four) Hours As Needed for Wheezing., Disp: , Rfl:     amantadine (SYMMETREL) 100 MG tablet, 1 tablet Daily., Disp: , Rfl:     atorvastatin (LIPITOR) 10 MG tablet, Take 1 tablet by mouth every night at bedtime., Disp: 90 tablet, Rfl: 1    B-D ULTRAFINE III SHORT PEN 31G X 8 MM misc, USE 4 TIMES A DAY WITH     HUMALOG, Disp: 360 each, Rfl: 1    budesonide-formoterol (SYMBICORT) 160-4.5 MCG/ACT inhaler, Inhale 2 puffs 2 (Two) Times a Day., Disp: , Rfl:     cetirizine (zyrTEC) 10 MG tablet, Take 1 tablet by mouth Daily., Disp: , Rfl:     Continuous Blood Gluc Sensor (Dexcom G7 Sensor)  misc, , Disp: , Rfl:     cyclobenzaprine (FLEXERIL) 10 MG tablet, Take 1 tablet by mouth 3 (Three) Times a Day As Needed., Disp: , Rfl:     Dietary Management Product (Vasculera) tablet, Take 1 tablet by mouth Daily., Disp: 30 tablet, Rfl: 11    dilTIAZem CD (CARDIZEM CD) 120 MG 24 hr capsule, Take 1 capsule by mouth Daily., Disp: 90 capsule, Rfl: 1    Dulaglutide (Trulicity) 1.5 MG/0.5ML solution pen-injector, Inject 4.5 mg under the skin into the appropriate area as directed. Every sunday, Disp: , Rfl:     Empagliflozin (JARDIANCE PO), Take 10 mg by mouth Daily., Disp: , Rfl:     EPINEPHrine (EPIPEN) 0.3 MG/0.3ML solution auto-injector injection, As Needed., Disp: , Rfl:     gabapentin (NEURONTIN) 100 MG capsule, 1-3 pills 3 x a day, Disp: 40 capsule, Rfl: 0    hydroCHLOROthiazide 25 MG tablet, Take 1 tablet by mouth Daily., Disp: 90 tablet, Rfl: 1    hydroxychloroquine (PLAQUENIL) 200 MG tablet, Take 1 tablet by mouth Daily., Disp: , Rfl:      MG tablet, , Disp: , Rfl:     Insulin Glargine, 2 Unit Dial, (Toujeo Max SoloStar) 300 UNIT/ML solution pen-injector injection, Inject 100 units SQ in the am and 80 units SQ in the pm, Disp: 54 mL, Rfl: 1    Insulin Lispro (humaLOG) 100 UNIT/ML injection, Inject  under the skin into the appropriate area as directed 3 (Three) Times a Day Before Meals. Sliding scale TID, Disp: , Rfl:     leflunomide (ARAVA) 10 MG tablet, Take 1 tablet by mouth Daily., Disp: , Rfl:     metFORMIN (GLUCOPHAGE) 500 MG tablet, Take 1 tablet by mouth 2 (Two) Times a Day With Meals., Disp: 180 tablet, Rfl: 1    methocarbamol (ROBAXIN) 500 MG tablet, Take 2 tablets by mouth., Disp: , Rfl:     metroNIDAZOLE (Metrogel) 1 % gel, Apply  topically to the appropriate area as directed Daily., Disp: 60 g, Rfl: 1    montelukast (SINGULAIR) 10 MG tablet, Take 1 tablet by mouth Every Evening., Disp: 90 tablet, Rfl: 1    mupirocin (BACTROBAN) 2 % ointment, Apply  topically to the appropriate area as  directed 2 (Two) Times a Day., Disp: 30 g, Rfl: 0    nystatin (MYCOSTATIN) 019653 UNIT/GM cream, Apply 1 application topically to the appropriate area as directed 2 (Two) Times a Day As Needed (yeast rash)., Disp: 30 g, Rfl: 0    nystatin-triamcinolone (MYCOLOG) 674299-1.1 UNIT/GM-% ointment, Apply 1 application topically to the appropriate area as directed 2 (Two) Times a Day., Disp: 60 g, Rfl: 2    omeprazole (priLOSEC) 40 MG capsule, Take 1 capsule by mouth Daily., Disp: 90 capsule, Rfl: 1    promethazine (PHENERGAN) 25 MG tablet, Take 1 tablet by mouth Every 6 (Six) Hours As Needed for Nausea or Vomiting., Disp: 30 tablet, Rfl: 0    promethazine-dextromethorphan (PROMETHAZINE-DM) 6.25-15 MG/5ML syrup, Take 5 mL by mouth At Night As Needed for Cough., Disp: 118 mL, Rfl: 0    Spiriva Respimat 1.25 MCG/ACT aerosol solution inhaler, Inhale 2 puffs Daily As Needed., Disp: , Rfl:     topiramate (TOPAMAX) 100 MG tablet, Take 1 tablet by mouth 2 (Two) Times a Day., Disp: 180 tablet, Rfl: 1    traZODone (DESYREL) 150 MG tablet, TAKE 1 TABLET BY MOUTH EVERY DAY AT NIGHT, Disp: 90 tablet, Rfl: 0    alendronate (FOSAMAX) 70 MG tablet, TAKE 1 TABLET BY MOUTH EVERY 7 DAYS. (Patient not taking: Reported on 1/8/2025), Disp: 12 tablet, Rfl: 2    azithromycin (Zithromax Z-Lizandro) 250 MG tablet, Take 2 tablets by mouth on day 1, then 1 tablet daily on days 2-5, Disp: 6 tablet, Rfl: 0    benzonatate (Tessalon Perles) 100 MG capsule, Take 1 capsule by mouth 3 (Three) Times a Day As Needed for Cough., Disp: 30 capsule, Rfl: 0    diazePAM (VALIUM) 5 MG tablet, , Disp: , Rfl:     estradiol (ESTRACE) 0.5 MG tablet, Take 1 tablet by mouth Daily. (Patient not taking: Reported on 1/8/2025), Disp: 90 tablet, Rfl: 1    lisinopril (PRINIVIL,ZESTRIL) 2.5 MG tablet, Take 1 tablet by mouth Daily. (Patient not taking: Reported on 1/8/2025), Disp: 90 tablet, Rfl: 1    predniSONE (DELTASONE) 10 MG tablet, Take 1 tablet by mouth Daily., Disp: 5 tablet,  "Rfl: 0    Scopolamine 1 MG/3DAYS patch, Place 1 patch on the skin as directed by provider Every 72 (Seventy-Two) Hours. (Patient not taking: Reported on 1/8/2025), Disp: 4 patch, Rfl: 0     Allergies     Allergies   Allergen Reactions    Asa [Aspirin] Anaphylaxis    Ciprofloxacin Anaphylaxis    Exenatide Nausea Only    Levemir [Insulin Detemir] GI Intolerance    Nitroglycerin Hives    Sumatriptan Dizziness    Sitagliptin GI Intolerance    Cat Hair Extract Rash    Codeine Palpitations    Diclofenac Swelling       Social History       Social History     Social History Narrative    Not on file       Immunizations     Immunization:  Immunization History   Administered Date(s) Administered    COVID-19 (PFIZER) 12YRS+ (COMIRNATY) 12/05/2024    COVID-19 (PFIZER) BIVALENT 12+YRS 10/06/2022    COVID-19 (PFIZER) Purple Cap Monovalent 03/30/2021, 04/20/2021, 11/08/2021    Fluzone  >6mos 10/14/2013, 12/05/2024    Fluzone (or Fluarix & Flulaval for VFC) >6mos 10/13/2021, 10/06/2022    Fluzone High-Dose 65+yrs 10/03/2023    Influenza Seasonal Injectable 10/27/2012    Influenza, Unspecified 10/03/2020, 10/06/2022    Pneumococcal Conjugate 20-Valent (PCV20) 04/06/2023    Pneumococcal Polysaccharide (PPSV23) 11/29/2016    Shingrix 04/06/2023, 07/13/2023    Tdap 09/29/2015          Objective     Objective     Vital Signs:   /64 (BP Location: Left arm, Patient Position: Sitting, Cuff Size: Adult)   Pulse 75   Temp 98 °F (36.7 °C) (Oral)   Ht 162.6 cm (64.02\")   Wt 93.4 kg (206 lb)   SpO2 98%   BMI 35.34 kg/m²       Physical Exam  Vitals and nursing note reviewed.   Constitutional:       Appearance: Normal appearance. She is well-groomed. She is obese.   HENT:      Head: Normocephalic.      Right Ear: Ear canal and external ear normal. A middle ear effusion is present. There is impacted cerumen.      Left Ear: Tympanic membrane, ear canal and external ear normal.      Ears:      Comments: Impacted cerumen in the right ear, " after irrigating ear tympanic membrane visible with effusion.     Nose: Congestion present.      Mouth/Throat:      Lips: Pink.      Mouth: Mucous membranes are moist.      Pharynx: Uvula midline.   Eyes:      Conjunctiva/sclera: Conjunctivae normal.      Pupils: Pupils are equal, round, and reactive to light.   Cardiovascular:      Rate and Rhythm: Normal rate and regular rhythm.      Pulses: Normal pulses.      Heart sounds: Normal heart sounds.   Pulmonary:      Effort: Pulmonary effort is normal.      Breath sounds: Examination of the right-lower field reveals decreased breath sounds. Examination of the left-lower field reveals decreased breath sounds. Decreased breath sounds present.      Comments: Decreased breath sounds in the bases, sounds tight, cough present  Abdominal:      General: Bowel sounds are normal.      Palpations: Abdomen is soft.   Musculoskeletal:         General: Normal range of motion.      Cervical back: Normal range of motion and neck supple.   Skin:     General: Skin is warm and dry.   Neurological:      General: No focal deficit present.      Mental Status: She is alert and oriented to person, place, and time.   Psychiatric:         Attention and Perception: Attention normal.         Mood and Affect: Mood and affect normal.         Behavior: Behavior normal. Behavior is cooperative.         Results    The following data was reviewed by: JENNIFER Jaquez on 01/08/25             POCT SARS-CoV-2 Antigen DANNY + Flu (01/08/2025 14:05)   Ear Cerumen Removal    Date/Time: 1/8/2025 3:37 PM    Performed by: Mel Soto APRN  Authorized by: Mel Soto APRN    Anesthesia:  Local Anesthetic: none  Location details: right ear  Procedure type: instrumentation, irrigation   Sedation:  Patient sedated: no          Assessment and Plan        Assessment and Plan       Upper respiratory tract infection, unspecified type    Orders:    azithromycin (Zithromax Z-Lizandro) 250 MG tablet; Take 2 tablets  by mouth on day 1, then 1 tablet daily on days 2-5    predniSONE (DELTASONE) 10 MG tablet; Take 1 tablet by mouth Daily.  Instructed to finish all of antibiotic.  Educated on the importance of hydration  SOB (shortness of breath)    Orders:    POCT SARS-CoV-2 Antigen DANNY + Flu    predniSONE (DELTASONE) 10 MG tablet; Take 1 tablet by mouth Daily.  Ra continue to use albuterol inhaler as needed pid COVID/flu negative  Acute cough    Orders:    benzonatate (Tessalon Perles) 100 MG capsule; Take 1 capsule by mouth 3 (Three) Times a Day As Needed for Cough.  Discussed the use of cough drops, Chloraseptic spray/lozenges, Vicks VapoRub, hot tea with honey, humidifier  Nasal congestion  Continue taking Zyrtec and Singulair       Sinus pressure  Continue taking Zyrtec       Right ear pain  May use Tylenol and/or ibuprofen for ear pain, continue taking Zyrtec to help with the fluid behind the tympanic membrane       Impacted cerumen of right ear  Right ear had impacted cerumen after removal tympanic membrane was visible showed effusion.                  Follow Up        Follow Up   Return for With PCP, Next scheduled follow up, sooner if condition worsens.  Patient was given instructions and counseling regarding her condition or for health maintenance advice. Please see specific information pulled into the AVS if appropriate.

## 2025-01-20 DIAGNOSIS — Z79.4 ENCOUNTER FOR LONG-TERM (CURRENT) USE OF INSULIN: Primary | ICD-10-CM

## 2025-01-20 DIAGNOSIS — E55.9 VITAMIN D DEFICIENCY: ICD-10-CM

## 2025-01-24 DIAGNOSIS — F51.01 PRIMARY INSOMNIA: ICD-10-CM

## 2025-01-24 RX ORDER — TRAZODONE HYDROCHLORIDE 150 MG/1
150 TABLET ORAL
Qty: 90 TABLET | Refills: 0 | Status: SHIPPED | OUTPATIENT
Start: 2025-01-24

## 2025-01-24 NOTE — TELEPHONE ENCOUNTER
"    Caller: Nory Rodriguez \"Cherelle\"    Relationship: Self    Best call back number: [phone] (Mobile)     Requested Prescriptions:   Requested Prescriptions     Pending Prescriptions Disp Refills    traZODone (DESYREL) 150 MG tablet 90 tablet 0     Sig: Take 1 tablet by mouth every night at bedtime.        Pharmacy where request should be sent: [facility] - [address] [facility] - [address] [address] [facility] AT [address] TO [facility] - [phone]  - [phone] FX     Last office visit with prescribing clinician: 12/5/2024   Last telemedicine visit with prescribing clinician: Visit date not found   Next office visit with prescribing clinician: 6/9/2025     Additional details provided by patient:     Does the patient have less than a 3 day supply:  [] Yes  [x] No      Monika Ledbetter, PCT   01/24/25 13:11 EST       "

## 2025-02-12 NOTE — TELEPHONE ENCOUNTER
"  Caller: Nory Rodriguez \"Cherelle\"    Relationship: Self    Best call back number: [phone]       What is the best time to reach you: ANYTIME     Who are you requesting to speak with (clinical staff, provider,  specific staff member): CLINICAL     What was the call regarding: PATIENT IS CALLING IN REQUESTING FOR A MEDICATION THAT IS NOT LISTED IN MEDICATION LIST FOR A OINTMENT OR CREAM. PATIENT IS ALSO REQUESTING FOR MOTION SICKNESS, PATCHES PRIOR TO GOING ON A CRUISE.   "

## 2025-02-12 NOTE — TELEPHONE ENCOUNTER
"    Caller: Nory Rodriguez \"Cherelle\"    Relationship: Self    Best call back number: [phone]     Requested Prescriptions:   Requested Prescriptions     Pending Prescriptions Disp Refills    methocarbamol (ROBAXIN) 500 MG tablet       Sig: Take 2 tablets by mouth.        Pharmacy where request should be sent: [facility] [id number] [place], [place] - [address] [facility] - [phone]  - [phone] FX     Last office visit with prescribing clinician: 12/5/2024   Last telemedicine visit with prescribing clinician: Visit date not found   Next office visit with prescribing clinician: 6/9/2025     Additional details provided by patient: LESS THAN THREE DAY SUPPLY.     Does the patient have less than a 3 day supply:  [x] Yes  [] No      Beatris Fan   02/12/25 13:22 EST       "

## 2025-02-14 DIAGNOSIS — T75.3XXA SEA SICKNESS, INITIAL ENCOUNTER: ICD-10-CM

## 2025-02-14 RX ORDER — SCOPOLAMINE 1 MG/3D
1 PATCH, EXTENDED RELEASE TRANSDERMAL
Qty: 4 PATCH | Refills: 0 | Status: SHIPPED | OUTPATIENT
Start: 2025-02-14

## 2025-02-17 RX ORDER — METHOCARBAMOL 500 MG/1
1000 TABLET, FILM COATED ORAL 3 TIMES DAILY PRN
Qty: 40 TABLET | Refills: 0 | Status: SHIPPED | OUTPATIENT
Start: 2025-02-17

## 2025-02-17 NOTE — TELEPHONE ENCOUNTER
Per pt she was getting rx from Dr Hardy for her RA. She states she has pain in her hips/legs, feet/ankles.

## 2025-02-24 ENCOUNTER — TELEPHONE (OUTPATIENT)
Dept: FAMILY MEDICINE CLINIC | Age: 60
End: 2025-02-24
Payer: COMMERCIAL

## 2025-03-31 RX ORDER — MONTELUKAST SODIUM 10 MG/1
10 TABLET ORAL EVERY EVENING
Qty: 90 TABLET | Refills: 1 | OUTPATIENT
Start: 2025-03-31

## 2025-04-15 ENCOUNTER — LAB (OUTPATIENT)
Dept: LAB | Facility: HOSPITAL | Age: 60
End: 2025-04-15
Payer: COMMERCIAL

## 2025-04-15 ENCOUNTER — TRANSCRIBE ORDERS (OUTPATIENT)
Dept: ADMINISTRATIVE | Facility: HOSPITAL | Age: 60
End: 2025-04-15
Payer: COMMERCIAL

## 2025-04-15 DIAGNOSIS — E11.49 TYPE II DIABETES MELLITUS WITH NEUROLOGICAL MANIFESTATIONS: ICD-10-CM

## 2025-04-15 DIAGNOSIS — E55.9 VITAMIN D DEFICIENCY: ICD-10-CM

## 2025-04-15 DIAGNOSIS — E11.40 DIABETIC NEUROPATHY WITH NEUROLOGIC COMPLICATION: Primary | ICD-10-CM

## 2025-04-15 DIAGNOSIS — Z79.4 ENCOUNTER FOR LONG-TERM (CURRENT) USE OF INSULIN: ICD-10-CM

## 2025-04-15 DIAGNOSIS — E11.49 DIABETIC NEUROPATHY WITH NEUROLOGIC COMPLICATION: Primary | ICD-10-CM

## 2025-04-15 LAB
25(OH)D3 SERPL-MCNC: 35 NG/ML (ref 30–100)
ALBUMIN SERPL-MCNC: 3.9 G/DL (ref 3.5–5.2)
ALBUMIN/GLOB SERPL: 1.3 G/DL
ALP SERPL-CCNC: 110 U/L (ref 39–117)
ALT SERPL W P-5'-P-CCNC: 15 U/L (ref 1–33)
ANION GAP SERPL CALCULATED.3IONS-SCNC: 10 MMOL/L (ref 5–15)
AST SERPL-CCNC: 19 U/L (ref 1–32)
BASOPHILS # BLD AUTO: 0.04 10*3/MM3 (ref 0–0.2)
BASOPHILS NFR BLD AUTO: 0.7 % (ref 0–1.5)
BILIRUB SERPL-MCNC: 0.2 MG/DL (ref 0–1.2)
BUN SERPL-MCNC: 21 MG/DL (ref 8–23)
BUN/CREAT SERPL: 18.8 (ref 7–25)
CALCIUM SPEC-SCNC: 9.1 MG/DL (ref 8.6–10.5)
CHLORIDE SERPL-SCNC: 108 MMOL/L (ref 98–107)
CO2 SERPL-SCNC: 24 MMOL/L (ref 22–29)
CREAT SERPL-MCNC: 1.12 MG/DL (ref 0.57–1)
CRP SERPL-MCNC: <0.3 MG/DL (ref 0–0.5)
DEPRECATED RDW RBC AUTO: 46 FL (ref 37–54)
EGFRCR SERPLBLD CKD-EPI 2021: 56.1 ML/MIN/1.73
EOSINOPHIL # BLD AUTO: 0.24 10*3/MM3 (ref 0–0.4)
EOSINOPHIL NFR BLD AUTO: 4.1 % (ref 0.3–6.2)
ERYTHROCYTE [DISTWIDTH] IN BLOOD BY AUTOMATED COUNT: 13.1 % (ref 12.3–15.4)
GLOBULIN UR ELPH-MCNC: 2.9 GM/DL
GLUCOSE SERPL-MCNC: 126 MG/DL (ref 65–99)
HBA1C MFR BLD: 5.7 % (ref 4.8–5.6)
HCT VFR BLD AUTO: 38.9 % (ref 34–46.6)
HGB BLD-MCNC: 12 G/DL (ref 12–15.9)
IMM GRANULOCYTES # BLD AUTO: 0 10*3/MM3 (ref 0–0.05)
IMM GRANULOCYTES NFR BLD AUTO: 0 % (ref 0–0.5)
LYMPHOCYTES # BLD AUTO: 1.54 10*3/MM3 (ref 0.7–3.1)
LYMPHOCYTES NFR BLD AUTO: 26.4 % (ref 19.6–45.3)
MCH RBC QN AUTO: 29.3 PG (ref 26.6–33)
MCHC RBC AUTO-ENTMCNC: 30.8 G/DL (ref 31.5–35.7)
MCV RBC AUTO: 95.1 FL (ref 79–97)
MONOCYTES # BLD AUTO: 0.72 10*3/MM3 (ref 0.1–0.9)
MONOCYTES NFR BLD AUTO: 12.3 % (ref 5–12)
NEUTROPHILS NFR BLD AUTO: 3.3 10*3/MM3 (ref 1.7–7)
NEUTROPHILS NFR BLD AUTO: 56.5 % (ref 42.7–76)
PLATELET # BLD AUTO: 241 10*3/MM3 (ref 140–450)
PMV BLD AUTO: 12.4 FL (ref 6–12)
POTASSIUM SERPL-SCNC: 4.3 MMOL/L (ref 3.5–5.2)
PROT SERPL-MCNC: 6.8 G/DL (ref 6–8.5)
RBC # BLD AUTO: 4.09 10*6/MM3 (ref 3.77–5.28)
SODIUM SERPL-SCNC: 142 MMOL/L (ref 136–145)
WBC NRBC COR # BLD AUTO: 5.84 10*3/MM3 (ref 3.4–10.8)

## 2025-04-15 PROCEDURE — 85025 COMPLETE CBC W/AUTO DIFF WBC: CPT

## 2025-04-15 PROCEDURE — 83036 HEMOGLOBIN GLYCOSYLATED A1C: CPT

## 2025-04-15 PROCEDURE — 80053 COMPREHEN METABOLIC PANEL: CPT

## 2025-04-15 PROCEDURE — 86140 C-REACTIVE PROTEIN: CPT

## 2025-04-15 PROCEDURE — 82306 VITAMIN D 25 HYDROXY: CPT

## 2025-04-15 PROCEDURE — 36415 COLL VENOUS BLD VENIPUNCTURE: CPT

## 2025-04-21 ENCOUNTER — LAB (OUTPATIENT)
Dept: LAB | Facility: HOSPITAL | Age: 60
End: 2025-04-21
Payer: COMMERCIAL

## 2025-04-21 ENCOUNTER — OFFICE VISIT (OUTPATIENT)
Dept: FAMILY MEDICINE CLINIC | Age: 60
End: 2025-04-21
Payer: COMMERCIAL

## 2025-04-21 VITALS — WEIGHT: 200 LBS | HEIGHT: 64 IN | OXYGEN SATURATION: 98 % | TEMPERATURE: 98.3 F | BODY MASS INDEX: 34.15 KG/M2

## 2025-04-21 DIAGNOSIS — E55.9 VITAMIN D DEFICIENCY: ICD-10-CM

## 2025-04-21 DIAGNOSIS — D64.9 ANEMIA, UNSPECIFIED TYPE: ICD-10-CM

## 2025-04-21 DIAGNOSIS — R55 SYNCOPE AND COLLAPSE: Primary | ICD-10-CM

## 2025-04-21 DIAGNOSIS — Z13.29 SCREENING FOR THYROID DISORDER: ICD-10-CM

## 2025-04-21 DIAGNOSIS — R55 SYNCOPE AND COLLAPSE: ICD-10-CM

## 2025-04-21 LAB
25(OH)D3 SERPL-MCNC: 37.9 NG/ML (ref 30–100)
ALBUMIN SERPL-MCNC: 4.1 G/DL (ref 3.5–5.2)
ALBUMIN/GLOB SERPL: 1.4 G/DL
ALP SERPL-CCNC: 104 U/L (ref 39–117)
ALT SERPL W P-5'-P-CCNC: 10 U/L (ref 1–33)
ANION GAP SERPL CALCULATED.3IONS-SCNC: 9.4 MMOL/L (ref 5–15)
AST SERPL-CCNC: 19 U/L (ref 1–32)
BASOPHILS # BLD AUTO: 0.03 10*3/MM3 (ref 0–0.2)
BASOPHILS NFR BLD AUTO: 0.5 % (ref 0–1.5)
BILIRUB SERPL-MCNC: 0.2 MG/DL (ref 0–1.2)
BUN SERPL-MCNC: 13 MG/DL (ref 8–23)
BUN/CREAT SERPL: 14.1 (ref 7–25)
CALCIUM SPEC-SCNC: 9 MG/DL (ref 8.6–10.5)
CHLORIDE SERPL-SCNC: 108 MMOL/L (ref 98–107)
CO2 SERPL-SCNC: 23.6 MMOL/L (ref 22–29)
CREAT SERPL-MCNC: 0.92 MG/DL (ref 0.57–1)
DEPRECATED RDW RBC AUTO: 42.1 FL (ref 37–54)
EGFRCR SERPLBLD CKD-EPI 2021: 71 ML/MIN/1.73
EOSINOPHIL # BLD AUTO: 0.26 10*3/MM3 (ref 0–0.4)
EOSINOPHIL NFR BLD AUTO: 4.1 % (ref 0.3–6.2)
ERYTHROCYTE [DISTWIDTH] IN BLOOD BY AUTOMATED COUNT: 12.8 % (ref 12.3–15.4)
FOLATE SERPL-MCNC: 13.8 NG/ML (ref 4.78–24.2)
GLOBULIN UR ELPH-MCNC: 3 GM/DL
GLUCOSE SERPL-MCNC: 94 MG/DL (ref 65–99)
HCT VFR BLD AUTO: 37.1 % (ref 34–46.6)
HGB BLD-MCNC: 11.7 G/DL (ref 12–15.9)
IMM GRANULOCYTES # BLD AUTO: 0.02 10*3/MM3 (ref 0–0.05)
IMM GRANULOCYTES NFR BLD AUTO: 0.3 % (ref 0–0.5)
LYMPHOCYTES # BLD AUTO: 2.06 10*3/MM3 (ref 0.7–3.1)
LYMPHOCYTES NFR BLD AUTO: 32.3 % (ref 19.6–45.3)
MAGNESIUM SERPL-MCNC: 2.2 MG/DL (ref 1.6–2.4)
MCH RBC QN AUTO: 28.9 PG (ref 26.6–33)
MCHC RBC AUTO-ENTMCNC: 31.5 G/DL (ref 31.5–35.7)
MCV RBC AUTO: 91.6 FL (ref 79–97)
MONOCYTES # BLD AUTO: 0.64 10*3/MM3 (ref 0.1–0.9)
MONOCYTES NFR BLD AUTO: 10 % (ref 5–12)
NEUTROPHILS NFR BLD AUTO: 3.36 10*3/MM3 (ref 1.7–7)
NEUTROPHILS NFR BLD AUTO: 52.8 % (ref 42.7–76)
NRBC BLD AUTO-RTO: 0 /100 WBC (ref 0–0.2)
PLATELET # BLD AUTO: 225 10*3/MM3 (ref 140–450)
PMV BLD AUTO: 12.9 FL (ref 6–12)
POTASSIUM SERPL-SCNC: 3.7 MMOL/L (ref 3.5–5.2)
PROT SERPL-MCNC: 7.1 G/DL (ref 6–8.5)
RBC # BLD AUTO: 4.05 10*6/MM3 (ref 3.77–5.28)
SODIUM SERPL-SCNC: 141 MMOL/L (ref 136–145)
TSH SERPL DL<=0.05 MIU/L-ACNC: 1.5 UIU/ML (ref 0.27–4.2)
VIT B12 BLD-MCNC: 554 PG/ML (ref 211–946)
WBC NRBC COR # BLD AUTO: 6.37 10*3/MM3 (ref 3.4–10.8)

## 2025-04-21 PROCEDURE — 82728 ASSAY OF FERRITIN: CPT

## 2025-04-21 PROCEDURE — 84443 ASSAY THYROID STIM HORMONE: CPT

## 2025-04-21 PROCEDURE — 83735 ASSAY OF MAGNESIUM: CPT

## 2025-04-21 PROCEDURE — 80053 COMPREHEN METABOLIC PANEL: CPT

## 2025-04-21 PROCEDURE — 85025 COMPLETE CBC W/AUTO DIFF WBC: CPT

## 2025-04-21 PROCEDURE — 82306 VITAMIN D 25 HYDROXY: CPT

## 2025-04-21 PROCEDURE — 82607 VITAMIN B-12: CPT

## 2025-04-21 PROCEDURE — 36415 COLL VENOUS BLD VENIPUNCTURE: CPT

## 2025-04-21 PROCEDURE — 83540 ASSAY OF IRON: CPT

## 2025-04-21 PROCEDURE — 93000 ELECTROCARDIOGRAM COMPLETE: CPT | Performed by: NURSE PRACTITIONER

## 2025-04-21 PROCEDURE — 84466 ASSAY OF TRANSFERRIN: CPT

## 2025-04-21 PROCEDURE — 99214 OFFICE O/P EST MOD 30 MIN: CPT | Performed by: NURSE PRACTITIONER

## 2025-04-21 PROCEDURE — 82746 ASSAY OF FOLIC ACID SERUM: CPT

## 2025-04-21 NOTE — PROGRESS NOTES
"Nory Rodriguez presents to [facility] with complaint of  Syncope (2 syncope episodes within the past 2 months. )    SUBJECTIVE  History of Present Illness    Patient is here to discuss recent syncopal events.  First incident was March 8.  Patient was in an airport and had recently sat down to wakeboarding.  Patient says that she had unusual sensation, over her and then she was not able to hear.  Shortly after that, patient says that she lost consciousness and slumped over.  Witnesses said that she was snoring very loudly.  She was unconscious for around 30 seconds.  Her blood glucose was checked and it was normal at 142.  Airport staff gave the patient orange juice but this made her nauseous.  Patient attributed this syncopal event to lack of sleep as she had been up for 21 hours straight and had not eaten at all that day.    Second syncopal event occurred April 16.  Patient had sat down to eat dinner, had unusual sensation, over again and felt like the room was spinning and closing in on her.  Patient then was not able to speak, reports her daughter witnessed the event and patient leaned over to the right side, snored very heavily, patient was unconscious for again 30 seconds.  Patient says both times, she woke up feeling confused and not able to recall what it happened beforehand.  Patient also endorses a mild headache whenever she wakes up that last for several hours.  Confusion also last for about an hour.  Patient did not have any episodes of incontinence during syncopal events nor reported body convulsing.    Patient does have known migraines, says that these are controlled with Topamax.    OBJECTIVE  Vital Signs:   Temp 98.3 °F (36.8 °C) (Oral)   Ht 162.6 cm (64.02\")   Wt 90.7 kg (200 lb)   SpO2 98%   BMI 34.31 kg/m²       Vitals:    04/21/25 1616 04/21/25 1634 04/21/25 1636   Orthostatic BP: 125/61 134/84 130/79   Orthostatic Pulse: 68 70 74   Patient Position: Lying " Sitting Standing         Physical Exam  Vitals reviewed.   Constitutional:       General: She is not in acute distress.     Appearance: Normal appearance. She is not ill-appearing.   HENT:      Head: Normocephalic and atraumatic.   Eyes:      Pupils: Pupils are equal, round, and reactive to light.   Cardiovascular:      Rate and Rhythm: Normal rate and regular rhythm.      Pulses: Normal pulses.      Heart sounds: Normal heart sounds.   Pulmonary:      Effort: Pulmonary effort is normal.      Breath sounds: Normal breath sounds.   Musculoskeletal:      Cervical back: Neck supple.   Skin:     General: Skin is warm and dry.   Neurological:      General: No focal deficit present.      Mental Status: She is alert and oriented to person, place, and time. Mental status is at baseline.   Psychiatric:         Mood and Affect: Mood normal.         Behavior: Behavior normal.         Judgment: Judgment normal.          Results Review:  The following data was reviewed by JENNIFER Graham [unfilled] 16:29 EDT.      ECG 12 Lead    Date/Time: 4/21/2025 5:05 PM  Performed by: Ruth Oliva APRN    Authorized by: Ruth Oliva APRN  Comparison: compared with previous ECG from 6/23/2022  Similar to previous ECG  Rhythm: sinus rhythm  Rate: normal  Conduction: conduction normal  Conduction: right bundle branch block and 1st degree AV block  ST Segments: ST segments normal  T inversion: V1    Clinical impression: abnormal EKG         Lab on 04/21/2025   Component Date Value Ref Range Status    Magnesium 04/21/2025 2.2  1.6 - 2.4 mg/dL Final    Glucose 04/21/2025 94  65 - 99 mg/dL Final    BUN 04/21/2025 13  8 - 23 mg/dL Final    Creatinine 04/21/2025 0.92  0.57 - 1.00 mg/dL Final    Sodium 04/21/2025 141  136 - 145 mmol/L Final    Potassium 04/21/2025 3.7  3.5 - 5.2 mmol/L Final    Chloride 04/21/2025 108 (H)  98 - 107 mmol/L Final    CO2 04/21/2025 23.6  22.0 - 29.0 mmol/L Final    Calcium 04/21/2025 9.0  8.6 - 10.5 mg/dL Final     Total Protein 04/21/2025 7.1  6.0 - 8.5 g/dL Final    Albumin 04/21/2025 4.1  3.5 - 5.2 g/dL Final    ALT (SGPT) 04/21/2025 10  1 - 33 U/L Final    AST (SGOT) 04/21/2025 19  1 - 32 U/L Final    Alkaline Phosphatase 04/21/2025 104  39 - 117 U/L Final    Total Bilirubin 04/21/2025 0.2  0.0 - 1.2 mg/dL Final    Globulin 04/21/2025 3.0  gm/dL Final    A/G Ratio 04/21/2025 1.4  g/dL Final    BUN/Creatinine Ratio 04/21/2025 14.1  7.0 - 25.0 Final    Anion Gap 04/21/2025 9.4  5.0 - 15.0 mmol/L Final    eGFR 04/21/2025 71.0  >60.0 mL/min/1.73 Final    25 Hydroxy, Vitamin D 04/21/2025 37.9  30.0 - 100.0 ng/ml Final    Folate 04/21/2025 13.80  4.78 - 24.20 ng/mL Final    Vitamin B-12 04/21/2025 554  211 - 946 pg/mL Final    TSH 04/21/2025 1.500  0.270 - 4.200 uIU/mL Final    WBC 04/21/2025 6.37  3.40 - 10.80 10*3/mm3 Final    RBC 04/21/2025 4.05  3.77 - 5.28 10*6/mm3 Final    Hemoglobin 04/21/2025 11.7 (L)  12.0 - 15.9 g/dL Final    Hematocrit 04/21/2025 37.1  34.0 - 46.6 % Final    MCV 04/21/2025 91.6  79.0 - 97.0 fL Final    MCH 04/21/2025 28.9  26.6 - 33.0 pg Final    MCHC 04/21/2025 31.5  31.5 - 35.7 g/dL Final    RDW 04/21/2025 12.8  12.3 - 15.4 % Final    RDW-SD 04/21/2025 42.1  37.0 - 54.0 fl Final    MPV 04/21/2025 12.9 (H)  6.0 - 12.0 fL Final    Platelets 04/21/2025 225  140 - 450 10*3/mm3 Final    Neutrophil % 04/21/2025 52.8  42.7 - 76.0 % Final    Lymphocyte % 04/21/2025 32.3  19.6 - 45.3 % Final    Monocyte % 04/21/2025 10.0  5.0 - 12.0 % Final    Eosinophil % 04/21/2025 4.1  0.3 - 6.2 % Final    Basophil % 04/21/2025 0.5  0.0 - 1.5 % Final    Immature Grans % 04/21/2025 0.3  0.0 - 0.5 % Final    Neutrophils, Absolute 04/21/2025 3.36  1.70 - 7.00 10*3/mm3 Final    Lymphocytes, Absolute 04/21/2025 2.06  0.70 - 3.10 10*3/mm3 Final    Monocytes, Absolute 04/21/2025 0.64  0.10 - 0.90 10*3/mm3 Final    Eosinophils, Absolute 04/21/2025 0.26  0.00 - 0.40 10*3/mm3 Final    Basophils, Absolute 04/21/2025 0.03  0.00 -  0.20 10*3/mm3 Final    Immature Grans, Absolute 04/21/2025 0.02  0.00 - 0.05 10*3/mm3 Final    nRBC 04/21/2025 0.0  0.0 - 0.2 /100 WBC Final    Ferritin 04/21/2025 14.80  13.00 - 150.00 ng/mL Final    Iron 04/21/2025 40  37 - 145 mcg/dL Final    Iron Saturation (TSAT) 04/21/2025 10 (L)  20 - 50 % Final    Transferrin 04/21/2025 263  200 - 360 mg/dL Final    TIBC 04/21/2025 392  298 - 536 mcg/dL Final         ASSESSMENT AND PLAN:  Diagnoses and all orders for this visit:    1. Syncope and collapse (Primary)  -     CBC & Differential; Future  -     Comprehensive Metabolic Panel; Future  -     Vitamin B12 & Folate; Future  -     Magnesium; Future  -     ECG 12 Lead  -     CT Head Without Contrast; Future  -     Holter Monitor - 72 Hour Up To 15 Days; Future  -     Ambulatory Referral to Cardiology  -     Ambulatory Referral to Neurology    2. Vitamin D deficiency  -     Vitamin D,25-Hydroxy; Future    3. Screening for thyroid disorder  -     TSH Rfx On Abnormal To Free T4; Future      Patient's EKG does not show any acute findings but does show mild right bundle branch block and first-degree AV block.  Explained the patient that typically these do not cause issues.  She will be set up to wear Holter monitor for 3 days and refer to cardiology.  Her orthostatic vital signs are normal.      However, strongly suspect that patient may be having seizure activity given her snoring more so than this being cardiac in nature, due to possible postictal symptoms given headache and confusion upon arousal.  For this reason, we will obtain head CT and refer to neurology.  Did recommend that patient stops driving until she has been completely worked up for her syncopal events.      Labs are unremarkable other than hemoglobin 11.7, iron levels are technically in the normal range but on the lower side.  She can start a iron supplement 3 times per week, do not think that this would explain her syncope.        Follow Up   No follow-ups on  file. Patient to notify office with any acute concerns or issues.  Patient verbalizes understanding, agrees with plan of care and has no further questions upon discharge.     Patient was given instructions and counseling regarding her condition or for health maintenance advice. Please see specific information pulled into the AVS if appropriate.     Discussed the importance of following up with any needed screening tests/labs/specialist appointments and any requested follow-up recommended by me today. Importance of maintaining follow-up discussed and patient accepts that missed appointments can delay diagnosis and potentially lead to worsening of conditions.    Part of this note may be an electronic transcription/translation of spoken language to printed text using the Dragon Dictation System.

## 2025-04-22 LAB
FERRITIN SERPL-MCNC: 14.8 NG/ML (ref 13–150)
IRON 24H UR-MRATE: 40 MCG/DL (ref 37–145)
IRON SATN MFR SERPL: 10 % (ref 20–50)
TIBC SERPL-MCNC: 392 MCG/DL (ref 298–536)
TRANSFERRIN SERPL-MCNC: 263 MG/DL (ref 200–360)

## 2025-05-01 DIAGNOSIS — G43.009 MIGRAINE WITHOUT AURA AND WITHOUT STATUS MIGRAINOSUS, NOT INTRACTABLE: ICD-10-CM

## 2025-05-01 DIAGNOSIS — F51.01 PRIMARY INSOMNIA: ICD-10-CM

## 2025-05-01 DIAGNOSIS — K21.00 GASTROESOPHAGEAL REFLUX DISEASE WITH ESOPHAGITIS WITHOUT HEMORRHAGE: ICD-10-CM

## 2025-05-01 DIAGNOSIS — E78.00 HYPERCHOLESTEROLEMIA: ICD-10-CM

## 2025-05-01 RX ORDER — TRAZODONE HYDROCHLORIDE 150 MG/1
150 TABLET ORAL NIGHTLY
Qty: 90 TABLET | Refills: 0 | Status: SHIPPED | OUTPATIENT
Start: 2025-05-01

## 2025-05-01 RX ORDER — OMEPRAZOLE 40 MG/1
40 CAPSULE, DELAYED RELEASE ORAL DAILY
Qty: 90 CAPSULE | Refills: 0 | Status: SHIPPED | OUTPATIENT
Start: 2025-05-01

## 2025-05-01 RX ORDER — ATORVASTATIN CALCIUM 10 MG/1
10 TABLET, FILM COATED ORAL
Qty: 90 TABLET | Refills: 0 | Status: SHIPPED | OUTPATIENT
Start: 2025-05-01

## 2025-05-01 RX ORDER — TOPIRAMATE 100 MG/1
100 TABLET, FILM COATED ORAL 2 TIMES DAILY
Qty: 180 TABLET | Refills: 0 | Status: SHIPPED | OUTPATIENT
Start: 2025-05-01

## 2025-05-01 RX ORDER — DILTIAZEM HYDROCHLORIDE 120 MG/1
120 CAPSULE, COATED, EXTENDED RELEASE ORAL DAILY
Qty: 90 CAPSULE | Refills: 0 | Status: SHIPPED | OUTPATIENT
Start: 2025-05-01

## 2025-05-01 RX ORDER — MONTELUKAST SODIUM 10 MG/1
10 TABLET ORAL EVERY EVENING
Qty: 90 TABLET | Refills: 0 | Status: SHIPPED | OUTPATIENT
Start: 2025-05-01

## 2025-05-01 NOTE — PROGRESS NOTES
"Chief Complaint  Syncope vs seizure.    Patient or patient representative verbalized consent for the use of Ambient Listening during the visit with  Jose Miguel Edmond MD PhD for chart documentation. 5/20/2025  14:08 EDT    Subjective      History of Present Illness:  Nory Rodriguez \"Cherelle\" is a delightful 59 y.o. 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, hepatitis viral     Cellulitis of chest wall     Chronic back pain     Chronic hoarseness     COVID-19     Dietary counseling and surveillance     Dizziness and giddiness     Effusion, left knee     Effusion, right knee     GERD without esophagitis     Hepatitis A     Hereditary and idiopathic neuropathy, unspecified     History of depression     Hyperlipidemia     Hypertension     Kidney stone     Localized swelling, mass and lump, right lower limb     Low back pain     Methicillin resistant Staphylococcus aureus infection as the cause of diseases classified elsewhere     Migraine without aura, not intractable, without status migrainosus     Moderate persistent asthma with (acute) exacerbation     Morbid obesity due to excess calories     Nausea     Neuropathy     Osteoporosis     Pain in joints of right hand     Pain in left hip     Pain in left leg     Pain in right shoulder     Paresthesia of skin     Peripheral neuropathy     Personal history of other venous thrombosis and embolism     Post-menopausal     Pressure ulcer of other site, stage 1     Primary insomnia     Pure hypercholesterolemia, unspecified     Rheumatoid lung disease with rheumatoid arthritis     Rheumatoid lung disease " "with rheumatoid arthritis of unspecified site     Shortness of breath     Sleep apnea     Sleep apnea, unspecified     Tinea unguium     Type 2 diabetes mellitus with diabetic polyneuropathy     Uterine polyp     Viral hepatitis     Vitamin D deficiency, unspecified          From  note 4/21/2025:  FU for 2 x \"syncopal\" events.  First was 3/08 while seated at airport waiting for boarding.  She felt an unusual sensation wave over her, then hear muffled.  Shortly after she lost consciousness and slumped over.  Witnesses said that she was snoring very loudly and unconscious for around 30 seconds.  Patient attributed first event to lack of sleep as she had been up for 21 hours straight and had not eaten at all that day.     Second occurred 4/16 when she sat down to eat dinner, had unusual sensation sweep over her again, and felt like the room was spinning and closing in on her.  She was then unable to speak. Daughter witnessed event and reported patient leaned over to the right side, snored very heavily, patient was unconscious for again 30 seconds. She reported waking confused both times lasting about an hour and amnestic for event.  She also had a mild headache post ictus lasting for several hours.  Denied loss of B/B, tongue bite.  Labs unremarkable.  Refer to Cardiology with Holter and Neurology.      History of Present Illness  The patient is a 59-year-old female referred to neurology for syncope versus seizure after two events of loss of consciousness in the past several months.    She reports no history of seizures but has experienced two episodes of loss of consciousness in the past few months. The first episode occurred in early 03/2025 at the airport, characterized by an unusual sensation, muffled hearing, and subsequent loss of consciousness. Witnesses reported that she slumped over and snored loudly for about 30 seconds. Upon regaining consciousness, she was confused and disoriented, unable to recall the " event. She had been awake for an extended period and had not eaten prior to the episode. The second episode occurred in mid-04/2025 during dinner, with similar symptoms including an unusual sensation, a feeling of the room spinning, muffled hearing, and loss of consciousness. She was unable to speak or respond, and again, witnesses reported loud snoring for about 30 seconds. Post-episode confusion lasted for about 15 minutes, during which she was disoriented and unresponsive to verbal or physical stimuli. She reports no abnormal limb shaking, tongue biting, or loss of bowel or bladder control during these episodes. Her eyes remained open with her pupils rolled back. She experienced a mild headache after the first episode and a severe facial headache after the second. She has not been referred to sleep medicine but has been advised to consult a cardiologist. She reports satisfactory sleep since returning home. She has previously undergone a sleep study and typically sleeps 3 to 4 hours per night. She reports no specific triggers for these episodes, which occurred while she was relaxed and seated. She continues to drive short distances despite being advised to exercise caution.    PAST MEDICAL HISTORY: She was born at term and met her developmental milestones on time. She reports no history of childhood or febrile seizures, encephalitis, meningitis, or CNS infections. She has a history of hepatitis from childhood. She graduated from high school and reports no history of stroke or brain injury. She does not use illegal drugs, smoke, or drink alcohol. She reports no recent illness or infection but has had COVID-19 four times. She reports no mental health issues. She has undergone a CT scan of her head. She reports poor balance and has a long-standing history of diabetes and rheumatoid arthritis. She reports normal kidney function and is currently on the highest dose of sleep medication available, which she reports as  "ineffective.    PAST SURGICAL HISTORY: She had shoulder surgery.    SOCIAL HISTORY  Education Level: High school graduate  Alcohol: Does not drink  Tobacco: Does not smoke  Recreational Drugs: Does not use illegal drugs  Sleep: Sleeps about 3 to 4 hours a night, which is longstanding and considered normal for her    FAMILY HISTORY  - Brother: Syncope, being given a pacemaker  - Negative for seizures    MEDICATIONS  CURRENT MEDS:  Sleep Medicine Highest dose  Compliance: Doesn't help        All available pertinent Labs and Imaging personally reviewed, including:    Imaging:    CT Head wo contrast 5/14/2025 for syncope:  IMPRESSION:  No acute intracranial process identified.        Labs:  Lab Results   Component Value Date    WBC 6.37 04/21/2025    HGB 11.7 (L) 04/21/2025    HCT 37.1 04/21/2025    MCV 91.6 04/21/2025     04/21/2025     Lab Results   Component Value Date    GLUCOSE 94 04/21/2025    BUN 13 04/21/2025    CREATININE 0.92 04/21/2025     04/21/2025    K 3.7 04/21/2025     (H) 04/21/2025    CALCIUM 9.0 04/21/2025    PROTEINTOT 7.1 04/21/2025    ALBUMIN 4.1 04/21/2025    ALT 10 04/21/2025    AST 19 04/21/2025    ALKPHOS 104 04/21/2025    BILITOT 0.2 04/21/2025    GLOB 3.0 04/21/2025    AGRATIO 1.4 04/21/2025    BCR 14.1 04/21/2025    ANIONGAP 9.4 04/21/2025    EGFR 71.0 04/21/2025     Lab Results   Component Value Date    HGBA1C 5.70 (H) 04/15/2025     Lab Results   Component Value Date    TSH 1.500 04/21/2025     Lab Results   Component Value Date    GMLILAPQ79 554 04/21/2025     Lab Results   Component Value Date    FOLATE 13.80 04/21/2025     Lab Results   Component Value Date    CHOL 143 06/05/2024    CHLPL 177 05/03/2021    TRIG 131 06/05/2024    HDL 43 06/05/2024    LDL 77 06/05/2024         Objective   Vital Signs:   /66   Pulse 74   Ht 162.6 cm (64\")   Wt 87.7 kg (193 lb 6.4 oz)   BMI 33.20 kg/m²     GENERAL:  GEN: owgt, well appearing, no apparent distress, appropriately " "dressed and groomed.  HEENT: NCAT, nml symm facies, no LNA, no goiter  LUNGS: CTA brian, no wheezes/crackles/rhonchi noted  Card: RRR, no m noted, no carotid bruit, brian rad and dp pulses 2+ with cap refill < 2 sec  EXT: no cce, no rash noted    NEUROLOGICAL:  MS: A+O x 3, language spontaneous, fluent with logical content, no word finding, no repeating or perseveration, reported own and regarded as excellent historian, normal judgement and insight, mood euthymic;   CN: PERRLA, full excursions in all cardinal directions with smooth pursuits throughout without saccadic breaks or nystagmus noted; horizontal and vertical saccades symmetric and on target. Cover test reveals no phoria. Visual fields full to confrontation. Fundus exam noted brian disks sharp. V1-III Light touch symm and intact; symm jaw clench masseter and temporalis bulk and tone, medial and lateral pterygoids intact. Symm forehead crease and grimace. Hearing grossly symm and intact to finger rub. Uvula and soft palate midline rise on gag. Sternocleidomastoids and traps symm and 5/5. Tongue demonstrates no furrowing and extends midline and into left and right.  MOTOR: no atrophy/drift, normal tone, strength 5/5, no adventitial movements or tremor noted  SENSORY: mod vib loss in toes. Romberg - NEG  COORD: AFTAB/FTN/HTS with good rhythm, speed, and amplitude. No ataxia noted.  GAIT: Native mild wide based gait with good stride, nml symm brian armswing, nml start/stop/turn. Toe and heel walk without difficulty. Tandem walk with frequent halfsteps to maintain balance.  DTR's: absent Ajs, 1+ patellas, Ues 2+; no clonus, Babinski - Absent.         ASSESSMENT & PLAN:    59 y.o. female who presents to [facility] referred for syncope vs seizure.    From  note 4/21/2025:  FU for 2 x \"syncopal\" events.  First was March 8 while seated at airport waiting for boarding.  She felt an unusual sensation wave over her, then hear muffled. "  Shortly after she lost consciousness and slumped over.  Witnesses said that she was snoring very loudly and unconscious for around 30 seconds.  Patient attributed first event to lack of sleep as she had been up for 21 hours straight and had not eaten at all that day.     Second occurred April 16 when she sat down to eat dinner, had unusual sensation sweep over her again, and felt like the room was spinning and closing in on her.  She was then unable to speak. Daughter witnessed event and reported patient leaned over to the right side, snored very heavily, patient was unconscious for again 30 seconds. She reported waking confused both times lasting about an hour and amnestic for event.  She also had a mild headache post ictus lasting for several hours.  Denied loss of B/B, tongue bite.  Labs unremarkable.  Refer to Cardiology with Holter and Neurology.         Diagnoses and all orders for this visit:    1. Witnessed seizure-like activity (Primary)         Assessment & Plan  1. Seizure-like events.  The patient's symptoms do not strongly suggest a seizure disorder; however, certain elements indicate potential neurological dysfunction. The sensation of warmth or cold preceding the episodes suggests an alteration in blood flow reflexes, possibly a vagal response. This could be triggered by various factors, including poor sleep or potentially cardiac issues. Given her chronic sleep deprivation, this is a plausible explanation. An MRI of the brain with contrast and an EEG will be ordered at [facility]. A CMP will also be ordered to evaluate her kidneys within 6 weeks of the MRI.   She has been advised against driving for a minimum of 3 months and to avoid activities that could lower the seizure threshold, such as alcohol consumption, sleep deprivation, and illegal drug use.   A referral to sleep medicine has been recommended, and PCP will consider after cardiology evaluation..  See seizure precautions and  guidelines below.    Follow-up  The patient will follow up in 3 months.            Discussed below with patient and answered all questions:  SEIZURE/SPELLS GUIDANCE:  Recommend all medications as prescribed especially antiepileptics to help prevent breakthrough seizure/spells since the most common cause is medication non-compliance, and do not take medications not prescribed to you or illegal medications.  In event of a spell, especially an event lasting 5 minutes or longer or recurrent seizures without return to baseline between spells,  patient advised to go to nearest ER for acute evaluation, and follow up with Neurology for event review and potential medication changes.  Advised to avoid activities which lower seizure threshold, to include illicit drugs, sleep deprivation, and alcohol use.  In event of spell, if available please have witness record spell activity and responsiveness afterwards on cell phone for Neurology/Epilepsy review.  Finally, seizure precautions explained (see below).     SEIZURE/ALTERED AWARENESS PRECAUTIONS:  Medical reporting for seizures or spells of altered awareness in [place] is not mandatory but may occur at discretion of medical authority. However, patient is advised no driving in [facility] (state specific regulation) or elsewhere a minimum of 3 months from date of last spell or until cleared by competent medical authority (time limit may be extended at discretion of medical authority). Also, advised no climbing at heights, operating heavy machinery, taking baths, running or hiking alone, carrying or discharging a loaded weapon, carrying an infant, or performing any task which might put yourself or anyone else at increased risk or in danger in event of repeat seizure or spell.             Follow Up  Return in about 3 months (around 8/20/2025) for case review (MRI and EEG).    43 minutes were spent caring for Nory Rodriguez on this date of service. This time spent by me includes  preparing for the visit, reviewing tests, obtaining/reviewing separately obtained history, performing medically appropriate exam/evaluation, counseling/educating the patient/family/caregiver, ordering medications/tests/procedures, referring/communicating with other health care professionals, documenting information in the medical record, independently interpreting results and communicating that with the patient/family/caregiver and/or care coordination.     Patient was given instructions and counseling regarding her condition or for health maintenance advice. Please see specific information pulled into the AVS if appropriate.

## 2025-05-14 ENCOUNTER — HOSPITAL ENCOUNTER (OUTPATIENT)
Dept: CT IMAGING | Facility: HOSPITAL | Age: 60
Discharge: HOME | End: 2025-05-14
Admitting: NURSE PRACTITIONER
Payer: COMMERCIAL

## 2025-05-14 DIAGNOSIS — R55 SYNCOPE AND COLLAPSE: ICD-10-CM

## 2025-05-14 PROCEDURE — 70450 CT HEAD/BRAIN W/O DYE: CPT

## 2025-05-20 ENCOUNTER — PATIENT ROUNDING (BHMG ONLY) (OUTPATIENT)
Dept: NEUROLOGY | Facility: CLINIC | Age: 60
End: 2025-05-20
Payer: COMMERCIAL

## 2025-05-20 ENCOUNTER — OFFICE VISIT (OUTPATIENT)
Dept: NEUROLOGY | Facility: CLINIC | Age: 60
End: 2025-05-20
Payer: COMMERCIAL

## 2025-05-20 VITALS
BODY MASS INDEX: 33.02 KG/M2 | HEIGHT: 64 IN | HEART RATE: 74 BPM | SYSTOLIC BLOOD PRESSURE: 108 MMHG | WEIGHT: 193.4 LBS | DIASTOLIC BLOOD PRESSURE: 66 MMHG

## 2025-05-20 DIAGNOSIS — R56.9 WITNESSED SEIZURE-LIKE ACTIVITY: Primary | ICD-10-CM

## 2025-06-09 ENCOUNTER — OFFICE VISIT (OUTPATIENT)
Age: 60
End: 2025-06-09
Payer: COMMERCIAL

## 2025-06-09 ENCOUNTER — TELEPHONE (OUTPATIENT)
Dept: FAMILY MEDICINE CLINIC | Age: 60
End: 2025-06-09

## 2025-06-09 VITALS
OXYGEN SATURATION: 97 % | HEIGHT: 64 IN | DIASTOLIC BLOOD PRESSURE: 72 MMHG | SYSTOLIC BLOOD PRESSURE: 115 MMHG | BODY MASS INDEX: 34.42 KG/M2 | HEART RATE: 72 BPM | WEIGHT: 201.6 LBS

## 2025-06-09 DIAGNOSIS — R55 SYNCOPE AND COLLAPSE: Primary | ICD-10-CM

## 2025-06-09 DIAGNOSIS — I10 PRIMARY HYPERTENSION: ICD-10-CM

## 2025-06-09 DIAGNOSIS — E78.49 OTHER HYPERLIPIDEMIA: ICD-10-CM

## 2025-06-09 PROCEDURE — 99204 OFFICE O/P NEW MOD 45 MIN: CPT | Performed by: INTERNAL MEDICINE

## 2025-06-09 NOTE — TELEPHONE ENCOUNTER
"Caller: Nory Rodriguez \"Cherelle\"    Relationship: Self    Best call back number:     [phone]     What orders are you requesting (i.e. lab or imaging): YEARLY MAMMOGRAM    In what timeframe would the patient need to come in: ASAP    Where will you receive your lab/imaging services:  [facility]    Additional notes: PATIENT WOULD LIKE A CALL BACK TO SCHEDULE    "

## 2025-06-09 NOTE — PROGRESS NOTES
[facility]  INTERVENTIONAL CARDIOLOGY NEW PATIENT OFFICE VISIT      Chief Complaint  Loss of Consciousness, Collapse, and Establish Care    Subjective          History of Present Illness    Nory Rodriguez is a 59 y.o. female who presents to [facility] for new patient visit.       History of Present Illness  The patient presents for evaluation of syncope and collapse.    She has experienced two episodes of syncope, characterized by her eyes rolling back and a sensation of the room closing in. The first [place] had not slept or eaten due to travel. During this episode, she was unable to communicate but could hear her . A nurse practitioner advised against physical contact. Upon regaining consciousness, she was informed that her blood sugar and blood pressure were within normal ranges. She was monitored for an hour, during which she was able to walk and identify people. She was given food and water, even though she did not feel hungry.     The second episode occurred three weeks later, lasting approximately 15 seconds. She had been working all day and was well-hydrated. Her blood sugar was 118, and her blood pressure and heart rate were normal. A CT scan of her head and neck revealed no abnormalities. She reports no palpitations or dizziness upon standing, but does experience lightheadedness in the morning, which she attributes to her diabetes. She has no history of seizures or epilepsy. She has undergone a tilt table test 20 years ago, which was normal. She has seen a neurologist who has scheduled two tests. She has a cardiac MRI scheduled for 07/21/2025. She has a history of fluid accumulation in her heart and lungs 11 years ago.    She is a diabetic.        Past History:  Past Medical History:   Diagnosis Date    Accessory skin tags     CONGENITAL    Acute frontal sinusitis, unspecified     Acute upper respiratory  infection, unspecified     Acute vaginitis     Allergic rhinitis, unspecified     Breast lump     Calculus of kidney     Carrier of, hepatitis viral     Cellulitis of chest wall     Chronic back pain     Chronic hoarseness     COVID-19     Dietary counseling and surveillance     Dizziness and giddiness     Effusion, left knee     Effusion, right knee     GERD without esophagitis     Hepatitis A     Hereditary and idiopathic neuropathy, unspecified     History of depression     Hyperlipidemia     Hypertension     Kidney stone     Localized swelling, mass and lump, right lower limb     Low back pain     Methicillin resistant Staphylococcus aureus infection as the cause of diseases classified elsewhere     Migraine without aura, not intractable, without status migrainosus     Moderate persistent asthma with (acute) exacerbation     Morbid obesity due to excess calories     Nausea     Neuropathy     Osteoporosis     Pain in joints of right hand     Pain in left hip     Pain in left leg     Pain in right shoulder     Paresthesia of skin     Peripheral neuropathy     Personal history of other venous thrombosis and embolism     Post-menopausal     Pressure ulcer of other site, stage 1     Primary insomnia     Pure hypercholesterolemia, unspecified     Rheumatoid lung disease with rheumatoid arthritis     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     Shortness of breath     Sleep apnea     Sleep apnea, unspecified     Tinea unguium     Type 2 diabetes mellitus with diabetic polyneuropathy     Uterine polyp     Viral hepatitis     Vitamin D deficiency, unspecified        Medical History:  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, hepatitis viral     Cellulitis of  chest wall     Chronic back pain     Chronic hoarseness     COVID-19     Dietary counseling and surveillance     Dizziness and giddiness     Effusion, left knee     Effusion, right knee     GERD without esophagitis     Hepatitis A     Hereditary and idiopathic neuropathy, unspecified     History of depression     Hyperlipidemia     Hypertension     Kidney stone     Localized swelling, mass and lump, right lower limb     Low back pain     Methicillin resistant Staphylococcus aureus infection as the cause of diseases classified elsewhere     Migraine without aura, not intractable, without status migrainosus     Moderate persistent asthma with (acute) exacerbation     Morbid obesity due to excess calories     Nausea     Neuropathy     Osteoporosis     Pain in joints of right hand     Pain in left hip     Pain in left leg     Pain in right shoulder     Paresthesia of skin     Peripheral neuropathy     Personal history of other venous thrombosis and embolism     Post-menopausal     Pressure ulcer of other site, stage 1     Primary insomnia     Pure hypercholesterolemia, unspecified     Rheumatoid lung disease with rheumatoid arthritis     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     Family History:   family history includes Alzheimer's disease in her maternal grandmother; Breast cancer in her maternal grandmother; Coronary artery disease in her father; Diabetes type II in her brother, maternal grandfather, and paternal grandfather; Endometrial cancer in her mother;  Hyperlipidemia in her mother; Stroke in her father.     Social History:   reports that she has never smoked. She has never been exposed to tobacco smoke. She has never used smokeless tobacco. She reports that she does not currently use alcohol. She reports that she does not use drugs.    Allergies:   Asa [aspirin], Ciprofloxacin, Exenatide, Levemir [insulin detemir], Nitroglycerin, Sumatriptan, Sitagliptin, Cat dander, Codeine, and Diclofenac    Current Outpatient Medications on File Prior to Visit   Medication Sig    albuterol (PROVENTIL) (2.5 MG/3ML) 0.083% nebulizer solution Take 2.5 mg by nebulization Every 4 (Four) Hours As Needed for Wheezing.    alendronate (FOSAMAX) 70 MG tablet TAKE 1 TABLET BY MOUTH EVERY 7 DAYS.    amantadine (SYMMETREL) 100 MG tablet 1 tablet Daily.    atorvastatin (LIPITOR) 10 MG tablet TAKE 1 TABLET EVERY NIGHT  AT BEDTIME    B-D ULTRAFINE III SHORT PEN 31G X 8 MM misc USE 4 TIMES A DAY WITH     HUMALOG    budesonide-formoterol (SYMBICORT) 160-4.5 MCG/ACT inhaler Inhale 2 puffs 2 (Two) Times a Day.    cetirizine (zyrTEC) 10 MG tablet Take 1 tablet by mouth Daily.    Continuous Blood Gluc Sensor (Dexcom G7 Sensor) misc     diazePAM (VALIUM) 5 MG tablet Take 1 tablet by mouth Every 6 (Six) Hours As Needed.    Dietary Management Product (Vasculera) tablet Take 1 tablet by mouth Daily.    dilTIAZem CD (CARDIZEM CD) 120 MG 24 hr capsule TAKE 1 CAPSULE DAILY    Dulaglutide (Trulicity) 1.5 MG/0.5ML solution pen-injector Inject 4.5 mg under the skin into the appropriate area as directed. Every sunday    Empagliflozin (JARDIANCE PO) Take 10 mg by mouth Daily.    EPINEPHrine (EPIPEN) 0.3 MG/0.3ML solution auto-injector injection As Needed.    estradiol (ESTRACE) 0.5 MG tablet Take 1 tablet by mouth Daily.    gabapentin (NEURONTIN) 100 MG capsule 1-3 pills 3 x a day    hydroCHLOROthiazide 25 MG tablet Take 1 tablet by mouth Daily.    hydroxychloroquine (PLAQUENIL) 200 MG tablet Take 1 tablet by  mouth Daily.     MG tablet     Insulin Glargine, 2 Unit Dial, (Toujeo Max SoloStar) 300 UNIT/ML solution pen-injector injection Inject 100 units SQ in the am and 80 units SQ in the pm    Insulin Lispro (humaLOG) 100 UNIT/ML injection Inject  under the skin into the appropriate area as directed 3 (Three) Times a Day Before Meals. Sliding scale TID    leflunomide (ARAVA) 10 MG tablet Take 1 tablet by mouth Daily.    lisinopril (PRINIVIL,ZESTRIL) 2.5 MG tablet Take 1 tablet by mouth Daily.    metFORMIN (GLUCOPHAGE) 500 MG tablet TAKE 1 TABLET TWICE DAILY  WITH MEALS    methocarbamol (ROBAXIN) 500 MG tablet Take 2 tablets by mouth 3 (Three) Times a Day As Needed for Muscle Spasms.    metroNIDAZOLE (Metrogel) 1 % gel Apply  topically to the appropriate area as directed Daily.    montelukast (SINGULAIR) 10 MG tablet TAKE 1 TABLET EVERY EVENING    mupirocin (BACTROBAN) 2 % ointment Apply  topically to the appropriate area as directed 2 (Two) Times a Day.    nystatin (MYCOSTATIN) 124848 UNIT/GM cream Apply 1 application topically to the appropriate area as directed 2 (Two) Times a Day As Needed (yeast rash).    nystatin-triamcinolone (MYCOLOG) 442405-6.1 UNIT/GM-% ointment Apply 1 application topically to the appropriate area as directed 2 (Two) Times a Day.    omeprazole (priLOSEC) 40 MG capsule TAKE 1 CAPSULE DAILY    promethazine (PHENERGAN) 25 MG tablet Take 1 tablet by mouth Every 6 (Six) Hours As Needed for Nausea or Vomiting.    promethazine-dextromethorphan (PROMETHAZINE-DM) 6.25-15 MG/5ML syrup Take 5 mL by mouth At Night As Needed for Cough.    Spiriva Respimat 1.25 MCG/ACT aerosol solution inhaler Inhale 2 puffs Daily As Needed.    topiramate (TOPAMAX) 100 MG tablet TAKE 1 TABLET TWICE A DAY    traZODone (DESYREL) 150 MG tablet TAKE 1 TABLET NIGHTLY AT   BEDTIME     No current facility-administered medications on file prior to visit.          Review of Systems   Negative ROS except as mentioned in HPI  "above.     Objective     /72 (BP Location: Left arm, Patient Position: Sitting, Cuff Size: Adult)   Pulse 72   Ht 162.6 cm (64\")   Wt 91.4 kg (201 lb 9.6 oz)   SpO2 97%   BMI 34.60 kg/m²       Physical Exam  General : Alert, awake, no acute distress  Neck : Supple, no carotid bruit, no jugular venous distention  CVS : Regular rate and rhythm, no murmur, rubs or gallops  Lungs: Clear to auscultation bilaterally, no crackles or rhonchi  Abdomen: Soft, nontender, bowel sounds heard in all 4 quadrants  Extremities: Warm, well-perfused, no pedal edema      Result Review :     The following data was reviewed by: Keagan Watson MD on 06/09/2025:    CMP          12/3/2024    17:25 4/15/2025    07:47 4/21/2025    17:20   CMP   Glucose 119  126  94    BUN 21  21  13    Creatinine 1.04  1.12  0.92    EGFR 61.3  56.1  71.0    Sodium 140  142  141    Potassium 3.9  4.3  3.7    Chloride 105  108  108    Calcium 9.2  9.1  9.0    Total Protein 7.0  6.8  7.1    Albumin 3.9  3.9  4.1    Globulin 3.1  2.9  3.0    Total Bilirubin <0.2  0.2  0.2    Alkaline Phosphatase 111  110  104    AST (SGOT) 15  19  19    ALT (SGPT) 9  15  10    Albumin/Globulin Ratio 1.3  1.3  1.4    BUN/Creatinine Ratio 20.2  18.8  14.1    Anion Gap 11.1  10.0  9.4      CBC          12/3/2024    17:25 4/15/2025    07:47 4/21/2025    17:20   CBC   WBC 6.12  5.84  6.37    RBC 4.33  4.09  4.05    Hemoglobin 12.9  12.0  11.7    Hematocrit 39.2  38.9  37.1    MCV 90.5  95.1  91.6    MCH 29.8  29.3  28.9    MCHC 32.9  30.8  31.5    RDW 12.4  13.1  12.8    Platelets 216  241  225      TSH          4/21/2025    17:20   TSH   TSH 1.500         A1C Last 3 Results          12/3/2024    17:25 4/15/2025    07:47   HGBA1C Last 3 Results   Hemoglobin A1C 6.40  5.70        Data reviewed: Cardiology studies    No results found for this or any previous visit.      No results found for this or any previous visit.          Procedures  EKG from 4/2025 reveals sinus " rhythm.    The 10-year ASCVD risk score (Mary GAMBLE, et al., 2019) is: 6.9%    Values used to calculate the score:      Age: 59 years      Sex: Female      Is Non- : No      Diabetic: Yes      Tobacco smoker: No      Systolic Blood Pressure: 115 mmHg      Is BP treated: Yes      HDL Cholesterol: 43 mg/dL      Total Cholesterol: 143 mg/dL         Assessment and Plan      Diagnoses and all orders for this visit:    1. Syncope and collapse (Primary)  -     Tilt Table; Future  -     Adult Transthoracic Echo Complete w/ Color, Spectral and Contrast if necessary per protocol; Future  -     Case Request Cath Lab: Loop insertion    2. Other hyperlipidemia  -     Tilt Table; Future  -     Adult Transthoracic Echo Complete w/ Color, Spectral and Contrast if necessary per protocol; Future  -     Case Request Cath Lab: Loop insertion    3. Primary hypertension  -     Tilt Table; Future  -     Adult Transthoracic Echo Complete w/ Color, Spectral and Contrast if necessary per protocol; Future  -     Case Request Cath Lab: Loop insertion        Assessment & Plan  1. Syncope:  - Echocardiogram to assess cardiac function and potential valve issues.  - Tilt table test to evaluate for orthostatic hypotension or other related conditions.  - Loop recorder implantation on 06/19/2025 to monitor for any cardiac arrhythmias during future syncope episodes.  Patient already had Holter monitor that was negative.  - The loop recorder is compatible with MRI machines; inform the technician about the device prior to the procedure.  - Discussed risks, benefits, and alternatives of the loop recorder implantation, including the procedure details, potential for infection at the incision site, and the need for follow-up to ensure proper healing.    2. Diabetes Mellitus:  - Continue monitoring blood glucose levels regularly.  - Maintain hydration and balanced diet to prevent episodes of hypoglycemia or hyperglycemia.    3.  COVID-19:  - Patient has received six vaccinations.  - No current symptoms or complications from previous infections.    Follow-up:  - Follow-up appointment in 6 months.    PROCEDURE  Loop recorder insertion will be scheduled.       Patient was educated on cardiac diet, adequate exercise and achieving/maintaining optimal weight.    Nory Rodriguez  reports that she has never smoked. She has never been exposed to tobacco smoke. She has never used smokeless tobacco.           Follow Up     Return in about 6 months (around 12/9/2025) for Next scheduled follow up with Rimma Chicas.             I spent 45 minutes caring for this patient on this date of service. This time includes time spent by me in the following activities:preparing for the visit, reviewing tests, obtaining and/or reviewing a separately obtained history, performing a medically appropriate examination and/or evaluation , counseling and educating the patient/family/caregiver, ordering medications, tests, or procedures, referring and communicating with other health care professionals , documenting information in the medical record, independently interpreting results and communicating that information with the patient/family/caregiver, and care coordination.     The patient was seen and examined. Work by the provider also included review and/or ordering of lab tests, review and/or ordering of radiology tests, review and/or ordering of medicine tests, discussion with other physicians or providers, independent review of data, obtaining old records, review/summation of old records, and/or other review.    I have reviewed the family history, social history, and past medical history for this patient. Previous information and data has been reviewed and updated as needed. I have reviewed and verified the chief complaint, history, and other documentation. The patient was interviewed and examined in the clinic and the chart reviewed. The previous observations,  recommendations, and conclusions were reviewed including those of other providers.     The plan was discussed with the patient and/or family. The patient was given time to ask questions and these questions were answered. At the conclusion of their visit they had no additional questions or concerns and all questions were answered to their satisfaction.     Patient was given instructions and counseling regarding her condition or for health maintenance advice. Please see specific information pulled into the AVS if appropriate.      Patient or patient representative verbalized consent for the use of Ambient Listening during the visit with  Keagan Watson MD for chart documentation. 6/9/2025  15:24 EDT      Keagan Watson MD, [facility]  06/09/25  15:17 EDT    Dictated Utilizing Dragon Dictation

## 2025-06-09 NOTE — LETTER
June 9, 2025     Eileen Barajas MD  [address] [facility] [address]  [place] [address]    Patient: Nory Rodriguez   YOB: 1965   Date of Visit: 6/9/2025       Dear Eileen Barajas MD,    Nory Rodriguez was in my office today. Below are the relevant portions of my assessment and plan of care.           If you have questions, please do not hesitate to call me. I look forward to following Nory along with you.         Sincerely,        Keagan Watson MD        CC: ARMAAN King MD Mark C Duber, DO  JENNIFER Bruno MD Adena Wilson, APRN Holly R Schat, NGHIA

## 2025-06-10 DIAGNOSIS — Z12.31 SCREENING MAMMOGRAM FOR BREAST CANCER: Primary | ICD-10-CM

## 2025-06-12 ENCOUNTER — TELEPHONE (OUTPATIENT)
Age: 60
End: 2025-06-12
Payer: COMMERCIAL

## 2025-06-12 NOTE — TELEPHONE ENCOUNTER
Patient called in asking questions about her up coming surgery with Dr Watson. She is confused over the medications she takes and is supposed to not take prior to surgery.

## 2025-06-12 NOTE — TELEPHONE ENCOUNTER
[facility] for patient to call back. Her case is currently pending insurance approval.

## 2025-06-13 ENCOUNTER — TELEPHONE (OUTPATIENT)
Dept: CARDIOLOGY | Facility: CLINIC | Age: 60
End: 2025-06-13
Payer: COMMERCIAL

## 2025-06-13 DIAGNOSIS — R55 SYNCOPE AND COLLAPSE: Primary | ICD-10-CM

## 2025-06-13 NOTE — TELEPHONE ENCOUNTER
Insurance denied the loop implant. Dr. Watson would like to proceed with a 30 day MCT. I left message with call back number for patient. MCT ordered.

## 2025-06-19 ENCOUNTER — TELEPHONE (OUTPATIENT)
Dept: CARDIOLOGY | Facility: CLINIC | Age: 60
End: 2025-06-19
Payer: COMMERCIAL

## 2025-06-19 ENCOUNTER — HOSPITAL ENCOUNTER (INPATIENT)
Facility: HOSPITAL | Age: 60
LOS: 4 days | Discharge: HOME | End: 2025-06-24
Attending: EMERGENCY MEDICINE | Admitting: STUDENT IN AN ORGANIZED HEALTH CARE EDUCATION/TRAINING PROGRAM
Payer: COMMERCIAL

## 2025-06-19 ENCOUNTER — APPOINTMENT (OUTPATIENT)
Dept: GENERAL RADIOLOGY | Facility: HOSPITAL | Age: 60
End: 2025-06-19
Payer: COMMERCIAL

## 2025-06-19 DIAGNOSIS — G89.18 CHEST WALL PAIN FOLLOWING SURGERY: ICD-10-CM

## 2025-06-19 DIAGNOSIS — R07.89 CHEST WALL PAIN FOLLOWING SURGERY: ICD-10-CM

## 2025-06-19 DIAGNOSIS — I44.2 COMPLETE HEART BLOCK: ICD-10-CM

## 2025-06-19 DIAGNOSIS — R55 SYNCOPE AND COLLAPSE: Primary | ICD-10-CM

## 2025-06-19 DIAGNOSIS — G43.009 MIGRAINE WITHOUT AURA AND WITHOUT STATUS MIGRAINOSUS, NOT INTRACTABLE: ICD-10-CM

## 2025-06-19 LAB
BASOPHILS # BLD AUTO: 0.07 10*3/MM3 (ref 0–0.2)
BASOPHILS NFR BLD AUTO: 0.8 % (ref 0–1.5)
DEPRECATED RDW RBC AUTO: 45.7 FL (ref 37–54)
EOSINOPHIL # BLD AUTO: 0.27 10*3/MM3 (ref 0–0.4)
EOSINOPHIL NFR BLD AUTO: 2.9 % (ref 0.3–6.2)
ERYTHROCYTE [DISTWIDTH] IN BLOOD BY AUTOMATED COUNT: 13.4 % (ref 12.3–15.4)
HCT VFR BLD AUTO: 34.5 % (ref 34–46.6)
HGB BLD-MCNC: 10.6 G/DL (ref 12–15.9)
IMM GRANULOCYTES # BLD AUTO: 0.02 10*3/MM3 (ref 0–0.05)
IMM GRANULOCYTES NFR BLD AUTO: 0.2 % (ref 0–0.5)
LYMPHOCYTES # BLD AUTO: 2.36 10*3/MM3 (ref 0.7–3.1)
LYMPHOCYTES NFR BLD AUTO: 25.7 % (ref 19.6–45.3)
MCH RBC QN AUTO: 29 PG (ref 26.6–33)
MCHC RBC AUTO-ENTMCNC: 30.7 G/DL (ref 31.5–35.7)
MCV RBC AUTO: 94.5 FL (ref 79–97)
MONOCYTES # BLD AUTO: 1.01 10*3/MM3 (ref 0.1–0.9)
MONOCYTES NFR BLD AUTO: 11 % (ref 5–12)
NEUTROPHILS NFR BLD AUTO: 5.46 10*3/MM3 (ref 1.7–7)
NEUTROPHILS NFR BLD AUTO: 59.4 % (ref 42.7–76)
NRBC BLD AUTO-RTO: 0 /100 WBC (ref 0–0.2)
PLATELET # BLD AUTO: 193 10*3/MM3 (ref 140–450)
PMV BLD AUTO: 12.4 FL (ref 6–12)
RBC # BLD AUTO: 3.65 10*6/MM3 (ref 3.77–5.28)
WBC NRBC COR # BLD AUTO: 9.19 10*3/MM3 (ref 3.4–10.8)

## 2025-06-19 PROCEDURE — 36415 COLL VENOUS BLD VENIPUNCTURE: CPT

## 2025-06-19 PROCEDURE — 93005 ELECTROCARDIOGRAM TRACING: CPT | Performed by: EMERGENCY MEDICINE

## 2025-06-19 PROCEDURE — 99291 CRITICAL CARE FIRST HOUR: CPT

## 2025-06-19 PROCEDURE — 93005 ELECTROCARDIOGRAM TRACING: CPT

## 2025-06-19 PROCEDURE — 83880 ASSAY OF NATRIURETIC PEPTIDE: CPT | Performed by: EMERGENCY MEDICINE

## 2025-06-19 PROCEDURE — 85025 COMPLETE CBC W/AUTO DIFF WBC: CPT | Performed by: EMERGENCY MEDICINE

## 2025-06-19 PROCEDURE — 84484 ASSAY OF TROPONIN QUANT: CPT | Performed by: EMERGENCY MEDICINE

## 2025-06-19 PROCEDURE — 71045 X-RAY EXAM CHEST 1 VIEW: CPT

## 2025-06-19 PROCEDURE — 80053 COMPREHEN METABOLIC PANEL: CPT | Performed by: EMERGENCY MEDICINE

## 2025-06-19 PROCEDURE — 93010 ELECTROCARDIOGRAM REPORT: CPT | Performed by: INTERNAL MEDICINE

## 2025-06-19 RX ORDER — SODIUM CHLORIDE 0.9 % (FLUSH) 0.9 %
10 SYRINGE (ML) INJECTION AS NEEDED
Status: DISCONTINUED | OUTPATIENT
Start: 2025-06-19 | End: 2025-06-24 | Stop reason: HOSPADM

## 2025-06-19 NOTE — Clinical Note
A 4 fr sheath was successfully inserted using micropuncture technique into the left subclavian vein. Sheath insertion not delayed.

## 2025-06-19 NOTE — Clinical Note
A 5 fr sheath was successfully inserted using micropuncture technique with ultrasound guidance into the right internal jugular vein. Sheath insertion not delayed.

## 2025-06-20 ENCOUNTER — APPOINTMENT (OUTPATIENT)
Dept: GENERAL RADIOLOGY | Facility: HOSPITAL | Age: 60
End: 2025-06-20
Payer: COMMERCIAL

## 2025-06-20 ENCOUNTER — TELEPHONE (OUTPATIENT)
Dept: FAMILY MEDICINE CLINIC | Age: 60
End: 2025-06-20
Payer: COMMERCIAL

## 2025-06-20 ENCOUNTER — TELEPHONE (OUTPATIENT)
Dept: CARDIOLOGY | Facility: CLINIC | Age: 60
End: 2025-06-20

## 2025-06-20 PROBLEM — I44.2 COMPLETE HEART BLOCK: Status: ACTIVE | Noted: 2025-06-20

## 2025-06-20 LAB
ALBUMIN SERPL-MCNC: 4 G/DL (ref 3.5–5.2)
ALBUMIN SERPL-MCNC: 4.2 G/DL (ref 3.5–5.2)
ALBUMIN/GLOB SERPL: 1.4 G/DL
ALBUMIN/GLOB SERPL: 1.5 G/DL
ALP SERPL-CCNC: 105 U/L (ref 39–117)
ALP SERPL-CCNC: 108 U/L (ref 39–117)
ALT SERPL W P-5'-P-CCNC: 12 U/L (ref 1–33)
ALT SERPL W P-5'-P-CCNC: 13 U/L (ref 1–33)
ANION GAP SERPL CALCULATED.3IONS-SCNC: 12.2 MMOL/L (ref 5–15)
ANION GAP SERPL CALCULATED.3IONS-SCNC: 9.6 MMOL/L (ref 5–15)
AST SERPL-CCNC: 14 U/L (ref 1–32)
AST SERPL-CCNC: 15 U/L (ref 1–32)
BILIRUB SERPL-MCNC: 0.2 MG/DL (ref 0–1.2)
BILIRUB SERPL-MCNC: <0.2 MG/DL (ref 0–1.2)
BUN SERPL-MCNC: 30.1 MG/DL (ref 8–23)
BUN SERPL-MCNC: 37.8 MG/DL (ref 8–23)
BUN/CREAT SERPL: 29.5 (ref 7–25)
BUN/CREAT SERPL: 33.8 (ref 7–25)
CALCIUM SPEC-SCNC: 8.3 MG/DL (ref 8.6–10.5)
CALCIUM SPEC-SCNC: 8.8 MG/DL (ref 8.6–10.5)
CHLORIDE SERPL-SCNC: 107 MMOL/L (ref 98–107)
CHLORIDE SERPL-SCNC: 107 MMOL/L (ref 98–107)
CO2 SERPL-SCNC: 20.8 MMOL/L (ref 22–29)
CO2 SERPL-SCNC: 24.4 MMOL/L (ref 22–29)
CREAT SERPL-MCNC: 1.02 MG/DL (ref 0.57–1)
CREAT SERPL-MCNC: 1.12 MG/DL (ref 0.57–1)
DEPRECATED RDW RBC AUTO: 45.7 FL (ref 37–54)
EGFRCR SERPLBLD CKD-EPI 2021: 55.7 ML/MIN/1.73
EGFRCR SERPLBLD CKD-EPI 2021: 62.3 ML/MIN/1.73
ERYTHROCYTE [DISTWIDTH] IN BLOOD BY AUTOMATED COUNT: 13.5 % (ref 12.3–15.4)
GEN 5 1HR TROPONIN T REFLEX: 18 NG/L
GLOBULIN UR ELPH-MCNC: 2.6 GM/DL
GLOBULIN UR ELPH-MCNC: 2.9 GM/DL
GLUCOSE BLDC GLUCOMTR-MCNC: 122 MG/DL (ref 70–99)
GLUCOSE BLDC GLUCOMTR-MCNC: 99 MG/DL (ref 70–99)
GLUCOSE SERPL-MCNC: 156 MG/DL (ref 65–99)
GLUCOSE SERPL-MCNC: 187 MG/DL (ref 65–99)
HCT VFR BLD AUTO: 36.2 % (ref 34–46.6)
HGB BLD-MCNC: 11.2 G/DL (ref 12–15.9)
HOLD SPECIMEN: NORMAL
HOLD SPECIMEN: NORMAL
MAGNESIUM SERPL-MCNC: 2.4 MG/DL (ref 1.6–2.4)
MCH RBC QN AUTO: 28.9 PG (ref 26.6–33)
MCHC RBC AUTO-ENTMCNC: 30.9 G/DL (ref 31.5–35.7)
MCV RBC AUTO: 93.5 FL (ref 79–97)
NT-PROBNP SERPL-MCNC: 1249 PG/ML (ref 0–900)
PHOSPHATE SERPL-MCNC: 3.3 MG/DL (ref 2.5–4.5)
PLATELET # BLD AUTO: 203 10*3/MM3 (ref 140–450)
PMV BLD AUTO: 12.2 FL (ref 6–12)
POTASSIUM SERPL-SCNC: 3.4 MMOL/L (ref 3.5–5.2)
POTASSIUM SERPL-SCNC: 3.6 MMOL/L (ref 3.5–5.2)
PROT SERPL-MCNC: 6.6 G/DL (ref 6–8.5)
PROT SERPL-MCNC: 7.1 G/DL (ref 6–8.5)
RBC # BLD AUTO: 3.87 10*6/MM3 (ref 3.77–5.28)
SODIUM SERPL-SCNC: 140 MMOL/L (ref 136–145)
SODIUM SERPL-SCNC: 141 MMOL/L (ref 136–145)
TROPONIN T % DELTA: 13
TROPONIN T NUMERIC DELTA: 2 NG/L
TROPONIN T SERPL HS-MCNC: 16 NG/L
WBC NRBC COR # BLD AUTO: 9.81 10*3/MM3 (ref 3.4–10.8)
WHOLE BLOOD HOLD COAG: NORMAL
WHOLE BLOOD HOLD SPECIMEN: NORMAL

## 2025-06-20 PROCEDURE — 84100 ASSAY OF PHOSPHORUS: CPT

## 2025-06-20 PROCEDURE — 99223 1ST HOSP IP/OBS HIGH 75: CPT | Performed by: STUDENT IN AN ORGANIZED HEALTH CARE EDUCATION/TRAINING PROGRAM

## 2025-06-20 PROCEDURE — 82948 REAGENT STRIP/BLOOD GLUCOSE: CPT

## 2025-06-20 PROCEDURE — 33210 INSERT ELECTRD/PM CATH SNGL: CPT | Performed by: INTERNAL MEDICINE

## 2025-06-20 PROCEDURE — 83735 ASSAY OF MAGNESIUM: CPT

## 2025-06-20 PROCEDURE — 25010000002 LIDOCAINE 2% SOLUTION: Performed by: INTERNAL MEDICINE

## 2025-06-20 PROCEDURE — 85027 COMPLETE CBC AUTOMATED: CPT

## 2025-06-20 PROCEDURE — 25010000002 FENTANYL CITRATE (PF) 50 MCG/ML SOLUTION: Performed by: INTERNAL MEDICINE

## 2025-06-20 PROCEDURE — 99152 MOD SED SAME PHYS/QHP 5/>YRS: CPT | Performed by: INTERNAL MEDICINE

## 2025-06-20 PROCEDURE — 25010000002 MIDAZOLAM PER 1MG: Performed by: INTERNAL MEDICINE

## 2025-06-20 PROCEDURE — 84484 ASSAY OF TROPONIN QUANT: CPT | Performed by: EMERGENCY MEDICINE

## 2025-06-20 PROCEDURE — 5A1223Z PERFORMANCE OF CARDIAC PACING, CONTINUOUS: ICD-10-PCS | Performed by: INTERNAL MEDICINE

## 2025-06-20 PROCEDURE — 99223 1ST HOSP IP/OBS HIGH 75: CPT | Performed by: INTERNAL MEDICINE

## 2025-06-20 PROCEDURE — 80053 COMPREHEN METABOLIC PANEL: CPT

## 2025-06-20 PROCEDURE — C1894 INTRO/SHEATH, NON-LASER: HCPCS | Performed by: INTERNAL MEDICINE

## 2025-06-20 PROCEDURE — 71045 X-RAY EXAM CHEST 1 VIEW: CPT

## 2025-06-20 RX ORDER — DOCUSATE SODIUM 100 MG/1
100 CAPSULE, LIQUID FILLED ORAL NIGHTLY
COMMUNITY

## 2025-06-20 RX ORDER — DEXTROSE MONOHYDRATE 25 G/50ML
25 INJECTION, SOLUTION INTRAVENOUS
Status: DISCONTINUED | OUTPATIENT
Start: 2025-06-20 | End: 2025-06-24 | Stop reason: HOSPADM

## 2025-06-20 RX ORDER — SODIUM CHLORIDE 9 MG/ML
40 INJECTION, SOLUTION INTRAVENOUS AS NEEDED
Status: DISCONTINUED | OUTPATIENT
Start: 2025-06-20 | End: 2025-06-24 | Stop reason: HOSPADM

## 2025-06-20 RX ORDER — SODIUM CHLORIDE 0.9 % (FLUSH) 0.9 %
10 SYRINGE (ML) INJECTION EVERY 12 HOURS SCHEDULED
Status: DISCONTINUED | OUTPATIENT
Start: 2025-06-20 | End: 2025-06-24 | Stop reason: HOSPADM

## 2025-06-20 RX ORDER — NICOTINE POLACRILEX 4 MG
15 LOZENGE BUCCAL
Status: DISCONTINUED | OUTPATIENT
Start: 2025-06-20 | End: 2025-06-24 | Stop reason: HOSPADM

## 2025-06-20 RX ORDER — ACETAMINOPHEN 325 MG/1
650 TABLET ORAL EVERY 4 HOURS PRN
Status: DISCONTINUED | OUTPATIENT
Start: 2025-06-20 | End: 2025-06-23 | Stop reason: SDUPTHER

## 2025-06-20 RX ORDER — SODIUM CHLORIDE 0.9 % (FLUSH) 0.9 %
10 SYRINGE (ML) INJECTION AS NEEDED
Status: DISCONTINUED | OUTPATIENT
Start: 2025-06-20 | End: 2025-06-24 | Stop reason: HOSPADM

## 2025-06-20 RX ORDER — ALBUTEROL SULFATE 90 UG/1
2 INHALANT RESPIRATORY (INHALATION) EVERY 4 HOURS PRN
COMMUNITY

## 2025-06-20 RX ORDER — FENTANYL CITRATE 50 UG/ML
INJECTION, SOLUTION INTRAMUSCULAR; INTRAVENOUS
Status: DISCONTINUED | OUTPATIENT
Start: 2025-06-20 | End: 2025-06-20 | Stop reason: HOSPADM

## 2025-06-20 RX ORDER — INSULIN LISPRO 100 [IU]/ML
2-7 INJECTION, SOLUTION INTRAVENOUS; SUBCUTANEOUS
Status: DISCONTINUED | OUTPATIENT
Start: 2025-06-20 | End: 2025-06-24 | Stop reason: HOSPADM

## 2025-06-20 RX ORDER — ACETAMINOPHEN 160 MG/5ML
650 SOLUTION ORAL EVERY 4 HOURS PRN
Status: DISCONTINUED | OUTPATIENT
Start: 2025-06-20 | End: 2025-06-23 | Stop reason: SDUPTHER

## 2025-06-20 RX ORDER — ACETAMINOPHEN 650 MG/1
650 SUPPOSITORY RECTAL EVERY 4 HOURS PRN
Status: DISCONTINUED | OUTPATIENT
Start: 2025-06-20 | End: 2025-06-23 | Stop reason: SDUPTHER

## 2025-06-20 RX ORDER — IBUPROFEN 600 MG/1
1 TABLET ORAL
Status: DISCONTINUED | OUTPATIENT
Start: 2025-06-20 | End: 2025-06-24 | Stop reason: HOSPADM

## 2025-06-20 RX ORDER — LIDOCAINE HYDROCHLORIDE 20 MG/ML
INJECTION, SOLUTION INFILTRATION; PERINEURAL
Status: DISCONTINUED | OUTPATIENT
Start: 2025-06-20 | End: 2025-06-20 | Stop reason: HOSPADM

## 2025-06-20 RX ORDER — MIDAZOLAM HYDROCHLORIDE 2 MG/2ML
INJECTION, SOLUTION INTRAMUSCULAR; INTRAVENOUS
Status: DISCONTINUED | OUTPATIENT
Start: 2025-06-20 | End: 2025-06-20 | Stop reason: HOSPADM

## 2025-06-20 RX ORDER — POTASSIUM CHLORIDE 750 MG/1
40 CAPSULE, EXTENDED RELEASE ORAL ONCE
Status: DISCONTINUED | OUTPATIENT
Start: 2025-06-20 | End: 2025-06-20

## 2025-06-20 RX ADMIN — ACETAMINOPHEN 650 MG: 325 TABLET ORAL at 15:58

## 2025-06-20 RX ADMIN — Medication 5 MG: at 22:12

## 2025-06-20 RX ADMIN — ACETAMINOPHEN 650 MG: 325 TABLET ORAL at 20:14

## 2025-06-20 RX ADMIN — Medication 10 ML: at 08:08

## 2025-06-20 RX ADMIN — Medication 10 ML: at 22:03

## 2025-06-20 NOTE — PAYOR COMM NOTE
"Nory Bagley \"Cherelle\" (60 y.o. Female)       Date of Birth   1965    Social Security Number       Address   [address] [place] [address]    Home Phone   [phone]    MRN   [id number]       Church   [employer]    Marital Status                               Admission Date   6/19/2025    Admission Type   Emergency    Admitting Provider   Simba Barclay MD    Attending Provider   Cruz Cavanaugh MD    Department, Room/Bed   Psychiatric INTENSIVE CARE UNIT, I02/1       Discharge Date       Discharge Disposition       Discharge Destination                                 Attending Provider: Cruz Cavanaugh MD    Allergies: Asa [Aspirin], Ciprofloxacin, Exenatide, Levemir [Insulin Detemir], Nitroglycerin, Sumatriptan, Sitagliptin, Cat Dander, Codeine, Diclofenac    Isolation: None   Infection: None   Code Status: CPR    Ht: 162.6 cm (64\")   Wt: 93 kg (205 lb 0.4 oz)    Admission Cmt: None   Principal Problem: Complete heart block [I44.2]                   Active Insurance as of 6/19/2025       Primary Coverage       Payor Plan Insurance Group Employer/Plan Group    [facility] [id number]       Payor Plan Address Payor Plan Phone Number Payor Plan Fax Number Effective Dates    [address] [address]   3/1/2022 - None Entered    [facility] [id number]         Subscriber Name Subscriber Birth Date Member ID       NORY BAGLEY 1965 [id number]                     Emergency Contacts        (Rel.) Home Phone Work Phone Mobile Phone    AMANDA KRAUSE (Daughter) [phone] -- [phone]           Cardiology GRG Clinical Indications for Admission to Inpatient Care       Indications Met   Last updated by Tami Estevez, RN on 6/20/2025 0729     Review Status Created By   Primary Completed Tami Estevez, MACKENZIE      Criteria Review   Cardiology GRG Clinical Indications for Admission to Inpatient Care     Overall Determination: Indications Met   "   Criteria:  [×] Hospital admission is needed for appropriate care of the patient because of  1 or more  of the following  (1) (2) (3):      [×] Atrioventricular conduction abnormality (atrioventricular block), as indicated by  1 or more  of the following  (9) (10) (11) (12):          [×] Symptomatic (eg, dizziness, presyncope, syncope, confusional state due to hypoperfusion, or heart failure symptoms) first-degree, [A] second-degree, or third-degree atrioventricular block not readily reversible (eg, in observation care) [B] or requires hospital care beyond placement of permanent pacemaker (ie, absent other issues or complications, successful placement of permanent pacemaker does not require inpatient care) (9) (10) (12) (13)              2025  7:29 AM                  -- 2025  7:29 AM by Tami Estevez, RN --                      Lightheaded and slow heart rate-cardiology office req come to ED her temp cardiac monitor revealed her to be in complete heart block                                            98.4  43  39  18-21  136/47  sats 99%     Notes:  -- 2025  7:29 AM by Tami Estevez, RN --      Subject: Admission                  Assessment/Plan:       3rd degree heart block      Hx of recent syncope episodes      Back pain      GERD      Hepatitis A      Depression      HLD      HTN      Neuropathy      T2DM      Sleep apnea            Plan:      Admit to ICU      Maintain pads on all the time      Strict bed rest      Cardiology consulted in the ED      NPO for PPM implantation in the morning      Cardiac monitoring                  VTE Prophylaxis:      Mechanical VTE prophylaxis orders are signed & held.          MRN: [id number]     Tami VALLE  [facility] ,[facility] [phone]  [phone]       History & Physical        Simba Barclay MD at 25 0220           [facility]  Date: [facility]   Patient Name: Nory Rodriguez  :  1965  MRN: [id number]  Primary Care Physician:  Eileen Barajas MD  Date of admission: 6/19/2025    Subjective  Subjective     Chief Complaint: Heart block    HPI:    Nory Rodriguez is a 60 y.o. female with significant past medical history of back pain, GERD, Hepatitis A, Depression, HLD, HTN, Neuropathy, T2DM, Sleep apnea sent to the ER by her cardiologist due to abnormal rhythm on Holter monitor.  Patient has been worked up for recent syncopal episodes, she reported that Holter monitor was placed today by her cardiologist, later in the day patient was called to come to the ER due to abnormal rhythm on the Holter monitor indicating possible complete heart block.  During encounter patient denies any chest pain, shortness of breath, palpitations, dizziness, focal weakness, nausea, vomiting, diarrhea, chills or fever.  In the ED patient noted to have a BNP over 1200, hemoglobin 10.6, creatinine 1.12, pads were placed by ER physician, patient's cardiologist Dr. Watson was contacted and he is planning on placing the pacemaker in the morning.      Personal History     Past Medical History:  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, hepatitis viral     Cellulitis of chest wall     Chronic back pain     Chronic hoarseness     COVID-19     Dietary counseling and surveillance     Dizziness and giddiness     Effusion, left knee     Effusion, right knee     GERD without esophagitis     Hepatitis A     Hereditary and idiopathic neuropathy, unspecified     History of depression     Hyperlipidemia     Hypertension     Kidney stone     Localized swelling, mass and lump, right lower limb     Low back pain     Methicillin resistant Staphylococcus aureus infection as the cause of diseases  classified elsewhere     Migraine without aura, not intractable, without status migrainosus     Moderate persistent asthma with (acute) exacerbation     Morbid obesity due to excess calories     Nausea     Neuropathy     Osteoporosis     Pain in joints of right hand     Pain in left hip     Pain in left leg     Pain in right shoulder     Paresthesia of skin     Peripheral neuropathy     Personal history of other venous thrombosis and embolism     Post-menopausal     Pressure ulcer of other site, stage 1     Primary insomnia     Pure hypercholesterolemia, unspecified     Rheumatoid lung disease with rheumatoid arthritis     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     Shortness of breath     Sleep apnea     Sleep apnea, unspecified     Tinea unguium     Type 2 diabetes mellitus with diabetic polyneuropathy     Uterine polyp     Viral hepatitis     Vitamin D deficiency, unspecified        Past Surgical History:  Past Surgical History:   Procedure Laterality Date    CARDIAC CATHETERIZATION  11/2011    CATARACT EXTRACTION, BILATERAL  08/2018    CHOLECYSTECTOMY      HYSTERECTOMY      TAHBSO    PERICARDIAL WINDOW  11/2011       Family History:   Family History   Problem Relation Age of Onset    Hyperlipidemia Mother     Endometrial cancer Mother     Coronary artery disease Father     Stroke Father     Diabetes type II Brother     Breast cancer Maternal Grandmother     Alzheimer's disease Maternal Grandmother     Diabetes type II Maternal Grandfather     Diabetes type II Paternal Grandfather        Social History:   Social History     Socioeconomic History    Marital status:     Number of children: 1   Tobacco Use    Smoking status: Never     Passive exposure: Never    Smokeless tobacco: Never   Vaping Use    Vaping status: Never Used   Substance and Sexual Activity    Alcohol use: Not Currently    Drug use: Never    Sexual activity: Defer       Home Medications:  Dexcom G7 Sensor,  Dulaglutide, EPINEPHrine, Empagliflozin, Insulin Glargine (2 Unit Dial), Insulin Lispro, Insulin Pen Needle, Tiotropium Bromide Monohydrate, Vasculera, albuterol, alendronate, amantadine, atorvastatin, budesonide-formoterol, cetirizine, diazePAM, dilTIAZem CD, estradiol, gabapentin, hydroCHLOROthiazide, hydroxychloroquine, ibuprofen, leflunomide, lisinopril, metFORMIN, methocarbamol, metroNIDAZOLE, montelukast, mupirocin, nystatin, nystatin-triamcinolone, omeprazole, promethazine, promethazine-dextromethorphan, topiramate, and traZODone    Allergies:  Allergies   Allergen Reactions    Asa [Aspirin] Anaphylaxis    Ciprofloxacin Anaphylaxis    Exenatide Nausea Only    Levemir [Insulin Detemir] GI Intolerance    Nitroglycerin Hives    Sumatriptan Dizziness    Sitagliptin GI Intolerance    Cat Dander Rash    Codeine Palpitations    Diclofenac Swelling       Review of Systems   All systems were reviewed and negative except for: As indicated in HPI otherwise negative    Objective  Objective     Vitals:   Temp:  [98.4 °F (36.9 °C)] 98.4 °F (36.9 °C)  Heart Rate:  [39-56] 40  Resp:  [18-21] 21  BP: (126-178)/() 139/46    Physical Exam   Vitals and nursing note reviewed.   Constitutional:       General: She is not in acute distress.     Appearance: Normal appearance. She is not toxic-appearing.   HENT:      Head: Normocephalic and atraumatic.      Jaw: There is normal jaw occlusion.   Eyes:      General: Lids are normal.      Extraocular Movements: Extraocular movements intact.      Conjunctiva/sclera: Conjunctivae normal.      Pupils: Pupils are equal, round, and reactive to light.   Cardiovascular:      Rate and Rhythm: Regular rhythm. Bradycardia present.      Pulses: Normal pulses.      Heart sounds: Normal heart sounds.   Pulmonary:      Effort: Pulmonary effort is normal. No respiratory distress.      Breath sounds: Normal breath sounds. No wheezing or rhonchi.   Abdominal:      General: Abdomen is flat.       Palpations: Abdomen is soft.      Tenderness: There is no abdominal tenderness. There is no guarding or rebound.   Musculoskeletal:         General: Normal range of motion.      Cervical back: Normal range of motion and neck supple.      Right lower leg: Edema present.      Left lower leg: Edema present.   Skin:     General: Skin is warm and dry.   Neurological:      Mental Status: She is alert and oriented to person, place, and time. Mental status is at baseline.   Psychiatric:         Mood and Affect: Mood normal.     Result Review   Result Review:  I have personally reviewed the results from the time of this admission to 6/20/2025 02:21 EDT and agree with these findings:  [x]  Laboratory  [x]  Microbiology  [x]  Radiology  [x]  EKG/Telemetry   [x]  Cardiology/Vascular   []  Pathology  []  Old records  []  Other:      Assessment & Plan  Assessment / Plan     Assessment/Plan:   3rd degree heart block  Hx of recent syncope episodes  Back pain  GERD  Hepatitis A  Depression  HLD  HTN  Neuropathy  T2DM  Sleep apnea    Plan:  Admit to ICU  Maintain pads on all the time  Strict bed rest  Cardiology consulted in the ED  NPO for PPM implantation in the morning  Cardiac monitoring      VTE Prophylaxis:  Mechanical VTE prophylaxis orders are signed & held.          CODE STATUS:    Code Status (Patient has no pulse and is not breathing): CPR (Attempt to Resuscitate)  Medical Interventions (Patient has pulse or is breathing): Full Support  Level Of Support Discussed With: Patient      Admission Status:  I believe this patient meets inpatient status.    Electronically signed by Simba Barclay MD, 06/20/25, 2:21 AM EDT.             Electronically signed by Simba Barclay MD at 06/20/25 0557          Emergency Department Notes        Eris Woodward MD at 06/20/25 0042          Time: 12:42 AM EDT  Date of encounter:  6/19/2025  Independent Historian/Clinical History and Information was obtained by:   Patient and  Family    History is limited by: N/A    Chief Complaint: Lightheaded      History of Present Illness:  Patient is a 60 y.o. year old female who presents to the emergency department for evaluation of lightheaded and slow heart rate.  Patient was called by her cardiologist to come to the Emergency Department as her temporary cardiac monitor revealed her to be in complete heart block this evening.      Patient Care Team  Primary Care Provider: Eileen Barajas MD    Past Medical History:     Allergies   Allergen Reactions    Asa [Aspirin] Anaphylaxis    Ciprofloxacin Anaphylaxis    Exenatide Nausea Only    Levemir [Insulin Detemir] GI Intolerance    Nitroglycerin Hives    Sumatriptan Dizziness    Sitagliptin GI Intolerance    Cat Dander Rash    Codeine Palpitations    Diclofenac Swelling     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, hepatitis viral     Cellulitis of chest wall     Chronic back pain     Chronic hoarseness     COVID-19     Dietary counseling and surveillance     Dizziness and giddiness     Effusion, left knee     Effusion, right knee     GERD without esophagitis     Hepatitis A     Hereditary and idiopathic neuropathy, unspecified     History of depression     Hyperlipidemia     Hypertension     Kidney stone     Localized swelling, mass and lump, right lower limb     Low back pain     Methicillin resistant Staphylococcus aureus infection as the cause of diseases classified elsewhere     Migraine without aura, not intractable, without status migrainosus     Moderate persistent asthma with (acute) exacerbation     Morbid obesity due to excess calories     Nausea     Neuropathy     Osteoporosis     Pain in joints of right hand     Pain in left hip     Pain in left leg      Pain in right shoulder     Paresthesia of skin     Peripheral neuropathy     Personal history of other venous thrombosis and embolism     Post-menopausal     Pressure ulcer of other site, stage 1     Primary insomnia     Pure hypercholesterolemia, unspecified     Rheumatoid lung disease with rheumatoid arthritis     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     Shortness of breath     Sleep apnea     Sleep apnea, unspecified     Tinea unguium     Type 2 diabetes mellitus with diabetic polyneuropathy     Uterine polyp     Viral hepatitis     Vitamin D deficiency, unspecified      Past Surgical History:   Procedure Laterality Date    CARDIAC CATHETERIZATION  11/2011    CATARACT EXTRACTION, BILATERAL  08/2018    CHOLECYSTECTOMY      HYSTERECTOMY      TAHBSO    PERICARDIAL WINDOW  11/2011     Family History   Problem Relation Age of Onset    Hyperlipidemia Mother     Endometrial cancer Mother     Coronary artery disease Father     Stroke Father     Diabetes type II Brother     Breast cancer Maternal Grandmother     Alzheimer's disease Maternal Grandmother     Diabetes type II Maternal Grandfather     Diabetes type II Paternal Grandfather        Home Medications:  Prior to Admission medications    Medication Sig Start Date End Date Taking? Authorizing Provider   albuterol (PROVENTIL) (2.5 MG/3ML) 0.083% nebulizer solution Take 2.5 mg by nebulization Every 4 (Four) Hours As Needed for Wheezing.    Provider, MD Lola   alendronate (FOSAMAX) 70 MG tablet TAKE 1 TABLET BY MOUTH EVERY 7 DAYS. 2/6/24   Eileen Barajas MD   amantadine (SYMMETREL) 100 MG tablet 1 tablet Daily. 9/6/22   Chai Hardy DO   atorvastatin (LIPITOR) 10 MG tablet TAKE 1 TABLET EVERY NIGHT  AT BEDTIME 5/1/25   Eileen Barajas MD   B-D ULTRAFINE III SHORT PEN 31G X 8 MM misc USE 4 TIMES A DAY WITH     HUMALOG 9/21/21   Eileen Barajas MD   budesonide-formoterol (SYMBICORT) 160-4.5 MCG/ACT inhaler Inhale 2  puffs 2 (Two) Times a Day.    Lola Miller MD   cetirizine (zyrTEC) 10 MG tablet Take 1 tablet by mouth Daily.    Lola Miller MD   Continuous Blood Gluc Sensor (Dexcom G7 Sensor) misc  10/2/23   Lola Miller MD   diazePAM (VALIUM) 5 MG tablet Take 1 tablet by mouth Every 6 (Six) Hours As Needed. 3/1/23   Lola Miller MD   Dietary Management Product (Vasculera) tablet Take 1 tablet by mouth Daily. 3/1/23   Bay Zurita MD   dilTIAZem CD (CARDIZEM CD) 120 MG 24 hr capsule TAKE 1 CAPSULE DAILY 5/1/25   Eileen Barajas MD   Dulaglutide (Trulicity) 1.5 MG/0.5ML solution pen-injector Inject 4.5 mg under the skin into the appropriate area as directed. Every sunday    Kerri Flores APRN   Empagliflozin (JARDIANCE PO) Take 10 mg by mouth Daily.    Kerri Flores APRN   EPINEPHrine (EPIPEN) 0.3 MG/0.3ML solution auto-injector injection As Needed. 2/2/22   Lola Miller MD   estradiol (ESTRACE) 0.5 MG tablet Take 1 tablet by mouth Daily. 12/5/24   Eileen Barajas MD   gabapentin (NEURONTIN) 100 MG capsule 1-3 pills 3 x a day 7/16/24   Eileen Barajas MD   hydroCHLOROthiazide 25 MG tablet Take 1 tablet by mouth Daily. 12/5/24   Eileen Barajas MD   hydroxychloroquine (PLAQUENIL) 200 MG tablet Take 1 tablet by mouth Daily. 7/13/23   Lola Miller MD    MG tablet  4/3/24   Lola Miller MD   Insulin Glargine, 2 Unit Dial, (Toujeo Max SoloStar) 300 UNIT/ML solution pen-injector injection Inject 100 units SQ in the am and 80 units SQ in the pm 9/21/21   Eileen Barajas MD   Insulin Lispro (humaLOG) 100 UNIT/ML injection Inject  under the skin into the appropriate area as directed 3 (Three) Times a Day Before Meals. Sliding scale TID    Lola Miller MD   leflunomide (ARAVA) 10 MG tablet Take 1 tablet by mouth Daily.    Paul MD Lola   lisinopril (PRINIVIL,ZESTRIL) 2.5 MG tablet Take 1 tablet by mouth Daily. 12/5/24    Eileen Barajas MD   metFORMIN (GLUCOPHAGE) 500 MG tablet TAKE 1 TABLET TWICE DAILY  WITH MEALS 5/1/25   Eileen Barajas MD   methocarbamol (ROBAXIN) 500 MG tablet Take 2 tablets by mouth 3 (Three) Times a Day As Needed for Muscle Spasms. 2/17/25   Eileen Barajas MD   metroNIDAZOLE (Metrogel) 1 % gel Apply  topically to the appropriate area as directed Daily. 10/6/22   Eileen Barajas MD   montelukast (SINGULAIR) 10 MG tablet TAKE 1 TABLET EVERY EVENING 5/1/25   Eileen Barajas MD   mupirocin (BACTROBAN) 2 % ointment Apply  topically to the appropriate area as directed 2 (Two) Times a Day. 7/16/24   Eileen Barajas MD   nystatin (MYCOSTATIN) 781602 UNIT/GM cream Apply 1 application topically to the appropriate area as directed 2 (Two) Times a Day As Needed (yeast rash). 6/27/23   Eileen Barajas MD   nystatin-triamcinolone (MYCOLOG) 713551-5.1 UNIT/GM-% ointment Apply 1 application topically to the appropriate area as directed 2 (Two) Times a Day. 12/8/22   Monika Martínez APRN   omeprazole (priLOSEC) 40 MG capsule TAKE 1 CAPSULE DAILY 5/1/25   Eileen Barajas MD   promethazine (PHENERGAN) 25 MG tablet Take 1 tablet by mouth Every 6 (Six) Hours As Needed for Nausea or Vomiting. 4/25/23   Monika Martínez APRN   promethazine-dextromethorphan (PROMETHAZINE-DM) 6.25-15 MG/5ML syrup Take 5 mL by mouth At Night As Needed for Cough. 8/27/24   Kaykay Chaudhary APRN   Spiriva Respimat 1.25 MCG/ACT aerosol solution inhaler Inhale 2 puffs Daily As Needed. 6/28/23   ProviderLola MD   topiramate (TOPAMAX) 100 MG tablet TAKE 1 TABLET TWICE A DAY 5/1/25   Eileen Barajas MD   traZODone (DESYREL) 150 MG tablet TAKE 1 TABLET NIGHTLY AT   BEDTIME 5/1/25   Eileen Barajas MD        Social History:   Social History     Tobacco Use    Smoking status: Never     Passive exposure: Never    Smokeless tobacco: Never   Vaping Use    Vaping status: Never Used   Substance Use Topics     "Alcohol use: Not Currently    Drug use: Never         Review of Systems:  Review of Systems   Constitutional:  Negative for chills and fever.   HENT:  Negative for congestion, rhinorrhea and sore throat.    Eyes:  Negative for pain and visual disturbance.   Respiratory:  Negative for apnea, cough, chest tightness and shortness of breath.    Cardiovascular:  Positive for palpitations. Negative for chest pain.   Gastrointestinal:  Negative for abdominal pain, diarrhea, nausea and vomiting.   Genitourinary:  Negative for difficulty urinating and dysuria.   Musculoskeletal:  Negative for joint swelling and myalgias.   Skin:  Negative for color change.   Neurological:  Negative for seizures and headaches.   Psychiatric/Behavioral: Negative.     All other systems reviewed and are negative.       Physical Exam:  /46   Pulse (!) 40   Temp 98.4 °F (36.9 °C) (Oral)   Resp 21   Ht 162.6 cm (64\")   SpO2 98%   BMI 34.50 kg/m²     Physical Exam  Vitals and nursing note reviewed.   Constitutional:       General: She is not in acute distress.     Appearance: Normal appearance. She is not toxic-appearing.   HENT:      Head: Normocephalic and atraumatic.      Jaw: There is normal jaw occlusion.   Eyes:      General: Lids are normal.      Extraocular Movements: Extraocular movements intact.      Conjunctiva/sclera: Conjunctivae normal.      Pupils: Pupils are equal, round, and reactive to light.   Cardiovascular:      Rate and Rhythm: Regular rhythm. Bradycardia present.      Pulses: Normal pulses.      Heart sounds: Normal heart sounds.   Pulmonary:      Effort: Pulmonary effort is normal. No respiratory distress.      Breath sounds: Normal breath sounds. No wheezing or rhonchi.   Abdominal:      General: Abdomen is flat.      Palpations: Abdomen is soft.      Tenderness: There is no abdominal tenderness. There is no guarding or rebound.   Musculoskeletal:         General: Normal range of motion.      Cervical back: " Normal range of motion and neck supple.      Right lower leg: Edema present.      Left lower leg: Edema present.   Skin:     General: Skin is warm and dry.   Neurological:      Mental Status: She is alert and oriented to person, place, and time. Mental status is at baseline.   Psychiatric:         Mood and Affect: Mood normal.                    Medical Decision Making:      Comorbidities that affect care:    Obesity, diabetes, hyperlipidemia    External Notes reviewed:    Previous Clinic Note: Cardiology office visit for syncope management      The following orders were placed and all results were independently analyzed by me:  Orders Placed This Encounter   Procedures    XR Chest 1 View    [place] Draw    Comprehensive Metabolic Panel    BNP    High Sensitivity Troponin T    CBC Auto Differential    High Sensitivity Troponin T 1Hr    NPO Diet NPO Type: Strict NPO    Undress & Gown    Continuous Pulse Oximetry    Vital Signs    Code Status and Medical Interventions: CPR (Attempt to Resuscitate); Full Support    Cardiology (on-call MD unless specified)    Inpatient Hospitalist Consult    Oxygen Therapy- Nasal Cannula; Titrate 1-6 LPM Per SpO2; 90 - 95%    ECG 12 Lead ED Triage Standing Order; SOA    Insert Peripheral IV    Inpatient Admission    CBC & Differential    Green Top (Gel)    Lavender Top    Gold Top - SST    Light Blue Top       Medications Given in the Emergency Department:  Medications   sodium chloride 0.9 % flush 10 mL (has no administration in time range)        ED Course:         Labs:    Lab Results (last 24 hours)       Procedure Component Value Units Date/Time    CBC & Differential [[id number]]  (Abnormal) Collected: 06/19/25 2338    Specimen: Blood Updated: 06/19/25 2348    Narrative:      The following orders were created for panel order CBC & Differential.  Procedure                               Abnormality         Status                     ---------                               -----------          ------                     CBC Auto Differential[[id number]]        Abnormal            Final result                 Please view results for these tests on the individual orders.    Comprehensive Metabolic Panel [[id number]]  (Abnormal) Collected: 06/19/25 2338    Specimen: Blood Updated: 06/20/25 0013     Glucose 187 mg/dL      BUN 37.8 mg/dL      Creatinine 1.12 mg/dL      Sodium 140 mmol/L      Potassium 3.4 mmol/L      Chloride 107 mmol/L      CO2 20.8 mmol/L      Calcium 8.3 mg/dL      Total Protein 6.6 g/dL      Albumin 4.0 g/dL      ALT (SGPT) 13 U/L      AST (SGOT) 15 U/L      Alkaline Phosphatase 105 U/L      Total Bilirubin <0.2 mg/dL      Globulin 2.6 gm/dL      A/G Ratio 1.5 g/dL      BUN/Creatinine Ratio 33.8     Anion Gap 12.2 mmol/L      eGFR 55.7 mL/min/1.73     Narrative:      GFR Categories in Chronic Kidney Disease (CKD)              GFR Category          GFR (mL/min/1.73)    Interpretation  G1                    90 or greater        Normal or high (1)  G2                    60-89                Mild decrease (1)  G3a                   45-59                Mild to moderate decrease  G3b                   30-44                Moderate to severe decrease  G4                    15-29                Severe decrease  G5                    14 or less           Kidney failure    (1)In the absence of evidence of kidney disease, neither GFR category G1 or G2 fulfill the criteria for CKD.    eGFR calculation 2021 CKD-EPI creatinine equation, which does not include race as a factor    BNP [[id number]]  (Abnormal) Collected: 06/19/25 2338    Specimen: Blood Updated: 06/20/25 0010     proBNP 1,249.0 pg/mL     Narrative:      This assay is used as an aid in the diagnosis of individuals suspected of having heart failure. It can be used as an aid in the diagnosis of acute decompensated heart failure (ADHF) in patients presenting with signs and symptoms of ADHF to the emergency department (ED). In addition,  NT-proBNP of <300 pg/mL indicates ADHF is not likely.    Age Range Result Interpretation  NT-proBNP Concentration (pg/mL:      <50             Positive            >450                   Gray                 300-450                    Negative             <300    50-75           Positive            >900                  Gray                300-900                  Negative            <300      >75             Positive            >1800                  Gray                300-1800                  Negative            <300    High Sensitivity Troponin T [[id number]]  (Abnormal) Collected: 06/19/25 2338    Specimen: Blood Updated: 06/20/25 0013     HS Troponin T 16 ng/L     Narrative:      High Sensitive Troponin T Reference Range:  <14.0 ng/L- Negative Female for AMI  <22.0 ng/L- Negative Male for AMI  >=14 - Abnormal Female indicating possible myocardial injury.  >=22 - Abnormal Male indicating possible myocardial injury.   Clinicians would have to utilize clinical acumen, EKG, Troponin, and serial changes to determine if it is an Acute Myocardial Infarction or myocardial injury due to an underlying chronic condition.         CBC Auto Differential [[id number]]  (Abnormal) Collected: 06/19/25 2338    Specimen: Blood Updated: 06/19/25 2348     WBC 9.19 10*3/mm3      RBC 3.65 10*6/mm3      Hemoglobin 10.6 g/dL      Hematocrit 34.5 %      MCV 94.5 fL      MCH 29.0 pg      MCHC 30.7 g/dL      RDW 13.4 %      RDW-SD 45.7 fl      MPV 12.4 fL      Platelets 193 10*3/mm3      Neutrophil % 59.4 %      Lymphocyte % 25.7 %      Monocyte % 11.0 %      Eosinophil % 2.9 %      Basophil % 0.8 %      Immature Grans % 0.2 %      Neutrophils, Absolute 5.46 10*3/mm3      Lymphocytes, Absolute 2.36 10*3/mm3      Monocytes, Absolute 1.01 10*3/mm3      Eosinophils, Absolute 0.27 10*3/mm3      Basophils, Absolute 0.07 10*3/mm3      Immature Grans, Absolute 0.02 10*3/mm3      nRBC 0.0 /100 WBC     High Sensitivity Troponin T 1Hr [[id number]]   (Abnormal) Collected: 06/20/25 0049    Specimen: Blood Updated: 06/20/25 0112     HS Troponin T 18 ng/L      Troponin T Numeric Delta 2 ng/L      Troponin T % Delta 13    Narrative:      High Sensitive Troponin T Reference Range:  <14.0 ng/L- Negative Female for AMI  <22.0 ng/L- Negative Male for AMI  >=14 - Abnormal Female indicating possible myocardial injury.  >=22 - Abnormal Male indicating possible myocardial injury.   Clinicians would have to utilize clinical acumen, EKG, Troponin, and serial changes to determine if it is an Acute Myocardial Infarction or myocardial injury due to an underlying chronic condition.                  Imaging:    XR Chest 1 View  Result Date: 6/20/2025  XR CHEST 1 VW-  Date of exam: 6/20/2025 12:01 AM.  Comparison: 1/8/2025.  INDICATIONS: 60-year-old female w/ h/o SOA/SOB/shortness of air/shortness of breath; trouble breathing.  FINDINGS: A single AP (or PA) upright portable chest radiograph was performed. No cardiac enlargement is seen. No acute infiltrate is appreciated. There may be minimal bibasilar subsegmental atelectasis. No pleural effusion or pneumothorax is identified. There is pulmonary hypoinflation. Chronic calcified granulomatous disease involves the chest. External artifacts obscure detail. The thoracic aorta is atherosclerotic. Degenerative changes involve the imaged spine and the bilateral shoulders. There is mild nonspecific distention of the stomach. No significant interval change is seen since the prior study (or studies).       No acute infiltrate is appreciated.    Portions of this note were completed with a voice recognition program.  6/20/2025 12:04 AM by Emre Grijalva MD on Workstation: [employer]          Differential Diagnosis and Discussion:    Palpitations: Differential diagnosis includes but is not limited to anxiety, atrioventricular blocks, mitral valve disease, hypoxia, coronary artery disease, hypokalemia, anemia, fever, COPD, congestive heart failure,  pericarditis, Kailey-Parkinson-White syndrome, pulmonary embolism, SVT, atrial fibrillation, atrial flutter, sinus tachycardia, thyrotoxicosis, and pheochromocytoma.    PROCEDURES:    Labs were collected in the emergency department and all labs were reviewed and interpreted by me.  X-ray were performed in the emergency department and all X-ray impressions were independently interpreted by me.  An EKG was performed and the EKG was interpreted by me.    ECG 12 Lead ED Triage Standing Order; SOA   Preliminary Result   HEART RATE=48  bpm   RR Ytuukzrs=4142  ms   AZ Stgrsuxw=796  ms   P Horizontal Axis=-26  deg   P Front Axis=53  deg   QRSD Zkmoyrvy=480  ms   QT Afpsmupb=604  ms   YDzS=098  ms   QRS Axis=-35  deg   T Wave Axis=26  deg   - ABNORMAL ECG -   Sinus bradycardia   Atrial premature complex   Prolonged AZ interval   Right bundle branch block   Left ventricular hypertrophy   Date and Time of Study:2025-06-19 23:28:41        Interpretation of EKG shows bradycardia, probable complete heart block, normal QT, no acute ischemia    Procedures    MDM  Number of Diagnoses or Management Options  Complete heart block  Diagnosis management comments: In summary this is a 60-year-old female who presents to the emergency department for evaluation of a low heart rate and reported complete heart block on a Holter monitor.  Patient does report symptoms of fatigue and lightheadedness. CBC independently reviewed and interpreted by me and shows no critical abnormalities.  CMP independently reviewed and interpreted by me and shows no critical abnormalities.  High-sensitivity troponin independently reviewed and interpreted by me shows no critical abnormalities.  Chest x-ray reviewed by me is unremarkable negative for acute pathology.  Discussed these findings with patient's cardiologist, Dr. Barron who plans for pacemaker insertion later today.  Patient is stable number department.  Patient case has been discussed with the hospitalist  team who will admit to the hospital for further evaluation and continuation of treatment.             Patient presents with abnormal vital signs, most notably bradycardia. The patient was placed on pads and cardiac monitor for fear of worsening rapid decline. Patient was placed on the cardiac monitor and was repeatedly assessed.  They were monitored for ventricular ectopy, arrhythmia, tachycardia, hypoxia, changes in blood pressure, and declining mental status several times throughout their stay in the emergency department.    Total Critical Care time of 35 minutes. Total critical care time documented does not include time spent on separately billed procedures for services of nurses or physician assistants. I personally saw and examined the patient. I have reviewed all diagnostic interpretations and treatment plans as written. I was present for the key portions of any procedures performed and the inclusive time noted in any critical care statement. Critical care time includes patient management by me, time spent at the patients bedside,  time to review lab and imaging results, discussing patient care, documentation in the medical record, and time spent with family or caregiver.          Patient Care Considerations:    CT CHEST: I considered ordering a CT scan of the chest, however no respiratory failure      Consultants/Shared Management Plan:    Hospitalist: I have discussed the case with Dr. Barclay who agrees to accept the patient for admission.  Consultant: I have discussed the case with Dr. Watson who states pacemaker later this morning    Social Determinants of Health:    Patient is independent, reliable, and has access to care.       Disposition and Care Coordination:    Admit:   Through independent evaluation of the patient's history, physical, and imperical data, the patient meets criteria for inpatient admission to the hospital.        Final diagnoses:   Complete heart block        ED Disposition       ED  Disposition   Decision to Admit    Condition   --    Comment   Level of Care: Critical Care [6]   Diagnosis: Complete heart block [162704]   Certification: I Certify That Inpatient Hospital Services Are Medically Necessary For Greater Than 2 Midnights                 This medical record created using voice recognition software.             Eris Woodward MD  06/20/25 0222      Electronically signed by Eris Woodward MD at 06/20/25 0222       Vital Signs (last day)       Date/Time Temp Temp src Pulse Resp BP Patient Position SpO2    06/20/25 0727 -- -- 38 -- -- -- 94    06/20/25 0700 97.9 (36.6) Oral 37 20 141/56 Lying 100    06/20/25 0608 -- -- 37 19 139/41 Lying 99    06/20/25 0457 -- -- 38 24 129/43 Lying 96    06/20/25 0445 -- -- 36 -- 98/30 -- 93    06/20/25 0430 -- -- 36 -- 101/37 -- 94    06/20/25 0415 -- -- 39 -- 139/39 -- 96    06/20/25 0400 -- -- 40 20 150/44 Lying 97    06/20/25 0345 -- -- 38 -- 143/56 -- 99    06/20/25 0330 -- -- 39 -- 140/46 -- 100    06/20/25 0315 97.6 (36.4) Oral 39 17 132/63 Lying 98    06/20/25 0300 -- -- 41 -- -- -- 98    06/20/25 0245 -- -- 39 -- 131/95 -- 98    06/20/25 0230 -- -- 41 -- 161/53 -- 99    06/20/25 0200 -- -- 40 -- 139/46 -- 98    06/20/25 0130 -- -- 41 -- 126/39 -- 94    06/20/25 0100 -- -- 40 -- 143/117 -- 96    06/20/25 0049 -- -- -- -- -- -- 100    06/20/25 0048 -- -- 56 21 155/54 -- 99    06/20/25 0030 -- -- 39 -- -- -- 100    06/20/25 0000 -- -- 41 -- -- -- 97    06/19/25 2330 -- -- 50 -- 178/48 -- 94    06/19/25 2320 -- -- -- -- 136/47 Sitting --    06/19/25 2318 98.4 (36.9) Oral 43 18 -- -- 99          Current Facility-Administered Medications   Medication Dose Route Frequency Provider Last Rate Last Admin    acetaminophen (TYLENOL) tablet 650 mg  650 mg Oral Q4H PRN Simba Barclay MD        Or    acetaminophen (TYLENOL) 160 MG/5ML oral solution 650 mg  650 mg Oral Q4H PRN Simba Barclay MD        Or    acetaminophen (TYLENOL) suppository 650 mg  650  mg Rectal Q4H PRN Simba Barclay MD        Calcium Replacement - Follow Nurse / BPA Driven Protocol   Not Applicable Simba Snider MD        Magnesium Standard Dose Replacement - Follow Nurse / BPA Driven Protocol   Not Applicable Simba Snider MD        Phosphorus Replacement - Follow Nurse / BPA Driven Protocol   Not Applicable Simba Snider MD        Potassium Replacement - Follow Nurse / BPA Driven Protocol   Not Applicable Simba Snider MD        sodium chloride 0.9 % flush 10 mL  10 mL Intravenous PRN Eris Woodward MD        sodium chloride 0.9 % flush 10 mL  10 mL Intravenous Q12H Simba Barclay MD        sodium chloride 0.9 % flush 10 mL  10 mL Intravenous PRN Simba Barclay MD        sodium chloride 0.9 % infusion 40 mL  40 mL Intravenous PRN Simba Barclay MD         Lab Results (last 24 hours)       Procedure Component Value Units Date/Time    High Sensitivity Troponin T 1Hr [[id number]]  (Abnormal) Collected: 06/20/25 0049    Specimen: Blood Updated: 06/20/25 0112     HS Troponin T 18 ng/L      Troponin T Numeric Delta 2 ng/L      Troponin T % Delta 13    Narrative:      High Sensitive Troponin T Reference Range:  <14.0 ng/L- Negative Female for AMI  <22.0 ng/L- Negative Male for AMI  >=14 - Abnormal Female indicating possible myocardial injury.  >=22 - Abnormal Male indicating possible myocardial injury.   Clinicians would have to utilize clinical acumen, EKG, Troponin, and serial changes to determine if it is an Acute Myocardial Infarction or myocardial injury due to an underlying chronic condition.         Comprehensive Metabolic Panel [[id number]]  (Abnormal) Collected: 06/19/25 3058    Specimen: Blood Updated: 06/20/25 0013     Glucose 187 mg/dL      BUN 37.8 mg/dL      Creatinine 1.12 mg/dL      Sodium 140 mmol/L      Potassium 3.4 mmol/L      Chloride 107 mmol/L      CO2 20.8 mmol/L      Calcium 8.3 mg/dL      Total Protein 6.6 g/dL      Albumin  4.0 g/dL      ALT (SGPT) 13 U/L      AST (SGOT) 15 U/L      Alkaline Phosphatase 105 U/L      Total Bilirubin <0.2 mg/dL      Globulin 2.6 gm/dL      A/G Ratio 1.5 g/dL      BUN/Creatinine Ratio 33.8     Anion Gap 12.2 mmol/L      eGFR 55.7 mL/min/1.73     Narrative:      GFR Categories in Chronic Kidney Disease (CKD)              GFR Category          GFR (mL/min/1.73)    Interpretation  G1                    90 or greater        Normal or high (1)  G2                    60-89                Mild decrease (1)  G3a                   45-59                Mild to moderate decrease  G3b                   30-44                Moderate to severe decrease  G4                    15-29                Severe decrease  G5                    14 or less           Kidney failure    (1)In the absence of evidence of kidney disease, neither GFR category G1 or G2 fulfill the criteria for CKD.    eGFR calculation 2021 CKD-EPI creatinine equation, which does not include race as a factor    High Sensitivity Troponin T [[id number]]  (Abnormal) Collected: 06/19/25 2338    Specimen: Blood Updated: 06/20/25 0013     HS Troponin T 16 ng/L     Narrative:      High Sensitive Troponin T Reference Range:  <14.0 ng/L- Negative Female for AMI  <22.0 ng/L- Negative Male for AMI  >=14 - Abnormal Female indicating possible myocardial injury.  >=22 - Abnormal Male indicating possible myocardial injury.   Clinicians would have to utilize clinical acumen, EKG, Troponin, and serial changes to determine if it is an Acute Myocardial Infarction or myocardial injury due to an underlying chronic condition.         BNP [[id number]]  (Abnormal) Collected: 06/19/25 2338    Specimen: Blood Updated: 06/20/25 0010     proBNP 1,249.0 pg/mL     Narrative:      This assay is used as an aid in the diagnosis of individuals suspected of having heart failure. It can be used as an aid in the diagnosis of acute decompensated heart failure (ADHF) in patients presenting with  signs and symptoms of ADHF to the emergency department (ED). In addition, NT-proBNP of <300 pg/mL indicates ADHF is not likely.    Age Range Result Interpretation  NT-proBNP Concentration (pg/mL:      <50             Positive            >450                   Gray                 300-450                    Negative             <300    50-75           Positive            >900                  Gray                300-900                  Negative            <300      >75             Positive            >1800                  Gray                300-1800                  Negative            <300    Hardy Draw [[id number]] Collected: 06/19/25 2338    Specimen: Blood Updated: 06/20/25 0000    Narrative:      The following orders were created for panel order Hardy Draw.  Procedure                               Abnormality         Status                     ---------                               -----------         ------                     Green Top (Gel)[[id number]]                                  Final result               Lavender Top[[id number]]                                     Final result               Gold Top - SST[[id number]]                                   Final result               Light Blue Top[[id number]]                                   Final result                 Please view results for these tests on the individual orders.    Green Top (Gel) [[id number]] Collected: 06/19/25 2338    Specimen: Blood Updated: 06/20/25 0000     Extra Tube Hold for add-ons.     Comment: Auto resulted.       Lavender Top [[id number]] Collected: 06/19/25 2338    Specimen: Blood Updated: 06/20/25 0000     Extra Tube hold for add-on     Comment: Auto resulted       Gold Top - SST [[id number]] Collected: 06/19/25 2338    Specimen: Blood Updated: 06/20/25 0000     Extra Tube Hold for add-ons.     Comment: Auto resulted.       Light Blue Top [[id number]] Collected: 06/19/25 2338    Specimen: Blood Updated: 06/20/25 0000      Extra Tube Hold for add-ons.     Comment: Auto resulted       CBC & Differential [[id number]]  (Abnormal) Collected: 06/19/25 2338    Specimen: Blood Updated: 06/19/25 2348    Narrative:      The following orders were created for panel order CBC & Differential.  Procedure                               Abnormality         Status                     ---------                               -----------         ------                     CBC Auto Differential[[id number]]        Abnormal            Final result                 Please view results for these tests on the individual orders.    CBC Auto Differential [[id number]]  (Abnormal) Collected: 06/19/25 2338    Specimen: Blood Updated: 06/19/25 2348     WBC 9.19 10*3/mm3      RBC 3.65 10*6/mm3      Hemoglobin 10.6 g/dL      Hematocrit 34.5 %      MCV 94.5 fL      MCH 29.0 pg      MCHC 30.7 g/dL      RDW 13.4 %      RDW-SD 45.7 fl      MPV 12.4 fL      Platelets 193 10*3/mm3      Neutrophil % 59.4 %      Lymphocyte % 25.7 %      Monocyte % 11.0 %      Eosinophil % 2.9 %      Basophil % 0.8 %      Immature Grans % 0.2 %      Neutrophils, Absolute 5.46 10*3/mm3      Lymphocytes, Absolute 2.36 10*3/mm3      Monocytes, Absolute 1.01 10*3/mm3      Eosinophils, Absolute 0.27 10*3/mm3      Basophils, Absolute 0.07 10*3/mm3      Immature Grans, Absolute 0.02 10*3/mm3      nRBC 0.0 /100 WBC           Imaging Results (Last 24 Hours)       Procedure Component Value Units Date/Time    XR Chest 1 View [[id number]] Collected: 06/20/25 0003     Updated: 06/20/25 0006    Narrative:      XR CHEST 1 VW-     Date of exam: 6/20/2025 12:01 AM.     Comparison: 1/8/2025.     INDICATIONS:  60-year-old female w/ h/o SOA/SOB/shortness of air/shortness of breath;  trouble breathing.     FINDINGS:  A single AP (or PA) upright portable chest radiograph was performed. No  cardiac enlargement is seen. No acute infiltrate is appreciated. There  may be minimal bibasilar subsegmental atelectasis. No pleural  effusion  or pneumothorax is identified. There is pulmonary hypoinflation. Chronic  calcified granulomatous disease involves the chest. External artifacts  obscure detail. The thoracic aorta is atherosclerotic. Degenerative  changes involve the imaged spine and the bilateral shoulders. There is  mild nonspecific distention of the stomach. No significant interval  change is seen since the prior study (or studies).          Impression:      No acute infiltrate is appreciated.           Portions of this note were completed with a voice recognition program.     6/20/2025 12:04 AM by Emre Grijalva MD on Workstation: [id number]             ECG/EMG Results (last 24 hours)       Procedure Component Value Units Date/Time    ECG 12 Lead ED Triage Standing Order; SOA [[id number]] Collected: 06/19/25 2328     Updated: 06/19/25 2329     QT Interval 541 ms      QTC Interval 458 ms     Narrative:      HEART RATE=48  bpm  RR Klftkisn=7282  ms  MO Ndqbckvw=870  ms  P Horizontal Axis=-26  deg  P Front Axis=53  deg  QRSD Llouavfb=099  ms  QT Fqldojpf=540  ms  OVrP=729  ms  QRS Axis=-35  deg  T Wave Axis=26  deg  - ABNORMAL ECG -  Sinus bradycardia  Atrial premature complex  Prolonged MO interval  Right bundle branch block  Left ventricular hypertrophy  Date and Time of Study:2025-06-19 23:28:41          Physician Progress Notes (last 24 hours)  Notes from 06/19/25 0734 through 06/20/25 0734   No notes of this type exist for this encounter.       Consult Notes (last 24 hours)  Notes from 06/19/25 0734 through 06/20/25 0734   No notes of this type exist for this encounter.

## 2025-06-20 NOTE — TELEPHONE ENCOUNTER
"    Caller: Nory Rodriguez \"Cherelle\"    Relationship to patient: Self    Best call back number: [phone]    Patient is needing: PATIENT CALLED JUST WANTING TO LET MD STOREY KNOW SHE IS CURRENTLY ADMITTED AT [facility] HAVING A PACE MAKER PLACED. PATIENT WOULD LIKE A CALLBACK FROM CLINICAL STAFF TO MAKE SURE THIS MESSAGE WAS RECEIVED.       "

## 2025-06-20 NOTE — TELEPHONE ENCOUNTER
Got critical notification from cardiac event monitor that patient is having complete heart block.  Reviewed the telemetry strip which shows heart rate of upper 30s to low 40s with complete heart block.  Patient was called at [phone], patient did not receive the call and it went to voicemail.  Left a voicemail suggesting patient to go to the ER for further evaluation given the critical nature of the disease.

## 2025-06-20 NOTE — ED PROVIDER NOTES
Time: 12:42 AM EDT  Date of encounter:  6/19/2025  Independent Historian/Clinical History and Information was obtained by:   Patient and Family    History is limited by: N/A    Chief Complaint: Lightheaded      History of Present Illness:  Patient is a 60 y.o. year old female who presents to the emergency department for evaluation of lightheaded and slow heart rate.  Patient was called by her cardiologist to come to the Emergency Department as her temporary cardiac monitor revealed her to be in complete heart block this evening.      Patient Care Team  Primary Care Provider: Eileen Barajas MD    Past Medical History:     Allergies   Allergen Reactions    Asa [Aspirin] Anaphylaxis    Ciprofloxacin Anaphylaxis    Exenatide Nausea Only    Levemir [Insulin Detemir] GI Intolerance    Nitroglycerin Hives    Sumatriptan Dizziness    Sitagliptin GI Intolerance    Cat Dander Rash    Codeine Palpitations    Diclofenac Swelling     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, hepatitis viral     Cellulitis of chest wall     Chronic back pain     Chronic hoarseness     COVID-19     Dietary counseling and surveillance     Dizziness and giddiness     Effusion, left knee     Effusion, right knee     GERD without esophagitis     Hepatitis A     Hereditary and idiopathic neuropathy, unspecified     History of depression     Hyperlipidemia     Hypertension     Kidney stone     Localized swelling, mass and lump, right lower limb     Low back pain     Methicillin resistant Staphylococcus aureus infection as the cause of diseases classified elsewhere     Migraine without aura, not intractable, without status migrainosus     Moderate persistent asthma with (acute) exacerbation     Morbid obesity due to  excess calories     Nausea     Neuropathy     Osteoporosis     Pain in joints of right hand     Pain in left hip     Pain in left leg     Pain in right shoulder     Paresthesia of skin     Peripheral neuropathy     Personal history of other venous thrombosis and embolism     Post-menopausal     Pressure ulcer of other site, stage 1     Primary insomnia     Pure hypercholesterolemia, unspecified     Rheumatoid lung disease with rheumatoid arthritis     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     Shortness of breath     Sleep apnea     Sleep apnea, unspecified     Tinea unguium     Type 2 diabetes mellitus with diabetic polyneuropathy     Uterine polyp     Viral hepatitis     Vitamin D deficiency, unspecified      Past Surgical History:   Procedure Laterality Date    CARDIAC CATHETERIZATION  11/2011    CATARACT EXTRACTION, BILATERAL  08/2018    CHOLECYSTECTOMY      HYSTERECTOMY      TAHBSO    PERICARDIAL WINDOW  11/2011     Family History   Problem Relation Age of Onset    Hyperlipidemia Mother     Endometrial cancer Mother     Coronary artery disease Father     Stroke Father     Diabetes type II Brother     Breast cancer Maternal Grandmother     Alzheimer's disease Maternal Grandmother     Diabetes type II Maternal Grandfather     Diabetes type II Paternal Grandfather        Home Medications:  Prior to Admission medications    Medication Sig Start Date End Date Taking? Authorizing Provider   albuterol (PROVENTIL) (2.5 MG/3ML) 0.083% nebulizer solution Take 2.5 mg by nebulization Every 4 (Four) Hours As Needed for Wheezing.    Provider, MD Lola   alendronate (FOSAMAX) 70 MG tablet TAKE 1 TABLET BY MOUTH EVERY 7 DAYS. 2/6/24   Eileen Barajas MD   amantadine (SYMMETREL) 100 MG tablet 1 tablet Daily. 9/6/22   Chai Hardy DO   atorvastatin (LIPITOR) 10 MG tablet TAKE 1 TABLET EVERY NIGHT  AT BEDTIME 5/1/25   Eileen Barajas MD   B-D ULTRAFINE III SHORT PEN 31G X 8 MM misc  USE 4 TIMES A DAY WITH     HUMALOG 9/21/21   Eileen Barajas MD   budesonide-formoterol (SYMBICORT) 160-4.5 MCG/ACT inhaler Inhale 2 puffs 2 (Two) Times a Day.    Lola iMller MD   cetirizine (zyrTEC) 10 MG tablet Take 1 tablet by mouth Daily.    Lola Miller MD   Continuous Blood Gluc Sensor (Dexcom G7 Sensor) misc  10/2/23   Lola Miller MD   diazePAM (VALIUM) 5 MG tablet Take 1 tablet by mouth Every 6 (Six) Hours As Needed. 3/1/23   Lola Miller MD   Dietary Management Product (Vasculera) tablet Take 1 tablet by mouth Daily. 3/1/23   Bay Zurita MD   dilTIAZem CD (CARDIZEM CD) 120 MG 24 hr capsule TAKE 1 CAPSULE DAILY 5/1/25   Eileen Barajas MD   Dulaglutide (Trulicity) 1.5 MG/0.5ML solution pen-injector Inject 4.5 mg under the skin into the appropriate area as directed. Every sunday    Kerri Flores APRN   Empagliflozin (JARDIANCE PO) Take 10 mg by mouth Daily.    Kerri Flores APRN   EPINEPHrine (EPIPEN) 0.3 MG/0.3ML solution auto-injector injection As Needed. 2/2/22   Lola Miller MD   estradiol (ESTRACE) 0.5 MG tablet Take 1 tablet by mouth Daily. 12/5/24   Eileen Barajas MD   gabapentin (NEURONTIN) 100 MG capsule 1-3 pills 3 x a day 7/16/24   Eileen Barajas MD   hydroCHLOROthiazide 25 MG tablet Take 1 tablet by mouth Daily. 12/5/24   Eileen Barajas MD   hydroxychloroquine (PLAQUENIL) 200 MG tablet Take 1 tablet by mouth Daily. 7/13/23   Lola Miller MD    MG tablet  4/3/24   Lola Miller MD   Insulin Glargine, 2 Unit Dial, (Toujeo Max SoloStar) 300 UNIT/ML solution pen-injector injection Inject 100 units SQ in the am and 80 units SQ in the pm 9/21/21   Eileen Barajas MD   Insulin Lispro (humaLOG) 100 UNIT/ML injection Inject  under the skin into the appropriate area as directed 3 (Three) Times a Day Before Meals. Sliding scale TID    Provider, MD Lola   leflunomide (ARAVA) 10 MG tablet Take 1  tablet by mouth Daily.    ProviderLola MD   lisinopril (PRINIVIL,ZESTRIL) 2.5 MG tablet Take 1 tablet by mouth Daily. 12/5/24   Eileen Barajas MD   metFORMIN (GLUCOPHAGE) 500 MG tablet TAKE 1 TABLET TWICE DAILY  WITH MEALS 5/1/25   Eileen Barajas MD   methocarbamol (ROBAXIN) 500 MG tablet Take 2 tablets by mouth 3 (Three) Times a Day As Needed for Muscle Spasms. 2/17/25   Eileen Barajas MD   metroNIDAZOLE (Metrogel) 1 % gel Apply  topically to the appropriate area as directed Daily. 10/6/22   Eileen Barajas MD   montelukast (SINGULAIR) 10 MG tablet TAKE 1 TABLET EVERY EVENING 5/1/25   Eileen Barajas MD   mupirocin (BACTROBAN) 2 % ointment Apply  topically to the appropriate area as directed 2 (Two) Times a Day. 7/16/24   Eileen Barajas MD   nystatin (MYCOSTATIN) 555890 UNIT/GM cream Apply 1 application topically to the appropriate area as directed 2 (Two) Times a Day As Needed (yeast rash). 6/27/23   Eileen Barajas MD   nystatin-triamcinolone (MYCOLOG) 841499-5.1 UNIT/GM-% ointment Apply 1 application topically to the appropriate area as directed 2 (Two) Times a Day. 12/8/22   Monika Martínez APRN   omeprazole (priLOSEC) 40 MG capsule TAKE 1 CAPSULE DAILY 5/1/25   Eileen Barajas MD   promethazine (PHENERGAN) 25 MG tablet Take 1 tablet by mouth Every 6 (Six) Hours As Needed for Nausea or Vomiting. 4/25/23   Monika Martínez APRN   promethazine-dextromethorphan (PROMETHAZINE-DM) 6.25-15 MG/5ML syrup Take 5 mL by mouth At Night As Needed for Cough. 8/27/24   Kaykay Chaudhary APRN   Spiriva Respimat 1.25 MCG/ACT aerosol solution inhaler Inhale 2 puffs Daily As Needed. 6/28/23   ProviderLola MD   topiramate (TOPAMAX) 100 MG tablet TAKE 1 TABLET TWICE A DAY 5/1/25   Eileen Barajas MD   traZODone (DESYREL) 150 MG tablet TAKE 1 TABLET NIGHTLY AT   BEDTIME 5/1/25   Eileen Barajas MD        Social History:   Social History     Tobacco Use    Smoking  "status: Never     Passive exposure: Never    Smokeless tobacco: Never   Vaping Use    Vaping status: Never Used   Substance Use Topics    Alcohol use: Not Currently    Drug use: Never         Review of Systems:  Review of Systems   Constitutional:  Negative for chills and fever.   HENT:  Negative for congestion, rhinorrhea and sore throat.    Eyes:  Negative for pain and visual disturbance.   Respiratory:  Negative for apnea, cough, chest tightness and shortness of breath.    Cardiovascular:  Positive for palpitations. Negative for chest pain.   Gastrointestinal:  Negative for abdominal pain, diarrhea, nausea and vomiting.   Genitourinary:  Negative for difficulty urinating and dysuria.   Musculoskeletal:  Negative for joint swelling and myalgias.   Skin:  Negative for color change.   Neurological:  Negative for seizures and headaches.   Psychiatric/Behavioral: Negative.     All other systems reviewed and are negative.       Physical Exam:  /46   Pulse (!) 40   Temp 98.4 °F (36.9 °C) (Oral)   Resp 21   Ht 162.6 cm (64\")   SpO2 98%   BMI 34.50 kg/m²     Physical Exam  Vitals and nursing note reviewed.   Constitutional:       General: She is not in acute distress.     Appearance: Normal appearance. She is not toxic-appearing.   HENT:      Head: Normocephalic and atraumatic.      Jaw: There is normal jaw occlusion.   Eyes:      General: Lids are normal.      Extraocular Movements: Extraocular movements intact.      Conjunctiva/sclera: Conjunctivae normal.      Pupils: Pupils are equal, round, and reactive to light.   Cardiovascular:      Rate and Rhythm: Regular rhythm. Bradycardia present.      Pulses: Normal pulses.      Heart sounds: Normal heart sounds.   Pulmonary:      Effort: Pulmonary effort is normal. No respiratory distress.      Breath sounds: Normal breath sounds. No wheezing or rhonchi.   Abdominal:      General: Abdomen is flat.      Palpations: Abdomen is soft.      Tenderness: There is no " abdominal tenderness. There is no guarding or rebound.   Musculoskeletal:         General: Normal range of motion.      Cervical back: Normal range of motion and neck supple.      Right lower leg: Edema present.      Left lower leg: Edema present.   Skin:     General: Skin is warm and dry.   Neurological:      Mental Status: She is alert and oriented to person, place, and time. Mental status is at baseline.   Psychiatric:         Mood and Affect: Mood normal.                    Medical Decision Making:      Comorbidities that affect care:    Obesity, diabetes, hyperlipidemia    External Notes reviewed:    Previous Clinic Note: Cardiology office visit for syncope management      The following orders were placed and all results were independently analyzed by me:  Orders Placed This Encounter   Procedures    XR Chest 1 View    [place] Draw    Comprehensive Metabolic Panel    BNP    High Sensitivity Troponin T    CBC Auto Differential    High Sensitivity Troponin T 1Hr    NPO Diet NPO Type: Strict NPO    Undress & Gown    Continuous Pulse Oximetry    Vital Signs    Code Status and Medical Interventions: CPR (Attempt to Resuscitate); Full Support    Cardiology (on-call MD unless specified)    Inpatient Hospitalist Consult    Oxygen Therapy- Nasal Cannula; Titrate 1-6 LPM Per SpO2; 90 - 95%    ECG 12 Lead ED Triage Standing Order; SOA    Insert Peripheral IV    Inpatient Admission    CBC & Differential    Green Top (Gel)    Lavender Top    Gold Top - SST    Light Blue Top       Medications Given in the Emergency Department:  Medications   sodium chloride 0.9 % flush 10 mL (has no administration in time range)        ED Course:         Labs:    Lab Results (last 24 hours)       Procedure Component Value Units Date/Time    CBC & Differential [[id number]]  (Abnormal) Collected: 06/19/25 2338    Specimen: Blood Updated: 06/19/25 2348    Narrative:      The following orders were created for panel order CBC &  Differential.  Procedure                               Abnormality         Status                     ---------                               -----------         ------                     CBC Auto Differential[[id number]]        Abnormal            Final result                 Please view results for these tests on the individual orders.    Comprehensive Metabolic Panel [[id number]]  (Abnormal) Collected: 06/19/25 2338    Specimen: Blood Updated: 06/20/25 0013     Glucose 187 mg/dL      BUN 37.8 mg/dL      Creatinine 1.12 mg/dL      Sodium 140 mmol/L      Potassium 3.4 mmol/L      Chloride 107 mmol/L      CO2 20.8 mmol/L      Calcium 8.3 mg/dL      Total Protein 6.6 g/dL      Albumin 4.0 g/dL      ALT (SGPT) 13 U/L      AST (SGOT) 15 U/L      Alkaline Phosphatase 105 U/L      Total Bilirubin <0.2 mg/dL      Globulin 2.6 gm/dL      A/G Ratio 1.5 g/dL      BUN/Creatinine Ratio 33.8     Anion Gap 12.2 mmol/L      eGFR 55.7 mL/min/1.73     Narrative:      GFR Categories in Chronic Kidney Disease (CKD)              GFR Category          GFR (mL/min/1.73)    Interpretation  G1                    90 or greater        Normal or high (1)  G2                    60-89                Mild decrease (1)  G3a                   45-59                Mild to moderate decrease  G3b                   30-44                Moderate to severe decrease  G4                    15-29                Severe decrease  G5                    14 or less           Kidney failure    (1)In the absence of evidence of kidney disease, neither GFR category G1 or G2 fulfill the criteria for CKD.    eGFR calculation 2021 CKD-EPI creatinine equation, which does not include race as a factor    BNP [[id number]]  (Abnormal) Collected: 06/19/25 2338    Specimen: Blood Updated: 06/20/25 0010     proBNP 1,249.0 pg/mL     Narrative:      This assay is used as an aid in the diagnosis of individuals suspected of having heart failure. It can be used as an aid in the  diagnosis of acute decompensated heart failure (ADHF) in patients presenting with signs and symptoms of ADHF to the emergency department (ED). In addition, NT-proBNP of <300 pg/mL indicates ADHF is not likely.    Age Range Result Interpretation  NT-proBNP Concentration (pg/mL:      <50             Positive            >450                   Gray                 300-450                    Negative             <300    50-75           Positive            >900                  Gray                300-900                  Negative            <300      >75             Positive            >1800                  Gray                300-1800                  Negative            <300    High Sensitivity Troponin T [[id number]]  (Abnormal) Collected: 06/19/25 2338    Specimen: Blood Updated: 06/20/25 0013     HS Troponin T 16 ng/L     Narrative:      High Sensitive Troponin T Reference Range:  <14.0 ng/L- Negative Female for AMI  <22.0 ng/L- Negative Male for AMI  >=14 - Abnormal Female indicating possible myocardial injury.  >=22 - Abnormal Male indicating possible myocardial injury.   Clinicians would have to utilize clinical acumen, EKG, Troponin, and serial changes to determine if it is an Acute Myocardial Infarction or myocardial injury due to an underlying chronic condition.         CBC Auto Differential [[id number]]  (Abnormal) Collected: 06/19/25 2338    Specimen: Blood Updated: 06/19/25 2348     WBC 9.19 10*3/mm3      RBC 3.65 10*6/mm3      Hemoglobin 10.6 g/dL      Hematocrit 34.5 %      MCV 94.5 fL      MCH 29.0 pg      MCHC 30.7 g/dL      RDW 13.4 %      RDW-SD 45.7 fl      MPV 12.4 fL      Platelets 193 10*3/mm3      Neutrophil % 59.4 %      Lymphocyte % 25.7 %      Monocyte % 11.0 %      Eosinophil % 2.9 %      Basophil % 0.8 %      Immature Grans % 0.2 %      Neutrophils, Absolute 5.46 10*3/mm3      Lymphocytes, Absolute 2.36 10*3/mm3      Monocytes, Absolute 1.01 10*3/mm3      Eosinophils, Absolute 0.27 10*3/mm3       Basophils, Absolute 0.07 10*3/mm3      Immature Grans, Absolute 0.02 10*3/mm3      nRBC 0.0 /100 WBC     High Sensitivity Troponin T 1Hr [[id number]]  (Abnormal) Collected: 06/20/25 0049    Specimen: Blood Updated: 06/20/25 0112     HS Troponin T 18 ng/L      Troponin T Numeric Delta 2 ng/L      Troponin T % Delta 13    Narrative:      High Sensitive Troponin T Reference Range:  <14.0 ng/L- Negative Female for AMI  <22.0 ng/L- Negative Male for AMI  >=14 - Abnormal Female indicating possible myocardial injury.  >=22 - Abnormal Male indicating possible myocardial injury.   Clinicians would have to utilize clinical acumen, EKG, Troponin, and serial changes to determine if it is an Acute Myocardial Infarction or myocardial injury due to an underlying chronic condition.                  Imaging:    XR Chest 1 View  Result Date: 6/20/2025  XR CHEST 1 VW-  Date of exam: 6/20/2025 12:01 AM.  Comparison: 1/8/2025.  INDICATIONS: 60-year-old female w/ h/o SOA/SOB/shortness of air/shortness of breath; trouble breathing.  FINDINGS: A single AP (or PA) upright portable chest radiograph was performed. No cardiac enlargement is seen. No acute infiltrate is appreciated. There may be minimal bibasilar subsegmental atelectasis. No pleural effusion or pneumothorax is identified. There is pulmonary hypoinflation. Chronic calcified granulomatous disease involves the chest. External artifacts obscure detail. The thoracic aorta is atherosclerotic. Degenerative changes involve the imaged spine and the bilateral shoulders. There is mild nonspecific distention of the stomach. No significant interval change is seen since the prior study (or studies).       No acute infiltrate is appreciated.    Portions of this note were completed with a voice recognition program.  6/20/2025 12:04 AM by Emre Grijalva MD on Workstation: [employer]          Differential Diagnosis and Discussion:    Palpitations: Differential diagnosis includes but is not  limited to anxiety, atrioventricular blocks, mitral valve disease, hypoxia, coronary artery disease, hypokalemia, anemia, fever, COPD, congestive heart failure, pericarditis, Kailey-Parkinson-White syndrome, pulmonary embolism, SVT, atrial fibrillation, atrial flutter, sinus tachycardia, thyrotoxicosis, and pheochromocytoma.    PROCEDURES:    Labs were collected in the emergency department and all labs were reviewed and interpreted by me.  X-ray were performed in the emergency department and all X-ray impressions were independently interpreted by me.  An EKG was performed and the EKG was interpreted by me.    ECG 12 Lead ED Triage Standing Order; SOA   Preliminary Result   HEART RATE=48  bpm   RR Uljfketr=8785  ms   ND Iakmobih=244  ms   P Horizontal Axis=-26  deg   P Front Axis=53  deg   QRSD Sjnhxpdo=805  ms   QT Yhwfhcii=276  ms   NCoT=478  ms   QRS Axis=-35  deg   T Wave Axis=26  deg   - ABNORMAL ECG -   Sinus bradycardia   Atrial premature complex   Prolonged ND interval   Right bundle branch block   Left ventricular hypertrophy   Date and Time of Study:2025-06-19 23:28:41        Interpretation of EKG shows bradycardia, probable complete heart block, normal QT, no acute ischemia    Procedures    MDM  Number of Diagnoses or Management Options  Complete heart block  Diagnosis management comments: In summary this is a 60-year-old female who presents to the emergency department for evaluation of a low heart rate and reported complete heart block on a Holter monitor.  Patient does report symptoms of fatigue and lightheadedness. CBC independently reviewed and interpreted by me and shows no critical abnormalities.  CMP independently reviewed and interpreted by me and shows no critical abnormalities.  High-sensitivity troponin independently reviewed and interpreted by me shows no critical abnormalities.  Chest x-ray reviewed by me is unremarkable negative for acute pathology.  Discussed these findings with patient's  cardiologist, Dr. Barron who plans for pacemaker insertion later today.  Patient is stable number department.  Patient case has been discussed with the hospitalist team who will admit to the hospital for further evaluation and continuation of treatment.             Patient presents with abnormal vital signs, most notably bradycardia. The patient was placed on pads and cardiac monitor for fear of worsening rapid decline. Patient was placed on the cardiac monitor and was repeatedly assessed.  They were monitored for ventricular ectopy, arrhythmia, tachycardia, hypoxia, changes in blood pressure, and declining mental status several times throughout their stay in the emergency department.    Total Critical Care time of 35 minutes. Total critical care time documented does not include time spent on separately billed procedures for services of nurses or physician assistants. I personally saw and examined the patient. I have reviewed all diagnostic interpretations and treatment plans as written. I was present for the key portions of any procedures performed and the inclusive time noted in any critical care statement. Critical care time includes patient management by me, time spent at the patients bedside,  time to review lab and imaging results, discussing patient care, documentation in the medical record, and time spent with family or caregiver.          Patient Care Considerations:    CT CHEST: I considered ordering a CT scan of the chest, however no respiratory failure      Consultants/Shared Management Plan:    Hospitalist: I have discussed the case with Dr. Barclay who agrees to accept the patient for admission.  Consultant: I have discussed the case with Dr. Watson who states pacemaker later this morning    Social Determinants of Health:    Patient is independent, reliable, and has access to care.       Disposition and Care Coordination:    Admit:   Through independent evaluation of the patient's history, physical, and  imperical data, the patient meets criteria for inpatient admission to the hospital.        Final diagnoses:   Complete heart block        ED Disposition       ED Disposition   Decision to Admit    Condition   --    Comment   Level of Care: Critical Care [6]   Diagnosis: Complete heart block [[id number]]   Certification: I Certify That Inpatient Hospital Services Are Medically Necessary For Greater Than 2 Midnights                 This medical record created using voice recognition software.             Eris Woodward MD  06/20/25 0222

## 2025-06-20 NOTE — PLAN OF CARE
Goal Outcome Evaluation:  Plan of Care Reviewed With: patient, family        Progress: improving  Outcome Evaluation: Patient has temp pacer. Rate 60. 10 output. 2.0 Sense. Purewick in place.

## 2025-06-20 NOTE — CONSULTS
[facility]   INTERVENTIONAL CARDIOLOGY CONSULT NOTE    Patient Name: Nory Rodriguez  : 1965  MRN: [id number]    Primary Care Physician:  Eileen Barajas MD  Date of admission: 2025    Subjective   Subjective     Chief Complaint: Dizziness    HPI:  Nory Rodriguez is a 60 y.o. female who was recently seen in the cardiology office for dizziness/syncope and collapse and underwent cardiac event monitor placement.  Last night cardiac event monitor showed episodes of 2 1 AV block and complete heart block.  She was called in for further evaluation.  Telemetry review shows 2-1 AV block with heart rate in 30s with occasional episodes of complete heart block.  Patient denies chest pain  Cardiology consulted for further evaluation.    Review of Systems  Negative except as mentioned in HPI    Personal History     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, hepatitis viral     Cellulitis of chest wall     Chronic back pain     Chronic hoarseness     COVID-19     Dietary counseling and surveillance     Dizziness and giddiness     Effusion, left knee     Effusion, right knee     GERD without esophagitis     Hepatitis A     Hereditary and idiopathic neuropathy, unspecified     History of depression     Hyperlipidemia     Hypertension     Kidney stone     Localized swelling, mass and lump, right lower limb     Low back pain     Methicillin resistant Staphylococcus aureus infection as the cause of diseases classified elsewhere     Migraine without aura, not intractable, without status migrainosus     Moderate persistent asthma with (acute) exacerbation     Morbid obesity due to excess calories     Nausea     Neuropathy     Osteoporosis     Pain in joints of right hand     Pain  in left hip     Pain in left leg     Pain in right shoulder     Paresthesia of skin     Peripheral neuropathy     Personal history of other venous thrombosis and embolism     Post-menopausal     Pressure ulcer of other site, stage 1     Primary insomnia     Pure hypercholesterolemia, unspecified     Rheumatoid lung disease with rheumatoid arthritis     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     Shortness of breath     Sleep apnea     Sleep apnea, unspecified     Tinea unguium     Type 2 diabetes mellitus with diabetic polyneuropathy     Uterine polyp     Viral hepatitis     Vitamin D deficiency, unspecified        Past Surgical History:   Procedure Laterality Date    CARDIAC CATHETERIZATION  11/2011    CATARACT EXTRACTION, BILATERAL  08/2018    CHOLECYSTECTOMY      HYSTERECTOMY      TAHBSO    PERICARDIAL WINDOW  11/2011       Family History: family history includes Alzheimer's disease in her maternal grandmother; Breast cancer in her maternal grandmother; Coronary artery disease in her father; Diabetes type II in her brother, maternal grandfather, and paternal grandfather; Endometrial cancer in her mother; Hyperlipidemia in her mother; Stroke in her father. Otherwise pertinent FHx was reviewed and not pertinent to current issue.    Social History:  reports that she has never smoked. She has never been exposed to tobacco smoke. She has never used smokeless tobacco. She reports that she does not currently use alcohol. She reports that she does not use drugs.    Home Medications:  Budesonide-Formoterol Fumarate, Dulaglutide, Insulin Glargine (1 Unit Dial), Insulin Lispro, Tiotropium Bromide Monohydrate, albuterol sulfate HFA, atorvastatin, dilTIAZem CD, docusate sodium, empagliflozin, gabapentin, hydroCHLOROthiazide, hydroxychloroquine, leflunomide, lisinopril, metFORMIN, methocarbamol, montelukast, omeprazole, topiramate, and traZODone    Allergies:  Allergies   Allergen Reactions    Asa  [Aspirin] Anaphylaxis    Ciprofloxacin Anaphylaxis    Exenatide Nausea Only    Levemir [Insulin Detemir] GI Intolerance    Nitroglycerin Hives    Sumatriptan Dizziness    Sitagliptin GI Intolerance    Cat Dander Rash    Codeine Palpitations    Diclofenac Swelling       Objective   Objective     Vitals:   Temp:  [97.6 °F (36.4 °C)-98.4 °F (36.9 °C)] 98.2 °F (36.8 °C)  Heart Rate:  [36-61] 38  Resp:  [17-24] 20  BP: ()/() 158/50    Physical Exam    Constitutional: Awake, alert   Neck: No JVD   Respiratory: Clear to auscultation bilaterally, nonlabored respirations    Cardiovascular: Bradycardic rate   Gastrointestinal: Soft, nontender, nondistended   Musculoskeletal: Lower extremity edema absent   Psychiatric: Appropriate affect, cooperative      Result Review    Result Review:  I have personally reviewed the results from the time of this admission to 6/20/2025 13:57 EDT and agree with these findings:  [x]  Laboratory  []  Microbiology  [x]  Radiology  [x]  EKG/Telemetry   [x]  Cardiology/Vascular   []  Pathology  [x]  Old records  []  Other:  Most notable findings include: Complete heart block in telemetry    ECG 12 Lead ED Triage Standing Order; SOA   Preliminary Result   HEART RATE=48  bpm   RR Omvijcsp=5707  ms   TX Sgbzctee=561  ms   P Horizontal Axis=-26  deg   P Front Axis=53  deg   QRSD Ogjsvaak=095  ms   QT Lsakotpr=694  ms   NEgN=125  ms   QRS Axis=-35  deg   T Wave Axis=26  deg   - ABNORMAL ECG -   Sinus bradycardia   Atrial premature complex   Prolonged TX interval   Right bundle branch block   Left ventricular hypertrophy   Date and Time of Study:2025-06-19 23:28:41        Results for orders placed in visit on 07/30/21    Echocardiogram Scan    No results found for this or any previous visit.    Lab Results   Component Value Date    CKTOTAL 51 05/09/2022    TROPONINT 18 (H) 06/20/2025     A1C Last 3 Results          12/3/2024    17:25 4/15/2025    07:47   HGBA1C Last 3 Results   Hemoglobin A1C  6.40  5.70          ASCVD SCORE  The 10-year ASCVD risk score (Mary GAMBLE, et al., 2019) is: 10.5%    Values used to calculate the score:      Age: 60 years      Sex: Female      Is Non- : No      Diabetic: Yes      Tobacco smoker: No      Systolic Blood Pressure: 158 mmHg      Is BP treated: No      HDL Cholesterol: 43 mg/dL      Total Cholesterol: 143 mg/dL        Assessment & Plan   Assessment / Plan     Brief Patient Summary:  Nory Rodriguez is a 60 y.o. female who presents with syncope/collapse and abnormal cardiac monitor showing complete heart block    Active Hospital Problems:  Active Hospital Problems    Diagnosis     **Complete heart block     Syncope and collapse        Plan:   -Patient in critical condition with complete heart block, syncope and collapse episodes.  Patient needs temporary pacemaker for stabilization and possibly permanent pacemaker given that she is symptomatic with syncope and collapse episode that has been going on for last few weeks.  - Will follow-up on the recent echocardiogram done on 6/9/2025.  - Will put temporary pacemaker through right IJ approach today.    -Case discussed with cardiology colleague for possible permanent pacemaker implantation however due to his schedule conflict, we will not be able to put permanent pacemaker today.  I will discuss with patient regarding if she would like to wait over the weekend or be transferred to higher facility for implant.    Will follow-up.      Addendum post temporary pacemaker placement  Temporary pacemaker was placed through the right internal jugular vein access.  Patient will stay over the weekend.  Will continue to evaluate the need for permanent pacemaker.  If a permanent pacemaker is needed, it is planned for Monday morning with Dr. Brizuela.  Will need to be n.p.o. past midnight on Sunday.    CODE STATUS:    Code Status (Patient has no pulse and is not breathing): CPR (Attempt to Resuscitate)  Medical  Interventions (Patient has pulse or is breathing): Full Support  Level Of Support Discussed With: Patient       Keagan Watson MD, FACC   Interventional Cardiology

## 2025-06-20 NOTE — H&P
[facility]  Date: 2025   Patient Name: Nory Rodriguez  : 1965  MRN: [id number]  Primary Care Physician:  Eileen Barajas MD  Date of admission: 2025    Subjective   Subjective     Chief Complaint: Heart block    HPI:    Nory Rodriguez is a 60 y.o. female with significant past medical history of back pain, GERD, Hepatitis A, Depression, HLD, HTN, Neuropathy, T2DM, Sleep apnea sent to the ER by her cardiologist due to abnormal rhythm on Holter monitor.  Patient has been worked up for recent syncopal episodes, she reported that Holter monitor was placed today by her cardiologist, later in the day patient was called to come to the ER due to abnormal rhythm on the Holter monitor indicating possible complete heart block.  During encounter patient denies any chest pain, shortness of breath, palpitations, dizziness, focal weakness, nausea, vomiting, diarrhea, chills or fever.  In the ED patient noted to have a BNP over 1200, hemoglobin 10.6, creatinine 1.12, pads were placed by ER physician, patient's cardiologist Dr. Watson was contacted and he is planning on placing the pacemaker in the morning.      Personal History     Past Medical History:  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, hepatitis viral     Cellulitis of chest wall     Chronic back pain     Chronic hoarseness     COVID-19     Dietary counseling and surveillance     Dizziness and giddiness     Effusion, left knee     Effusion, right knee     GERD without esophagitis     Hepatitis A     Hereditary and idiopathic neuropathy, unspecified     History of depression     Hyperlipidemia     Hypertension     Kidney stone     Localized swelling, mass and lump, right lower  limb     Low back pain     Methicillin resistant Staphylococcus aureus infection as the cause of diseases classified elsewhere     Migraine without aura, not intractable, without status migrainosus     Moderate persistent asthma with (acute) exacerbation     Morbid obesity due to excess calories     Nausea     Neuropathy     Osteoporosis     Pain in joints of right hand     Pain in left hip     Pain in left leg     Pain in right shoulder     Paresthesia of skin     Peripheral neuropathy     Personal history of other venous thrombosis and embolism     Post-menopausal     Pressure ulcer of other site, stage 1     Primary insomnia     Pure hypercholesterolemia, unspecified     Rheumatoid lung disease with rheumatoid arthritis     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     Shortness of breath     Sleep apnea     Sleep apnea, unspecified     Tinea unguium     Type 2 diabetes mellitus with diabetic polyneuropathy     Uterine polyp     Viral hepatitis     Vitamin D deficiency, unspecified        Past Surgical History:  Past Surgical History:   Procedure Laterality Date    CARDIAC CATHETERIZATION  11/2011    CATARACT EXTRACTION, BILATERAL  08/2018    CHOLECYSTECTOMY      HYSTERECTOMY      TAHBSO    PERICARDIAL WINDOW  11/2011       Family History:   Family History   Problem Relation Age of Onset    Hyperlipidemia Mother     Endometrial cancer Mother     Coronary artery disease Father     Stroke Father     Diabetes type II Brother     Breast cancer Maternal Grandmother     Alzheimer's disease Maternal Grandmother     Diabetes type II Maternal Grandfather     Diabetes type II Paternal Grandfather        Social History:   Social History     Socioeconomic History    Marital status:     Number of children: 1   Tobacco Use    Smoking status: Never     Passive exposure: Never    Smokeless tobacco: Never   Vaping Use    Vaping status: Never Used   Substance and Sexual Activity    Alcohol use:  Not Currently    Drug use: Never    Sexual activity: Defer       Home Medications:  Dexcom G7 Sensor, Dulaglutide, EPINEPHrine, Empagliflozin, Insulin Glargine (2 Unit Dial), Insulin Lispro, Insulin Pen Needle, Tiotropium Bromide Monohydrate, Vasculera, albuterol, alendronate, amantadine, atorvastatin, budesonide-formoterol, cetirizine, diazePAM, dilTIAZem CD, estradiol, gabapentin, hydroCHLOROthiazide, hydroxychloroquine, ibuprofen, leflunomide, lisinopril, metFORMIN, methocarbamol, metroNIDAZOLE, montelukast, mupirocin, nystatin, nystatin-triamcinolone, omeprazole, promethazine, promethazine-dextromethorphan, topiramate, and traZODone    Allergies:  Allergies   Allergen Reactions    Asa [Aspirin] Anaphylaxis    Ciprofloxacin Anaphylaxis    Exenatide Nausea Only    Levemir [Insulin Detemir] GI Intolerance    Nitroglycerin Hives    Sumatriptan Dizziness    Sitagliptin GI Intolerance    Cat Dander Rash    Codeine Palpitations    Diclofenac Swelling       Review of Systems   All systems were reviewed and negative except for: As indicated in HPI otherwise negative    Objective   Objective     Vitals:   Temp:  [98.4 °F (36.9 °C)] 98.4 °F (36.9 °C)  Heart Rate:  [39-56] 40  Resp:  [18-21] 21  BP: (126-178)/() 139/46    Physical Exam   Vitals and nursing note reviewed.   Constitutional:       General: She is not in acute distress.     Appearance: Normal appearance. She is not toxic-appearing.   HENT:      Head: Normocephalic and atraumatic.      Jaw: There is normal jaw occlusion.   Eyes:      General: Lids are normal.      Extraocular Movements: Extraocular movements intact.      Conjunctiva/sclera: Conjunctivae normal.      Pupils: Pupils are equal, round, and reactive to light.   Cardiovascular:      Rate and Rhythm: Regular rhythm. Bradycardia present.      Pulses: Normal pulses.      Heart sounds: Normal heart sounds.   Pulmonary:      Effort: Pulmonary effort is normal. No respiratory distress.      Breath  sounds: Normal breath sounds. No wheezing or rhonchi.   Abdominal:      General: Abdomen is flat.      Palpations: Abdomen is soft.      Tenderness: There is no abdominal tenderness. There is no guarding or rebound.   Musculoskeletal:         General: Normal range of motion.      Cervical back: Normal range of motion and neck supple.      Right lower leg: Edema present.      Left lower leg: Edema present.   Skin:     General: Skin is warm and dry.   Neurological:      Mental Status: She is alert and oriented to person, place, and time. Mental status is at baseline.   Psychiatric:         Mood and Affect: Mood normal.     Result Review    Result Review:  I have personally reviewed the results from the time of this admission to 6/20/2025 02:21 EDT and agree with these findings:  [x]  Laboratory  [x]  Microbiology  [x]  Radiology  [x]  EKG/Telemetry   [x]  Cardiology/Vascular   []  Pathology  []  Old records  []  Other:      Assessment & Plan   Assessment / Plan     Assessment/Plan:   3rd degree heart block  Hx of recent syncope episodes  Back pain  GERD  Hepatitis A  Depression  HLD  HTN  Neuropathy  T2DM  Sleep apnea    Plan:  Admit to ICU  Maintain pads on all the time  Strict bed rest  Cardiology consulted in the ED  NPO for PPM implantation in the morning  Cardiac monitoring      VTE Prophylaxis:  Mechanical VTE prophylaxis orders are signed & held.          CODE STATUS:    Code Status (Patient has no pulse and is not breathing): CPR (Attempt to Resuscitate)  Medical Interventions (Patient has pulse or is breathing): Full Support  Level Of Support Discussed With: Patient      Admission Status:  I believe this patient meets inpatient status.    Electronically signed by Simba Barclay MD, 06/20/25, 2:21 AM EDT.

## 2025-06-20 NOTE — TELEPHONE ENCOUNTER
"  Caller: Nory Rodriguez \"Cherelle\"    Relationship: Self    Best call back number: [phone]    What was the call regarding: PT CALLED IN TO RELAY SHE HAD A HEART MONITOR PLACED YESTERDAY, SHE IS NOW IN [place] AND GETTING A PACEMAKER PLACED TODAY. THE HEART MONITOR HAS BEEN TAKEN OFF. SHE JUST WANTED TO LET THE LADY THAT PUT IT ON KNOW IT'S OFF AND THE HOSPITAL HAS IT.       "

## 2025-06-21 LAB
ALBUMIN SERPL-MCNC: 3.7 G/DL (ref 3.5–5.2)
ALBUMIN/GLOB SERPL: 1.3 G/DL
ALP SERPL-CCNC: 106 U/L (ref 39–117)
ALT SERPL W P-5'-P-CCNC: 11 U/L (ref 1–33)
ANION GAP SERPL CALCULATED.3IONS-SCNC: 11 MMOL/L (ref 5–15)
AST SERPL-CCNC: 14 U/L (ref 1–32)
BILIRUB SERPL-MCNC: 0.2 MG/DL (ref 0–1.2)
BUN SERPL-MCNC: 20.9 MG/DL (ref 8–23)
BUN/CREAT SERPL: 24.9 (ref 7–25)
CALCIUM SPEC-SCNC: 8.5 MG/DL (ref 8.6–10.5)
CHLORIDE SERPL-SCNC: 109 MMOL/L (ref 98–107)
CO2 SERPL-SCNC: 20 MMOL/L (ref 22–29)
CREAT SERPL-MCNC: 0.84 MG/DL (ref 0.57–1)
DEPRECATED RDW RBC AUTO: 44.5 FL (ref 37–54)
EGFRCR SERPLBLD CKD-EPI 2021: 78.7 ML/MIN/1.73
ERYTHROCYTE [DISTWIDTH] IN BLOOD BY AUTOMATED COUNT: 13.3 % (ref 12.3–15.4)
GLOBULIN UR ELPH-MCNC: 2.8 GM/DL
GLUCOSE BLDC GLUCOMTR-MCNC: 106 MG/DL (ref 70–99)
GLUCOSE BLDC GLUCOMTR-MCNC: 129 MG/DL (ref 70–99)
GLUCOSE BLDC GLUCOMTR-MCNC: 139 MG/DL (ref 70–99)
GLUCOSE BLDC GLUCOMTR-MCNC: 180 MG/DL (ref 70–99)
GLUCOSE SERPL-MCNC: 116 MG/DL (ref 65–99)
HCT VFR BLD AUTO: 36.2 % (ref 34–46.6)
HGB BLD-MCNC: 11.5 G/DL (ref 12–15.9)
MAGNESIUM SERPL-MCNC: 2.3 MG/DL (ref 1.6–2.4)
MCH RBC QN AUTO: 29 PG (ref 26.6–33)
MCHC RBC AUTO-ENTMCNC: 31.8 G/DL (ref 31.5–35.7)
MCV RBC AUTO: 91.4 FL (ref 79–97)
PHOSPHATE SERPL-MCNC: 3.5 MG/DL (ref 2.5–4.5)
PLATELET # BLD AUTO: 210 10*3/MM3 (ref 140–450)
PMV BLD AUTO: 11.9 FL (ref 6–12)
POTASSIUM SERPL-SCNC: 3.7 MMOL/L (ref 3.5–5.2)
PROT SERPL-MCNC: 6.5 G/DL (ref 6–8.5)
RBC # BLD AUTO: 3.96 10*6/MM3 (ref 3.77–5.28)
SODIUM SERPL-SCNC: 140 MMOL/L (ref 136–145)
WBC NRBC COR # BLD AUTO: 8.33 10*3/MM3 (ref 3.4–10.8)

## 2025-06-21 PROCEDURE — 82948 REAGENT STRIP/BLOOD GLUCOSE: CPT

## 2025-06-21 PROCEDURE — 99223 1ST HOSP IP/OBS HIGH 75: CPT | Performed by: INTERNAL MEDICINE

## 2025-06-21 PROCEDURE — 82948 REAGENT STRIP/BLOOD GLUCOSE: CPT | Performed by: INTERNAL MEDICINE

## 2025-06-21 PROCEDURE — 83735 ASSAY OF MAGNESIUM: CPT | Performed by: INTERNAL MEDICINE

## 2025-06-21 PROCEDURE — 80053 COMPREHEN METABOLIC PANEL: CPT | Performed by: INTERNAL MEDICINE

## 2025-06-21 PROCEDURE — 84100 ASSAY OF PHOSPHORUS: CPT | Performed by: INTERNAL MEDICINE

## 2025-06-21 PROCEDURE — 99232 SBSQ HOSP IP/OBS MODERATE 35: CPT | Performed by: STUDENT IN AN ORGANIZED HEALTH CARE EDUCATION/TRAINING PROGRAM

## 2025-06-21 PROCEDURE — 85027 COMPLETE CBC AUTOMATED: CPT | Performed by: INTERNAL MEDICINE

## 2025-06-21 PROCEDURE — 99233 SBSQ HOSP IP/OBS HIGH 50: CPT | Performed by: INTERNAL MEDICINE

## 2025-06-21 RX ORDER — HYDROXYCHLOROQUINE SULFATE 200 MG/1
200 TABLET, FILM COATED ORAL DAILY
Status: DISCONTINUED | OUTPATIENT
Start: 2025-06-21 | End: 2025-06-24 | Stop reason: HOSPADM

## 2025-06-21 RX ORDER — ALBUTEROL SULFATE 0.83 MG/ML
2.5 SOLUTION RESPIRATORY (INHALATION) EVERY 6 HOURS PRN
Status: DISCONTINUED | OUTPATIENT
Start: 2025-06-21 | End: 2025-06-24 | Stop reason: HOSPADM

## 2025-06-21 RX ORDER — LISINOPRIL 2.5 MG/1
2.5 TABLET ORAL DAILY
Status: DISCONTINUED | OUTPATIENT
Start: 2025-06-21 | End: 2025-06-24 | Stop reason: HOSPADM

## 2025-06-21 RX ORDER — ATORVASTATIN CALCIUM 10 MG/1
10 TABLET, FILM COATED ORAL NIGHTLY
Status: DISCONTINUED | OUTPATIENT
Start: 2025-06-21 | End: 2025-06-24 | Stop reason: HOSPADM

## 2025-06-21 RX ORDER — TOPIRAMATE 100 MG/1
100 TABLET, FILM COATED ORAL 2 TIMES DAILY
Status: DISCONTINUED | OUTPATIENT
Start: 2025-06-21 | End: 2025-06-24 | Stop reason: HOSPADM

## 2025-06-21 RX ORDER — LEFLUNOMIDE 20 MG/1
20 TABLET ORAL DAILY
Status: DISCONTINUED | OUTPATIENT
Start: 2025-06-21 | End: 2025-06-24 | Stop reason: HOSPADM

## 2025-06-21 RX ORDER — HYDROCHLOROTHIAZIDE 25 MG/1
25 TABLET ORAL DAILY
Status: DISCONTINUED | OUTPATIENT
Start: 2025-06-21 | End: 2025-06-24 | Stop reason: HOSPADM

## 2025-06-21 RX ADMIN — TOPIRAMATE 100 MG: 25 TABLET, FILM COATED ORAL at 22:15

## 2025-06-21 RX ADMIN — Medication 5 MG: at 22:14

## 2025-06-21 RX ADMIN — LEFLUNOMIDE 20 MG: 20 TABLET ORAL at 09:43

## 2025-06-21 RX ADMIN — ATORVASTATIN CALCIUM 10 MG: 10 TABLET, FILM COATED ORAL at 22:15

## 2025-06-21 RX ADMIN — HYDROXYCHLOROQUINE SULFATE 200 MG: 200 TABLET ORAL at 09:43

## 2025-06-21 RX ADMIN — EMPAGLIFLOZIN 25 MG: 10 TABLET, FILM COATED ORAL at 13:54

## 2025-06-21 RX ADMIN — HYDROCHLOROTHIAZIDE 25 MG: 25 TABLET ORAL at 13:54

## 2025-06-21 RX ADMIN — METFORMIN HYDROCHLORIDE 500 MG: 500 TABLET, FILM COATED ORAL at 17:35

## 2025-06-21 RX ADMIN — ACETAMINOPHEN 650 MG: 325 TABLET ORAL at 22:14

## 2025-06-21 RX ADMIN — Medication 10 ML: at 22:15

## 2025-06-21 RX ADMIN — ACETAMINOPHEN 650 MG: 325 TABLET ORAL at 00:41

## 2025-06-21 RX ADMIN — Medication 10 ML: at 09:43

## 2025-06-21 RX ADMIN — TOPIRAMATE 100 MG: 25 TABLET, FILM COATED ORAL at 09:43

## 2025-06-21 RX ADMIN — LISINOPRIL 2.5 MG: 2.5 TABLET ORAL at 13:53

## 2025-06-21 RX ADMIN — ACETAMINOPHEN 650 MG: 325 TABLET ORAL at 07:19

## 2025-06-21 NOTE — PROGRESS NOTES
[facility]   Hospitalist Progress Note  Date: 2025  Patient Name: Nory Rodriguez  : 1965  MRN: [id number]  Date of admission: 2025  Room/Bed: \[facility]\""      Subjective   Subjective     Chief Complaint: Heart block    Summary:    Nory Rodriguez is a 60 y.o. female with significant past medical history of back pain, GERD, Hepatitis A, Depression, HLD, HTN, Neuropathy, T2DM, Sleep apnea sent to the ER by her cardiologist due to abnormal rhythm on Holter monitor.  Patient has been worked up for recent syncopal episodes, she reported that Holter monitor was placed by her cardiologist, later in the day patient was called to come to the ER due to abnormal rhythm on the Holter monitor indicating possible complete heart block. In the ED patient noted to have a BNP over 1200, hemoglobin 10.6, creatinine 1.12, pads were placed by ER physician.  Admitted to the ICU.  Cardiology consulted.  Patient is status post temporary pacemaker.  Plans for permanent pacemaker on Monday    Interval Followup:   Patient with temporary pacer.  No new complaint    All systems reviewed and negative except for what is outlined above.      Objective   Objective     Vitals:   Temp:  [97.8 °F (36.6 °C)-98.8 °F (37.1 °C)] 98.8 °F (37.1 °C)  Heart Rate:  [60-72] 72  Resp:  [15-32] 21  BP: ()/(50-97) 141/73    Physical Exam   General: No acute distress  HENT: Temporary pacemaker  Cardiovascular: RRR  Pulmonary: no conversational dyspnea  Musculoskeletal: No gross deformities  Psych: Mood and affect appropriate    Result Review    Result Review:  I have personally reviewed these results:  [x]  Laboratory      Lab 25  0247 25  0806 25  2338   WBC 8.33 9.81 9.19   HEMOGLOBIN 11.5* 11.2* 10.6*   HEMATOCRIT 36.2 36.2 34.5   PLATELETS 210 203 193   NEUTROS ABS  --   --  5.46   IMMATURE GRANS (ABS)  --   --  0.02   LYMPHS ABS  --   --  2.36   MONOS ABS  --   --  1.01*   EOS ABS  --   --  0.27   MCV 91.4 93.5 94.5          Lab 06/21/25  0247 06/20/25  0806 06/19/25  2338   SODIUM 140 141 140   POTASSIUM 3.7 3.6 3.4*   CHLORIDE 109* 107 107   CO2 20.0* 24.4 20.8*   ANION GAP 11.0 9.6 12.2   BUN 20.9 30.1* 37.8*   CREATININE 0.84 1.02* 1.12*   EGFR 78.7 62.3 55.7*   GLUCOSE 116* 156* 187*   CALCIUM 8.5* 8.8 8.3*   MAGNESIUM 2.3 2.4  --    PHOSPHORUS 3.5 3.3  --          Lab 06/21/25  0247 06/20/25  0806 06/19/25  2338   TOTAL PROTEIN 6.5 7.1 6.6   ALBUMIN 3.7 4.2 4.0   GLOBULIN 2.8 2.9 2.6   ALT (SGPT) 11 12 13   AST (SGOT) 14 14 15   BILIRUBIN 0.2 0.2 <0.2   ALK PHOS 106 108 105         Lab 06/20/25  0049 06/19/25  2338   PROBNP  --  1,249.0*   HSTROP T 18* 16*                 Brief Urine Lab Results  (Last result in the past 365 days)        Color   Clarity   Blood   Leuk Est   Nitrite   Protein   CREAT   Urine HCG        07/16/24 1509 Yellow   Slightly Cloudy   Negative   Negative   Negative   Negative                 [x]  Microbiology   Microbiology Results (last 10 days)       ** No results found for the last 240 hours. **          [x]  Radiology  XR Chest 1 View  Result Date: 6/20/2025  Impression: 1.Right internal jugular approach temporary pacemaker lead terminates in the right ventricle. 2.No pneumothorax visible on this limited supine image. Electronically Signed: Melita Medrano MD  6/20/2025 5:00 PM EDT  Workstation ID: [id number]    XR Chest 1 View  Result Date: 6/20/2025  No acute infiltrate is appreciated.    Portions of this note were completed with a voice recognition program.  6/20/2025 12:04 AM by Emre Grijalva MD on Workstation: [id number]      []  EKG/Telemetry   []  Cardiology/Vascular   []  Pathology  []  Old records  []  Other:    Assessment & Plan        Assessment and Plan:    Complete heart block status post temporary placement  Hx of recent syncope episodes  Back pain  GERD  Hepatitis A  Depression  HLD  HTN  Neuropathy  T2DM  Sleep apnea  Rheumatoid arthritis     Plan:  Admitted to ICU  Strict bed  rest  Cardiology consulted  Cardiac monitoring  Atorvastatin  Jardiance  Hydrochlorothiazide  Plaquenil  Sliding scale insulin  Leflunomide  Lisinopril  Metformin  Topamax       Discussed with RN.    VTE Prophylaxis:  Mechanical VTE prophylaxis orders are present.        CODE STATUS:   Code Status (Patient has no pulse and is not breathing): CPR (Attempt to Resuscitate)  Medical Interventions (Patient has pulse or is breathing): Full Support  Level Of Support Discussed With: Patient      Electronically signed by Cruz Cavanaugh MD, 6/21/2025, 15:10 EDT.

## 2025-06-21 NOTE — CONSULTS
[facility]   Consult Note    Patient Name: Nory Rodriguez  : 1965  MRN: [id number]  Primary Care Physician:  Eileen Barajas MD  Referring Physician: No ref. provider found  Date of admission: 2025    Consults  Subjective   Subjective     Reason for Consult/ Chief Complaint: Reason for consultation critical care management in the setting of acute heart block    History of Present Illness  Nory Rodriguez is a 60 y.o. female admitted to the hospital on 2025  Found to have heart block currently being transvenous pacer due to the right IJ  Transvenous pacer placed on 2025    Review of Systems   Positive for headache  Personal History     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, hepatitis viral     Cellulitis of chest wall     Chronic back pain     Chronic hoarseness     COVID-19     Dietary counseling and surveillance     Dizziness and giddiness     Effusion, left knee     Effusion, right knee     GERD without esophagitis     Hepatitis A     Hereditary and idiopathic neuropathy, unspecified     History of depression     Hyperlipidemia     Hypertension     Kidney stone     Localized swelling, mass and lump, right lower limb     Low back pain     Methicillin resistant Staphylococcus aureus infection as the cause of diseases classified elsewhere     Migraine without aura, not intractable, without status migrainosus     Moderate persistent asthma with (acute) exacerbation     Morbid obesity due to excess calories     Nausea     Neuropathy     Osteoporosis     Pain in joints of right hand     Pain in left hip     Pain in left leg     Pain in right shoulder     Paresthesia of skin     Peripheral neuropathy     Personal history of other venous thrombosis and  embolism     Post-menopausal     Pressure ulcer of other site, stage 1     Primary insomnia     Pure hypercholesterolemia, unspecified     Rheumatoid lung disease with rheumatoid arthritis     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     Shortness of breath     Sleep apnea     Sleep apnea, unspecified     Tinea unguium     Type 2 diabetes mellitus with diabetic polyneuropathy     Uterine polyp     Viral hepatitis     Vitamin D deficiency, unspecified        Past Surgical History:   Procedure Laterality Date    CARDIAC CATHETERIZATION  11/2011    CATARACT EXTRACTION, BILATERAL  08/2018    CHOLECYSTECTOMY      HYSTERECTOMY      TAHBSO    PERICARDIAL WINDOW  11/2011       Family History: family history includes Alzheimer's disease in her maternal grandmother; Breast cancer in her maternal grandmother; Coronary artery disease in her father; Diabetes type II in her brother, maternal grandfather, and paternal grandfather; Endometrial cancer in her mother; Hyperlipidemia in her mother; Stroke in her father. Otherwise pertinent FHx was reviewed and not pertinent to current issue.    Social History:  reports that she has never smoked. She has never been exposed to tobacco smoke. She has never used smokeless tobacco. She reports that she does not currently use alcohol. She reports that she does not use drugs.    Home Medications:   Budesonide-Formoterol Fumarate, Dulaglutide, Insulin Glargine (1 Unit Dial), Insulin Lispro, Tiotropium Bromide Monohydrate, albuterol sulfate HFA, atorvastatin, dilTIAZem CD, docusate sodium, empagliflozin, gabapentin, hydroCHLOROthiazide, hydroxychloroquine, leflunomide, lisinopril, metFORMIN, methocarbamol, montelukast, omeprazole, topiramate, and traZODone    Allergies:  Allergies   Allergen Reactions    Asa [Aspirin] Anaphylaxis    Ciprofloxacin Anaphylaxis    Exenatide Nausea Only    Levemir [Insulin Detemir] GI Intolerance    Nitroglycerin Hives    Sumatriptan  Dizziness    Sitagliptin GI Intolerance    Cat Dander Rash    Codeine Palpitations    Diclofenac Swelling       Objective    Objective     Vitals:  Temp:  [97.8 °F (36.6 °C)-98.2 °F (36.8 °C)] 97.8 °F (36.6 °C)  Heart Rate:  [36-72] 65  Resp:  [16-22] 22  BP: ()/(37-64) 152/64    Physical Exam  Pleasant female resting comfortably in no acute distress lungs are clear to auscultation  Result Review    Result Review:  I have personally reviewed the results from the time of this admission to 6/21/2025 09:26 EDT and agree with these findings:  []  Laboratory list / accordion  [x]  Microbiology  [x]  Radiology  [x]  EKG/Telemetry   [x]  Cardiology/Vascular   [x]  Pathology  []  Old records  []  Other:        Assessment & Plan   Assessment / Plan     Brief Patient Summary:  Nory Rodriguez is a 60 y.o. female who in the ICU with complete heart block requiring transvenous pacemaker    Active Hospital Problems:  Active Hospital Problems    Diagnosis     **Complete heart block     Syncope and collapse    Rheumatoid arthritis  Hypertension    Plan:   Will restart back home medications in the form of her Plaquenil and leflunomide  Topamax restarted  Continue sliding scale insulin  Transvenous pacemaker per cardiology service  To receive permanent pacemaker on Monday  Renal panel with electrolytes in the morning to include magnesium  Replace as needed    Temperature reviewed patient currently paced rhythm    Takes budesonide/formoterol as needed at home will add albuterol as needed while hospitalized    Case discussed with nursing regarding plan of care as well as hospitalist    Jose Miguel Paz DO    Electronically signed by Jose Miguel Paz DO, 06/21/25, 9:33 AM EDT.

## 2025-06-21 NOTE — PROGRESS NOTES
INTERVENTIONAL CARDIOLOGY  INPATIENT PROGRESS NOTE        PATIENT IDENTIFICATION:  Name:  Nory Rodriguez        MRN:  [id number]  60 y.o.  female            Chief Complain:   Syncopal episodes    SUBJECTIVE:   Patient is status post temporary pacemaker placement yesterday.  Denies shortness of breath, syncopal episodes.  Telemetry reviewed, paced rhythm at 60 bpm which is the set rate.    OBJECTIVE:  Vitals:    06/21/25 0900 06/21/25 1000 06/21/25 1100 06/21/25 1200   BP: 127/75 127/64 135/97    BP Location: Right arm Right arm Right arm    Patient Position: Lying Lying Lying    Pulse: 60 60 60 60   Resp: 19 18 (!) 30 15   Temp:    98.8 °F (37.1 °C)   TempSrc:    Oral   SpO2: 98% 97% 98% 99%   Weight:       Height:               Body mass index is 35.19 kg/m².    Intake/Output Summary (Last 24 hours) at 6/21/2025 1215  Last data filed at 6/21/2025 0200  Gross per 24 hour   Intake --   Output 2100 ml   Net -2100 ml         Physical Exam  General : Alert, awake, no acute distress  Neck : No jugular venous distention  CVS : Regular rate and rhythm, no murmur  Lungs: Clear to auscultation bilaterally, no crackles or rhonchi  Abdomen: Soft, nontender  Extremities: Warm, well-perfused, no pretibial edema        Allergies   Allergen Reactions    Asa [Aspirin] Anaphylaxis    Ciprofloxacin Anaphylaxis    Exenatide Nausea Only    Levemir [Insulin Detemir] GI Intolerance    Nitroglycerin Hives    Sumatriptan Dizziness    Sitagliptin GI Intolerance    Cat Dander Rash    Codeine Palpitations    Diclofenac Swelling     Scheduled meds:  atorvastatin, 10 mg, Oral, Nightly  empagliflozin, 25 mg, Oral, Daily  hydroCHLOROthiazide, 25 mg, Oral, Daily  hydroxychloroquine, 200 mg, Oral, Daily  insulin lispro, 2-7 Units, Subcutaneous, 4x Daily AC & at Bedtime  leflunomide, 20 mg, Oral, Daily  lisinopril, 2.5 mg, Oral, Daily  metFORMIN, 500 mg, Oral, BID With Meals  sodium chloride, 10 mL, Intravenous, Q12H  topiramate, 100 mg, Oral,  "BID      IV meds:                           Data Review:  CBC          6/19/2025    23:38 6/20/2025    08:06 6/21/2025    02:47   CBC   WBC 9.19  9.81  8.33    RBC 3.65  3.87  3.96    Hemoglobin 10.6  11.2  11.5    Hematocrit 34.5  36.2  36.2    MCV 94.5  93.5  91.4    MCH 29.0  28.9  29.0    MCHC 30.7  30.9  31.8    RDW 13.4  13.5  13.3    Platelets 193  203  210      CMP          6/19/2025    23:38 6/20/2025    08:06 6/21/2025    02:47   CMP   Glucose 187  156  116    BUN 37.8  30.1  20.9    Creatinine 1.12  1.02  0.84    EGFR 55.7  62.3  78.7    Sodium 140  141  140    Potassium 3.4  3.6  3.7    Chloride 107  107  109    Calcium 8.3  8.8  8.5    Total Protein 6.6  7.1  6.5    Albumin 4.0  4.2  3.7    Globulin 2.6  2.9  2.8    Total Bilirubin <0.2  0.2  0.2    Alkaline Phosphatase 105  108  106    AST (SGOT) 15  14  14    ALT (SGPT) 13  12  11    Albumin/Globulin Ratio 1.5  1.4  1.3    BUN/Creatinine Ratio 33.8  29.5  24.9    Anion Gap 12.2  9.6  11.0       CARDIAC LABS:      Lab 06/20/25  0049 06/19/25  2338   PROBNP  --  1,249.0*   HSTROP T 18* 16*        No results found for: \"DIGOXIN\"   Lab Results   Component Value Date    TSH 1.500 04/21/2025           Invalid input(s): \"LDLCALC\"  No results found for: \"POCTROP\"  Lab Results   Component Value Date    TROPONINT 18 (H) 06/20/2025   (  Lab Results   Component Value Date    MG 2.3 06/21/2025     Results for orders placed in visit on 07/30/21    Echocardiogram Scan           ASSESSMENT:    Complete heart block    Syncope and collapse        PLAN:  - Patient is status post temporary pacemaker placement yesterday.  Currently set at rate of 60, output of 5 and sensitivity of 2.  -Continue temporary pacemaker over the weekend.  Possible permanent pacemaker Monday morning.  Patient will need to be n.p.o. past midnight Sunday.  - Resume home medications including atorvastatin 10 mg daily, Jardiance 25 mg daily, hydrochlorothiazide 25 mg daily, insulin, lisinopril, " metformin.  -Will follow up.     Keagan Watson MD, [facility]  6/21/2025    12:15 EDT         I spent 30 minutes caring for this patient on this date of service. This time includes time spent by me in the following activities:preparing for the visit, reviewing tests, obtaining and/or reviewing a separately obtained history, performing a medically appropriate examination and/or evaluation , counseling and educating the patient/family/caregiver, ordering medications, tests, or procedures, referring and communicating with other health care professionals , documenting information in the medical record, independently interpreting results and communicating that information with the patient/family/caregiver, and care coordination. The patient was seen and examined. Work by the provider also included review and/or ordering of lab tests, review and/or ordering of radiology tests, review and/or ordering of medicine tests, discussion with other physicians or providers, independent review of data, obtaining old records, review/summation of old records, and/or other review.

## 2025-06-22 LAB
ALBUMIN SERPL-MCNC: 3.7 G/DL (ref 3.5–5.2)
ALBUMIN/GLOB SERPL: 1.2 G/DL
ALP SERPL-CCNC: 109 U/L (ref 39–117)
ALT SERPL W P-5'-P-CCNC: 8 U/L (ref 1–33)
ANION GAP SERPL CALCULATED.3IONS-SCNC: 11.2 MMOL/L (ref 5–15)
AST SERPL-CCNC: 12 U/L (ref 1–32)
BILIRUB SERPL-MCNC: <0.2 MG/DL (ref 0–1.2)
BUN SERPL-MCNC: 24.6 MG/DL (ref 8–23)
BUN/CREAT SERPL: 23 (ref 7–25)
CALCIUM SPEC-SCNC: 8.8 MG/DL (ref 8.6–10.5)
CHLORIDE SERPL-SCNC: 108 MMOL/L (ref 98–107)
CO2 SERPL-SCNC: 18.8 MMOL/L (ref 22–29)
CREAT SERPL-MCNC: 1.07 MG/DL (ref 0.57–1)
DEPRECATED RDW RBC AUTO: 44.9 FL (ref 37–54)
EGFRCR SERPLBLD CKD-EPI 2021: 58.9 ML/MIN/1.73
ERYTHROCYTE [DISTWIDTH] IN BLOOD BY AUTOMATED COUNT: 13.4 % (ref 12.3–15.4)
GLOBULIN UR ELPH-MCNC: 3.1 GM/DL
GLUCOSE BLDC GLUCOMTR-MCNC: 119 MG/DL (ref 70–99)
GLUCOSE BLDC GLUCOMTR-MCNC: 130 MG/DL (ref 70–99)
GLUCOSE BLDC GLUCOMTR-MCNC: 136 MG/DL (ref 70–99)
GLUCOSE BLDC GLUCOMTR-MCNC: 141 MG/DL (ref 70–99)
GLUCOSE BLDC GLUCOMTR-MCNC: 177 MG/DL (ref 70–99)
GLUCOSE SERPL-MCNC: 137 MG/DL (ref 65–99)
HCT VFR BLD AUTO: 39.4 % (ref 34–46.6)
HGB BLD-MCNC: 12.4 G/DL (ref 12–15.9)
MAGNESIUM SERPL-MCNC: 2.2 MG/DL (ref 1.6–2.4)
MCH RBC QN AUTO: 29 PG (ref 26.6–33)
MCHC RBC AUTO-ENTMCNC: 31.5 G/DL (ref 31.5–35.7)
MCV RBC AUTO: 92.1 FL (ref 79–97)
PHOSPHATE SERPL-MCNC: 3.7 MG/DL (ref 2.5–4.5)
PLATELET # BLD AUTO: 226 10*3/MM3 (ref 140–450)
PMV BLD AUTO: 11.6 FL (ref 6–12)
POTASSIUM SERPL-SCNC: 3.9 MMOL/L (ref 3.5–5.2)
PROT SERPL-MCNC: 6.8 G/DL (ref 6–8.5)
RBC # BLD AUTO: 4.28 10*6/MM3 (ref 3.77–5.28)
SODIUM SERPL-SCNC: 138 MMOL/L (ref 136–145)
WBC NRBC COR # BLD AUTO: 9.31 10*3/MM3 (ref 3.4–10.8)

## 2025-06-22 PROCEDURE — 82948 REAGENT STRIP/BLOOD GLUCOSE: CPT

## 2025-06-22 PROCEDURE — 99233 SBSQ HOSP IP/OBS HIGH 50: CPT | Performed by: INTERNAL MEDICINE

## 2025-06-22 PROCEDURE — 82948 REAGENT STRIP/BLOOD GLUCOSE: CPT | Performed by: INTERNAL MEDICINE

## 2025-06-22 PROCEDURE — 80053 COMPREHEN METABOLIC PANEL: CPT

## 2025-06-22 PROCEDURE — 99232 SBSQ HOSP IP/OBS MODERATE 35: CPT | Performed by: STUDENT IN AN ORGANIZED HEALTH CARE EDUCATION/TRAINING PROGRAM

## 2025-06-22 PROCEDURE — 83735 ASSAY OF MAGNESIUM: CPT

## 2025-06-22 PROCEDURE — 84100 ASSAY OF PHOSPHORUS: CPT

## 2025-06-22 PROCEDURE — 99232 SBSQ HOSP IP/OBS MODERATE 35: CPT | Performed by: INTERNAL MEDICINE

## 2025-06-22 PROCEDURE — 85027 COMPLETE CBC AUTOMATED: CPT

## 2025-06-22 PROCEDURE — 63710000001 INSULIN LISPRO (HUMAN) PER 5 UNITS: Performed by: INTERNAL MEDICINE

## 2025-06-22 RX ADMIN — EMPAGLIFLOZIN 25 MG: 10 TABLET, FILM COATED ORAL at 09:54

## 2025-06-22 RX ADMIN — LEFLUNOMIDE 20 MG: 20 TABLET ORAL at 09:54

## 2025-06-22 RX ADMIN — TOPIRAMATE 100 MG: 25 TABLET, FILM COATED ORAL at 20:39

## 2025-06-22 RX ADMIN — INSULIN LISPRO 2 UNITS: 100 INJECTION, SOLUTION INTRAVENOUS; SUBCUTANEOUS at 11:10

## 2025-06-22 RX ADMIN — TOPIRAMATE 100 MG: 25 TABLET, FILM COATED ORAL at 09:54

## 2025-06-22 RX ADMIN — METFORMIN HYDROCHLORIDE 500 MG: 500 TABLET, FILM COATED ORAL at 18:06

## 2025-06-22 RX ADMIN — HYDROXYCHLOROQUINE SULFATE 200 MG: 200 TABLET ORAL at 09:54

## 2025-06-22 RX ADMIN — Medication 10 ML: at 20:39

## 2025-06-22 RX ADMIN — METFORMIN HYDROCHLORIDE 500 MG: 500 TABLET, FILM COATED ORAL at 09:55

## 2025-06-22 RX ADMIN — ATORVASTATIN CALCIUM 10 MG: 10 TABLET, FILM COATED ORAL at 20:39

## 2025-06-22 RX ADMIN — ACETAMINOPHEN 650 MG: 325 TABLET ORAL at 22:21

## 2025-06-22 RX ADMIN — ACETAMINOPHEN 650 MG: 325 TABLET ORAL at 06:44

## 2025-06-22 RX ADMIN — Medication 5 MG: at 22:21

## 2025-06-22 RX ADMIN — LISINOPRIL 2.5 MG: 2.5 TABLET ORAL at 09:54

## 2025-06-22 RX ADMIN — HYDROCHLOROTHIAZIDE 25 MG: 25 TABLET ORAL at 09:54

## 2025-06-22 RX ADMIN — ACETAMINOPHEN 650 MG: 325 TABLET ORAL at 18:06

## 2025-06-22 NOTE — PROGRESS NOTES
[facility]   Hospitalist Progress Note  Date: 2025  Patient Name: Nory Rodriguez  : 1965  MRN: [id number]  Date of admission: 2025  Room/Bed: 02/      Subjective   Subjective     Chief Complaint: Heart block    Summary:    Nory Rodriguez is a 60 y.o. female with significant past medical history of back pain, GERD, Hepatitis A, Depression, HLD, HTN, Neuropathy, T2DM, Sleep apnea sent to the ER by her cardiologist due to abnormal rhythm on Holter monitor.  Patient has been worked up for recent syncopal episodes, she reported that Holter monitor was placed by her cardiologist, later in the day patient was called to come to the ER due to abnormal rhythm on the Holter monitor indicating possible complete heart block. In the ED patient noted to have a BNP over 1200, hemoglobin 10.6, creatinine 1.12, pads were placed by ER physician.  Admitted to the ICU.  Cardiology consulted.  Patient is status post temporary pacemaker.  Plans for permanent pacemaker on Monday    Interval Followup:   Patient is without new complaints.     All systems reviewed and negative except for what is outlined above.      Objective   Objective     Vitals:   Temp:  [98.1 °F (36.7 °C)-99.2 °F (37.3 °C)] 98.3 °F (36.8 °C)  Heart Rate:  [60-72] 72  Resp:  [14-32] 22  BP: (107-146)/() 142/74    Physical Exam   General: No acute distress  HENT: Temporary pacemaker  Cardiovascular: RRR  Pulmonary: Normal work of breathing  Psych: Mood and affect appropriate    Result Review    Result Review:  I have personally reviewed these results:  [x]  Laboratory      Lab 25  0239 25  0247 25  0806 25  2338   WBC 9.31 8.33 9.81 9.19   HEMOGLOBIN 12.4 11.5* 11.2* 10.6*   HEMATOCRIT 39.4 36.2 36.2 34.5   PLATELETS 226 210 203 193   NEUTROS ABS  --   --   --  5.46   IMMATURE GRANS (ABS)  --   --   --  0.02   LYMPHS ABS  --   --   --  2.36   MONOS ABS  --   --   --  1.01*   EOS ABS  --   --   --  0.27   MCV 92.1  91.4 93.5 94.5         Lab 06/22/25  0239 06/21/25  0247 06/20/25  0806   SODIUM 138 140 141   POTASSIUM 3.9 3.7 3.6   CHLORIDE 108* 109* 107   CO2 18.8* 20.0* 24.4   ANION GAP 11.2 11.0 9.6   BUN 24.6* 20.9 30.1*   CREATININE 1.07* 0.84 1.02*   EGFR 58.9* 78.7 62.3   GLUCOSE 137* 116* 156*   CALCIUM 8.8 8.5* 8.8   MAGNESIUM 2.2 2.3 2.4   PHOSPHORUS 3.7 3.5 3.3         Lab 06/22/25  0239 06/21/25  0247 06/20/25  0806   TOTAL PROTEIN 6.8 6.5 7.1   ALBUMIN 3.7 3.7 4.2   GLOBULIN 3.1 2.8 2.9   ALT (SGPT) 8 11 12   AST (SGOT) 12 14 14   BILIRUBIN <0.2 0.2 0.2   ALK PHOS 109 106 108         Lab 06/20/25  0049 06/19/25  2338   PROBNP  --  1,249.0*   HSTROP T 18* 16*                 Brief Urine Lab Results  (Last result in the past 365 days)        Color   Clarity   Blood   Leuk Est   Nitrite   Protein   CREAT   Urine HCG        07/16/24 1509 Yellow   Slightly Cloudy   Negative   Negative   Negative   Negative                 [x]  Microbiology   Microbiology Results (last 10 days)       ** No results found for the last 240 hours. **          [x]  Radiology  XR Chest 1 View  Result Date: 6/20/2025  Impression: 1.Right internal jugular approach temporary pacemaker lead terminates in the right ventricle. 2.No pneumothorax visible on this limited supine image. Electronically Signed: Melita Medrano MD  6/20/2025 5:00 PM EDT  Workstation ID: [id number]    XR Chest 1 View  Result Date: 6/20/2025  No acute infiltrate is appreciated.    Portions of this note were completed with a voice recognition program.  6/20/2025 12:04 AM by Emre Grijalva MD on Workstation: [id number]      []  EKG/Telemetry   []  Cardiology/Vascular   []  Pathology  []  Old records  []  Other:    Assessment & Plan        Assessment and Plan:    Complete heart block status post temporary placement  Hx of recent syncope episodes  Back pain  GERD  Hepatitis A  Depression  HLD  HTN  Neuropathy  T2DM  Sleep apnea  Rheumatoid arthritis     Plan:  Admitted to  ICU  Cardiology consulted  Cardiac monitoring  Atorvastatin  Jardiance  Hydrochlorothiazide  Plaquenil  Sliding scale insulin  Leflunomide  Lisinopril  Metformin  Topamax  Continue to monitor creatinine  Plan for permanent pacemaker tomorrow       Discussed with RN.    VTE Prophylaxis:  Mechanical VTE prophylaxis orders are present.        CODE STATUS:   Code Status (Patient has no pulse and is not breathing): CPR (Attempt to Resuscitate)  Medical Interventions (Patient has pulse or is breathing): Full Support  Level Of Support Discussed With: Patient      Electronically signed by Cruz Cavanaugh MD, 6/22/2025, 11:13 EDT.

## 2025-06-22 NOTE — PROGRESS NOTES
[facility]     Progress Note    Patient Name: Nory Rodriguez  : 1965  MRN: [id number]  Primary Care Physician:  Eileen Barajas MD  Date of admission: 2025    Subjective   Subjective     Chief Complaint: Follow-up for for heart block    History of Present Illness  Patient Reports some shoulder pain after sleeping in an uncomfortable position    Review of Systems  No shortness of breath  Objective   Objective     Vitals:   Temp:  [98.1 °F (36.7 °C)-99.2 °F (37.3 °C)] 98.3 °F (36.8 °C)  Heart Rate:  [60-72] 72  Resp:  [14-32] 22  BP: (107-146)/() 142/74    Physical Exam   No acute distress resting comfortably in bedside chair  Result Review    Result Review:  I have personally reviewed the results from the time of this admission to 2025 11:21 EDT and agree with these findings:  [x]  Laboratory list / accordion  []  Microbiology  []  Radiology  []  EKG/Telemetry   []  Cardiology/Vascular   []  Pathology  []  Old records  []  Other:        Assessment & Plan   Assessment / Plan     Brief Patient Summary:  Nory Rodriguez is a 60 y.o. female who in the ICU with heart block    Active Hospital Problems:  Active Hospital Problems    Diagnosis     **Complete heart block     Syncope and collapse    Rheumatoid arthritis  Hypertension  Nongap metabolic acidosis  Plan:   Suspect that the acidosis is likely from hyperchloremia  No fluid given at this time  No evidence of anion gap acidosis  No nausea or vomiting  No diarrhea  Repeat renal panel in morning  Continue with transvenous pacer per cardiology  VTE Prophylaxis:  Mechanical VTE prophylaxis orders are present.        CODE STATUS:    Code Status (Patient has no pulse and is not breathing): CPR (Attempt to Resuscitate)  Medical Interventions (Patient has pulse or is breathing): Full Support  Level Of Support Discussed With: Patient        Jose Miguel Paz DO    Electronically signed by Jose Miguel Paz DO, 25, 11:23  FLORECITA ALFREDOT.

## 2025-06-22 NOTE — PROGRESS NOTES
[facility]     Cardiology Progress Note    Patient Name: Nory Rodriguez  : 1965  MRN: [id number]  Primary Care Physician:  Eileen Barajas MD  Date of admission: 2025    Subjective   Subjective   Chief complaint  Bradycardia    HPI:  Patient Reports she overall just does not feel really well today.  She notes no fevers or chills.  She notes no nausea.  She states she just woke up that way.    Review of Systems   All systems were reviewed and negative except for: Cardiac review of systems negative.    Objective   Objective     Vitals:   Temp:  [98.1 °F (36.7 °C)-99.2 °F (37.3 °C)] 98.2 °F (36.8 °C)  Heart Rate:  [60-72] 60  Resp:  [14-29] 22  BP: ()/() 97/72  Physical Exam   Neck: no JVD, no bruit  Lungs: clear to ausculation bilaterally.  No crackles or wheezes  CV: regular rate and rhythm, no murmur  Ext: no cyanosis, clubbing or edema.  Normal bilateral LE pulses.      Scheduled Meds:atorvastatin, 10 mg, Oral, Nightly  empagliflozin, 25 mg, Oral, Daily  hydroCHLOROthiazide, 25 mg, Oral, Daily  hydroxychloroquine, 200 mg, Oral, Daily  insulin lispro, 2-7 Units, Subcutaneous, 4x Daily AC & at Bedtime  leflunomide, 20 mg, Oral, Daily  lisinopril, 2.5 mg, Oral, Daily  metFORMIN, 500 mg, Oral, BID With Meals  sodium chloride, 10 mL, Intravenous, Q12H  topiramate, 100 mg, Oral, BID      Continuous Infusions:        Result Review    Result Review:  I have personally reviewed the results from the time of this admission to 2025 13:29 EDT and agree with these findings:  [x]  Laboratory  []  Microbiology  [x]  Radiology  [x]  EKG/Telemetry   [x]  Cardiology/Vascular   []  Pathology  []  Old records  []  Other:  Most notable findings include:     CBC          2025    08:06 2025    02:47 2025    02:39   CBC   WBC 9.81  8.33  9.31    RBC 3.87  3.96  4.28    Hemoglobin 11.2  11.5  12.4    Hematocrit 36.2  36.2  39.4    MCV 93.5  91.4  92.1    MCH 28.9  29.0  29.0    MCHC  30.9  31.8  31.5    RDW 13.5  13.3  13.4    Platelets 203  210  226      CMP          6/20/2025    08:06 6/21/2025    02:47 6/22/2025    02:39   CMP   Glucose 156  116  137    BUN 30.1  20.9  24.6    Creatinine 1.02  0.84  1.07    EGFR 62.3  78.7  58.9    Sodium 141  140  138    Potassium 3.6  3.7  3.9    Chloride 107  109  108    Calcium 8.8  8.5  8.8    Total Protein 7.1  6.5  6.8    Albumin 4.2  3.7  3.7    Globulin 2.9  2.8  3.1    Total Bilirubin 0.2  0.2  <0.2    Alkaline Phosphatase 108  106  109    AST (SGOT) 14  14  12    ALT (SGPT) 12  11  8    Albumin/Globulin Ratio 1.4  1.3  1.2    BUN/Creatinine Ratio 29.5  24.9  23.0    Anion Gap 9.6  11.0  11.2       CARDIAC LABS:      Lab 06/20/25  0049 06/19/25  2338   PROBNP  --  1,249.0*   HSTROP T 18* 16*        Assessment & Plan   Assessment / Plan     Brief Patient Summary:  Nory Rodriguez is a 60 y.o. female who presented with complete heart block/syncope    Active Hospital Problems:  Active Hospital Problems    Diagnosis     **Complete heart block     Syncope and collapse        Assessment:  1.  Complete heart block  2.  Status post temporary pacemaker  3.  Hypertension  4.  Hyperlipidemia    Plan:   1.  Turn patient's pacemaker down to 40 bpm.  She has a native rhythm that shows 2-1 AV block at about 45 bpm.  Turned the lower rate limit back up to 60.  2.  Will make n.p.o. at midnight for possible permanent pacemaker tomorrow.  3.  Continue the lisinopril and hydrochlorothiazide.  She is on no rate controlling medications.  4.  Continue the Lipitor.       CODE STATUS:   Code Status (Patient has no pulse and is not breathing): CPR (Attempt to Resuscitate)  Medical Interventions (Patient has pulse or is breathing): Full Support  Level Of Support Discussed With: Patient      Electronically signed by Clayton Mosley MD, 06/22/25, 1:29 PM EDT.

## 2025-06-22 NOTE — H&P (VIEW-ONLY)
[facility]     Cardiology Progress Note    Patient Name: Nory Rodriguez  : 1965  MRN: [id number]  Primary Care Physician:  Eileen Barajas MD  Date of admission: 2025    Subjective   Subjective   Chief complaint  Bradycardia    HPI:  Patient Reports she overall just does not feel really well today.  She notes no fevers or chills.  She notes no nausea.  She states she just woke up that way.    Review of Systems   All systems were reviewed and negative except for: Cardiac review of systems negative.    Objective   Objective     Vitals:   Temp:  [98.1 °F (36.7 °C)-99.2 °F (37.3 °C)] 98.2 °F (36.8 °C)  Heart Rate:  [60-72] 60  Resp:  [14-29] 22  BP: ()/() 97/72  Physical Exam   Neck: no JVD, no bruit  Lungs: clear to ausculation bilaterally.  No crackles or wheezes  CV: regular rate and rhythm, no murmur  Ext: no cyanosis, clubbing or edema.  Normal bilateral LE pulses.      Scheduled Meds:atorvastatin, 10 mg, Oral, Nightly  empagliflozin, 25 mg, Oral, Daily  hydroCHLOROthiazide, 25 mg, Oral, Daily  hydroxychloroquine, 200 mg, Oral, Daily  insulin lispro, 2-7 Units, Subcutaneous, 4x Daily AC & at Bedtime  leflunomide, 20 mg, Oral, Daily  lisinopril, 2.5 mg, Oral, Daily  metFORMIN, 500 mg, Oral, BID With Meals  sodium chloride, 10 mL, Intravenous, Q12H  topiramate, 100 mg, Oral, BID      Continuous Infusions:        Result Review    Result Review:  I have personally reviewed the results from the time of this admission to 2025 13:29 EDT and agree with these findings:  [x]  Laboratory  []  Microbiology  [x]  Radiology  [x]  EKG/Telemetry   [x]  Cardiology/Vascular   []  Pathology  []  Old records  []  Other:  Most notable findings include:     CBC          2025    08:06 2025    02:47 2025    02:39   CBC   WBC 9.81  8.33  9.31    RBC 3.87  3.96  4.28    Hemoglobin 11.2  11.5  12.4    Hematocrit 36.2  36.2  39.4    MCV 93.5  91.4  92.1    MCH 28.9  29.0  29.0    MCHC  30.9  31.8  31.5    RDW 13.5  13.3  13.4    Platelets 203  210  226      CMP          6/20/2025    08:06 6/21/2025    02:47 6/22/2025    02:39   CMP   Glucose 156  116  137    BUN 30.1  20.9  24.6    Creatinine 1.02  0.84  1.07    EGFR 62.3  78.7  58.9    Sodium 141  140  138    Potassium 3.6  3.7  3.9    Chloride 107  109  108    Calcium 8.8  8.5  8.8    Total Protein 7.1  6.5  6.8    Albumin 4.2  3.7  3.7    Globulin 2.9  2.8  3.1    Total Bilirubin 0.2  0.2  <0.2    Alkaline Phosphatase 108  106  109    AST (SGOT) 14  14  12    ALT (SGPT) 12  11  8    Albumin/Globulin Ratio 1.4  1.3  1.2    BUN/Creatinine Ratio 29.5  24.9  23.0    Anion Gap 9.6  11.0  11.2       CARDIAC LABS:      Lab 06/20/25  0049 06/19/25  2338   PROBNP  --  1,249.0*   HSTROP T 18* 16*        Assessment & Plan   Assessment / Plan     Brief Patient Summary:  Nroy Rodriguez is a 60 y.o. female who presented with complete heart block/syncope    Active Hospital Problems:  Active Hospital Problems    Diagnosis     **Complete heart block     Syncope and collapse        Assessment:  1.  Complete heart block  2.  Status post temporary pacemaker  3.  Hypertension  4.  Hyperlipidemia    Plan:   1.  Turn patient's pacemaker down to 40 bpm.  She has a native rhythm that shows 2-1 AV block at about 45 bpm.  Turned the lower rate limit back up to 60.  2.  Will make n.p.o. at midnight for possible permanent pacemaker tomorrow.  3.  Continue the lisinopril and hydrochlorothiazide.  She is on no rate controlling medications.  4.  Continue the Lipitor.       CODE STATUS:   Code Status (Patient has no pulse and is not breathing): CPR (Attempt to Resuscitate)  Medical Interventions (Patient has pulse or is breathing): Full Support  Level Of Support Discussed With: Patient      Electronically signed by Clayton Mosley MD, 06/22/25, 1:29 PM EDT.

## 2025-06-23 ENCOUNTER — APPOINTMENT (OUTPATIENT)
Dept: GENERAL RADIOLOGY | Facility: HOSPITAL | Age: 60
End: 2025-06-23
Payer: COMMERCIAL

## 2025-06-23 LAB
ANION GAP SERPL CALCULATED.3IONS-SCNC: 13 MMOL/L (ref 5–15)
BUN SERPL-MCNC: 32.1 MG/DL (ref 8–23)
BUN/CREAT SERPL: 27.4 (ref 7–25)
CALCIUM SPEC-SCNC: 9.1 MG/DL (ref 8.6–10.5)
CHLORIDE SERPL-SCNC: 106 MMOL/L (ref 98–107)
CO2 SERPL-SCNC: 19 MMOL/L (ref 22–29)
CREAT SERPL-MCNC: 1.17 MG/DL (ref 0.57–1)
EGFRCR SERPLBLD CKD-EPI 2021: 52.9 ML/MIN/1.73
GLUCOSE BLDC GLUCOMTR-MCNC: 119 MG/DL (ref 70–99)
GLUCOSE BLDC GLUCOMTR-MCNC: 123 MG/DL (ref 70–99)
GLUCOSE BLDC GLUCOMTR-MCNC: 128 MG/DL (ref 70–99)
GLUCOSE BLDC GLUCOMTR-MCNC: 138 MG/DL (ref 70–99)
GLUCOSE SERPL-MCNC: 162 MG/DL (ref 65–99)
MC_CV_MDC_IDC_RATE_1: 160
MC_CV_MDC_IDC_ZONE_ID: 1
MDC_IDC_MSMT_BATTERY_REMAINING_LONGEVITY: 132 MO
MDC_IDC_MSMT_BATTERY_REMAINING_PERCENTAGE: 100 %
MDC_IDC_MSMT_BATTERY_STATUS: NORMAL
MDC_IDC_MSMT_LEADCHNL_RA_DTM: NORMAL
MDC_IDC_MSMT_LEADCHNL_RA_PACING_THRESHOLD_POLARITY: NORMAL
MDC_IDC_MSMT_LEADCHNL_RV_DTM: NORMAL
MDC_IDC_MSMT_LEADCHNL_RV_PACING_THRESHOLD_POLARITY: NORMAL
MDC_IDC_PG_IMPLANT_DTM: NORMAL
MDC_IDC_PG_MFG: NORMAL
MDC_IDC_PG_MODEL: NORMAL
MDC_IDC_PG_SERIAL: NORMAL
MDC_IDC_PG_TYPE: NORMAL
MDC_IDC_SESS_DTM: NORMAL
MDC_IDC_SESS_TYPE: NORMAL
MDC_IDC_SET_BRADY_AT_MODE_SWITCH_RATE: 170
MDC_IDC_SET_BRADY_LOWRATE: 60
MDC_IDC_SET_BRADY_MAX_TRACKING_RATE: 130
MDC_IDC_SET_BRADY_MODE: NORMAL
MDC_IDC_SET_BRADY_PAV_DELAY: 180
MDC_IDC_SET_BRADY_SAV_DELAY: 150
MDC_IDC_SET_LEADCHNL_RA_PACING_AMPLITUDE: 3.5
MDC_IDC_SET_LEADCHNL_RA_PACING_POLARITY: NORMAL
MDC_IDC_SET_LEADCHNL_RA_PACING_PULSEWIDTH: 0.4
MDC_IDC_SET_LEADCHNL_RA_SENSING_POLARITY: NORMAL
MDC_IDC_SET_LEADCHNL_RA_SENSING_SENSITIVITY: 0.75
MDC_IDC_SET_LEADCHNL_RV_PACING_AMPLITUDE: 3.5
MDC_IDC_SET_LEADCHNL_RV_PACING_POLARITY: NORMAL
MDC_IDC_SET_LEADCHNL_RV_PACING_PULSEWIDTH: 0.4
MDC_IDC_SET_LEADCHNL_RV_SENSING_POLARITY: NORMAL
MDC_IDC_SET_LEADCHNL_RV_SENSING_SENSITIVITY: 2.5
MDC_IDC_SET_ZONE_STATUS: NORMAL
MDC_IDC_SET_ZONE_TYPE: NORMAL
MDC_IDC_STAT_BRADY_RA_PERCENT_PACED: 0
MDC_IDC_STAT_BRADY_RV_PERCENT_PACED: 100
POTASSIUM SERPL-SCNC: 3.7 MMOL/L (ref 3.5–5.2)
SODIUM SERPL-SCNC: 138 MMOL/L (ref 136–145)
WHOLE BLOOD HOLD SPECIMEN: NORMAL

## 2025-06-23 PROCEDURE — 25010000002 MIDAZOLAM PER 1MG: Performed by: INTERNAL MEDICINE

## 2025-06-23 PROCEDURE — 80048 BASIC METABOLIC PNL TOTAL CA: CPT | Performed by: STUDENT IN AN ORGANIZED HEALTH CARE EDUCATION/TRAINING PROGRAM

## 2025-06-23 PROCEDURE — 33208 INSRT HEART PM ATRIAL & VENT: CPT | Performed by: INTERNAL MEDICINE

## 2025-06-23 PROCEDURE — 82948 REAGENT STRIP/BLOOD GLUCOSE: CPT

## 2025-06-23 PROCEDURE — 25010000002 CEFAZOLIN PER 500 MG: Performed by: INTERNAL MEDICINE

## 2025-06-23 PROCEDURE — 02HK3JZ INSERTION OF PACEMAKER LEAD INTO RIGHT VENTRICLE, PERCUTANEOUS APPROACH: ICD-10-PCS | Performed by: INTERNAL MEDICINE

## 2025-06-23 PROCEDURE — 25010000002 LIDOCAINE 2% SOLUTION: Performed by: INTERNAL MEDICINE

## 2025-06-23 PROCEDURE — 25010000002 DIPHENHYDRAMINE PER 50 MG: Performed by: INTERNAL MEDICINE

## 2025-06-23 PROCEDURE — C1898 LEAD, PMKR, OTHER THAN TRANS: HCPCS | Performed by: INTERNAL MEDICINE

## 2025-06-23 PROCEDURE — 25510000001 IOPAMIDOL PER 1 ML: Performed by: INTERNAL MEDICINE

## 2025-06-23 PROCEDURE — 25010000002 FENTANYL CITRATE (PF) 50 MCG/ML SOLUTION: Performed by: INTERNAL MEDICINE

## 2025-06-23 PROCEDURE — 99024 POSTOP FOLLOW-UP VISIT: CPT | Performed by: INTERNAL MEDICINE

## 2025-06-23 PROCEDURE — 25010000002 BUPIVACAINE (PF) 0.5 % SOLUTION: Performed by: INTERNAL MEDICINE

## 2025-06-23 PROCEDURE — 02H63JZ INSERTION OF PACEMAKER LEAD INTO RIGHT ATRIUM, PERCUTANEOUS APPROACH: ICD-10-PCS | Performed by: INTERNAL MEDICINE

## 2025-06-23 PROCEDURE — C1785 PMKR, DUAL, RATE-RESP: HCPCS | Performed by: INTERNAL MEDICINE

## 2025-06-23 PROCEDURE — 82948 REAGENT STRIP/BLOOD GLUCOSE: CPT | Performed by: INTERNAL MEDICINE

## 2025-06-23 PROCEDURE — C1892 INTRO/SHEATH,FIXED,PEEL-AWAY: HCPCS | Performed by: INTERNAL MEDICINE

## 2025-06-23 PROCEDURE — 99232 SBSQ HOSP IP/OBS MODERATE 35: CPT | Performed by: INTERNAL MEDICINE

## 2025-06-23 PROCEDURE — 63710000001 ONDANSETRON ODT 4 MG TABLET DISPERSIBLE: Performed by: INTERNAL MEDICINE

## 2025-06-23 PROCEDURE — C1894 INTRO/SHEATH, NON-LASER: HCPCS | Performed by: INTERNAL MEDICINE

## 2025-06-23 PROCEDURE — 71045 X-RAY EXAM CHEST 1 VIEW: CPT

## 2025-06-23 PROCEDURE — 0JH606Z INSERTION OF PACEMAKER, DUAL CHAMBER INTO CHEST SUBCUTANEOUS TISSUE AND FASCIA, OPEN APPROACH: ICD-10-PCS | Performed by: INTERNAL MEDICINE

## 2025-06-23 DEVICE — PACE/SENSE LEAD
Type: IMPLANTABLE DEVICE | Status: FUNCTIONAL
Brand: INGEVITY™+

## 2025-06-23 DEVICE — PACEMAKER
Type: IMPLANTABLE DEVICE | Status: FUNCTIONAL
Brand: ACCOLADE™ MRI EL DR

## 2025-06-23 DEVICE — STEROX BIPOLAR IS-1 ATRIAL/VENTRICULAR
Type: IMPLANTABLE DEVICE | Status: FUNCTIONAL
Brand: FINELINE® II EZ STEROX

## 2025-06-23 RX ORDER — SODIUM CHLORIDE 0.9 % (FLUSH) 0.9 %
10 SYRINGE (ML) INJECTION AS NEEDED
Status: DISCONTINUED | OUTPATIENT
Start: 2025-06-23 | End: 2025-06-24 | Stop reason: HOSPADM

## 2025-06-23 RX ORDER — SODIUM CHLORIDE 0.9 % (FLUSH) 0.9 %
10 SYRINGE (ML) INJECTION EVERY 12 HOURS SCHEDULED
Status: DISCONTINUED | OUTPATIENT
Start: 2025-06-23 | End: 2025-06-24 | Stop reason: HOSPADM

## 2025-06-23 RX ORDER — ONDANSETRON 4 MG/1
4 TABLET, ORALLY DISINTEGRATING ORAL EVERY 6 HOURS PRN
Status: DISCONTINUED | OUTPATIENT
Start: 2025-06-23 | End: 2025-06-24 | Stop reason: HOSPADM

## 2025-06-23 RX ORDER — IOPAMIDOL 755 MG/ML
INJECTION, SOLUTION INTRAVASCULAR
Status: DISCONTINUED | OUTPATIENT
Start: 2025-06-23 | End: 2025-06-23 | Stop reason: HOSPADM

## 2025-06-23 RX ORDER — TRAMADOL HYDROCHLORIDE 50 MG/1
50 TABLET ORAL EVERY 8 HOURS PRN
Status: DISCONTINUED | OUTPATIENT
Start: 2025-06-23 | End: 2025-06-24 | Stop reason: HOSPADM

## 2025-06-23 RX ORDER — ACETAMINOPHEN 160 MG/5ML
650 SOLUTION ORAL EVERY 8 HOURS
Status: DISCONTINUED | OUTPATIENT
Start: 2025-06-23 | End: 2025-06-24 | Stop reason: HOSPADM

## 2025-06-23 RX ORDER — SODIUM CHLORIDE 9 MG/ML
40 INJECTION, SOLUTION INTRAVENOUS AS NEEDED
Status: DISCONTINUED | OUTPATIENT
Start: 2025-06-23 | End: 2025-06-24 | Stop reason: HOSPADM

## 2025-06-23 RX ORDER — LIDOCAINE HYDROCHLORIDE 20 MG/ML
INJECTION, SOLUTION INFILTRATION; PERINEURAL
Status: DISCONTINUED | OUTPATIENT
Start: 2025-06-23 | End: 2025-06-23 | Stop reason: HOSPADM

## 2025-06-23 RX ORDER — SODIUM CHLORIDE 9 MG/ML
40 INJECTION, SOLUTION INTRAVENOUS AS NEEDED
Status: DISCONTINUED | OUTPATIENT
Start: 2025-06-23 | End: 2025-06-23 | Stop reason: HOSPADM

## 2025-06-23 RX ORDER — SODIUM CHLORIDE 0.9 % (FLUSH) 0.9 %
10 SYRINGE (ML) INJECTION EVERY 12 HOURS SCHEDULED
Status: DISCONTINUED | OUTPATIENT
Start: 2025-06-23 | End: 2025-06-23 | Stop reason: HOSPADM

## 2025-06-23 RX ORDER — SODIUM CHLORIDE 0.9 % (FLUSH) 0.9 %
10 SYRINGE (ML) INJECTION AS NEEDED
Status: DISCONTINUED | OUTPATIENT
Start: 2025-06-23 | End: 2025-06-23 | Stop reason: HOSPADM

## 2025-06-23 RX ORDER — FENTANYL CITRATE 50 UG/ML
INJECTION, SOLUTION INTRAMUSCULAR; INTRAVENOUS
Status: DISCONTINUED | OUTPATIENT
Start: 2025-06-23 | End: 2025-06-23 | Stop reason: HOSPADM

## 2025-06-23 RX ORDER — ACETAMINOPHEN 325 MG/1
650 TABLET ORAL EVERY 8 HOURS
Status: DISCONTINUED | OUTPATIENT
Start: 2025-06-23 | End: 2025-06-24 | Stop reason: HOSPADM

## 2025-06-23 RX ORDER — MIDAZOLAM HYDROCHLORIDE 2 MG/2ML
INJECTION, SOLUTION INTRAMUSCULAR; INTRAVENOUS
Status: DISCONTINUED | OUTPATIENT
Start: 2025-06-23 | End: 2025-06-23 | Stop reason: HOSPADM

## 2025-06-23 RX ORDER — BUPIVACAINE HYDROCHLORIDE 5 MG/ML
INJECTION, SOLUTION EPIDURAL; INTRACAUDAL; PERINEURAL
Status: DISCONTINUED | OUTPATIENT
Start: 2025-06-23 | End: 2025-06-23 | Stop reason: HOSPADM

## 2025-06-23 RX ORDER — DIPHENHYDRAMINE HYDROCHLORIDE 50 MG/ML
INJECTION, SOLUTION INTRAMUSCULAR; INTRAVENOUS
Status: DISCONTINUED | OUTPATIENT
Start: 2025-06-23 | End: 2025-06-23 | Stop reason: HOSPADM

## 2025-06-23 RX ORDER — ACETAMINOPHEN 650 MG/1
650 SUPPOSITORY RECTAL EVERY 8 HOURS
Status: DISCONTINUED | OUTPATIENT
Start: 2025-06-23 | End: 2025-06-24 | Stop reason: HOSPADM

## 2025-06-23 RX ADMIN — HYDROCHLOROTHIAZIDE 25 MG: 25 TABLET ORAL at 10:59

## 2025-06-23 RX ADMIN — ATORVASTATIN CALCIUM 10 MG: 10 TABLET, FILM COATED ORAL at 20:15

## 2025-06-23 RX ADMIN — LISINOPRIL 2.5 MG: 2.5 TABLET ORAL at 10:59

## 2025-06-23 RX ADMIN — Medication 10 ML: at 20:15

## 2025-06-23 RX ADMIN — ACETAMINOPHEN 650 MG: 325 TABLET ORAL at 10:50

## 2025-06-23 RX ADMIN — LEFLUNOMIDE 20 MG: 20 TABLET ORAL at 10:50

## 2025-06-23 RX ADMIN — Medication 10 ML: at 10:56

## 2025-06-23 RX ADMIN — ACETAMINOPHEN 650 MG: 325 TABLET ORAL at 16:23

## 2025-06-23 RX ADMIN — EMPAGLIFLOZIN 25 MG: 10 TABLET, FILM COATED ORAL at 10:50

## 2025-06-23 RX ADMIN — TOPIRAMATE 100 MG: 25 TABLET, FILM COATED ORAL at 11:10

## 2025-06-23 RX ADMIN — TOPIRAMATE 100 MG: 25 TABLET, FILM COATED ORAL at 20:15

## 2025-06-23 RX ADMIN — Medication 10 ML: at 10:57

## 2025-06-23 RX ADMIN — METFORMIN HYDROCHLORIDE 500 MG: 500 TABLET, FILM COATED ORAL at 18:27

## 2025-06-23 RX ADMIN — SODIUM CHLORIDE 2000 MG: 9 INJECTION, SOLUTION INTRAVENOUS at 16:26

## 2025-06-23 RX ADMIN — ONDANSETRON 4 MG: 4 TABLET, ORALLY DISINTEGRATING ORAL at 18:26

## 2025-06-23 RX ADMIN — TRAMADOL HYDROCHLORIDE 50 MG: 50 TABLET, COATED ORAL at 18:26

## 2025-06-23 RX ADMIN — HYDROXYCHLOROQUINE SULFATE 200 MG: 200 TABLET ORAL at 10:50

## 2025-06-23 NOTE — PROGRESS NOTES
INTERVENTIONAL CARDIOLOGY  INPATIENT PROGRESS NOTE        PATIENT IDENTIFICATION:  Name:  Nory Rodriguez        MRN:  [id number]  60 y.o.  female            Chief Complain:   Syncopal episodes    SUBJECTIVE:   Patient underwent successful pulmonary pacemaker implantation this morning.  Patient feels tired and sleepy.  States he did not sleep well last night.    OBJECTIVE:  Vitals:    06/23/25 1100 06/23/25 1200 06/23/25 1315 06/23/25 1527   BP: 106/57 93/57 133/66 96/57   BP Location: Right arm Right arm Right arm Right arm   Patient Position: Lying Lying Sitting Lying   Pulse: 70 67 74 72   Resp: 15 16 16 16   Temp:   98.1 °F (36.7 °C) 98.1 °F (36.7 °C)   TempSrc:   Oral Oral   SpO2: 94% 95% 97% 96%   Weight:       Height:               Body mass index is 35.19 kg/m².    Intake/Output Summary (Last 24 hours) at 6/23/2025 1545  Last data filed at 6/23/2025 1315  Gross per 24 hour   Intake 100 ml   Output 1575 ml   Net -1475 ml         Physical Exam  General : Alert, drowsy.  Neck : No jugular venous distention  CVS : Regular rate and rhythm, no murmur  Lungs: Clear to auscultation bilaterally, no crackles or rhonchi  Abdomen: Soft, nontender  Extremities: Warm, well-perfused, no pretibial edema        Allergies   Allergen Reactions    Asa [Aspirin] Anaphylaxis    Ciprofloxacin Anaphylaxis    Exenatide Nausea Only    Levemir [Insulin Detemir] GI Intolerance    Nitroglycerin Hives    Sumatriptan Dizziness    Sitagliptin GI Intolerance    Cat Dander Rash    Codeine Palpitations    Diclofenac Swelling     Scheduled meds:  acetaminophen, 650 mg, Oral, Q8H   Or  acetaminophen, 650 mg, Oral, Q8H   Or  acetaminophen, 650 mg, Rectal, Q8H  atorvastatin, 10 mg, Oral, Nightly  ceFAZolin, 2,000 mg, Intravenous, Q8H  empagliflozin, 25 mg, Oral, Daily  hydroCHLOROthiazide, 25 mg, Oral, Daily  hydroxychloroquine, 200 mg, Oral, Daily  insulin lispro, 2-7 Units, Subcutaneous, 4x Daily AC & at Bedtime  leflunomide, 20 mg, Oral,  "Daily  lisinopril, 2.5 mg, Oral, Daily  metFORMIN, 500 mg, Oral, BID With Meals  sodium chloride, 10 mL, Intravenous, Q12H  sodium chloride, 10 mL, Intravenous, Q12H  topiramate, 100 mg, Oral, BID      IV meds:                           Data Review:  CBC          6/20/2025    08:06 6/21/2025    02:47 6/22/2025    02:39   CBC   WBC 9.81  8.33  9.31    RBC 3.87  3.96  4.28    Hemoglobin 11.2  11.5  12.4    Hematocrit 36.2  36.2  39.4    MCV 93.5  91.4  92.1    MCH 28.9  29.0  29.0    MCHC 30.9  31.8  31.5    RDW 13.5  13.3  13.4    Platelets 203  210  226      CMP          6/21/2025    02:47 6/22/2025    02:39 6/23/2025    02:16   CMP   Glucose 116  137  162    BUN 20.9  24.6  32.1    Creatinine 0.84  1.07  1.17    EGFR 78.7  58.9  52.9    Sodium 140  138  138    Potassium 3.7  3.9  3.7    Chloride 109  108  106    Calcium 8.5  8.8  9.1    Total Protein 6.5  6.8     Albumin 3.7  3.7     Globulin 2.8  3.1     Total Bilirubin 0.2  <0.2     Alkaline Phosphatase 106  109     AST (SGOT) 14  12     ALT (SGPT) 11  8     Albumin/Globulin Ratio 1.3  1.2     BUN/Creatinine Ratio 24.9  23.0  27.4    Anion Gap 11.0  11.2  13.0       CARDIAC LABS:      Lab 06/20/25  0049 06/19/25  2338   PROBNP  --  1,249.0*   HSTROP T 18* 16*        No results found for: \"DIGOXIN\"   Lab Results   Component Value Date    TSH 1.500 04/21/2025           Invalid input(s): \"LDLCALC\"  No results found for: \"POCTROP\"  Lab Results   Component Value Date    TROPONINT 18 (H) 06/20/2025   (  Lab Results   Component Value Date    MG 2.2 06/22/2025     Results for orders placed in visit on 06/17/25    Adult Transthoracic Echo Complete w/ Color, Spectral and Contrast if necessary per protocol    Interpretation Summary    Left ventricular systolic function is normal. Calculated left ventricular EF = 64.5%    Left ventricular diastolic dysfunction is noted.    The right ventricular cavity is borderline dilated.           ASSESSMENT:    Complete heart block    " Syncope and collapse        PLAN:  - Patient is status post permanent pacemaker placement today.  - Continue monitoring, device check in the morning.  - Continue current medications.  - Will follow-up.    Keagan Watson MD, [facility]  6/23/2025    15:45 EDT         I spent 30 minutes caring for this patient on this date of service. This time includes time spent by me in the following activities:preparing for the visit, reviewing tests, obtaining and/or reviewing a separately obtained history, performing a medically appropriate examination and/or evaluation , counseling and educating the patient/family/caregiver, ordering medications, tests, or procedures, referring and communicating with other health care professionals , documenting information in the medical record, independently interpreting results and communicating that information with the patient/family/caregiver, and care coordination. The patient was seen and examined. Work by the provider also included review and/or ordering of lab tests, review and/or ordering of radiology tests, review and/or ordering of medicine tests, discussion with other physicians or providers, independent review of data, obtaining old records, review/summation of old records, and/or other review.

## 2025-06-23 NOTE — NURSING NOTE
Patient went down to CL at 0700. Discussed patient report but was not able to assess or see patient prior to leaving floor for procedure.

## 2025-06-23 NOTE — TELEPHONE ENCOUNTER
Noted.  She should already have a follow-up with cardiology for that.  If she needs to see me go ahead and place a follow-up appointment.  Dr. Barajas

## 2025-06-23 NOTE — PROGRESS NOTES
[facility]   Hospitalist Progress Note  Date: 2025  Patient Name: Nory Rodriguez  : 1965  MRN: [id number]  Date of admission: 2025  Room/Bed: Pool/CATH      Subjective   Subjective     Chief Complaint: Heart block    Summary:    Nory Rordiguez is a 60 y.o. female with significant past medical history of back pain, GERD, Hepatitis A, Depression, HLD, HTN, Neuropathy, T2DM, Sleep apnea sent to the ER by her cardiologist due to abnormal rhythm on Holter monitor.  Patient has been worked up for recent syncopal episodes, she reported that Holter monitor was placed by her cardiologist, later in the day patient was called to come to the ER due to abnormal rhythm on the Holter monitor indicating possible complete heart block. In the ED patient noted to have a BNP over 1200, hemoglobin 10.6, creatinine 1.12, pads were placed by ER physician.  Admitted to the ICU.  Cardiology consulted.  Patient is status post temporary pacemaker.  Plans for permanent pacemaker on Monday    Interval Followup:   Patient is seen post pacemaker placement.  She complains of some soreness in the area of the recent placement.    All systems reviewed and negative except for what is outlined above.      Objective   Objective     Vitals:   Temp:  [98 °F (36.7 °C)-98.7 °F (37.1 °C)] 98 °F (36.7 °C)  Heart Rate:  [60-80] 63  Resp:  [12-22] 22  BP: ()/(55-89) 108/67  Flow (L/min) (Oxygen Therapy):  [2] 2    Physical Exam   General: Sleeping, awakens to voice no acute distress  HENT: Normocephalic atraumatic  Cardiovascular: RRR  Pulmonary: Clear to auscultation bilaterally normal work of breathing  Psych: Mood and affect appropriate    Result Review    Result Review:  I have personally reviewed these results:  [x]  Laboratory      Lab 25  0239 25  0247 25  0806 25  2338   WBC 9.31 8.33 9.81 9.19   HEMOGLOBIN 12.4 11.5* 11.2* 10.6*   HEMATOCRIT 39.4 36.2 36.2 34.5   PLATELETS 226 210 203 193    NEUTROS ABS  --   --   --  5.46   IMMATURE GRANS (ABS)  --   --   --  0.02   LYMPHS ABS  --   --   --  2.36   MONOS ABS  --   --   --  1.01*   EOS ABS  --   --   --  0.27   MCV 92.1 91.4 93.5 94.5         Lab 06/23/25  0216 06/22/25  0239 06/21/25  0247 06/20/25  0806   SODIUM 138 138 140 141   POTASSIUM 3.7 3.9 3.7 3.6   CHLORIDE 106 108* 109* 107   CO2 19.0* 18.8* 20.0* 24.4   ANION GAP 13.0 11.2 11.0 9.6   BUN 32.1* 24.6* 20.9 30.1*   CREATININE 1.17* 1.07* 0.84 1.02*   EGFR 52.9* 58.9* 78.7 62.3   GLUCOSE 162* 137* 116* 156*   CALCIUM 9.1 8.8 8.5* 8.8   MAGNESIUM  --  2.2 2.3 2.4   PHOSPHORUS  --  3.7 3.5 3.3         Lab 06/22/25  0239 06/21/25  0247 06/20/25  0806   TOTAL PROTEIN 6.8 6.5 7.1   ALBUMIN 3.7 3.7 4.2   GLOBULIN 3.1 2.8 2.9   ALT (SGPT) 8 11 12   AST (SGOT) 12 14 14   BILIRUBIN <0.2 0.2 0.2   ALK PHOS 109 106 108         Lab 06/20/25  0049 06/19/25  2338   PROBNP  --  1,249.0*   HSTROP T 18* 16*                 Brief Urine Lab Results  (Last result in the past 365 days)        Color   Clarity   Blood   Leuk Est   Nitrite   Protein   CREAT   Urine HCG        07/16/24 1509 Yellow   Slightly Cloudy   Negative   Negative   Negative   Negative                 [x]  Microbiology   Microbiology Results (last 10 days)       ** No results found for the last 240 hours. **          [x]  Radiology  XR Chest 1 View  Result Date: 6/20/2025  Impression: 1.Right internal jugular approach temporary pacemaker lead terminates in the right ventricle. 2.No pneumothorax visible on this limited supine image. Electronically Signed: Melita Medrano MD  6/20/2025 5:00 PM EDT  Workstation ID: [id number]    XR Chest 1 View  Result Date: 6/20/2025  No acute infiltrate is appreciated.    Portions of this note were completed with a voice recognition program.  6/20/2025 12:04 AM by Emre Grijalva MD on Workstation: [id number]      []  EKG/Telemetry   []  Cardiology/Vascular   []  Pathology  []  Old records  []  Other:    Assessment &  Plan        Assessment and Plan:    Complete heart block   Hx of recent syncope episodes  Back pain  GERD  Hepatitis A  Depression  HLD  HTN  Neuropathy  T2DM  Sleep apnea  Rheumatoid arthritis     Plan:  Transfer to PCU  Cardiology consult noted and appreciated  Patient is status post permanent pacemaker placement on this morning.  Sliding scale insulin  Continue to monitor creatinine       Discussed with RN.    VTE Prophylaxis:  Mechanical VTE prophylaxis orders are present.        CODE STATUS:   Code Status (Patient has no pulse and is not breathing): CPR (Attempt to Resuscitate)  Medical Interventions (Patient has pulse or is breathing): Full Support  Level Of Support Discussed With: Patient      Electronically signed by Serge Grace MD, 6/23/2025, 09:25 EDT.

## 2025-06-23 NOTE — INTERVAL H&P NOTE
H&P reviewed. The patient was examined and there are no changes to the H&P.      Risk benefits and alternatives of permanent pacemaker implant were discussed with the patient.  She is agreeable to proceed.    Lexa Brizuela MD

## 2025-06-24 ENCOUNTER — READMISSION MANAGEMENT (OUTPATIENT)
Dept: CALL CENTER | Facility: HOSPITAL | Age: 60
End: 2025-06-24
Payer: COMMERCIAL

## 2025-06-24 ENCOUNTER — TELEPHONE (OUTPATIENT)
Dept: FAMILY MEDICINE CLINIC | Age: 60
End: 2025-06-24
Payer: COMMERCIAL

## 2025-06-24 VITALS
OXYGEN SATURATION: 96 % | WEIGHT: 205.03 LBS | SYSTOLIC BLOOD PRESSURE: 104 MMHG | HEIGHT: 64 IN | TEMPERATURE: 98.4 F | BODY MASS INDEX: 35 KG/M2 | DIASTOLIC BLOOD PRESSURE: 62 MMHG | RESPIRATION RATE: 16 BRPM | HEART RATE: 73 BPM

## 2025-06-24 LAB
ANION GAP SERPL CALCULATED.3IONS-SCNC: 12.5 MMOL/L (ref 5–15)
BUN SERPL-MCNC: 24.9 MG/DL (ref 8–23)
BUN/CREAT SERPL: 24.9 (ref 7–25)
CALCIUM SPEC-SCNC: 8.5 MG/DL (ref 8.6–10.5)
CHLORIDE SERPL-SCNC: 106 MMOL/L (ref 98–107)
CO2 SERPL-SCNC: 19.5 MMOL/L (ref 22–29)
CREAT SERPL-MCNC: 1 MG/DL (ref 0.57–1)
EGFRCR SERPLBLD CKD-EPI 2021: 63.8 ML/MIN/1.73
GLUCOSE BLDC GLUCOMTR-MCNC: 120 MG/DL (ref 70–99)
GLUCOSE BLDC GLUCOMTR-MCNC: 124 MG/DL (ref 70–99)
GLUCOSE SERPL-MCNC: 189 MG/DL (ref 65–99)
POTASSIUM SERPL-SCNC: 3.8 MMOL/L (ref 3.5–5.2)
QT INTERVAL: 477 MS
QTC INTERVAL: 501 MS
SODIUM SERPL-SCNC: 138 MMOL/L (ref 136–145)
WHOLE BLOOD HOLD SPECIMEN: NORMAL

## 2025-06-24 PROCEDURE — 93010 ELECTROCARDIOGRAM REPORT: CPT | Performed by: INTERNAL MEDICINE

## 2025-06-24 PROCEDURE — 99232 SBSQ HOSP IP/OBS MODERATE 35: CPT | Performed by: INTERNAL MEDICINE

## 2025-06-24 PROCEDURE — 99024 POSTOP FOLLOW-UP VISIT: CPT | Performed by: INTERNAL MEDICINE

## 2025-06-24 PROCEDURE — 99238 HOSP IP/OBS DSCHRG MGMT 30/<: CPT | Performed by: INTERNAL MEDICINE

## 2025-06-24 PROCEDURE — 80048 BASIC METABOLIC PNL TOTAL CA: CPT | Performed by: INTERNAL MEDICINE

## 2025-06-24 PROCEDURE — 63710000001 ONDANSETRON ODT 4 MG TABLET DISPERSIBLE: Performed by: INTERNAL MEDICINE

## 2025-06-24 PROCEDURE — 82948 REAGENT STRIP/BLOOD GLUCOSE: CPT | Performed by: INTERNAL MEDICINE

## 2025-06-24 PROCEDURE — 93005 ELECTROCARDIOGRAM TRACING: CPT | Performed by: INTERNAL MEDICINE

## 2025-06-24 PROCEDURE — 25010000002 CEFAZOLIN PER 500 MG: Performed by: INTERNAL MEDICINE

## 2025-06-24 RX ORDER — TRAMADOL HYDROCHLORIDE 50 MG/1
50 TABLET ORAL EVERY 8 HOURS PRN
Qty: 12 TABLET | Refills: 0 | Status: SHIPPED | OUTPATIENT
Start: 2025-06-24 | End: 2025-06-28

## 2025-06-24 RX ORDER — ONDANSETRON 4 MG/1
4 TABLET, ORALLY DISINTEGRATING ORAL EVERY 6 HOURS PRN
Qty: 12 TABLET | Refills: 0 | Status: SHIPPED | OUTPATIENT
Start: 2025-06-24

## 2025-06-24 RX ORDER — TOPIRAMATE 100 MG/1
100 TABLET, FILM COATED ORAL 2 TIMES DAILY
Qty: 180 TABLET | Refills: 0 | Status: SHIPPED | OUTPATIENT
Start: 2025-06-24

## 2025-06-24 RX ADMIN — HYDROXYCHLOROQUINE SULFATE 200 MG: 200 TABLET ORAL at 09:20

## 2025-06-24 RX ADMIN — ACETAMINOPHEN 650 MG: 325 TABLET ORAL at 09:21

## 2025-06-24 RX ADMIN — ONDANSETRON 4 MG: 4 TABLET, ORALLY DISINTEGRATING ORAL at 06:48

## 2025-06-24 RX ADMIN — EMPAGLIFLOZIN 25 MG: 10 TABLET, FILM COATED ORAL at 09:21

## 2025-06-24 RX ADMIN — TOPIRAMATE 100 MG: 25 TABLET, FILM COATED ORAL at 09:21

## 2025-06-24 RX ADMIN — SODIUM CHLORIDE 2000 MG: 9 INJECTION, SOLUTION INTRAVENOUS at 00:13

## 2025-06-24 RX ADMIN — Medication 10 ML: at 09:22

## 2025-06-24 RX ADMIN — METFORMIN HYDROCHLORIDE 500 MG: 500 TABLET, FILM COATED ORAL at 09:20

## 2025-06-24 RX ADMIN — HYDROCHLOROTHIAZIDE 25 MG: 25 TABLET ORAL at 09:22

## 2025-06-24 RX ADMIN — LEFLUNOMIDE 20 MG: 20 TABLET ORAL at 09:20

## 2025-06-24 RX ADMIN — LISINOPRIL 2.5 MG: 2.5 TABLET ORAL at 09:20

## 2025-06-24 RX ADMIN — TRAMADOL HYDROCHLORIDE 50 MG: 50 TABLET, COATED ORAL at 06:48

## 2025-06-24 RX ADMIN — ACETAMINOPHEN 650 MG: 325 TABLET ORAL at 00:13

## 2025-06-24 RX ADMIN — Medication 10 ML: at 09:21

## 2025-06-24 NOTE — PLAN OF CARE
Goal Outcome Evaluation:           Progress: improving  Outcome Evaluation: Permanent pacemaker placed. VSS. c/o pain see MAR. Anxious and ready to go home today

## 2025-06-24 NOTE — PROGRESS NOTES
INTERVENTIONAL CARDIOLOGY  INPATIENT PROGRESS NOTE        PATIENT IDENTIFICATION:  Name:  Nory Rodriguez        MRN:  [id number]  60 y.o.  female            Chief Complain:   Syncopal episodes    SUBJECTIVE:   Patient underwent permanent pacemaker placement yesterday.  Patient is doing well, no acute events overnight.    OBJECTIVE:  Vitals:    06/23/25 2010 06/23/25 2355 06/24/25 0400 06/24/25 0735   BP: 117/52 119/56 117/62 137/63   BP Location: Right arm Right arm Right arm Right arm   Patient Position: Lying Lying Lying Lying   Pulse: 65 72 68 70   Resp: 18 16 16 16   Temp: 98.1 °F (36.7 °C) 98.1 °F (36.7 °C) 97.7 °F (36.5 °C) 97.7 °F (36.5 °C)   TempSrc: Oral Oral Oral Oral   SpO2: 99% 98% 97% 99%   Weight:       Height:               Body mass index is 35.19 kg/m².    Intake/Output Summary (Last 24 hours) at 6/24/2025 1108  Last data filed at 6/24/2025 0929  Gross per 24 hour   Intake 580 ml   Output 750 ml   Net -170 ml         Physical Exam  General : Alert, drowsy.  Neck : No jugular venous distention  CVS : Regular rate and rhythm, no murmur  Lungs: Clear to auscultation bilaterally, no crackles or rhonchi  Abdomen: Soft, nontender  Extremities: Warm, well-perfused, no pretibial edema        Allergies   Allergen Reactions    Asa [Aspirin] Anaphylaxis    Ciprofloxacin Anaphylaxis    Exenatide Nausea Only    Levemir [Insulin Detemir] GI Intolerance    Nitroglycerin Hives    Sumatriptan Dizziness    Sitagliptin GI Intolerance    Cat Dander Rash    Codeine Palpitations    Diclofenac Swelling     Scheduled meds:  acetaminophen, 650 mg, Oral, Q8H   Or  acetaminophen, 650 mg, Oral, Q8H   Or  acetaminophen, 650 mg, Rectal, Q8H  atorvastatin, 10 mg, Oral, Nightly  empagliflozin, 25 mg, Oral, Daily  hydroCHLOROthiazide, 25 mg, Oral, Daily  hydroxychloroquine, 200 mg, Oral, Daily  insulin lispro, 2-7 Units, Subcutaneous, 4x Daily AC & at Bedtime  leflunomide, 20 mg, Oral, Daily  lisinopril, 2.5 mg, Oral,  "Daily  metFORMIN, 500 mg, Oral, BID With Meals  sodium chloride, 10 mL, Intravenous, Q12H  sodium chloride, 10 mL, Intravenous, Q12H  topiramate, 100 mg, Oral, BID      IV meds:                           Data Review:  CBC          6/20/2025    08:06 6/21/2025    02:47 6/22/2025    02:39   CBC   WBC 9.81  8.33  9.31    RBC 3.87  3.96  4.28    Hemoglobin 11.2  11.5  12.4    Hematocrit 36.2  36.2  39.4    MCV 93.5  91.4  92.1    MCH 28.9  29.0  29.0    MCHC 30.9  31.8  31.5    RDW 13.5  13.3  13.4    Platelets 203  210  226      CMP          6/22/2025    02:39 6/23/2025    02:16 6/24/2025    09:28   CMP   Glucose 137  162  189    BUN 24.6  32.1  24.9    Creatinine 1.07  1.17  1.00    EGFR 58.9  52.9  63.8    Sodium 138  138  138    Potassium 3.9  3.7  3.8    Chloride 108  106  106    Calcium 8.8  9.1  8.5    Total Protein 6.8      Albumin 3.7      Globulin 3.1      Total Bilirubin <0.2      Alkaline Phosphatase 109      AST (SGOT) 12      ALT (SGPT) 8      Albumin/Globulin Ratio 1.2      BUN/Creatinine Ratio 23.0  27.4  24.9    Anion Gap 11.2  13.0  12.5       CARDIAC LABS:      Lab 06/20/25  0049 06/19/25  2338   PROBNP  --  1,249.0*   HSTROP T 18* 16*        No results found for: \"DIGOXIN\"   Lab Results   Component Value Date    TSH 1.500 04/21/2025           Invalid input(s): \"LDLCALC\"  No results found for: \"POCTROP\"  Lab Results   Component Value Date    TROPONINT 18 (H) 06/20/2025   (  Lab Results   Component Value Date    MG 2.2 06/22/2025     Results for orders placed in visit on 06/17/25    Adult Transthoracic Echo Complete w/ Color, Spectral and Contrast if necessary per protocol    Interpretation Summary    Left ventricular systolic function is normal. Calculated left ventricular EF = 64.5%    Left ventricular diastolic dysfunction is noted.    The right ventricular cavity is borderline dilated.           ASSESSMENT:    Complete heart block    Syncope and collapse        PLAN:  - Patient is status post " permanent pacemaker placement yesterday.  Device interrogation done today showed normally functioning pacemaker.  - Case discussed with implanting cardiologist Dr. Brizuela.  Patient is stable for discharge from cardiac standpoint.  - Patient educated on precautions regarding recent pacemaker placement.  No arm lifting for a week, keep the insertion site dry and clean until at least device check in 1 week.    Patient is stable for discharge from cardiac standpoint and can follow-up with cardiology in 2 to 4 weeks postdischarge.    Thank you for the consult.  Cardiology will be available as needed.    Keagan Watson MD, [facility]  6/24/2025    11:08 EDT         I spent 30 minutes caring for this patient on this date of service. This time includes time spent by me in the following activities:preparing for the visit, reviewing tests, obtaining and/or reviewing a separately obtained history, performing a medically appropriate examination and/or evaluation , counseling and educating the patient/family/caregiver, ordering medications, tests, or procedures, referring and communicating with other health care professionals , documenting information in the medical record, independently interpreting results and communicating that information with the patient/family/caregiver, and care coordination. The patient was seen and examined. Work by the provider also included review and/or ordering of lab tests, review and/or ordering of radiology tests, review and/or ordering of medicine tests, discussion with other physicians or providers, independent review of data, obtaining old records, review/summation of old records, and/or other review.

## 2025-06-24 NOTE — OUTREACH NOTE
Prep Survey      Flowsheet Row Responses   [facility] patient discharged from? [facility]   Is LACE score < 7 ? No   Eligibility [facility]   Date of Admission 06/19/25   Date of Discharge 06/24/25   Discharge Disposition Home or Self Care   Discharge diagnosis Bradycardia, PPM placed   Does the patient have one of the following disease processes/diagnoses(primary or secondary)? General Surgery   Does the patient have Home health ordered? No   Is there a DME ordered? No   Medication alerts for this patient see avs   Prep survey completed? Yes            Sahara CAMPBELL - Registered Nurse

## 2025-06-24 NOTE — PROGRESS NOTES
Pulmonary / Critical Care Progress Note      Patient Name: Nory Rodriguez  : 1965  MRN: [id number]  Primary Care Physician:  Eileen Barajas MD  Date of admission: 2025    Subjective   Subjective   Follow-up for complete heart block s/p permanent pacemaker    Over past 24 hours: Underwent pacemaker placement, completed antibiotic course.    No acute events overnight.     This morning,   Currently on room air  Resting in bed  Reports feeling better today  Denies any chest pain chest tightness  No fever or chills  Eager to go    Objective   Objective     Vitals:   Temp:  [97.7 °F (36.5 °C)-98.2 °F (36.8 °C)] 97.7 °F (36.5 °C)  Heart Rate:  [65-76] 70  Resp:  [15-21] 16  BP: ()/(52-66) 137/63  Physical Exam   Vital Signs Reviewed   General:  WDWN, Alert, NAD.  Pleasant female, sitting up in bed  HEENT:  PERRL, EOMI.  OP, nares clear, no sinus tenderness  Neck:  Supple, no JVD, no thyromegaly  Chest:  good aeration, clear to auscultation bilaterally, tympanic to percussion bilaterally, no work of breathing noted  CV: RRR, no MGR, pulses 2+, equal.  Abd:  Soft, NT, ND, + BS, no HSM  EXT:  no clubbing, no cyanosis, no edema  Neuro:  A&Ox3, CN grossly intact, no focal deficits.  Skin: No rashes or lesions noted, left subclavian surgical site, C/D/I      Result Review    Result Review:  I have personally reviewed the results from the time of this admission to 2025 08:16 EDT and agree with these findings:  []  Laboratory  []  Microbiology  []  Radiology  []  EKG/Telemetry   []  Cardiology/Vascular   []  Pathology  []  Old records  []  Other:  Most notable findings include:         Lab 25  0216 25  0239 25  0247 25  0806 25  2338   WBC  --  9.31 8.33 9.81 9.19   HEMOGLOBIN  --  12.4 11.5* 11.2* 10.6*   HEMATOCRIT  --  39.4 36.2 36.2 34.5   PLATELETS  --  226 210 203 193   SODIUM 138 138 140 141 140   POTASSIUM 3.7 3.9 3.7 3.6 3.4*   CHLORIDE 106 108* 109* 107 107    CO2 19.0* 18.8* 20.0* 24.4 20.8*   BUN 32.1* 24.6* 20.9 30.1* 37.8*   CREATININE 1.17* 1.07* 0.84 1.02* 1.12*   GLUCOSE 162* 137* 116* 156* 187*   CALCIUM 9.1 8.8 8.5* 8.8 8.3*   PHOSPHORUS  --  3.7 3.5 3.3  --    TOTAL PROTEIN  --  6.8 6.5 7.1 6.6   ALBUMIN  --  3.7 3.7 4.2 4.0   GLOBULIN  --  3.1 2.8 2.9 2.6       XR Chest 1 View  Result Date: 6/23/2025  XR CHEST 1 VW Date of Exam: 6/23/2025 10:46 AM EDT Indication: Post ICD / Pacer Implant Comparison: AP chest x-ray 6/20/2025, 6/19/2025 Findings: No pneumothorax is seen following pacemaker placement. Lungs appear grossly clear. Cardiomediastinal contours are stable.     Impression: Impression: No pneumothorax is seen following pacemaker placement. Electronically Signed: Melita Medrano MD  6/23/2025 11:27 AM EDT  Workstation ID: [id number]                 Results for orders placed in visit on 06/17/25    Adult Transthoracic Echo Complete w/ Color, Spectral and Contrast if necessary per protocol    Interpretation Summary    Left ventricular systolic function is normal. Calculated left ventricular EF = 64.5%    Left ventricular diastolic dysfunction is noted.    The right ventricular cavity is borderline dilated.       Assessment & Plan   Assessment / Plan     Active Hospital Problems:  Active Hospital Problems    Diagnosis     **Complete heart block     Syncope and collapse          Impression:   Rheumatoid arthritis  Hypertension  [place]ong[place] metabolic acidosis    Plan:   Continue to maintain SpO2 greater than 90%  Patient is s/p permanent pacemaker  Cardiology following, appreciate input  Completed antibiotics course  Encourage activity as tolerated incentive spirometer use  Likely home today    Unless otherwise needed,pulmonary will sign off at this time. Please call with any questions or concerns.       VTE Prophylaxis:  Mechanical VTE prophylaxis orders are present.        CODE STATUS:   Code Status (Patient has no pulse and is not breathing): CPR (Attempt to  Resuscitate)  Medical Interventions (Patient has pulse or is breathing): Full Support  Level Of Support Discussed With: Patient      I personally reviewed pertinent labs, imaging and provider notes. Discussed with bedside nurse and will discuss with primary service.     Electronically signed by JENNIFER Augustin, 06/24/25, 10:51 AM EDT.    This visit was performed by BOTH a physician and an APC. I personally evaluated and examined the patient. I performed all aspects of MDM as documented. , I have reviewed and confirmed the accuracy of the patient's history as documented in this note., and I have reexamined the patient and the results are consistent with the previously documented exam. I have updated the documentation as necessary.     Electronically signed by Rafa Vance MD, 06/24/25, 5:02 PM EDT.     Electronically signed by Rafa Vance MD, 6/24/2025, 08:16 EDT.

## 2025-06-24 NOTE — TELEPHONE ENCOUNTER
Patient had a hospital stay . She is scheduled 7/3 at 9:00am with Dr. Barajas.   She was seen for a complete heart block and a pacemaker was put in place.

## 2025-06-24 NOTE — DISCHARGE SUMMARY
[facility]         HOSPITALIST  DISCHARGE SUMMARY    Patient Name: Nory Rodriguez  : 1965  MRN: [id number]    Date of Admission: 2025  Date of Discharge: 2025  Primary Care Physician: Eileen Barajas MD    Consults       Date and Time Order Name Status Description    2025  2:11 AM Inpatient Hospitalist Consult      2025 12:41 AM Cardiology (on-call MD unless specified) Completed             Active and Resolved Hospital Problems:  Active Hospital Problems    Diagnosis POA    **Complete heart block [I44.2] Yes    Syncope and collapse [R55] Yes      Resolved Hospital Problems   No resolved problems to display.       Hospital Course     Hospital Course:  Nory Rodriguez is a 60 y.o. female with significant past medical history of back pain, GERD, Hepatitis A, Depression, HLD, HTN, Neuropathy, T2DM, Sleep apnea sent to the ER by her cardiologist due to abnormal rhythm on Holter monitor. Patient has been worked up for recent syncopal episodes, she reported that Holter monitor was placed by her cardiologist, later in the day patient was called to come to the ER due to abnormal rhythm on the Holter monitor indicating possible complete heart block. In the ED patient noted to have a BNP over 1200, hemoglobin 10.6, creatinine 1.12, pads were placed by ER physician. Admitted to the ICU. Cardiology consulted. Patient is status post temporary pacemaker. Plans for permanent pacemaker on Monday.    The patient did undergo permanent pacemaker placement.  Heart rates have been well-controlled post intervention.  Day of Discharge     Vital Signs:  Temp:  [97.7 °F (36.5 °C)-98.4 °F (36.9 °C)] 98.4 °F (36.9 °C)  Heart Rate:  [65-73] 73  Resp:  [16-18] 16  BP: ()/(52-63) 104/62  Physical Exam:   General: Awake and alert sitting up on the side of the bed, no acute distress  HENT: Normocephalic atraumatic  Cardiovascular: RRR  Pulmonary: Clear to auscultation bilaterally normal  work of breathing  Psych: Mood and affect appropriate    Discharge Details        Discharge Medications        New Medications        Instructions Start Date   ondansetron ODT 4 MG disintegrating tablet  Commonly known as: ZOFRAN-ODT   4 mg, Translingual, Every 6 Hours PRN      traMADol 50 MG tablet  Commonly known as: ULTRAM   50 mg, Oral, Every 8 Hours PRN             Changes to Medications        Instructions Start Date   gabapentin 100 MG capsule  Commonly known as: NEURONTIN  What changed:   how much to take  how to take this  when to take this  reasons to take this  additional instructions   1-3 pills 3 x a day      topiramate 100 MG tablet  Commonly known as: TOPAMAX  What changed: when to take this   100 mg, Oral, 2 Times Daily             Continue These Medications        Instructions Start Date   albuterol sulfate  (90 Base) MCG/ACT inhaler  Commonly known as: PROVENTIL HFA;VENTOLIN HFA;PROAIR HFA   2 puffs, Inhalation, Every 4 Hours PRN      atorvastatin 10 MG tablet  Commonly known as: LIPITOR   10 mg, Oral, Every Night at Bedtime      BUDESONIDE-FORMOTEROL FUMARATE IN   2 puffs, Inhalation, Daily PRN      docusate sodium 100 MG capsule  Commonly known as: COLACE   100 mg, Nightly      Dulaglutide 4.5 MG/0.5ML solution auto-injector   4.5 mg, Subcutaneous, Weekly, Every sunday       empagliflozin 25 MG tablet tablet  Commonly known as: JARDIANCE   25 mg, Daily      hydroCHLOROthiazide 25 MG tablet   25 mg, Oral, Daily      hydroxychloroquine 200 MG tablet  Commonly known as: PLAQUENIL   200 mg, Daily      Insulin Glargine (1 Unit Dial) 300 UNIT/ML solution pen-injector injection  Commonly known as: TOUJEO   75 Units, Every Morning      Insulin Glargine (1 Unit Dial) 300 UNIT/ML solution pen-injector injection  Commonly known as: TOUJEO   50 Units, Nightly      Insulin Lispro 100 UNIT/ML injection  Commonly known as: humaLOG   6 Units, Subcutaneous, 3 Times Daily PRN, Sliding scale TID        leflunomide 20 MG tablet  Commonly known as: ARAVA   20 mg, Daily      lisinopril 2.5 MG tablet  Commonly known as: PRINIVIL,ZESTRIL   2.5 mg, Oral, Daily      metFORMIN 500 MG tablet  Commonly known as: GLUCOPHAGE   500 mg, Oral, 2 Times Daily With Meals      methocarbamol 500 MG tablet  Commonly known as: ROBAXIN   1,000 mg, Oral, 3 Times Daily PRN      montelukast 10 MG tablet  Commonly known as: SINGULAIR   10 mg, Oral, Every Evening      omeprazole 40 MG capsule  Commonly known as: priLOSEC   40 mg, Oral, Daily      SPIRIVA RESPIMAT IN   Inhale 2 puffs Daily As Needed.      traZODone 150 MG tablet  Commonly known as: DESYREL   150 mg, Oral, Nightly             Stop These Medications      dilTIAZem  MG 24 hr capsule  Commonly known as: CARDIZEM CD              Allergies   Allergen Reactions    Asa [Aspirin] Anaphylaxis    Ciprofloxacin Anaphylaxis    Exenatide Nausea Only    Levemir [Insulin Detemir] GI Intolerance    Nitroglycerin Hives    Sumatriptan Dizziness    Sitagliptin GI Intolerance    Cat Dander Rash    Codeine Palpitations    Diclofenac Swelling       Discharge Disposition:  Home or Self Care    Diet:  Hospital:  Diet Order   Procedures    Diet: Cardiac; Healthy Heart (2-3 Na+); Fluid Consistency: Thin (IDDSI 0)       Discharge Activity:   Activity Instructions       Lifting Restrictions      Type of Restriction: Lifting    Lifting Restrictions: Lifting Restriction (Indicate Limit)    Weight Limit (Pounds): 5    Length of Lifting Restriction: 4 weeks    Other Activity Instructions      Activity Instructions: As tolerated            CODE STATUS:  Code Status and Medical Interventions: CPR (Attempt to Resuscitate); Full Support   Ordered at: 06/20/25 0220     Code Status (Patient has no pulse and is not breathing):    CPR (Attempt to Resuscitate)     Medical Interventions (Patient has pulse or is breathing):    Full Support     Level Of Support Discussed With:    Patient         Future  Appointments   Date Time Provider Department Center   7/1/2025  2:00 PM Eileen Barajas MD [facility] PC BARDS [facility]   7/15/2025  7:30 AM ANIVAL SALGADOLAMBERTO [address] DEBRA [facility]   8/20/2025  2:00 PM Jose Miguel Edmond MD PhD [facility] ESTER ETWN [facility]   12/16/2025  2:00 PM Rimma Chicas APRN [facility] CD BAPAT [facility]       Additional Instructions for the Follow-ups that You Need to Schedule       Call MD With Problems / Concerns   As directed      Instructions: Recurrent syncope or any symptoms concerning to the patient    Order Comments: Instructions: Recurrent syncope or any symptoms concerning to the patient         Discharge Follow-up with PCP   As directed       Currently Documented PCP:    Eileen Barajas MD    PCP Phone Number:    [phone]     Follow Up Details: hospital follow up 1 week                Pertinent  and/or Most Recent Results     PROCEDURES:   Permanent pacemaker placement    LAB RESULTS:      Lab 06/22/25  0239 06/21/25  0247 06/20/25  0806 06/19/25  2338   WBC 9.31 8.33 9.81 9.19   HEMOGLOBIN 12.4 11.5* 11.2* 10.6*   HEMATOCRIT 39.4 36.2 36.2 34.5   PLATELETS 226 210 203 193   NEUTROS ABS  --   --   --  5.46   IMMATURE GRANS (ABS)  --   --   --  0.02   LYMPHS ABS  --   --   --  2.36   MONOS ABS  --   --   --  1.01*   EOS ABS  --   --   --  0.27   MCV 92.1 91.4 93.5 94.5         Lab 06/24/25  0928 06/23/25  0216 06/22/25  0239 06/21/25  0247 06/20/25  0806   SODIUM 138 138 138 140 141   POTASSIUM 3.8 3.7 3.9 3.7 3.6   CHLORIDE 106 106 108* 109* 107   CO2 19.5* 19.0* 18.8* 20.0* 24.4   ANION GAP 12.5 13.0 11.2 11.0 9.6   BUN 24.9* 32.1* 24.6* 20.9 30.1*   CREATININE 1.00 1.17* 1.07* 0.84 1.02*   EGFR 63.8 52.9* 58.9* 78.7 62.3   GLUCOSE 189* 162* 137* 116* 156*   CALCIUM 8.5* 9.1 8.8 8.5* 8.8   MAGNESIUM  --   --  2.2 2.3 2.4   PHOSPHORUS  --   --  3.7 3.5 3.3         Lab 06/22/25  0239 06/21/25  0247 06/20/25  0806 06/19/25  2338   TOTAL PROTEIN 6.8 6.5 7.1 6.6   ALBUMIN 3.7 3.7 4.2 4.0   GLOBULIN 3.1 2.8  2.9 2.6   ALT (SGPT) 8 11 12 13   AST (SGOT) 12 14 14 15   BILIRUBIN <0.2 0.2 0.2 <0.2   ALK PHOS 109 106 108 105         Lab 06/20/25  0049 06/19/25  2338   PROBNP  --  1,249.0*   HSTROP T 18* 16*                 Brief Urine Lab Results  (Last result in the past 365 days)        Color   Clarity   Blood   Leuk Est   Nitrite   Protein   CREAT   Urine HCG        07/16/24 1509 Yellow   Slightly Cloudy   Negative   Negative   Negative   Negative                 Microbiology Results (last 10 days)       ** No results found for the last 240 hours. **            XR Chest 1 View  Result Date: 6/23/2025  Impression: Impression: No pneumothorax is seen following pacemaker placement. Electronically Signed: Melita Medrano MD  6/23/2025 11:27 AM EDT  Workstation ID: [id number]    XR Chest 1 View  Result Date: 6/20/2025  Impression: Impression: 1.Right internal jugular approach temporary pacemaker lead terminates in the right ventricle. 2.No pneumothorax visible on this limited supine image. Electronically Signed: Melita Medrano MD  6/20/2025 5:00 PM EDT  Workstation ID: [id number]    XR Chest 1 View  Result Date: 6/20/2025  Impression: No acute infiltrate is appreciated.    Portions of this note were completed with a voice recognition program.  6/20/2025 12:04 AM by Emre Grijalva MD on Workstation: [employer]        Results for orders placed during the hospital encounter of 03/01/23    Duplex Venous Lower Extremity - Left CV-READ    Interpretation Summary    Acute-on-chronic left lower extremity deep vein thrombosis noted in the popliteal.    Acute left lower extremity deep vein thrombosis noted in the posterial tibial.    All other left sided veins appeared normal.      Results for orders placed during the hospital encounter of 03/01/23    Duplex Venous Lower Extremity - Left CV-READ    Interpretation Summary    Acute-on-chronic left lower extremity deep vein thrombosis noted in the popliteal.    Acute left lower extremity deep vein  thrombosis noted in the posterial tibial.    All other left sided veins appeared normal.      Results for orders placed in visit on 06/17/25    Adult Transthoracic Echo Complete w/ Color, Spectral and Contrast if necessary per protocol    Interpretation Summary    Left ventricular systolic function is normal. Calculated left ventricular EF = 64.5%    Left ventricular diastolic dysfunction is noted.    The right ventricular cavity is borderline dilated.      Labs Pending at Discharge: None        Time spent on Discharge including face to face service: 30 minutes    Electronically signed by Serge Grace MD, 06/24/25, 1:42 PM EDT.

## 2025-06-25 ENCOUNTER — TRANSITIONAL CARE MANAGEMENT TELEPHONE ENCOUNTER (OUTPATIENT)
Dept: CALL CENTER | Facility: HOSPITAL | Age: 60
End: 2025-06-25
Payer: COMMERCIAL

## 2025-06-25 LAB
QT INTERVAL: 541 MS
QTC INTERVAL: 458 MS

## 2025-06-25 NOTE — OUTREACH NOTE
Call Center TCM Note      Flowsheet Row Responses   [facility] facility patient discharged from? [facility]   Does the patient have one of the following disease processes/diagnoses(primary or secondary)? General Surgery   TCM attempt successful? No  [vr for annabel Nayak]   Unsuccessful attempts Attempt 1   Call Status Left message            PIPER Pelaez Registered Nurse    6/25/2025, 10:50 EDT

## 2025-06-26 ENCOUNTER — TELEPHONE (OUTPATIENT)
Dept: CARDIOLOGY | Facility: CLINIC | Age: 60
End: 2025-06-26
Payer: COMMERCIAL

## 2025-06-26 ENCOUNTER — TRANSITIONAL CARE MANAGEMENT TELEPHONE ENCOUNTER (OUTPATIENT)
Dept: CALL CENTER | Facility: HOSPITAL | Age: 60
End: 2025-06-26
Payer: COMMERCIAL

## 2025-06-26 ENCOUNTER — RESULTS FOLLOW-UP (OUTPATIENT)
Age: 60
End: 2025-06-26
Payer: COMMERCIAL

## 2025-06-26 NOTE — TELEPHONE ENCOUNTER
Received VM from patient stating severe pain in left leg and unable to bend her knee.     Attempted to call patient. No answer. VM left with return call requested. Advised in VM if leg is red, hot, and painful to report to ER ASAP.

## 2025-06-26 NOTE — TELEPHONE ENCOUNTER
DE[place]A patient. Patient reports pain in left leg on the inside portion of the knee, especially when bending the knee. States knee is swollen. States denies any heat or redness. Reports sore to the touch with bruising. Patient had temporary pacemaker in right IJ and pacemaker placed 6 days ago.     Advised to report to ER if pain, heat, and redness in leg. Advised to go to urgent care or PCP to have evaluated.     JENNIFER Freedman: Agree with above recommendations.

## 2025-06-26 NOTE — OUTREACH NOTE
Call Center TCM Note      Flowsheet Row Responses   [facility] facility patient discharged from? [facility]   Does the patient have one of the following disease processes/diagnoses(primary or secondary)? General Surgery   TCM attempt successful? No   Unsuccessful attempts Attempt 3            Anders POTTER - Registered Nurse    6/26/2025, 11:28 EDT

## 2025-06-27 ENCOUNTER — TELEPHONE (OUTPATIENT)
Dept: FAMILY MEDICINE CLINIC | Age: 60
End: 2025-06-27
Payer: COMMERCIAL

## 2025-06-27 NOTE — TELEPHONE ENCOUNTER
Pt is calling and reports that she is unable to bend her knee and it is swollen she believes she has a blood hui due to a knot on her leg. Pt advised to go to the er to rule out dvt , pt is not on blood thinners and has had a recent hospitalization with a procedure

## 2025-07-01 ENCOUNTER — OFFICE VISIT (OUTPATIENT)
Dept: FAMILY MEDICINE CLINIC | Age: 60
End: 2025-07-01
Payer: COMMERCIAL

## 2025-07-01 ENCOUNTER — LAB (OUTPATIENT)
Dept: LAB | Facility: HOSPITAL | Age: 60
End: 2025-07-01
Payer: COMMERCIAL

## 2025-07-01 VITALS
TEMPERATURE: 98.2 F | HEART RATE: 74 BPM | HEIGHT: 64 IN | BODY MASS INDEX: 33.05 KG/M2 | DIASTOLIC BLOOD PRESSURE: 80 MMHG | WEIGHT: 193.6 LBS | OXYGEN SATURATION: 95 % | SYSTOLIC BLOOD PRESSURE: 127 MMHG

## 2025-07-01 DIAGNOSIS — E78.00 HYPERCHOLESTEROLEMIA: ICD-10-CM

## 2025-07-01 DIAGNOSIS — L08.9 SUPERFICIAL INJURY OF RIGHT RING FINGER WITH INFECTION: ICD-10-CM

## 2025-07-01 DIAGNOSIS — Z79.4 TYPE 2 DIABETES MELLITUS WITH HYPERGLYCEMIA, WITH LONG-TERM CURRENT USE OF INSULIN: ICD-10-CM

## 2025-07-01 DIAGNOSIS — E11.65 TYPE 2 DIABETES MELLITUS WITH HYPERGLYCEMIA, WITH LONG-TERM CURRENT USE OF INSULIN: ICD-10-CM

## 2025-07-01 DIAGNOSIS — I44.2 COMPLETE HEART BLOCK: Primary | ICD-10-CM

## 2025-07-01 DIAGNOSIS — I10 PRIMARY HYPERTENSION: ICD-10-CM

## 2025-07-01 DIAGNOSIS — Z95.0 PACEMAKER: ICD-10-CM

## 2025-07-01 DIAGNOSIS — R55 SYNCOPE AND COLLAPSE: ICD-10-CM

## 2025-07-01 DIAGNOSIS — S60.944A SUPERFICIAL INJURY OF RIGHT RING FINGER WITH INFECTION: ICD-10-CM

## 2025-07-01 DIAGNOSIS — M05.79 RHEUMATOID ARTHRITIS INVOLVING MULTIPLE SITES WITH POSITIVE RHEUMATOID FACTOR: ICD-10-CM

## 2025-07-01 DIAGNOSIS — M25.562 ACUTE PAIN OF LEFT KNEE: ICD-10-CM

## 2025-07-01 LAB
CHOLEST SERPL-MCNC: 161 MG/DL (ref 0–200)
CHROMATIN AB SERPL-ACNC: 60.5 IU/ML (ref 0–14)
CRP SERPL-MCNC: 0.95 MG/DL (ref 0–0.5)
ERYTHROCYTE [SEDIMENTATION RATE] IN BLOOD: 24 MM/HR (ref 0–30)
HDLC SERPL-MCNC: 34 MG/DL (ref 40–60)
LDLC SERPL CALC-MCNC: 91 MG/DL (ref 0–100)
LDLC/HDLC SERPL: 2.48 {RATIO}
TRIGL SERPL-MCNC: 214 MG/DL (ref 0–150)
URATE SERPL-MCNC: 5.1 MG/DL (ref 2.4–5.7)
VLDLC SERPL-MCNC: 36 MG/DL (ref 5–40)

## 2025-07-01 PROCEDURE — 86431 RHEUMATOID FACTOR QUANT: CPT

## 2025-07-01 PROCEDURE — 36415 COLL VENOUS BLD VENIPUNCTURE: CPT

## 2025-07-01 PROCEDURE — 84550 ASSAY OF BLOOD/URIC ACID: CPT

## 2025-07-01 PROCEDURE — 86140 C-REACTIVE PROTEIN: CPT

## 2025-07-01 PROCEDURE — 99214 OFFICE O/P EST MOD 30 MIN: CPT | Performed by: FAMILY MEDICINE

## 2025-07-01 PROCEDURE — 85652 RBC SED RATE AUTOMATED: CPT

## 2025-07-01 PROCEDURE — 80061 LIPID PANEL: CPT

## 2025-07-01 RX ORDER — DOXYCYCLINE 100 MG/1
100 TABLET ORAL 2 TIMES DAILY
Qty: 14 TABLET | Refills: 0 | Status: SHIPPED | OUTPATIENT
Start: 2025-07-01 | End: 2025-07-08

## 2025-07-01 NOTE — ASSESSMENT & PLAN NOTE
Status post pacer PEG current placement.  She has follow-up with cardiology.  She is doing well with no medication changes.

## 2025-07-01 NOTE — Clinical Note
Please call Cherelle and let her know I cannot put her on topical diclofenac because she has an allergy to oral diclofenac.  Lets get labs first but we could always do steroids if she was willing.  Dr. Barajas

## 2025-07-01 NOTE — PROGRESS NOTES
Nory Rodriguez presents to [facility].    Chief Complaint:  syncope, new pacemaker    Subjective     History of Present Illness:  ABDOUL Villarreal is a 61 yo female with recent hospitalization, admitted on 6/19/25, discharge on 6/20/25, at [facility].  Cherelle evidently had been having some syncopal episodes for the past few months and had a Holter monitor placed by cardiology that morning and later that day came to the ER with abnormal rhythm on the Holter monitor indicating possible complete heart block.  In the ER her BNP was 1200, hemoglobin 10.6, creatinine 1.12.  She admitted to the ICU.  She was placed on a temporary pacemaker with plans for permanent pacemaker placement on Monday.  She proceeded with pacemaker placement on 6/22/2025 and tolerated procedure well.  She was discharged home on pain medication tramadol and Zofran nausea medication.  Other changes in her medication is her diltiazem was stopped otherwise there were no med changes.  She does not take the tramadol pain medicine at all.       Labs in the hospital include WBC 3 1, hemoglobin 12.4, hematocrit 39.4, platelets 226, Sodium 138, potassium 3.8, BUN 24.9, creatinine 1.0, GFR 63.8, glucose 189, calcium 8.5, magnesium 2.2, phosphorus 3.7, ALT 8, AST 12, alkaline phosphatase 109, troponin 16 and 18, urine negative for infection, chest x-ray pacemaker placement is in good place.      Results for orders placed in visit on 06/17/25  Adult Transthoracic Echo Complete w/ Color, Spectral and Contrast if necessary per protocol  Interpretation Summary    Left ventricular systolic function is normal. Calculated left ventricular EF = 64.5%    Left ventricular diastolic dysfunction is noted.    The right ventricular cavity is borderline dilated.      Result Review   The following data was reviewed by Eileen Barajas MD on 07/01/2025.  Lab Results   Component Value Date    WBC 9.31 06/22/2025    HGB 12.4 06/22/2025  "   HCT 39.4 06/22/2025    MCV 92.1 06/22/2025     06/22/2025     Lab Results   Component Value Date    GLUCOSE 189 (H) 06/24/2025    BUN 24.9 (H) 06/24/2025    CREATININE 1.00 06/24/2025     06/24/2025    K 3.8 06/24/2025     06/24/2025    CALCIUM 8.5 (L) 06/24/2025    PROTEINTOT 6.8 06/22/2025    ALBUMIN 3.7 06/22/2025    ALT 8 06/22/2025    AST 12 06/22/2025    ALKPHOS 109 06/22/2025    BILITOT <0.2 06/22/2025    GLOB 3.1 06/22/2025    AGRATIO 1.2 06/22/2025    BCR 24.9 06/24/2025    ANIONGAP 12.5 06/24/2025    EGFR 63.8 06/24/2025     Lab Results   Component Value Date    CHOL 143 06/05/2024    CHLPL 177 05/03/2021    TRIG 131 06/05/2024    HDL 43 06/05/2024    LDL 77 06/05/2024     Lab Results   Component Value Date    TSH 1.500 04/21/2025     Lab Results   Component Value Date    HGBA1C 5.70 (H) 04/15/2025     No results found for: \"PSA\"  Lab Results   Component Value Date    Iron 40 04/21/2025    Iron Saturation (TSAT) 10 (L) 04/21/2025      Lab Results   Component Value Date    NZVO90TL 37.9 04/21/2025               Assessment and Plan:   Assessment & Plan  Complete heart block  Status post pacer PEG current placement.  She has follow-up with cardiology.  She is doing well with no medication changes.       Pacemaker  Follow-up with cardiology as directed.  No acute issues.  Incision clean dry and intact       Syncope and collapse  Secondary to complete heart failure.  Status post pacemaker placement and she is doing well       Type 2 diabetes mellitus with hyperglycemia, with long-term current use of insulin  Blood sugars are stable and very well-controlled.  No changes to current meds or treatment plan         Primary hypertension  Blood pressure stable and well-controlled.  No changes to current meds or treatment plan         Hypercholesterolemia   Cholesterol is well controlled on current medication and treatment plan, tolerates meds well, no changes are needed to current meds or tx " plan, will check appropriate labs today.  Encourage healthy diet and daily exercise         Acute pain of left knee  Secondary to RA flare up, will check labs.  I was going to start her on topical diclofenac but she has an allergy to diclofenac so we may have to resort to steroid treatment orally       Superficial injury of right ring finger with infection  Will treat for cellulitis, looks like a FB but she is unaware of a FB  Orders:    doxycycline (ADOXA) 100 MG tablet; Take 1 tablet by mouth 2 (Two) Times a Day for 7 days.    Rheumatoid Factor, Quant; Future    C-reactive protein; Future    Uric acid; Future    Sedimentation rate, automated; Future    Lipid panel; Future    Rheumatoid arthritis involving multiple sites with positive rheumatoid factor  If needed I will fill her arava and plaquenil to bridge the gap of her pending follow up with her rheumatologist she was changing offices and needs to get her insurance approved before he can fill her medication again.                  Objective     Medications:  Current Outpatient Medications   Medication Instructions    albuterol sulfate  (90 Base) MCG/ACT inhaler 2 puffs, Every 4 Hours PRN    atorvastatin (LIPITOR) 10 mg, Oral, Every Night at Bedtime    BUDESONIDE-FORMOTEROL FUMARATE IN 2 puffs, Daily PRN    docusate sodium (COLACE) 100 mg, Nightly    doxycycline (ADOXA) 100 mg, Oral, 2 Times Daily    Dulaglutide 4.5 mg, Weekly    empagliflozin (JARDIANCE) 25 mg, Daily    gabapentin (NEURONTIN) 100 MG capsule 1-3 pills 3 x a day    hydroCHLOROthiazide 25 mg, Oral, Daily    hydroxychloroquine (PLAQUENIL) 200 mg, Daily    Insulin Glargine (1 Unit Dial) (TOUJEO) 75 Units, Every Morning    Insulin Glargine (1 Unit Dial) (TOUJEO) 50 Units, Nightly    Insulin Lispro (HUMALOG) 6 Units, 3 Times Daily PRN    leflunomide (ARAVA) 20 mg, Daily    lisinopril (PRINIVIL,ZESTRIL) 2.5 mg, Oral, Daily    metFORMIN (GLUCOPHAGE) 500 mg, Oral, 2 Times Daily With Meals     "methocarbamol (ROBAXIN) 1,000 mg, Oral, 3 Times Daily PRN    montelukast (SINGULAIR) 10 mg, Oral, Every Evening    omeprazole (PRILOSEC) 40 mg, Oral, Daily    ondansetron ODT (ZOFRAN-ODT) 4 mg, Translingual, Every 6 Hours PRN    Tiotropium Bromide Monohydrate (SPIRIVA RESPIMAT IN) Inhale 2 puffs Daily As Needed.    topiramate (TOPAMAX) 100 mg, Oral, 2 Times Daily    traZODone (DESYREL) 150 mg, Oral, Nightly        Vital Signs:   /80 (BP Location: Right arm, Patient Position: Sitting, Cuff Size: Adult)   Pulse 74   Temp 98.2 °F (36.8 °C) (Oral)   Ht 162.6 cm (64.02\")   Wt 87.8 kg (193 lb 9.6 oz)   SpO2 95%   BMI 33.21 kg/m²           BP Readings from Last 3 Encounters:   07/01/25 127/80   06/24/25 104/62   06/09/25 115/72      Wt Readings from Last 3 Encounters:   07/01/25 87.8 kg (193 lb 9.6 oz)   06/20/25 93 kg (205 lb 0.4 oz)   06/17/25 91.2 kg (201 lb)        Physical Exam:  Physical Exam  Vitals and nursing note reviewed.   Constitutional:       General: She is not in acute distress.     Appearance: Normal appearance. She is not ill-appearing, toxic-appearing or diaphoretic.   HENT:      Head: Normocephalic and atraumatic.      Right Ear: Tympanic membrane, ear canal and external ear normal.      Left Ear: Tympanic membrane, ear canal and external ear normal.      Nose: Nose normal. No congestion or rhinorrhea.      Mouth/Throat:      Pharynx: Oropharynx is clear. No oropharyngeal exudate or posterior oropharyngeal erythema.   Eyes:      Conjunctiva/sclera: Conjunctivae normal.   Cardiovascular:      Rate and Rhythm: Normal rate.      Pulses: Normal pulses.   Pulmonary:      Effort: Pulmonary effort is normal. No respiratory distress.      Breath sounds: Normal breath sounds. No stridor. No wheezing, rhonchi or rales.   Musculoskeletal:         General: Normal range of motion.      Cervical back: Normal range of motion and neck supple. No rigidity.   Lymphadenopathy:      Cervical: No cervical " adenopathy.   Skin:     General: Skin is warm and dry.      Capillary Refill: Capillary refill takes less than 2 seconds.          Neurological:      Mental Status: She is alert and oriented to person, place, and time.   Psychiatric:         Mood and Affect: Mood normal.         Behavior: Behavior normal.           Review of Systems:  Review of Systems   Constitutional:  Positive for fatigue. Negative for chills and fever.   HENT:  Negative for ear pain, sinus pressure and sore throat.    Eyes:  Negative for blurred vision and double vision.   Respiratory:  Positive for cough. Negative for shortness of breath and wheezing.    Cardiovascular:  Negative for chest pain and palpitations.   Gastrointestinal:  Negative for abdominal pain, blood in stool, constipation, diarrhea, nausea and vomiting.   Musculoskeletal:  Positive for arthralgias.   Skin:  Negative for rash.   Neurological:  Negative for dizziness and headache.   Psychiatric/Behavioral:  Negative for sleep disturbance, suicidal ideas and depressed mood. The patient is not nervous/anxious.               Follow Up   Return in about 3 months (around 10/1/2025), or if symptoms worsen or fail to improve, for Recheck.    Part of this note may be an electronic transcription/translation of spoken language to printed   text using the Dragon Dictation System.              Health Maintenance   Topic Date Due    LIPID PANEL  06/06/2025    URINE MICROALBUMIN-CREATININE RATIO (uACR)  06/06/2025    INFLUENZA VACCINE  07/01/2025    ANNUAL PHYSICAL  07/16/2025    TDAP/TD VACCINES (2 - Td or Tdap) 09/29/2025    HEMOGLOBIN A1C  10/15/2025    DIABETIC FOOT EXAM  12/05/2025    MAMMOGRAM  06/17/2026    DIABETIC EYE EXAM  06/19/2026    COLORECTAL CANCER SCREENING  07/02/2028    HEPATITIS C SCREENING  Completed    COVID-19 Vaccine  Completed    Pneumococcal Vaccine 50+  Completed    ZOSTER VACCINE  Completed          Medical History:  There are no discontinued medications.   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, hepatitis viral    Cellulitis of chest wall    Chronic back pain    Chronic hoarseness    COVID-19    Dietary counseling and surveillance    Dizziness and giddiness    Effusion, left knee    Effusion, right knee    GERD without esophagitis    Hepatitis A    Hereditary and idiopathic neuropathy, unspecified    History of depression    Hyperlipidemia    Hypertension    Kidney stone    Localized swelling, mass and lump, right lower limb    Low back pain    Methicillin resistant Staphylococcus aureus infection as the cause of diseases classified elsewhere    Migraine without aura, not intractable, without status migrainosus    Moderate persistent asthma with (acute) exacerbation    Morbid obesity due to excess calories    Nausea    Neuropathy    Osteoporosis    Pain in joints of right hand    Pain in left hip    Pain in left leg    Pain in right shoulder    Paresthesia of skin    Peripheral neuropathy    Personal history of other venous thrombosis and embolism    Post-menopausal    Pressure ulcer of other site, stage 1    Primary insomnia    Pure hypercholesterolemia, unspecified    Rheumatoid lung disease with rheumatoid arthritis    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    Shortness of breath    Sleep apnea    Sleep apnea, unspecified    Tinea unguium    Type 2 diabetes mellitus with diabetic polyneuropathy    Uterine polyp    Viral hepatitis    Vitamin D deficiency, unspecified     Past Surgical History:    CARDIAC CATHETERIZATION    CARDIAC ELECTROPHYSIOLOGY PROCEDURE    Procedure: Temporary Pacemaker;  Surgeon: Keagan aWtson MD;  Location: [facility] CATH INVASIVE LOCATION;  Service: Cardiology;  Laterality: Right;  Right IJ    CATARACT  EXTRACTION, BILATERAL    CHOLECYSTECTOMY    HYSTERECTOMY    TAHBSO    PACEMAKER IMPLANTATION    Procedure: Implant PPM DC - Stick at 07:30, [employer];  Surgeon: SAÚL Brizuela MD;  Location: [facility] INVASIVE LOCATION;  Service: Cardiovascular;  Laterality: N/A;    PERICARDIAL WINDOW      Family History   Problem Relation Age of Onset    Hyperlipidemia Mother     Endometrial cancer Mother     Coronary artery disease Father     Stroke Father     Diabetes type II Brother     Breast cancer Maternal Grandmother     Alzheimer's disease Maternal Grandmother     Diabetes type II Maternal Grandfather     Diabetes type II Paternal Grandfather      Social History     Tobacco Use    Smoking status: Never     Passive exposure: Never    Smokeless tobacco: Never   Substance Use Topics    Alcohol use: Not Currently       Health Maintenance Due   Topic Date Due    LIPID PANEL  06/06/2025    URINE MICROALBUMIN-CREATININE RATIO (uACR)  06/06/2025    INFLUENZA VACCINE  07/01/2025        Immunization History   Administered Date(s) Administered    COVID-19 (PFIZER) 12YRS+ (COMIRNATY) 12/05/2024    COVID-19 (PFIZER) BIVALENT 12+YRS 10/06/2022    COVID-19 (PFIZER) Purple Cap Monovalent 03/30/2021, 04/20/2021, 11/08/2021    Fluzone  >6mos 10/14/2013, 12/05/2024    Fluzone (or Fluarix & Flulaval for VFC) >6mos 10/13/2021, 10/06/2022    Fluzone High-Dose 65+yrs 10/03/2023    Influenza Seasonal Injectable 10/27/2012    Influenza, Unspecified 10/03/2020, 10/06/2022    Pneumococcal Conjugate 20-Valent (PCV20) 04/06/2023    Pneumococcal Polysaccharide (PPSV23) 11/29/2016    Shingrix 04/06/2023, 07/13/2023    Tdap 09/29/2015       Allergies   Allergen Reactions    Asa [Aspirin] Anaphylaxis    Ciprofloxacin Anaphylaxis    Exenatide Nausea Only    Levemir [Insulin Detemir] GI Intolerance    Nitroglycerin Hives    Sumatriptan Dizziness    Sitagliptin GI Intolerance    Cat Dander Rash    Codeine Palpitations    Diclofenac Swelling

## 2025-07-01 NOTE — ASSESSMENT & PLAN NOTE
Blood sugars are stable and very well-controlled.  No changes to current meds or treatment plan

## 2025-07-07 LAB
MC_CV_MDC_IDC_RATE_1: 160
MC_CV_MDC_IDC_ZONE_ID: 1
MDC_IDC_MSMT_BATTERY_REMAINING_LONGEVITY: 132 MO
MDC_IDC_MSMT_BATTERY_REMAINING_PERCENTAGE: 100 %
MDC_IDC_MSMT_BATTERY_STATUS: NORMAL
MDC_IDC_MSMT_LEADCHNL_RA_DTM: NORMAL
MDC_IDC_MSMT_LEADCHNL_RA_PACING_THRESHOLD_POLARITY: NORMAL
MDC_IDC_MSMT_LEADCHNL_RV_DTM: NORMAL
MDC_IDC_MSMT_LEADCHNL_RV_PACING_THRESHOLD_POLARITY: NORMAL
MDC_IDC_PG_IMPLANT_DTM: NORMAL
MDC_IDC_PG_MFG: NORMAL
MDC_IDC_PG_MODEL: NORMAL
MDC_IDC_PG_SERIAL: NORMAL
MDC_IDC_PG_TYPE: NORMAL
MDC_IDC_SESS_DTM: NORMAL
MDC_IDC_SESS_TYPE: NORMAL
MDC_IDC_SET_BRADY_AT_MODE_SWITCH_RATE: 170
MDC_IDC_SET_BRADY_LOWRATE: 60
MDC_IDC_SET_BRADY_MAX_TRACKING_RATE: 130
MDC_IDC_SET_BRADY_MODE: NORMAL
MDC_IDC_SET_BRADY_PAV_DELAY: 180
MDC_IDC_SET_BRADY_SAV_DELAY: 150
MDC_IDC_SET_LEADCHNL_RA_PACING_AMPLITUDE: 3.5
MDC_IDC_SET_LEADCHNL_RA_PACING_POLARITY: NORMAL
MDC_IDC_SET_LEADCHNL_RA_PACING_PULSEWIDTH: 0.4
MDC_IDC_SET_LEADCHNL_RA_SENSING_POLARITY: NORMAL
MDC_IDC_SET_LEADCHNL_RA_SENSING_SENSITIVITY: 0.75
MDC_IDC_SET_LEADCHNL_RV_PACING_AMPLITUDE: 3.5
MDC_IDC_SET_LEADCHNL_RV_PACING_POLARITY: NORMAL
MDC_IDC_SET_LEADCHNL_RV_PACING_PULSEWIDTH: 0.4
MDC_IDC_SET_LEADCHNL_RV_SENSING_POLARITY: NORMAL
MDC_IDC_SET_LEADCHNL_RV_SENSING_SENSITIVITY: 2.5
MDC_IDC_SET_ZONE_STATUS: NORMAL
MDC_IDC_SET_ZONE_TYPE: NORMAL
MDC_IDC_STAT_BRADY_RA_PERCENT_PACED: 0
MDC_IDC_STAT_BRADY_RV_PERCENT_PACED: 100

## 2025-07-12 DIAGNOSIS — F51.01 PRIMARY INSOMNIA: ICD-10-CM

## 2025-07-14 RX ORDER — TRAZODONE HYDROCHLORIDE 150 MG/1
150 TABLET ORAL NIGHTLY
Qty: 90 TABLET | Refills: 0 | Status: SHIPPED | OUTPATIENT
Start: 2025-07-14

## 2025-07-15 ENCOUNTER — HOSPITAL ENCOUNTER (OUTPATIENT)
Dept: MAMMOGRAPHY | Facility: HOSPITAL | Age: 60
Discharge: HOME | End: 2025-07-15
Admitting: FAMILY MEDICINE
Payer: COMMERCIAL

## 2025-07-15 DIAGNOSIS — Z12.31 SCREENING MAMMOGRAM FOR BREAST CANCER: ICD-10-CM

## 2025-07-15 PROCEDURE — 77063 BREAST TOMOSYNTHESIS BI: CPT

## 2025-07-15 PROCEDURE — 77067 SCR MAMMO BI INCL CAD: CPT

## 2025-07-16 ENCOUNTER — TELEPHONE (OUTPATIENT)
Age: 60
End: 2025-07-16
Payer: COMMERCIAL

## 2025-07-16 NOTE — TELEPHONE ENCOUNTER
"  Caller: Nory Rodriguez \"Cherelle\"    Relationship: Self    Best call back number: [phone]    What is the best time to reach you: ANY    Who are you requesting to speak with (clinical staff, provider,  specific staff member): CLINICAL     What was the call regarding: PT CALLED IN FIRST, WONDERING WHY HER APPT WAS MOVED BACK TWICE NOW. SHE IS OKAY TO WAIT THAT LONG BUT SHE HAS A FEW CLINICAL QUESTIONS AS TO HER CONDITION. PT NEEDS TO KNOW BASED ON HER CONDITION, WHAT SHE IS ABLE TO DO. PT IS JUST WONDERING HOW MUCH ACTIVITY SHE CAN DO AND HOW MUCH SHE CAN EXERT HERSELF. PUSHING BACK THIS APPT HAS PUT HER LIFE ON HOLD AND WOULD LIKE TO HAVE HER LIFE BACK NOW.     PT WENT INTO THE HOSPITAL COMPLETE HEART BLOCK AND HAD A PACEMAKER PLACED. PT IS FEELING OKAY, JUST SORE AND DOESN'T HAVE THE ENERGY SHE SAYS OTHER SAY SHE SHOULD HAVE AT THIS POINT.   "

## 2025-07-17 NOTE — TELEPHONE ENCOUNTER
SW patient. Patient had pacemaker implanted on 6/23. Patient request advisement if she can go to the water park while keeping pacemaker site covered and if able to ride roller coaster if bar does not go over chest. Request advisement on any other activity limitations. Patient does report ongoing fatigue. BP consistently 110's/50's. Patient has appointment on 8/19.

## 2025-07-18 NOTE — TELEPHONE ENCOUNTER
Please advise patient she needs to wait after 6 weeks pacemaker implantation before doing of these activities.

## 2025-07-30 ENCOUNTER — OFFICE VISIT (OUTPATIENT)
Dept: FAMILY MEDICINE CLINIC | Age: 60
End: 2025-07-30
Payer: COMMERCIAL

## 2025-07-30 VITALS
TEMPERATURE: 98.1 F | HEIGHT: 64 IN | DIASTOLIC BLOOD PRESSURE: 82 MMHG | BODY MASS INDEX: 32.33 KG/M2 | WEIGHT: 189.4 LBS | HEART RATE: 79 BPM | OXYGEN SATURATION: 95 % | SYSTOLIC BLOOD PRESSURE: 103 MMHG

## 2025-07-30 DIAGNOSIS — E53.8 B12 DEFICIENCY: ICD-10-CM

## 2025-07-30 DIAGNOSIS — I10 PRIMARY HYPERTENSION: ICD-10-CM

## 2025-07-30 DIAGNOSIS — R53.83 OTHER FATIGUE: ICD-10-CM

## 2025-07-30 DIAGNOSIS — J02.9 SORE THROAT: ICD-10-CM

## 2025-07-30 DIAGNOSIS — U07.1 COVID-19 VIRUS INFECTION: Primary | ICD-10-CM

## 2025-07-30 DIAGNOSIS — R05.1 ACUTE COUGH: ICD-10-CM

## 2025-07-30 DIAGNOSIS — J45.20 MILD INTERMITTENT ASTHMA WITHOUT COMPLICATION: ICD-10-CM

## 2025-07-30 LAB
EXPIRATION DATE: ABNORMAL
EXPIRATION DATE: NORMAL
FLUAV AG UPPER RESP QL IA.RAPID: NOT DETECTED
FLUBV AG UPPER RESP QL IA.RAPID: NOT DETECTED
INTERNAL CONTROL: ABNORMAL
INTERNAL CONTROL: NORMAL
Lab: ABNORMAL
Lab: NORMAL
S PYO AG THROAT QL: NEGATIVE
SARS-COV-2 AG UPPER RESP QL IA.RAPID: DETECTED

## 2025-07-30 PROCEDURE — 87081 CULTURE SCREEN ONLY: CPT | Performed by: FAMILY MEDICINE

## 2025-07-30 RX ORDER — DEXTROMETHORPHAN HYDROBROMIDE AND PROMETHAZINE HYDROCHLORIDE 15; 6.25 MG/5ML; MG/5ML
5 SYRUP ORAL 4 TIMES DAILY PRN
Qty: 180 ML | Refills: 0 | Status: SHIPPED | OUTPATIENT
Start: 2025-07-30

## 2025-07-30 RX ORDER — CYANOCOBALAMIN 1000 UG/ML
1000 INJECTION, SOLUTION INTRAMUSCULAR; SUBCUTANEOUS
Status: SHIPPED | OUTPATIENT
Start: 2025-07-30

## 2025-07-30 RX ADMIN — CYANOCOBALAMIN 1000 MCG: 1000 INJECTION, SOLUTION INTRAMUSCULAR; SUBCUTANEOUS at 16:53

## 2025-07-30 NOTE — ASSESSMENT & PLAN NOTE
BP is well controlled on current medication and treatment plan, tolerates meds well, no changes are needed to current meds or tx plan, will check appropriate labs today.  Encourage healthy diet and daily exercise

## 2025-07-30 NOTE — PROGRESS NOTES
Nory Rodriguez presents to [facility].    Chief Complaint:  URI symptoms    Subjective     History of Present Illness:  HPI  She presents with acute URI symptoms, started yesterday morning, she feels bad all over.  She has a dry cough and feels weak with bodyaches sinus congestion nausea vomiting diarrhea sore throat and ear pain bilaterally.  She usually gets diarrhea with her sinus infections.  She is on singulair and claritan 10 mg bid.  NO nasal sprays.  She was exposed to covid with her mother.    Results for orders placed or performed in visit on 07/30/25   POCT rapid strep A    Collection Time: 07/30/25  3:50 PM    Specimen: Swab   Result Value Ref Range    Rapid Strep A Screen Negative Negative, VALID, INVALID, Not Performed    Internal Control Passed Passed    Lot Number #[id number]     Expiration Date 5-31-26    POCT SARS-CoV-2 Antigen DANNY + Flu    Collection Time: 07/30/25  3:59 PM    Specimen: Swab   Result Value Ref Range    SARS Antigen Detected (A) Not Detected, Presumptive Negative    Influenza A Antigen DANNY Not Detected Not Detected    Influenza B Antigen DANNY Not Detected Not Detected    Internal Control Passed Passed    Lot Number [id number]     Expiration Date 1-17-26              Result Review   The following data was reviewed by Eileen Barajas MD on 07/30/2025.  Lab Results   Component Value Date    WBC 9.31 06/22/2025    HGB 12.4 06/22/2025    HCT 39.4 06/22/2025    MCV 92.1 06/22/2025     06/22/2025     Lab Results   Component Value Date    GLUCOSE 189 (H) 06/24/2025    BUN 24.9 (H) 06/24/2025    CREATININE 1.00 06/24/2025     06/24/2025    K 3.8 06/24/2025     06/24/2025    CALCIUM 8.5 (L) 06/24/2025    PROTEINTOT 6.8 06/22/2025    ALBUMIN 3.7 06/22/2025    ALT 8 06/22/2025    AST 12 06/22/2025    ALKPHOS 109 06/22/2025    BILITOT <0.2 06/22/2025    GLOB 3.1 06/22/2025    AGRATIO 1.2 06/22/2025    BCR 24.9 06/24/2025    ANIONGAP 12.5  "06/24/2025    EGFR 63.8 06/24/2025     Lab Results   Component Value Date    CHOL 161 07/01/2025    CHLPL 177 05/03/2021    TRIG 214 (H) 07/01/2025    HDL 34 (L) 07/01/2025    LDL 91 07/01/2025     Lab Results   Component Value Date    TSH 1.500 04/21/2025     Lab Results   Component Value Date    HGBA1C 5.70 (H) 04/15/2025     No results found for: \"PSA\"  Lab Results   Component Value Date    Iron 40 04/21/2025    Iron Saturation (TSAT) 10 (L) 04/21/2025      Lab Results   Component Value Date    RDYN05KI 37.9 04/21/2025               Assessment and Plan:   Assessment & Plan  COVID-19 virus infection  She is to quarantine for 5 days total from onset of symptoms and wear a mask for 5 days after.  Will go ahead and treat with Paxlovid.  She has underlying asthma but her lungs are clear without wheezing.  She is to continue her inhalers as directed she is to hold Lipitor and take half of the trazodone while on Paxlovid  Orders:    promethazine-dextromethorphan (PROMETHAZINE-DM) 6.25-15 MG/5ML syrup; Take 5 mL by mouth 4 (Four) Times a Day As Needed for Cough.    Nirmatrelvir & Ritonavir, 300mg/100mg, (PAXLOVID); Take 3 tablets by mouth 2 (Two) Times a Day. Hold atorvastatin/lipitor while on paxlovid and 1/2 pill trazodone while on this medication    Acute cough  Positive for COVID.  Orders:    POCT SARS-CoV-2 Antigen DANNY + Flu    Sore throat    Orders:    POCT rapid strep A    Beta Strep Culture, Throat - , Throat; Future    Other fatigue  Ongoing fatigue.  She is given B12 to help with her fatigue  Orders:    Vitamin B12; Future    B12 deficiency    Orders:    cyanocobalamin injection 1,000 mcg    Primary hypertension  BP is well controlled on current medication and treatment plan, tolerates meds well, no changes are needed to current meds or tx plan, will check appropriate labs today.  Encourage healthy diet and daily exercise         Mild intermittent asthma without complication  Continue her current pulmonary " "toilet regimen.  No acute exacerbation                          Objective     Medications:  Current Outpatient Medications   Medication Instructions    albuterol sulfate  (90 Base) MCG/ACT inhaler 2 puffs, Every 4 Hours PRN    atorvastatin (LIPITOR) 10 mg, Oral, Every Night at Bedtime    BUDESONIDE-FORMOTEROL FUMARATE IN 2 puffs, Daily PRN    docusate sodium (COLACE) 100 mg, Nightly    Dulaglutide 4.5 mg, Weekly    empagliflozin (JARDIANCE) 25 mg, Oral, Daily    gabapentin (NEURONTIN) 100 MG capsule 1-3 pills 3 x a day    hydroCHLOROthiazide 25 mg, Oral, Daily    hydroxychloroquine (PLAQUENIL) 200 mg, Daily    Insulin Glargine (1 Unit Dial) (TOUJEO) 75 Units, Every Morning    Insulin Glargine (1 Unit Dial) (TOUJEO) 50 Units, Nightly    Insulin Lispro (HUMALOG) 6 Units, 3 Times Daily PRN    leflunomide (ARAVA) 20 mg, Daily    lisinopril (PRINIVIL,ZESTRIL) 2.5 mg, Oral, Daily    metFORMIN (GLUCOPHAGE) 500 mg, Oral, 2 Times Daily With Meals    methocarbamol (ROBAXIN) 1,000 mg, Oral, 3 Times Daily PRN    montelukast (SINGULAIR) 10 mg, Oral, Every Evening    Nirmatrelvir & Ritonavir, 300mg/100mg, (PAXLOVID) 3 tablets, Oral, 2 Times Daily, Hold atorvastatin/lipitor while on paxlovid and 1/2 pill trazodone while on this medication    omeprazole (PRILOSEC) 40 mg, Oral, Daily    ondansetron ODT (ZOFRAN-ODT) 4 mg, Translingual, Every 6 Hours PRN    promethazine-dextromethorphan (PROMETHAZINE-DM) 6.25-15 MG/5ML syrup 5 mL, Oral, 4 Times Daily PRN    Tiotropium Bromide Monohydrate (SPIRIVA RESPIMAT IN) Inhale 2 puffs Daily As Needed.    topiramate (TOPAMAX) 100 mg, Oral, 2 Times Daily    traZODone (DESYREL) 150 mg, Oral, Nightly        Vital Signs:   /82 (BP Location: Right arm, Patient Position: Sitting)   Pulse 79   Temp 98.1 °F (36.7 °C) (Temporal)   Ht 162.6 cm (64.02\")   Wt 85.9 kg (189 lb 6.4 oz)   SpO2 95%   BMI 32.49 kg/m²          BP Readings from Last 3 Encounters:   07/30/25 103/82   07/01/25 " 127/80   06/24/25 104/62      Wt Readings from Last 3 Encounters:   07/30/25 85.9 kg (189 lb 6.4 oz)   07/01/25 87.8 kg (193 lb 9.6 oz)   06/20/25 93 kg (205 lb 0.4 oz)        Physical Exam:  Physical Exam  Vitals and nursing note reviewed.   Constitutional:       General: She is not in acute distress.     Appearance: Normal appearance. She is not ill-appearing, toxic-appearing or diaphoretic.   HENT:      Head: Normocephalic and atraumatic.      Right Ear: Tympanic membrane, ear canal and external ear normal.      Left Ear: Tympanic membrane, ear canal and external ear normal.      Nose: No congestion or rhinorrhea.      Mouth/Throat:      Mouth: Mucous membranes are moist.      Pharynx: Oropharynx is clear. No oropharyngeal exudate or posterior oropharyngeal erythema.   Eyes:      Extraocular Movements: Extraocular movements intact.      Conjunctiva/sclera: Conjunctivae normal.      Pupils: Pupils are equal, round, and reactive to light.   Cardiovascular:      Rate and Rhythm: Normal rate and regular rhythm.      Heart sounds: Normal heart sounds.   Pulmonary:      Effort: Pulmonary effort is normal.      Breath sounds: Normal breath sounds. No wheezing, rhonchi or rales.   Abdominal:      General: Abdomen is flat.      Palpations: Abdomen is soft. There is no mass.      Tenderness: There is no abdominal tenderness.      Hernia: No hernia is present.   Musculoskeletal:      Cervical back: Neck supple. No rigidity.      Right lower leg: No edema.      Left lower leg: No edema.   Lymphadenopathy:      Cervical: No cervical adenopathy.   Skin:     General: Skin is warm and dry.   Neurological:      General: No focal deficit present.      Mental Status: She is alert and oriented to person, place, and time. Mental status is at baseline.   Psychiatric:         Mood and Affect: Mood normal.         Behavior: Behavior normal.         Thought Content: Thought content normal.         Judgment: Judgment normal.            Review of Systems:  Review of Systems   Constitutional:  Positive for fatigue. Negative for chills and fever.   HENT:  Positive for congestion, rhinorrhea and sore throat. Negative for ear pain and sinus pressure.    Eyes:  Negative for blurred vision and double vision.   Respiratory:  Positive for cough. Negative for shortness of breath and wheezing.    Cardiovascular:  Negative for chest pain and palpitations.   Gastrointestinal:  Positive for diarrhea, nausea and vomiting. Negative for abdominal pain, blood in stool and constipation.   Skin:  Negative for rash.   Neurological:  Negative for dizziness and headache.   Psychiatric/Behavioral:  Negative for sleep disturbance and suicidal ideas.               Follow Up   Return if symptoms worsen or fail to improve.    Part of this note may be an electronic transcription/translation of spoken language to printed   text using the Dragon Dictation System.              Health Maintenance   Topic Date Due    URINE MICROALBUMIN-CREATININE RATIO (uACR)  06/06/2025    ANNUAL PHYSICAL  07/16/2025    HEMOGLOBIN A1C  10/15/2025    TDAP/TD VACCINES (2 - Td or Tdap) 09/29/2025    INFLUENZA VACCINE  10/01/2025    DIABETIC FOOT EXAM  12/05/2025    DIABETIC EYE EXAM  06/19/2026    LIPID PANEL  07/01/2026    MAMMOGRAM  07/15/2027    COLORECTAL CANCER SCREENING  07/02/2028    HEPATITIS C SCREENING  Completed    COVID-19 Vaccine  Completed    Pneumococcal Vaccine 50+  Completed    ZOSTER VACCINE  Completed          Medical History:  Medications Discontinued During This Encounter   Medication Reason    empagliflozin (JARDIANCE) 25 MG tablet tablet Reorder      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, hepatitis viral    Cellulitis of chest wall    Chronic back pain     Chronic hoarseness    COVID-19    Dietary counseling and surveillance    Dizziness and giddiness    Effusion, left knee    Effusion, right knee    GERD without esophagitis    Hepatitis A    Hereditary and idiopathic neuropathy, unspecified    History of depression    Hyperlipidemia    Hypertension    Kidney stone    Localized swelling, mass and lump, right lower limb    Low back pain    Methicillin resistant Staphylococcus aureus infection as the cause of diseases classified elsewhere    Migraine without aura, not intractable, without status migrainosus    Moderate persistent asthma with (acute) exacerbation    Morbid obesity due to excess calories    Nausea    Neuropathy    Osteoporosis    Pain in joints of right hand    Pain in left hip    Pain in left leg    Pain in right shoulder    Paresthesia of skin    Peripheral neuropathy    Personal history of other venous thrombosis and embolism    Post-menopausal    Pressure ulcer of other site, stage 1    Primary insomnia    Pure hypercholesterolemia, unspecified    Rheumatoid lung disease with rheumatoid arthritis    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    Shortness of breath    Sleep apnea    Sleep apnea, unspecified    Tinea unguium    Type 2 diabetes mellitus with diabetic polyneuropathy    Uterine polyp    Viral hepatitis    Vitamin D deficiency, unspecified     Past Surgical History:    CARDIAC CATHETERIZATION    CARDIAC ELECTROPHYSIOLOGY PROCEDURE    Procedure: Temporary Pacemaker;  Surgeon: Keagan Watson MD;  Location: [facility] CATH INVASIVE LOCATION;  Service: Cardiology;  Laterality: Right;  Right IJ    CATARACT EXTRACTION, BILATERAL    CHOLECYSTECTOMY    HYSTERECTOMY    TAHBSO    PACEMAKER IMPLANTATION    Procedure: Implant PPM DC - Stick at 07:30, [employer];  Surgeon: SAÚL Brizuela MD;  Location: [facility] CATH INVASIVE LOCATION;  Service: Cardiovascular;  Laterality: N/A;    PERICARDIAL WINDOW      Family History    Problem Relation Age of Onset    Hyperlipidemia Mother     Endometrial cancer Mother     Coronary artery disease Father     Stroke Father     Diabetes type II Brother     Breast cancer Maternal Grandmother     Alzheimer's disease Maternal Grandmother     Diabetes type II Maternal Grandfather     Diabetes type II Paternal Grandfather      Social History     Tobacco Use    Smoking status: Never     Passive exposure: Never    Smokeless tobacco: Never   Substance Use Topics    Alcohol use: Not Currently       Health Maintenance Due   Topic Date Due    URINE MICROALBUMIN-CREATININE RATIO (uACR)  06/06/2025    ANNUAL PHYSICAL  07/16/2025    HEMOGLOBIN A1C  10/15/2025        Immunization History   Administered Date(s) Administered    COVID-19 (PFIZER) 12YRS+ (COMIRNATY) 12/05/2024    COVID-19 (PFIZER) BIVALENT 12+YRS 10/06/2022    COVID-19 (PFIZER) Purple Cap Monovalent 03/30/2021, 04/20/2021, 11/08/2021    Fluzone  >6mos 10/14/2013, 12/05/2024    Fluzone (or Fluarix & Flulaval for VFC) >6mos 10/13/2021, 10/06/2022    Fluzone High-Dose 65+yrs 10/03/2023    Influenza Seasonal Injectable 10/27/2012    Influenza, Unspecified 10/03/2020, 10/06/2022    Pneumococcal Conjugate 20-Valent (PCV20) 04/06/2023    Pneumococcal Polysaccharide (PPSV23) 11/29/2016    Shingrix 04/06/2023, 07/13/2023    Tdap 09/29/2015       Allergies   Allergen Reactions    Asa [Aspirin] Anaphylaxis    Ciprofloxacin Anaphylaxis    Exenatide Nausea Only    Levemir [Insulin Detemir] GI Intolerance    Nitroglycerin Hives    Sumatriptan Dizziness    Sitagliptin GI Intolerance    Cat Dander Rash    Codeine Palpitations    Diclofenac Swelling

## 2025-08-01 LAB — BACTERIA SPEC AEROBE CULT: NORMAL

## 2025-08-19 ENCOUNTER — OFFICE VISIT (OUTPATIENT)
Age: 60
End: 2025-08-19
Payer: COMMERCIAL

## 2025-08-19 VITALS
HEART RATE: 70 BPM | DIASTOLIC BLOOD PRESSURE: 57 MMHG | BODY MASS INDEX: 34.31 KG/M2 | SYSTOLIC BLOOD PRESSURE: 114 MMHG | WEIGHT: 201 LBS | HEIGHT: 64 IN

## 2025-08-19 DIAGNOSIS — I10 PRIMARY HYPERTENSION: Primary | ICD-10-CM

## 2025-08-19 DIAGNOSIS — Z95.0 PRESENCE OF CARDIAC PACEMAKER: ICD-10-CM

## 2025-08-19 DIAGNOSIS — E78.2 MIXED HYPERLIPIDEMIA: ICD-10-CM

## 2025-08-19 DIAGNOSIS — I44.2 COMPLETE HEART BLOCK: ICD-10-CM

## 2025-08-19 RX ORDER — MONTELUKAST SODIUM 10 MG/1
10 TABLET ORAL EVERY EVENING
Qty: 90 TABLET | Refills: 0 | Status: SHIPPED | OUTPATIENT
Start: 2025-08-19

## 2025-08-19 RX ORDER — DILTIAZEM HYDROCHLORIDE 120 MG/1
120 CAPSULE, COATED, EXTENDED RELEASE ORAL DAILY
Qty: 90 CAPSULE | Refills: 0 | OUTPATIENT
Start: 2025-08-19

## 2025-08-21 ENCOUNTER — TELEPHONE (OUTPATIENT)
Dept: FAMILY MEDICINE CLINIC | Age: 60
End: 2025-08-21
Payer: COMMERCIAL

## 2025-08-21 ENCOUNTER — LAB (OUTPATIENT)
Dept: LAB | Facility: HOSPITAL | Age: 60
End: 2025-08-21
Payer: COMMERCIAL

## (undated) DEVICE — SI AVANTI+ 6F STD W/GW  NO OBT: Brand: AVANTI

## (undated) DEVICE — PENCL E/S SMOKEEVAC W/TELESCP CANN

## (undated) DEVICE — CATH PACE PACEL BIOPOLAR HEART CRV 5F 110CM RT

## (undated) DEVICE — SLV CONTAM 80CM STER

## (undated) DEVICE — SUT VIC 2/0 SH 27IN

## (undated) DEVICE — SUT VIC 3/0 SH 27IN J416H

## (undated) DEVICE — ST ACC MICROPUNCTURE STFF .018 ECHO/PLAT/TP 4F/10CM 21G/7CM

## (undated) DEVICE — KT INTRO MIC VSI SMOTH STFF 4F 40CM 7CM

## (undated) DEVICE — SUT SILK 0/0 CT2 18IN C027D

## (undated) DEVICE — INTRO TEAR AWAY/LVD W/SD PRT 6F 13CM

## (undated) DEVICE — DISPOSABLE ADAPTER